# Patient Record
Sex: FEMALE | Race: WHITE | Employment: OTHER | RURAL
[De-identification: names, ages, dates, MRNs, and addresses within clinical notes are randomized per-mention and may not be internally consistent; named-entity substitution may affect disease eponyms.]

---

## 2017-01-24 RX ORDER — LEVOTHYROXINE SODIUM 175 UG/1
TABLET ORAL
Qty: 30 TABLET | Refills: 3 | Status: SHIPPED | OUTPATIENT
Start: 2017-01-24 | End: 2017-06-05 | Stop reason: SDUPTHER

## 2017-02-01 ENCOUNTER — OFFICE VISIT (OUTPATIENT)
Dept: PRIMARY CARE CLINIC | Age: 52
End: 2017-02-01
Payer: MEDICARE

## 2017-02-01 VITALS
OXYGEN SATURATION: 94 % | WEIGHT: 242 LBS | RESPIRATION RATE: 20 BRPM | DIASTOLIC BLOOD PRESSURE: 84 MMHG | BODY MASS INDEX: 40.27 KG/M2 | HEART RATE: 110 BPM | SYSTOLIC BLOOD PRESSURE: 124 MMHG

## 2017-02-01 DIAGNOSIS — E66.9 OBESITY, UNSPECIFIED OBESITY SEVERITY, UNSPECIFIED OBESITY TYPE: Primary | ICD-10-CM

## 2017-02-01 DIAGNOSIS — G25.81 RLS (RESTLESS LEGS SYNDROME): ICD-10-CM

## 2017-02-01 DIAGNOSIS — F41.9 ANXIETY: ICD-10-CM

## 2017-02-01 DIAGNOSIS — I10 ESSENTIAL HYPERTENSION: ICD-10-CM

## 2017-02-01 PROCEDURE — G8427 DOCREV CUR MEDS BY ELIG CLIN: HCPCS | Performed by: INTERNAL MEDICINE

## 2017-02-01 PROCEDURE — 3017F COLORECTAL CA SCREEN DOC REV: CPT | Performed by: INTERNAL MEDICINE

## 2017-02-01 PROCEDURE — 1036F TOBACCO NON-USER: CPT | Performed by: INTERNAL MEDICINE

## 2017-02-01 PROCEDURE — 3014F SCREEN MAMMO DOC REV: CPT | Performed by: INTERNAL MEDICINE

## 2017-02-01 PROCEDURE — G8484 FLU IMMUNIZE NO ADMIN: HCPCS | Performed by: INTERNAL MEDICINE

## 2017-02-01 PROCEDURE — 99213 OFFICE O/P EST LOW 20 MIN: CPT | Performed by: INTERNAL MEDICINE

## 2017-02-01 PROCEDURE — G8419 CALC BMI OUT NRM PARAM NOF/U: HCPCS | Performed by: INTERNAL MEDICINE

## 2017-02-01 RX ORDER — ROPINIROLE 0.5 MG/1
0.5 TABLET, FILM COATED ORAL NIGHTLY
Qty: 30 TABLET | Refills: 1 | Status: SHIPPED | OUTPATIENT
Start: 2017-02-01 | End: 2017-04-03 | Stop reason: SDUPTHER

## 2017-02-01 RX ORDER — IBUPROFEN 800 MG/1
TABLET ORAL
Refills: 0 | COMMUNITY
Start: 2016-12-30 | End: 2021-03-23

## 2017-02-01 ASSESSMENT — ENCOUNTER SYMPTOMS
NAUSEA: 0
WHEEZING: 1
EYE DISCHARGE: 0
SINUS PRESSURE: 0
ABDOMINAL PAIN: 0
VOMITING: 0
SORE THROAT: 0
BACK PAIN: 0
SHORTNESS OF BREATH: 1
COUGH: 1

## 2017-02-02 ENCOUNTER — NURSE ONLY (OUTPATIENT)
Dept: PRIMARY CARE CLINIC | Age: 52
End: 2017-02-02
Payer: MEDICARE

## 2017-02-02 ENCOUNTER — HOSPITAL ENCOUNTER (OUTPATIENT)
Dept: OTHER | Age: 52
Discharge: HOME | End: 2017-02-02
Attending: INTERNAL MEDICINE | Admitting: INTERNAL MEDICINE

## 2017-02-02 DIAGNOSIS — G25.81 RLS (RESTLESS LEGS SYNDROME): ICD-10-CM

## 2017-02-02 DIAGNOSIS — E78.5 HYPERLIPIDEMIA, UNSPECIFIED HYPERLIPIDEMIA TYPE: ICD-10-CM

## 2017-02-02 DIAGNOSIS — R30.0 DYSURIA: Primary | ICD-10-CM

## 2017-02-02 DIAGNOSIS — I10 ESSENTIAL HYPERTENSION: ICD-10-CM

## 2017-02-02 DIAGNOSIS — E03.9 HYPOTHYROIDISM, UNSPECIFIED TYPE: ICD-10-CM

## 2017-02-02 LAB
A/G RATIO: 1.8 (ref 0.8–2)
ALBUMIN SERPL-MCNC: 4.4 G/DL (ref 3.4–4.8)
ALP BLD-CCNC: 167 U/L (ref 25–100)
ALT SERPL-CCNC: 26 U/L (ref 4–36)
ANION GAP SERPL CALCULATED.3IONS-SCNC: 11 MMOL/L (ref 3–16)
AST SERPL-CCNC: 25 U/L (ref 8–33)
BILIRUB SERPL-MCNC: 0.4 MG/DL (ref 0.3–1.2)
BILIRUBIN, POC: NORMAL
BLOOD URINE, POC: NORMAL
BUN BLDV-MCNC: 13 MG/DL (ref 6–20)
CALCIUM SERPL-MCNC: 9.4 MG/DL (ref 8.5–10.5)
CHLORIDE BLD-SCNC: 102 MMOL/L (ref 98–107)
CHOLESTEROL, TOTAL: 214 MG/DL (ref 0–200)
CLARITY, POC: NORMAL
CO2: 29 MMOL/L (ref 20–30)
COLOR, POC: YELLOW
CREAT SERPL-MCNC: 0.7 MG/DL (ref 0.4–1.2)
GFR AFRICAN AMERICAN: >59
GFR NON-AFRICAN AMERICAN: >60
GLOBULIN: 2.5 G/DL
GLUCOSE BLD-MCNC: 95 MG/DL (ref 74–106)
GLUCOSE URINE, POC: NORMAL
HDLC SERPL-MCNC: 60 MG/DL (ref 40–60)
KETONES, POC: NORMAL
LDL CHOLESTEROL CALCULATED: 134 MG/DL
LEUKOCYTE EST, POC: NORMAL
MAGNESIUM: 2.4 MG/DL (ref 1.7–2.4)
NITRITE, POC: NORMAL
PH, POC: 8
POTASSIUM SERPL-SCNC: 4.3 MMOL/L (ref 3.4–5.1)
PROTEIN, POC: NORMAL
SODIUM BLD-SCNC: 142 MMOL/L (ref 136–145)
SPECIFIC GRAVITY, POC: 1.01
TOTAL PROTEIN: 6.9 G/DL (ref 6.4–8.3)
TRIGL SERPL-MCNC: 98 MG/DL (ref 0–249)
TSH SERPL DL<=0.05 MIU/L-ACNC: 2.81 UIU/ML (ref 0.35–5.5)
UROBILINOGEN, POC: 0.2
VLDLC SERPL CALC-MCNC: 20 MG/DL

## 2017-02-02 PROCEDURE — 81002 URINALYSIS NONAUTO W/O SCOPE: CPT | Performed by: INTERNAL MEDICINE

## 2017-02-22 RX ORDER — VENLAFAXINE HYDROCHLORIDE 75 MG/1
225 CAPSULE, EXTENDED RELEASE ORAL DAILY
Qty: 90 CAPSULE | Refills: 2 | Status: SHIPPED | OUTPATIENT
Start: 2017-02-22 | End: 2017-06-08 | Stop reason: SDUPTHER

## 2017-02-22 RX ORDER — VENLAFAXINE HYDROCHLORIDE 75 MG/1
225 CAPSULE, EXTENDED RELEASE ORAL DAILY
Qty: 90 CAPSULE | Refills: 2 | Status: CANCELLED | OUTPATIENT
Start: 2017-02-22

## 2017-02-25 ENCOUNTER — OFFICE VISIT (OUTPATIENT)
Dept: PRIMARY CARE CLINIC | Age: 52
End: 2017-02-25
Payer: MEDICARE

## 2017-02-25 VITALS
TEMPERATURE: 98.2 F | RESPIRATION RATE: 24 BRPM | BODY MASS INDEX: 40.32 KG/M2 | DIASTOLIC BLOOD PRESSURE: 84 MMHG | HEIGHT: 65 IN | HEART RATE: 105 BPM | OXYGEN SATURATION: 95 % | WEIGHT: 242 LBS | SYSTOLIC BLOOD PRESSURE: 132 MMHG

## 2017-02-25 DIAGNOSIS — J44.1 COPD WITH ACUTE EXACERBATION (HCC): Primary | ICD-10-CM

## 2017-02-25 DIAGNOSIS — J84.9 INTERSTITIAL LUNG DISEASE (HCC): ICD-10-CM

## 2017-02-25 PROCEDURE — 1036F TOBACCO NON-USER: CPT | Performed by: NURSE PRACTITIONER

## 2017-02-25 PROCEDURE — G8926 SPIRO NO PERF OR DOC: HCPCS | Performed by: NURSE PRACTITIONER

## 2017-02-25 PROCEDURE — 3023F SPIROM DOC REV: CPT | Performed by: NURSE PRACTITIONER

## 2017-02-25 PROCEDURE — 3014F SCREEN MAMMO DOC REV: CPT | Performed by: NURSE PRACTITIONER

## 2017-02-25 PROCEDURE — G8417 CALC BMI ABV UP PARAM F/U: HCPCS | Performed by: NURSE PRACTITIONER

## 2017-02-25 PROCEDURE — 94640 AIRWAY INHALATION TREATMENT: CPT | Performed by: NURSE PRACTITIONER

## 2017-02-25 PROCEDURE — 3017F COLORECTAL CA SCREEN DOC REV: CPT | Performed by: NURSE PRACTITIONER

## 2017-02-25 PROCEDURE — G8427 DOCREV CUR MEDS BY ELIG CLIN: HCPCS | Performed by: NURSE PRACTITIONER

## 2017-02-25 PROCEDURE — 99212 OFFICE O/P EST SF 10 MIN: CPT | Performed by: NURSE PRACTITIONER

## 2017-02-25 PROCEDURE — G8484 FLU IMMUNIZE NO ADMIN: HCPCS | Performed by: NURSE PRACTITIONER

## 2017-02-25 RX ORDER — IPRATROPIUM BROMIDE AND ALBUTEROL SULFATE 2.5; .5 MG/3ML; MG/3ML
1 SOLUTION RESPIRATORY (INHALATION) ONCE
Status: COMPLETED | OUTPATIENT
Start: 2017-02-25 | End: 2017-02-25

## 2017-02-25 RX ADMIN — IPRATROPIUM BROMIDE AND ALBUTEROL SULFATE 1 AMPULE: 2.5; .5 SOLUTION RESPIRATORY (INHALATION) at 12:49

## 2017-02-25 ASSESSMENT — ENCOUNTER SYMPTOMS
GASTROINTESTINAL NEGATIVE: 1
WHEEZING: 1
SHORTNESS OF BREATH: 1
ORTHOPNEA: 1
CHEST TIGHTNESS: 1
EYES NEGATIVE: 1

## 2017-03-03 ENCOUNTER — CARE COORDINATION (OUTPATIENT)
Dept: CARE COORDINATION | Age: 52
End: 2017-03-03

## 2017-03-06 RX ORDER — PANTOPRAZOLE SODIUM 40 MG/1
TABLET, DELAYED RELEASE ORAL
Qty: 30 TABLET | Refills: 3 | Status: SHIPPED | OUTPATIENT
Start: 2017-03-06 | End: 2017-07-01 | Stop reason: SDUPTHER

## 2017-03-08 ENCOUNTER — OFFICE VISIT (OUTPATIENT)
Dept: PRIMARY CARE CLINIC | Age: 52
End: 2017-03-08
Payer: MEDICARE

## 2017-03-08 VITALS
HEART RATE: 90 BPM | WEIGHT: 245 LBS | OXYGEN SATURATION: 98 % | BODY MASS INDEX: 40.77 KG/M2 | RESPIRATION RATE: 20 BRPM | DIASTOLIC BLOOD PRESSURE: 66 MMHG | SYSTOLIC BLOOD PRESSURE: 112 MMHG

## 2017-03-08 DIAGNOSIS — J44.9 CHRONIC OBSTRUCTIVE PULMONARY DISEASE, UNSPECIFIED COPD TYPE (HCC): Primary | ICD-10-CM

## 2017-03-08 DIAGNOSIS — I10 ESSENTIAL HYPERTENSION: ICD-10-CM

## 2017-03-08 DIAGNOSIS — E78.5 HYPERLIPIDEMIA, UNSPECIFIED HYPERLIPIDEMIA TYPE: ICD-10-CM

## 2017-03-08 PROCEDURE — 99495 TRANSJ CARE MGMT MOD F2F 14D: CPT | Performed by: INTERNAL MEDICINE

## 2017-03-08 RX ORDER — POTASSIUM CHLORIDE 750 MG/1
10 TABLET, FILM COATED, EXTENDED RELEASE ORAL 2 TIMES DAILY
Qty: 60 TABLET | Refills: 2 | Status: SHIPPED | OUTPATIENT
Start: 2017-03-08 | End: 2017-06-08 | Stop reason: SDUPTHER

## 2017-03-08 RX ORDER — PREDNISONE 10 MG/1
10 TABLET ORAL 3 TIMES DAILY
COMMUNITY
Start: 2017-02-28 | End: 2017-08-08 | Stop reason: ALTCHOICE

## 2017-03-08 ASSESSMENT — ENCOUNTER SYMPTOMS
BACK PAIN: 0
NAUSEA: 0
COUGH: 1
SORE THROAT: 0
VOMITING: 0
EYE DISCHARGE: 0
ABDOMINAL PAIN: 0
SINUS PRESSURE: 0
SHORTNESS OF BREATH: 1
WHEEZING: 1

## 2017-04-03 RX ORDER — ROPINIROLE 0.5 MG/1
0.5 TABLET, FILM COATED ORAL NIGHTLY
Qty: 30 TABLET | Refills: 1 | Status: SHIPPED | OUTPATIENT
Start: 2017-04-03 | End: 2017-05-31 | Stop reason: SDUPTHER

## 2017-05-05 DIAGNOSIS — I10 ESSENTIAL HYPERTENSION: ICD-10-CM

## 2017-05-05 RX ORDER — HYDROCHLOROTHIAZIDE 25 MG/1
TABLET ORAL
Qty: 30 TABLET | Refills: 3 | Status: SHIPPED | OUTPATIENT
Start: 2017-05-05 | End: 2017-08-28 | Stop reason: SDUPTHER

## 2017-05-31 RX ORDER — ROPINIROLE 0.5 MG/1
0.5 TABLET, FILM COATED ORAL NIGHTLY
Qty: 30 TABLET | Refills: 1 | Status: SHIPPED | OUTPATIENT
Start: 2017-05-31 | End: 2017-07-31 | Stop reason: SDUPTHER

## 2017-06-02 ENCOUNTER — HOSPITAL ENCOUNTER (OUTPATIENT)
Dept: OTHER | Age: 52
Discharge: HOME | End: 2017-06-02
Attending: INTERNAL MEDICINE | Admitting: INTERNAL MEDICINE

## 2017-06-02 DIAGNOSIS — E78.5 HYPERLIPIDEMIA, UNSPECIFIED HYPERLIPIDEMIA TYPE: ICD-10-CM

## 2017-06-02 DIAGNOSIS — I10 ESSENTIAL HYPERTENSION: ICD-10-CM

## 2017-06-02 DIAGNOSIS — J44.9 CHRONIC OBSTRUCTIVE PULMONARY DISEASE, UNSPECIFIED COPD TYPE (HCC): ICD-10-CM

## 2017-06-02 LAB
A/G RATIO: 1.5 (ref 0.8–2)
ALBUMIN SERPL-MCNC: 4.3 G/DL (ref 3.4–4.8)
ALP BLD-CCNC: 155 U/L (ref 25–100)
ALT SERPL-CCNC: 46 U/L (ref 4–36)
ANION GAP SERPL CALCULATED.3IONS-SCNC: 13 MMOL/L (ref 3–16)
AST SERPL-CCNC: 44 U/L (ref 8–33)
BASOPHILS ABSOLUTE: 0 K/UL (ref 0–0.1)
BASOPHILS RELATIVE PERCENT: 0.1 %
BILIRUB SERPL-MCNC: 0.3 MG/DL (ref 0.3–1.2)
BUN BLDV-MCNC: 18 MG/DL (ref 6–20)
CALCIUM SERPL-MCNC: 9.5 MG/DL (ref 8.5–10.5)
CHLORIDE BLD-SCNC: 99 MMOL/L (ref 98–107)
CHOLESTEROL, TOTAL: 205 MG/DL (ref 0–200)
CO2: 30 MMOL/L (ref 20–30)
CREAT SERPL-MCNC: 0.7 MG/DL (ref 0.4–1.2)
EOSINOPHILS ABSOLUTE: 0 K/UL (ref 0–0.4)
EOSINOPHILS RELATIVE PERCENT: 0 %
GFR AFRICAN AMERICAN: >59
GFR NON-AFRICAN AMERICAN: >60
GLOBULIN: 2.8 G/DL
GLUCOSE BLD-MCNC: 107 MG/DL (ref 74–106)
HCT VFR BLD CALC: 43.2 % (ref 37–47)
HDLC SERPL-MCNC: 53 MG/DL (ref 40–60)
HEMOGLOBIN: 13.9 G/DL (ref 11.5–16.5)
LDL CHOLESTEROL CALCULATED: 131 MG/DL
LYMPHOCYTES ABSOLUTE: 1.9 K/UL (ref 1.5–4)
LYMPHOCYTES RELATIVE PERCENT: 25 % (ref 20–40)
MCH RBC QN AUTO: 30.8 PG (ref 27–32)
MCHC RBC AUTO-ENTMCNC: 32.2 G/DL (ref 31–35)
MCV RBC AUTO: 95.8 FL (ref 80–100)
MONOCYTES ABSOLUTE: 0.6 K/UL (ref 0.2–0.8)
MONOCYTES RELATIVE PERCENT: 7.8 % (ref 3–10)
NEUTROPHILS ABSOLUTE: 5.1 K/UL (ref 2–7.5)
NEUTROPHILS RELATIVE PERCENT: 67.1 %
PDW BLD-RTO: 13.3 % (ref 11–16)
PLATELET # BLD: 308 K/UL (ref 150–400)
PMV BLD AUTO: 11.1 FL (ref 6–10)
POTASSIUM SERPL-SCNC: 4.1 MMOL/L (ref 3.4–5.1)
RBC # BLD: 4.51 M/UL (ref 3.8–5.8)
SODIUM BLD-SCNC: 142 MMOL/L (ref 136–145)
TOTAL PROTEIN: 7.1 G/DL (ref 6.4–8.3)
TRIGL SERPL-MCNC: 104 MG/DL (ref 0–249)
VLDLC SERPL CALC-MCNC: 21 MG/DL
WBC # BLD: 7.6 K/UL (ref 4–11)

## 2017-06-05 RX ORDER — LEVOTHYROXINE SODIUM 175 UG/1
TABLET ORAL
Qty: 30 TABLET | Refills: 3 | Status: ON HOLD | OUTPATIENT
Start: 2017-06-05 | End: 2017-11-03 | Stop reason: HOSPADM

## 2017-06-08 ENCOUNTER — OFFICE VISIT (OUTPATIENT)
Dept: PRIMARY CARE CLINIC | Age: 52
End: 2017-06-08
Payer: MEDICARE

## 2017-06-08 VITALS
HEART RATE: 104 BPM | OXYGEN SATURATION: 97 % | RESPIRATION RATE: 18 BRPM | BODY MASS INDEX: 41.07 KG/M2 | SYSTOLIC BLOOD PRESSURE: 112 MMHG | DIASTOLIC BLOOD PRESSURE: 68 MMHG | WEIGHT: 246.8 LBS

## 2017-06-08 DIAGNOSIS — E78.5 HYPERLIPIDEMIA, UNSPECIFIED HYPERLIPIDEMIA TYPE: ICD-10-CM

## 2017-06-08 DIAGNOSIS — J84.9 INTERSTITIAL LUNG DISEASE (HCC): Primary | ICD-10-CM

## 2017-06-08 DIAGNOSIS — E03.9 HYPOTHYROIDISM, UNSPECIFIED TYPE: ICD-10-CM

## 2017-06-08 DIAGNOSIS — R73.9 HYPERGLYCEMIA: ICD-10-CM

## 2017-06-08 LAB — HBA1C MFR BLD: 5.4 %

## 2017-06-08 PROCEDURE — 3017F COLORECTAL CA SCREEN DOC REV: CPT | Performed by: INTERNAL MEDICINE

## 2017-06-08 PROCEDURE — G8427 DOCREV CUR MEDS BY ELIG CLIN: HCPCS | Performed by: INTERNAL MEDICINE

## 2017-06-08 PROCEDURE — 99213 OFFICE O/P EST LOW 20 MIN: CPT | Performed by: INTERNAL MEDICINE

## 2017-06-08 PROCEDURE — 1036F TOBACCO NON-USER: CPT | Performed by: INTERNAL MEDICINE

## 2017-06-08 PROCEDURE — G8417 CALC BMI ABV UP PARAM F/U: HCPCS | Performed by: INTERNAL MEDICINE

## 2017-06-08 PROCEDURE — 83036 HEMOGLOBIN GLYCOSYLATED A1C: CPT | Performed by: INTERNAL MEDICINE

## 2017-06-08 PROCEDURE — 3014F SCREEN MAMMO DOC REV: CPT | Performed by: INTERNAL MEDICINE

## 2017-06-08 RX ORDER — VENLAFAXINE HYDROCHLORIDE 75 MG/1
225 CAPSULE, EXTENDED RELEASE ORAL DAILY
Qty: 90 CAPSULE | Refills: 2 | Status: SHIPPED | OUTPATIENT
Start: 2017-06-08 | End: 2017-09-14 | Stop reason: SDUPTHER

## 2017-06-08 RX ORDER — ACYCLOVIR 400 MG/1
400 TABLET ORAL DAILY
Qty: 30 TABLET | Refills: 2 | Status: SHIPPED | OUTPATIENT
Start: 2017-06-08 | End: 2017-09-25 | Stop reason: SDUPTHER

## 2017-06-08 RX ORDER — CETIRIZINE HYDROCHLORIDE 10 MG/1
10 TABLET ORAL DAILY
Qty: 30 TABLET | Refills: 3 | Status: SHIPPED | OUTPATIENT
Start: 2017-06-08 | End: 2020-07-22 | Stop reason: SDUPTHER

## 2017-06-08 RX ORDER — POTASSIUM CHLORIDE 750 MG/1
10 TABLET, FILM COATED, EXTENDED RELEASE ORAL 2 TIMES DAILY
Qty: 60 TABLET | Refills: 2 | Status: SHIPPED | OUTPATIENT
Start: 2017-06-08 | End: 2017-09-25 | Stop reason: SDUPTHER

## 2017-06-08 ASSESSMENT — PATIENT HEALTH QUESTIONNAIRE - PHQ9
1. LITTLE INTEREST OR PLEASURE IN DOING THINGS: 0
2. FEELING DOWN, DEPRESSED OR HOPELESS: 1
SUM OF ALL RESPONSES TO PHQ QUESTIONS 1-9: 1
SUM OF ALL RESPONSES TO PHQ9 QUESTIONS 1 & 2: 1

## 2017-06-08 ASSESSMENT — ENCOUNTER SYMPTOMS
COUGH: 1
SINUS PRESSURE: 0
SORE THROAT: 0
NAUSEA: 0
BACK PAIN: 0
WHEEZING: 1
EYE DISCHARGE: 0
ABDOMINAL PAIN: 0
SHORTNESS OF BREATH: 1
VOMITING: 0

## 2017-07-05 RX ORDER — PANTOPRAZOLE SODIUM 40 MG/1
TABLET, DELAYED RELEASE ORAL
Qty: 30 TABLET | Refills: 3 | Status: SHIPPED | OUTPATIENT
Start: 2017-07-05 | End: 2017-11-11 | Stop reason: SDUPTHER

## 2017-07-31 RX ORDER — ROPINIROLE 0.5 MG/1
TABLET, FILM COATED ORAL
Qty: 30 TABLET | Refills: 1 | Status: SHIPPED | OUTPATIENT
Start: 2017-07-31 | End: 2017-08-08 | Stop reason: ALTCHOICE

## 2017-08-08 ENCOUNTER — OFFICE VISIT (OUTPATIENT)
Dept: PRIMARY CARE CLINIC | Age: 52
End: 2017-08-08
Payer: MEDICARE

## 2017-08-08 VITALS
RESPIRATION RATE: 18 BRPM | BODY MASS INDEX: 39.71 KG/M2 | HEART RATE: 83 BPM | WEIGHT: 238.6 LBS | SYSTOLIC BLOOD PRESSURE: 116 MMHG | OXYGEN SATURATION: 98 % | DIASTOLIC BLOOD PRESSURE: 64 MMHG

## 2017-08-08 DIAGNOSIS — E03.9 HYPOTHYROIDISM, UNSPECIFIED TYPE: ICD-10-CM

## 2017-08-08 DIAGNOSIS — I10 ESSENTIAL HYPERTENSION: ICD-10-CM

## 2017-08-08 DIAGNOSIS — J84.9 INTERSTITIAL LUNG DISEASE (HCC): ICD-10-CM

## 2017-08-08 DIAGNOSIS — E66.9 OBESITY, UNSPECIFIED OBESITY SEVERITY, UNSPECIFIED OBESITY TYPE: Primary | ICD-10-CM

## 2017-08-08 DIAGNOSIS — R79.89 ELEVATED LFTS: ICD-10-CM

## 2017-08-08 PROCEDURE — 1036F TOBACCO NON-USER: CPT | Performed by: INTERNAL MEDICINE

## 2017-08-08 PROCEDURE — 99213 OFFICE O/P EST LOW 20 MIN: CPT | Performed by: INTERNAL MEDICINE

## 2017-08-08 PROCEDURE — G8427 DOCREV CUR MEDS BY ELIG CLIN: HCPCS | Performed by: INTERNAL MEDICINE

## 2017-08-08 PROCEDURE — G8417 CALC BMI ABV UP PARAM F/U: HCPCS | Performed by: INTERNAL MEDICINE

## 2017-08-08 PROCEDURE — 3014F SCREEN MAMMO DOC REV: CPT | Performed by: INTERNAL MEDICINE

## 2017-08-08 PROCEDURE — 3017F COLORECTAL CA SCREEN DOC REV: CPT | Performed by: INTERNAL MEDICINE

## 2017-08-08 RX ORDER — PHENTERMINE HYDROCHLORIDE 37.5 MG/1
37.5 TABLET ORAL
Qty: 30 TABLET | Refills: 0 | Status: SHIPPED | OUTPATIENT
Start: 2017-08-08 | End: 2017-09-19 | Stop reason: SDUPTHER

## 2017-08-08 ASSESSMENT — ENCOUNTER SYMPTOMS
NAUSEA: 0
WHEEZING: 1
EYE DISCHARGE: 0
BACK PAIN: 0
SORE THROAT: 0
VOMITING: 0
SINUS PRESSURE: 0
ABDOMINAL PAIN: 0
SHORTNESS OF BREATH: 1
COUGH: 1

## 2017-08-28 DIAGNOSIS — I10 ESSENTIAL HYPERTENSION: ICD-10-CM

## 2017-08-28 RX ORDER — HYDROCHLOROTHIAZIDE 25 MG/1
TABLET ORAL
Qty: 30 TABLET | Refills: 3 | Status: SHIPPED | OUTPATIENT
Start: 2017-08-28 | End: 2018-04-12 | Stop reason: SDUPTHER

## 2017-09-14 RX ORDER — VENLAFAXINE HYDROCHLORIDE 75 MG/1
CAPSULE, EXTENDED RELEASE ORAL
Qty: 90 CAPSULE | Refills: 2 | Status: SHIPPED | OUTPATIENT
Start: 2017-09-14 | End: 2018-01-17 | Stop reason: SDUPTHER

## 2017-09-19 ENCOUNTER — OFFICE VISIT (OUTPATIENT)
Dept: PRIMARY CARE CLINIC | Age: 52
End: 2017-09-19
Payer: MEDICARE

## 2017-09-19 VITALS
WEIGHT: 231 LBS | RESPIRATION RATE: 18 BRPM | BODY MASS INDEX: 38.44 KG/M2 | SYSTOLIC BLOOD PRESSURE: 132 MMHG | HEART RATE: 92 BPM | OXYGEN SATURATION: 96 % | DIASTOLIC BLOOD PRESSURE: 70 MMHG

## 2017-09-19 DIAGNOSIS — E66.9 OBESITY, UNSPECIFIED OBESITY SEVERITY, UNSPECIFIED OBESITY TYPE: Primary | ICD-10-CM

## 2017-09-19 DIAGNOSIS — I10 ESSENTIAL HYPERTENSION: ICD-10-CM

## 2017-09-19 PROCEDURE — G8427 DOCREV CUR MEDS BY ELIG CLIN: HCPCS | Performed by: INTERNAL MEDICINE

## 2017-09-19 PROCEDURE — 3014F SCREEN MAMMO DOC REV: CPT | Performed by: INTERNAL MEDICINE

## 2017-09-19 PROCEDURE — G8417 CALC BMI ABV UP PARAM F/U: HCPCS | Performed by: INTERNAL MEDICINE

## 2017-09-19 PROCEDURE — 3017F COLORECTAL CA SCREEN DOC REV: CPT | Performed by: INTERNAL MEDICINE

## 2017-09-19 PROCEDURE — 99213 OFFICE O/P EST LOW 20 MIN: CPT | Performed by: INTERNAL MEDICINE

## 2017-09-19 PROCEDURE — 1036F TOBACCO NON-USER: CPT | Performed by: INTERNAL MEDICINE

## 2017-09-19 RX ORDER — ROPINIROLE 0.5 MG/1
1 TABLET, FILM COATED ORAL NIGHTLY PRN
Refills: 0 | COMMUNITY
Start: 2017-09-13 | End: 2017-11-09 | Stop reason: ALTCHOICE

## 2017-09-19 RX ORDER — PHENTERMINE HYDROCHLORIDE 37.5 MG/1
37.5 TABLET ORAL
Qty: 30 TABLET | Refills: 0 | Status: SHIPPED | OUTPATIENT
Start: 2017-09-19 | End: 2017-12-07 | Stop reason: SDUPTHER

## 2017-09-19 ASSESSMENT — ENCOUNTER SYMPTOMS
ABDOMINAL PAIN: 0
WHEEZING: 1
NAUSEA: 0
SINUS PRESSURE: 0
COUGH: 1
SHORTNESS OF BREATH: 1
BACK PAIN: 0
EYE DISCHARGE: 0
VOMITING: 0
SORE THROAT: 0

## 2017-09-25 RX ORDER — POTASSIUM CHLORIDE 750 MG/1
TABLET, FILM COATED, EXTENDED RELEASE ORAL
Qty: 60 TABLET | Refills: 2 | Status: SHIPPED | OUTPATIENT
Start: 2017-09-25 | End: 2018-01-03 | Stop reason: SDUPTHER

## 2017-09-25 RX ORDER — ACYCLOVIR 400 MG/1
TABLET ORAL
Qty: 30 TABLET | Refills: 2 | Status: SHIPPED | OUTPATIENT
Start: 2017-09-25 | End: 2017-12-07 | Stop reason: ALTCHOICE

## 2017-10-09 RX ORDER — ROPINIROLE 0.5 MG/1
TABLET, FILM COATED ORAL
Qty: 30 TABLET | Refills: 1 | Status: SHIPPED | OUTPATIENT
Start: 2017-10-09 | End: 2017-10-20 | Stop reason: SDUPTHER

## 2017-10-20 ENCOUNTER — OFFICE VISIT (OUTPATIENT)
Dept: PRIMARY CARE CLINIC | Age: 52
End: 2017-10-20
Payer: MEDICARE

## 2017-10-20 VITALS
SYSTOLIC BLOOD PRESSURE: 128 MMHG | DIASTOLIC BLOOD PRESSURE: 86 MMHG | OXYGEN SATURATION: 96 % | WEIGHT: 227 LBS | TEMPERATURE: 97 F | HEIGHT: 65 IN | HEART RATE: 136 BPM | RESPIRATION RATE: 20 BRPM | BODY MASS INDEX: 37.82 KG/M2

## 2017-10-20 DIAGNOSIS — J44.1 COPD WITH ACUTE EXACERBATION (HCC): Primary | ICD-10-CM

## 2017-10-20 PROCEDURE — G8427 DOCREV CUR MEDS BY ELIG CLIN: HCPCS | Performed by: FAMILY MEDICINE

## 2017-10-20 PROCEDURE — 3014F SCREEN MAMMO DOC REV: CPT | Performed by: FAMILY MEDICINE

## 2017-10-20 PROCEDURE — G8926 SPIRO NO PERF OR DOC: HCPCS | Performed by: FAMILY MEDICINE

## 2017-10-20 PROCEDURE — 96372 THER/PROPH/DIAG INJ SC/IM: CPT | Performed by: FAMILY MEDICINE

## 2017-10-20 PROCEDURE — G8484 FLU IMMUNIZE NO ADMIN: HCPCS | Performed by: FAMILY MEDICINE

## 2017-10-20 PROCEDURE — 99214 OFFICE O/P EST MOD 30 MIN: CPT | Performed by: FAMILY MEDICINE

## 2017-10-20 PROCEDURE — 3017F COLORECTAL CA SCREEN DOC REV: CPT | Performed by: FAMILY MEDICINE

## 2017-10-20 PROCEDURE — G8417 CALC BMI ABV UP PARAM F/U: HCPCS | Performed by: FAMILY MEDICINE

## 2017-10-20 PROCEDURE — 1036F TOBACCO NON-USER: CPT | Performed by: FAMILY MEDICINE

## 2017-10-20 PROCEDURE — 3023F SPIROM DOC REV: CPT | Performed by: FAMILY MEDICINE

## 2017-10-20 RX ORDER — CEFDINIR 300 MG/1
300 CAPSULE ORAL 2 TIMES DAILY
Qty: 20 CAPSULE | Refills: 0 | Status: SHIPPED | OUTPATIENT
Start: 2017-10-20 | End: 2017-10-30

## 2017-10-20 RX ORDER — HYDROCODONE POLISTIREX AND CHLORPHENIRAMINE POLISTIREX 10; 8 MG/5ML; MG/5ML
5 SUSPENSION, EXTENDED RELEASE ORAL EVERY 12 HOURS PRN
Qty: 140 ML | Refills: 0 | Status: ON HOLD | OUTPATIENT
Start: 2017-10-20 | End: 2017-11-03 | Stop reason: HOSPADM

## 2017-10-20 RX ORDER — METHYLPREDNISOLONE SODIUM SUCCINATE 125 MG/2ML
125 INJECTION, POWDER, LYOPHILIZED, FOR SOLUTION INTRAMUSCULAR; INTRAVENOUS ONCE
Status: COMPLETED | OUTPATIENT
Start: 2017-10-20 | End: 2017-10-20

## 2017-10-20 RX ORDER — METHYLPREDNISOLONE 4 MG/1
TABLET ORAL
Qty: 1 KIT | Refills: 0 | Status: SHIPPED | OUTPATIENT
Start: 2017-10-20 | End: 2017-10-26

## 2017-10-20 RX ORDER — CEFTRIAXONE 500 MG/1
1000 INJECTION, POWDER, FOR SOLUTION INTRAMUSCULAR; INTRAVENOUS ONCE
Status: COMPLETED | OUTPATIENT
Start: 2017-10-20 | End: 2017-10-20

## 2017-10-20 RX ADMIN — METHYLPREDNISOLONE SODIUM SUCCINATE 125 MG: 125 INJECTION, POWDER, LYOPHILIZED, FOR SOLUTION INTRAMUSCULAR; INTRAVENOUS at 16:10

## 2017-10-20 RX ADMIN — CEFTRIAXONE 1000 MG: 500 INJECTION, POWDER, FOR SOLUTION INTRAMUSCULAR; INTRAVENOUS at 16:09

## 2017-10-20 NOTE — PROGRESS NOTES
After obtaining consent, and per orders of ,  injection of rocephin and solumedrol given in bilateral gluteal by Ruthy Homans. Patient instructed to remain in clinic for 20 minutes afterwards, and to report any adverse reaction to me immediately.

## 2017-10-28 PROBLEM — F41.9 ANXIETY: Status: ACTIVE | Noted: 2017-10-28

## 2017-10-28 PROBLEM — J96.21 ACUTE ON CHRONIC RESPIRATORY FAILURE WITH HYPOXIA (HCC): Status: ACTIVE | Noted: 2017-10-28

## 2017-10-28 PROBLEM — D80.3 IGG DEFICIENCY (HCC): Status: ACTIVE | Noted: 2017-10-28

## 2017-10-29 PROBLEM — K14.0 GLOSSITIS: Status: ACTIVE | Noted: 2017-10-29

## 2017-11-01 PROBLEM — R53.81 DECLINING FUNCTIONAL STATUS: Status: ACTIVE | Noted: 2017-11-01

## 2017-11-03 ENCOUNTER — TELEPHONE (OUTPATIENT)
Dept: PRIMARY CARE CLINIC | Age: 52
End: 2017-11-03

## 2017-11-03 PROBLEM — G47.00 INSOMNIA: Status: ACTIVE | Noted: 2017-11-03

## 2017-11-03 NOTE — TELEPHONE ENCOUNTER
Pt called stating she just got out of the hospital and has been on so many antibiotic she has thrush in her mouth and she said she thought you were going to send her some nystatin rinse but she did not get it.

## 2017-11-05 ASSESSMENT — ENCOUNTER SYMPTOMS
BACK PAIN: 0
SORE THROAT: 1
COUGH: 1
VOMITING: 0
NAUSEA: 0
SINUS PRESSURE: 0
SHORTNESS OF BREATH: 1
ABDOMINAL PAIN: 0
WHEEZING: 1

## 2017-11-07 ENCOUNTER — CARE COORDINATION (OUTPATIENT)
Dept: CARE COORDINATION | Age: 52
End: 2017-11-07

## 2017-11-09 ENCOUNTER — OFFICE VISIT (OUTPATIENT)
Dept: PRIMARY CARE CLINIC | Age: 52
End: 2017-11-09
Payer: MEDICARE

## 2017-11-09 VITALS
OXYGEN SATURATION: 99 % | HEART RATE: 69 BPM | SYSTOLIC BLOOD PRESSURE: 128 MMHG | WEIGHT: 235.8 LBS | BODY MASS INDEX: 39.24 KG/M2 | DIASTOLIC BLOOD PRESSURE: 70 MMHG | RESPIRATION RATE: 18 BRPM

## 2017-11-09 DIAGNOSIS — I10 ESSENTIAL HYPERTENSION: ICD-10-CM

## 2017-11-09 DIAGNOSIS — J84.9 INTERSTITIAL LUNG DISEASE (HCC): Primary | ICD-10-CM

## 2017-11-09 DIAGNOSIS — Z12.31 ENCOUNTER FOR SCREENING MAMMOGRAM FOR BREAST CANCER: ICD-10-CM

## 2017-11-09 PROCEDURE — 3014F SCREEN MAMMO DOC REV: CPT | Performed by: INTERNAL MEDICINE

## 2017-11-09 PROCEDURE — G8427 DOCREV CUR MEDS BY ELIG CLIN: HCPCS | Performed by: INTERNAL MEDICINE

## 2017-11-09 PROCEDURE — 1111F DSCHRG MED/CURRENT MED MERGE: CPT | Performed by: INTERNAL MEDICINE

## 2017-11-09 PROCEDURE — G8484 FLU IMMUNIZE NO ADMIN: HCPCS | Performed by: INTERNAL MEDICINE

## 2017-11-09 PROCEDURE — 1036F TOBACCO NON-USER: CPT | Performed by: INTERNAL MEDICINE

## 2017-11-09 PROCEDURE — 99213 OFFICE O/P EST LOW 20 MIN: CPT | Performed by: INTERNAL MEDICINE

## 2017-11-09 PROCEDURE — G8417 CALC BMI ABV UP PARAM F/U: HCPCS | Performed by: INTERNAL MEDICINE

## 2017-11-09 PROCEDURE — 3017F COLORECTAL CA SCREEN DOC REV: CPT | Performed by: INTERNAL MEDICINE

## 2017-11-09 RX ORDER — HYDROCODONE BITARTRATE AND ACETAMINOPHEN 5; 325 MG/1; MG/1
TABLET ORAL
Refills: 0 | COMMUNITY
Start: 2017-11-03 | End: 2018-02-28 | Stop reason: ALTCHOICE

## 2017-11-09 RX ORDER — ZOLPIDEM TARTRATE 5 MG/1
1 TABLET ORAL NIGHTLY
Refills: 0 | COMMUNITY
Start: 2017-11-03 | End: 2018-02-28 | Stop reason: ALTCHOICE

## 2017-11-09 RX ORDER — FLUCONAZOLE 100 MG/1
100 TABLET ORAL DAILY
Qty: 5 TABLET | Refills: 0 | Status: SHIPPED | OUTPATIENT
Start: 2017-11-09 | End: 2017-11-14

## 2017-11-09 ASSESSMENT — ENCOUNTER SYMPTOMS
VOMITING: 0
SINUS PRESSURE: 0
NAUSEA: 0
ABDOMINAL PAIN: 0
EYE DISCHARGE: 0
BACK PAIN: 0
SORE THROAT: 0

## 2017-11-09 NOTE — PROGRESS NOTES
numbness and headaches. Hematological: Negative. Psychiatric/Behavioral: Negative for agitation and sleep disturbance. The patient is not nervous/anxious. OBJECTIVE:   Wt Readings from Last 3 Encounters:   11/09/17 235 lb 12.8 oz (107 kg)   11/03/17 232 lb 6.4 oz (105.4 kg)   10/30/17 228 lb (103.4 kg)     BP Readings from Last 3 Encounters:   11/09/17 128/70   11/03/17 129/80   10/31/17 112/61       /70 (Site: Right Arm, Position: Sitting, Cuff Size: Large Adult)   Pulse 69   Resp 18   Wt 235 lb 12.8 oz (107 kg)   SpO2 99%   BMI 39.24 kg/m²      Physical Exam   Constitutional: She is oriented to person, place, and time. She appears well-developed and well-nourished. obese, on O2   HENT:   Head: Normocephalic and atraumatic. Right Ear: External ear normal.   Left Ear: External ear normal.   Nose: Nose normal.   Eyes: Conjunctivae and EOM are normal. Pupils are equal, round, and reactive to light. Neck: Neck supple. No JVD present. No thyromegaly present. Cardiovascular: Normal rate, regular rhythm and normal heart sounds. Pulmonary/Chest: Effort normal and breath sounds normal. She has no wheezes. She has no rales. Abdominal: Soft. Bowel sounds are normal. She exhibits no distension. There is no tenderness. Musculoskeletal: She exhibits no edema or tenderness. Neurological: She is alert and oriented to person, place, and time. Skin: No rash noted. No erythema. Psychiatric: She has a normal mood and affect. Judgment normal.   Nursing note and vitals reviewed.       Lab Results   Component Value Date     11/02/2017    K 4.5 11/02/2017    CL 99 11/02/2017    CO2 35 11/02/2017    GLUCOSE 133 11/02/2017    BUN 24 11/02/2017    CREATININE 0.6 11/02/2017    CALCIUM 9.3 11/02/2017    PROT 7.0 10/28/2017    LABALBU 3.9 10/28/2017    BILITOT <0.2 10/28/2017    ALT 31 10/28/2017    AST 14 10/28/2017       Hemoglobin A1C (%)   Date Value   06/08/2017 5.4     Microscopic Examination (no units)   Date Value   02/25/2017 Not Indicated     LDL Calculated (mg/dL)   Date Value   06/02/2017 131 (H)         Lab Results   Component Value Date    WBC 15.8 11/02/2017    NEUTROABS 13.8 11/02/2017    HGB 13.8 11/02/2017    HCT 42.1 11/02/2017    MCV 94.6 11/02/2017     11/02/2017       Lab Results   Component Value Date    TSH 0.24 (L) 10/28/2017         ASSESSMENT/PLAN:     1. Interstitial lung disease (HCC)  Continue current inhalers, neb treatment and oxygen. Back to baseline. Continue to follow-up with pulmonology. Long conversation with her regarding weight loss. Review record from recent hospital stay. 2. Essential hypertension  BP is stable. I have advised her on low-sodium diet, exercise and weight control. I am going to continue current medication. Will monitor her renal function every few months, have advised her to check blood pressure frequently and to keep a record of this. 3. Class 2 obesity with serious comorbidity and body mass index (BMI) of 39.0 to 39.9 in adult, unspecified obesity type  I had a long discussion with patient regarding the risk and complication from obesity. She is aware. I have a long discussion with her about the importance of following appropriate diet and exercise on a regular basis. I have discussed with her several diet option. Work up to rule out secondary causes of obesity was done. Risks of dieting were reviewed, including fatigue, temporary hair loss, gallstone formation, gout, and with very low calorie diets, electrolyte abnormalities, nutrient inadequacies, and loss of lean body mass. Proper food choices reviewed. Also, discuss with her several treatment options including surgery and other medications. Patient elected to continue on Phentermine. She is aware about the risk and possible side effect. She is aware that it will be for short term only. I will re-assess her response in the next few weeks.     4. Encounter for screening mammogram for breast cancer  Proceed with mammogram. Advised patient on monthly self breast exams. Orders Placed This Encounter   Medications    fluconazole (DIFLUCAN) 100 MG tablet     Sig: Take 1 tablet by mouth daily for 5 days     Dispense:  5 tablet     Refill:  0        Written by Janki Mariano CMA, acting as a scribe for Dr. Cristine Cavazos on 11/9/2017 at 2:06 PM.       I, Dr. Cici James, personally performed the services described in the documentation as scribed by Janki Mariano CMA, in my presence and it is both accurate and complete.

## 2017-11-13 RX ORDER — PANTOPRAZOLE SODIUM 40 MG/1
TABLET, DELAYED RELEASE ORAL
Qty: 30 TABLET | Refills: 3 | Status: SHIPPED | OUTPATIENT
Start: 2017-11-13 | End: 2018-03-14 | Stop reason: SDUPTHER

## 2017-11-26 ASSESSMENT — ENCOUNTER SYMPTOMS
COUGH: 1
SHORTNESS OF BREATH: 1
WHEEZING: 1

## 2017-11-27 ENCOUNTER — HOSPITAL ENCOUNTER (OUTPATIENT)
Dept: NEUROLOGY | Age: 52
Discharge: HOME | End: 2017-11-27
Attending: INTERNAL MEDICINE | Admitting: INTERNAL MEDICINE

## 2017-12-07 ENCOUNTER — OFFICE VISIT (OUTPATIENT)
Dept: PRIMARY CARE CLINIC | Age: 52
End: 2017-12-07
Payer: MEDICARE

## 2017-12-07 VITALS
WEIGHT: 236 LBS | OXYGEN SATURATION: 99 % | HEART RATE: 89 BPM | BODY MASS INDEX: 39.27 KG/M2 | DIASTOLIC BLOOD PRESSURE: 78 MMHG | SYSTOLIC BLOOD PRESSURE: 118 MMHG

## 2017-12-07 DIAGNOSIS — J84.9 INTERSTITIAL LUNG DISEASE (HCC): ICD-10-CM

## 2017-12-07 DIAGNOSIS — E78.5 HYPERLIPIDEMIA, UNSPECIFIED HYPERLIPIDEMIA TYPE: ICD-10-CM

## 2017-12-07 DIAGNOSIS — E03.9 HYPOTHYROIDISM, UNSPECIFIED TYPE: ICD-10-CM

## 2017-12-07 DIAGNOSIS — Z12.31 ENCOUNTER FOR SCREENING MAMMOGRAM FOR BREAST CANCER: ICD-10-CM

## 2017-12-07 DIAGNOSIS — I10 ESSENTIAL HYPERTENSION: Primary | ICD-10-CM

## 2017-12-07 PROCEDURE — 1036F TOBACCO NON-USER: CPT | Performed by: INTERNAL MEDICINE

## 2017-12-07 PROCEDURE — G8427 DOCREV CUR MEDS BY ELIG CLIN: HCPCS | Performed by: INTERNAL MEDICINE

## 2017-12-07 PROCEDURE — G8417 CALC BMI ABV UP PARAM F/U: HCPCS | Performed by: INTERNAL MEDICINE

## 2017-12-07 PROCEDURE — G8484 FLU IMMUNIZE NO ADMIN: HCPCS | Performed by: INTERNAL MEDICINE

## 2017-12-07 PROCEDURE — 3017F COLORECTAL CA SCREEN DOC REV: CPT | Performed by: INTERNAL MEDICINE

## 2017-12-07 PROCEDURE — 3014F SCREEN MAMMO DOC REV: CPT | Performed by: INTERNAL MEDICINE

## 2017-12-07 PROCEDURE — 99213 OFFICE O/P EST LOW 20 MIN: CPT | Performed by: INTERNAL MEDICINE

## 2017-12-07 RX ORDER — PHENTERMINE HYDROCHLORIDE 37.5 MG/1
37.5 TABLET ORAL
Qty: 30 TABLET | Refills: 0 | Status: SHIPPED | OUTPATIENT
Start: 2017-12-07 | End: 2018-01-06

## 2017-12-07 ASSESSMENT — ENCOUNTER SYMPTOMS
BACK PAIN: 0
ABDOMINAL PAIN: 0
SINUS PRESSURE: 0
SHORTNESS OF BREATH: 1
SORE THROAT: 0
COUGH: 1
WHEEZING: 1
EYE DISCHARGE: 0
NAUSEA: 0
VOMITING: 0

## 2017-12-07 NOTE — PROGRESS NOTES
SUBJECTIVE:    Patient ID: Corey Crook is a 47 y.o. female. Chief Complaint   Patient presents with    COPD    Obesity    Hypertension    Hyperlipidemia     HPI:  Patient has had hypertension for few years. She has been compliant with taking medications, without side effects from it. She has been following a low-sodium, is active and occasionally exercises. Weight is stable, compared to last visit. Her blood pressure is stable at this time. Patient has had COPD/interstitial lung disease for a long time. She has been using nebulizer inhalation treatments as ordered. She is on oxygen. She has some dyspnea with exertion. With on and off cough, SOB and wheezing that does respond to treatment. She has been using the Albuterol inhaler. Patient  has had hypothyroidism for the past few years. She has been compliant with taking her medication without side effect. There are no symptoms of hypo or hyper thyroidism. Patient has been overweight for some time now. History of eating disorders: none. There is a family history positive for obesity in the patient. Previous treatments for obesity include prescription appetite suppressants: Adipex and self-directed dieting. Obesity associated medical conditions: hyperlipidemia. Obesity associated medications: none. Cardiovascular risk factors besides obesity: hypertension and obesity (BMI >= 30 kg/m2). Patient's medications, allergies, past medical, surgical, social and family histories were reviewed and updated as appropriate. Outpatient Prescriptions Marked as Taking for the 12/7/17 encounter (Office Visit) with Usama Mccoy MD   Medication Sig Dispense Refill    pantoprazole (PROTONIX) 40 MG tablet take 1 tablet by mouth once daily 30 tablet 3    HYDROcodone-acetaminophen (NORCO) 5-325 MG per tablet take 1 tablet by mouth every 6 hours if needed for cough  0    zolpidem (AMBIEN) 5 MG tablet Take 1 tablet by mouth nightly .   0    continue current medication. Will monitor her renal function every few months, have advised her to check blood pressure frequently and to keep a record of this. - Comprehensive Metabolic Panel; Future  - Lipid Panel; Future  - CBC Auto Differential; Future    2. Interstitial lung disease ([address]  Continue current regimen. Patient at baseline. Follow up with pulmonology. Long conversation with her regarding weight loss. - CBC Auto Differential; Future    3. Hypothyroidism, unspecified type  Last TSH was borderline low. I am going to check the TSH every few months. I am going to continue the current dose of Levothyroxine. I have advised her on the importance of taking the medication on a daily basis. Lab Results   Component Value Date    TSH 0.24 (L) 10/28/2017     - TSH without Reflex; Future    4. Hyperlipidemia, unspecified hyperlipidemia type  I have advised her on low-fat diet, exercise and weight control. I will monitor her liver functions and lipid profile every few months. Last lipid profile was not at goal.  Lab Results   Component Value Date    LDLCALC 131 (H) 06/02/2017    LDLDIRECT 143 (H) 11/12/2014     - Comprehensive Metabolic Panel; Future  - Lipid Panel; Future    5. Encounter for screening mammogram for breast cancer  Proceed with mammogram. Further recommandation based on test results. Advised patient on monthly self breast exams.     - Mammography screening bilateral; Future        Orders Placed This Encounter   Medications    phentermine (ADIPEX-P) 37.5 MG tablet     Sig: Take 1 tablet by mouth every morning (before breakfast) . Dispense:  30 tablet     Refill:  0        Written by Augusto Tatum CMA, acting as a scribe for Dr. Mercedez Humphreys on 12/7/2017 at 9:33 AM.       I, Dr. Leroy Schofield, personally performed the services described in the documentation as scribed by Augusto Tatum CMA, in my presence and it is both accurate and complete.

## 2017-12-12 RX ORDER — ROPINIROLE 0.5 MG/1
TABLET, FILM COATED ORAL
Qty: 30 TABLET | Refills: 1 | Status: SHIPPED | OUTPATIENT
Start: 2017-12-12 | End: 2018-02-28 | Stop reason: ALTCHOICE

## 2017-12-13 ENCOUNTER — HOSPITAL ENCOUNTER (OUTPATIENT)
Dept: GENERAL RADIOLOGY | Age: 52
Discharge: HOME | End: 2017-12-13
Attending: INTERNAL MEDICINE | Admitting: INTERNAL MEDICINE

## 2017-12-13 DIAGNOSIS — Z12.31 ENCOUNTER FOR SCREENING MAMMOGRAM FOR BREAST CANCER: ICD-10-CM

## 2017-12-16 ENCOUNTER — OFFICE VISIT (OUTPATIENT)
Dept: PRIMARY CARE CLINIC | Age: 52
End: 2017-12-16
Payer: MEDICARE

## 2017-12-16 VITALS
RESPIRATION RATE: 26 BRPM | SYSTOLIC BLOOD PRESSURE: 138 MMHG | TEMPERATURE: 97.7 F | OXYGEN SATURATION: 83 % | HEIGHT: 65 IN | HEART RATE: 116 BPM | WEIGHT: 238.6 LBS | DIASTOLIC BLOOD PRESSURE: 72 MMHG | BODY MASS INDEX: 39.75 KG/M2

## 2017-12-16 DIAGNOSIS — J84.9 INTERSTITIAL LUNG DISEASE (HCC): ICD-10-CM

## 2017-12-16 DIAGNOSIS — J44.1 COPD WITH ACUTE EXACERBATION (HCC): Primary | ICD-10-CM

## 2017-12-16 PROCEDURE — 3023F SPIROM DOC REV: CPT | Performed by: NURSE PRACTITIONER

## 2017-12-16 PROCEDURE — 3017F COLORECTAL CA SCREEN DOC REV: CPT | Performed by: NURSE PRACTITIONER

## 2017-12-16 PROCEDURE — 1036F TOBACCO NON-USER: CPT | Performed by: NURSE PRACTITIONER

## 2017-12-16 PROCEDURE — G8926 SPIRO NO PERF OR DOC: HCPCS | Performed by: NURSE PRACTITIONER

## 2017-12-16 PROCEDURE — G8484 FLU IMMUNIZE NO ADMIN: HCPCS | Performed by: NURSE PRACTITIONER

## 2017-12-16 PROCEDURE — 3014F SCREEN MAMMO DOC REV: CPT | Performed by: NURSE PRACTITIONER

## 2017-12-16 PROCEDURE — 99213 OFFICE O/P EST LOW 20 MIN: CPT | Performed by: NURSE PRACTITIONER

## 2017-12-16 PROCEDURE — G8427 DOCREV CUR MEDS BY ELIG CLIN: HCPCS | Performed by: NURSE PRACTITIONER

## 2017-12-16 PROCEDURE — G8417 CALC BMI ABV UP PARAM F/U: HCPCS | Performed by: NURSE PRACTITIONER

## 2017-12-16 RX ORDER — FLUCONAZOLE 100 MG/1
TABLET ORAL
Refills: 0 | COMMUNITY
Start: 2017-11-09 | End: 2018-07-19 | Stop reason: ALTCHOICE

## 2017-12-16 RX ORDER — LEVOFLOXACIN 500 MG/1
500 TABLET, FILM COATED ORAL DAILY
Qty: 14 TABLET | Refills: 0 | Status: SHIPPED | OUTPATIENT
Start: 2017-12-16 | End: 2017-12-30

## 2017-12-16 RX ORDER — ACYCLOVIR 400 MG/1
TABLET ORAL
Refills: 0 | COMMUNITY
Start: 2017-09-25 | End: 2018-05-21

## 2017-12-16 NOTE — PROGRESS NOTES
SUBJECTIVE:  Tiffany Roa is a 47 y.o. y/o female that presents with Cough and Restrictive Lung Disease  . HPI:      Symptoms have been present for chronically  and uses bactrim, acyclovir daily and the levaquin is prn and her refills . She says she is coughing up green now and if she is not started on the levaquin, she will be admitted and she would like to avoid that. Symptoms are worse since they initially started. Fever? Yes  Runny nose or congestion? No   Cough? Yes  Sore throat? No  Headache, fatigue, joint pains, muscle aches? Yes  Shortness of breath/Wheezing? Yes  Nausea/Vomiting/Diarrhea? No  Double Sickening? No  Sick contacts? No    Patient has tried current treatments with no improvement. 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      Social History Main Topics    Smoking status: Former Smoker     Packs/day: 1.00     Years: 30.00     Quit date: 2007    Smokeless tobacco: Never Used    Alcohol use No    Drug use: No    Sexual activity: Not on file     Other Topics Concern    Not on file     Social History Narrative    No narrative on file       Family History   Problem Relation Age of Onset    Cancer Mother      colon    Cancer Father      bladder    High Blood Pressure Father     Stroke Father     High Blood Pressure Sister            OBJECTIVE:  /72 (Site: Right Arm, Position: Sitting, Cuff Size: Large Adult)   Pulse 116   Temp 97.7 °F (36.5 °C)   Resp 26   Ht 5' 5\" (1.651 m)   Wt 238 lb 9.6 oz (108.2 kg)   SpO2 (!) 83% Comment: 3 Liters  BMI 39.71 kg/m²   General appearance: alert, well appearing, and in no distress, oriented to person, place, and time, overweight, anxious, ill-appearing and chronically ill appearing. ENT exam reveals - ENT exam normal, no neck nodes or sinus tenderness, bilateral TM normal without fluid or infection, neck without nodes, throat normal without erythema or exudate, sinuses nontender, post nasal drip noted and nasal mucosa pale and congested. CVS exam: normal rate, regular rhythm, normal S1, S2, no murmurs, rubs, clicks or gallops. Chest:no tachypnea, retractions or cyanosis, wheezing noted bilateral, decreased air entry noted bilateral.   Abdominal exam: soft, nontender, nondistended, no masses or organomegaly. Extremities:  No clubbing, cyanosis or edema  Skin exam - normal coloration and turgor, no rashes, no suspicious skin lesions noted. Psych -  Affect appropriate. Thought process is normal without evidence of depression or psychosis. Good insight and appropriae interaction. Cognition and memory appear to be intact. ASSESSMENT & PLAN  Hamlet Moser was seen today for cough and restrictive lung disease. Diagnoses and all orders for this visit:    COPD with acute exacerbation ([address]    Interstitial lung disease ([facility] [address]    Other orders  -     levofloxacin (LEVAQUIN) 500 MG tablet; Take 1 tablet by mouth daily for 14 days        No Follow-up on file.     -Patient advised to call immediately or go to ER if any worsening of symptoms  -Patient counseled on conservative care including fluids, rest and OTC meds    Hamlet Moser received counseling on the following healthy behaviors: medication adherence  Reviewed prior labs and health maintenance. Continue current medications, diet and exercise. Discussed use, benefit, and side effects of prescribed medications. Barriers to medication compliance addressed. Patient given educational materials - see patient instructions. All patient questions answered. Patient voiced understanding. I have reviewed this patient's history, habits, and medication list and have updated the chart where appropriate.

## 2017-12-16 NOTE — PATIENT INSTRUCTIONS
cannot be treated with a safer antibiotic. Levofloxacin may also be used for purposes not listed in this medication guide. What should I discuss with my healthcare provider before taking levofloxacin? You should not use this medicine if you are allergic to levofloxacin or other fluoroquinolones (ciprofloxacin, gemifloxacin, moxifloxacin, ofloxacin, norfloxacin, and others). To make sure levofloxacin is safe for you, tell your doctor if you have:  · tendon problems, bone problems, arthritis or other joint problems (especially in children);  · slow heartbeats or other heart rhythm disorder (especially if you take medication to treat it);  · a personal or family history of long QT syndrome;  · liver or kidney disease;  · a history of epilepsy or other seizure disorder;  · a nerve disorder;  · diabetes (especially if you use insulin or take oral diabetes medication);  · low levels of potassium in your blood (hypokalemia); or  · if you use a blood thinner (warfarin, Coumadin, Hermila Brittle) and have \"INR\" or prothrombin time tests. Levofloxacin may cause swelling or tearing of a tendon (the fiber that connects bones to muscles in the body), especially in the Achilles' tendon of the heel. This can happen during treatment or up to several months after you stop taking levofloxacin. Tendon problems may be more likely to occur if you are over 60, if you take steroid medication, or if you have had a kidney, heart, or lung transplant. Do not give this medicine to a child without medical advice. Tendon and joint problems may be more likely in a child taking levofloxacin. It is not known whether this medicine will harm an unborn baby. Tell your doctor if you are pregnant or plan to become pregnant. Levofloxacin can pass into breast milk and may harm a nursing baby. You should not breast-feed while using this medicine. How should I take levofloxacin? Follow all directions on your prescription label.  Do not take this medicine levofloxacin. Remember, keep this and all other medicines out of the reach of children, never share your medicines with others, and use this medication only for the indication prescribed. Every effort has been made to ensure that the information provided by [address] is accurate, up-to-date, and complete, but no guarantee is made to that effect. Drug information contained herein may be time sensitive. [facility] information has been compiled for use by healthcare practitioners and consumers in the [place] and therefore [facility] does not warrant that uses outside of the [place] are appropriate, unless specifically indicated otherwise. [facility]'s drug information does not endorse drugs, diagnose patients or recommend therapy. [facility]'s drug information is an informational resource designed to assist licensed healthcare practitioners in caring for their patients and/or to serve consumers viewing this service as a supplement to, and not a substitute for, the expertise, skill, knowledge and judgment of healthcare practitioners. The absence of a warning for a given drug or drug combination in no way should be construed to indicate that the drug or drug combination is safe, effective or appropriate for any given patient. [facility] does not assume any responsibility for any aspect of healthcare administered with the aid of information [facility] provides. The information contained herein is not intended to cover all possible uses, directions, precautions, warnings, drug interactions, allergic reactions, or adverse effects. If you have questions about the drugs you are taking, check with your doctor, nurse or pharmacist.  Copyright 1081-3821 [address] [place] [address] Dr DIAS. Version: 11.02. Revision date: 2/13/2017. Care instructions adapted under license by [facility] ([facility]).  If you have questions about a medical condition or this instruction, always ask your healthcare professional. [address] any warranty or liability for your use of this information.

## 2018-01-03 RX ORDER — POTASSIUM CHLORIDE 750 MG/1
TABLET, FILM COATED, EXTENDED RELEASE ORAL
Qty: 60 TABLET | Refills: 5 | Status: SHIPPED | OUTPATIENT
Start: 2018-01-03 | End: 2018-07-03 | Stop reason: SDUPTHER

## 2018-01-17 RX ORDER — SULFAMETHOXAZOLE AND TRIMETHOPRIM 800; 160 MG/1; MG/1
1 TABLET ORAL
Refills: 0 | Status: CANCELLED | OUTPATIENT
Start: 2018-01-17

## 2018-01-18 RX ORDER — VENLAFAXINE HYDROCHLORIDE 75 MG/1
CAPSULE, EXTENDED RELEASE ORAL
Qty: 90 CAPSULE | Refills: 5 | Status: SHIPPED | OUTPATIENT
Start: 2018-01-18 | End: 2018-08-08 | Stop reason: SDUPTHER

## 2018-02-28 ENCOUNTER — OFFICE VISIT (OUTPATIENT)
Dept: PRIMARY CARE CLINIC | Age: 53
End: 2018-02-28
Payer: MEDICARE

## 2018-02-28 VITALS
TEMPERATURE: 97.9 F | SYSTOLIC BLOOD PRESSURE: 126 MMHG | OXYGEN SATURATION: 96 % | WEIGHT: 236 LBS | RESPIRATION RATE: 18 BRPM | DIASTOLIC BLOOD PRESSURE: 70 MMHG | BODY MASS INDEX: 39.27 KG/M2 | HEART RATE: 97 BPM

## 2018-02-28 DIAGNOSIS — H60.311 ACUTE DIFFUSE OTITIS EXTERNA OF RIGHT EAR: Primary | ICD-10-CM

## 2018-02-28 PROCEDURE — 3017F COLORECTAL CA SCREEN DOC REV: CPT | Performed by: INTERNAL MEDICINE

## 2018-02-28 PROCEDURE — G8417 CALC BMI ABV UP PARAM F/U: HCPCS | Performed by: INTERNAL MEDICINE

## 2018-02-28 PROCEDURE — G8482 FLU IMMUNIZE ORDER/ADMIN: HCPCS | Performed by: INTERNAL MEDICINE

## 2018-02-28 PROCEDURE — 1036F TOBACCO NON-USER: CPT | Performed by: INTERNAL MEDICINE

## 2018-02-28 PROCEDURE — 4130F TOPICAL PREP RX AOE: CPT | Performed by: INTERNAL MEDICINE

## 2018-02-28 PROCEDURE — G8427 DOCREV CUR MEDS BY ELIG CLIN: HCPCS | Performed by: INTERNAL MEDICINE

## 2018-02-28 PROCEDURE — 99213 OFFICE O/P EST LOW 20 MIN: CPT | Performed by: INTERNAL MEDICINE

## 2018-02-28 PROCEDURE — 3014F SCREEN MAMMO DOC REV: CPT | Performed by: INTERNAL MEDICINE

## 2018-02-28 RX ORDER — HYDROCODONE BITARTRATE AND ACETAMINOPHEN 5; 325 MG/1; MG/1
1 TABLET ORAL EVERY 6 HOURS PRN
Qty: 12 TABLET | Refills: 0 | Status: SHIPPED | OUTPATIENT
Start: 2018-02-28 | End: 2018-03-03

## 2018-02-28 RX ORDER — AMOXICILLIN AND CLAVULANATE POTASSIUM 875; 125 MG/1; MG/1
1 TABLET, FILM COATED ORAL 2 TIMES DAILY
Qty: 20 TABLET | Refills: 0 | Status: SHIPPED | OUTPATIENT
Start: 2018-02-28 | End: 2018-02-28 | Stop reason: SDUPTHER

## 2018-02-28 RX ORDER — PRAMIPEXOLE DIHYDROCHLORIDE 0.5 MG/1
1 TABLET ORAL NIGHTLY
Refills: 0 | COMMUNITY
Start: 2018-02-05 | End: 2018-07-19 | Stop reason: SDUPTHER

## 2018-02-28 RX ORDER — HYDROCODONE BITARTRATE AND ACETAMINOPHEN 5; 325 MG/1; MG/1
1 TABLET ORAL EVERY 6 HOURS PRN
Qty: 12 TABLET | Refills: 0 | Status: SHIPPED | OUTPATIENT
Start: 2018-02-28 | End: 2018-02-28 | Stop reason: SDUPTHER

## 2018-02-28 RX ORDER — AMOXICILLIN AND CLAVULANATE POTASSIUM 875; 125 MG/1; MG/1
1 TABLET, FILM COATED ORAL 2 TIMES DAILY
Qty: 20 TABLET | Refills: 0 | Status: SHIPPED | OUTPATIENT
Start: 2018-02-28 | End: 2018-03-07 | Stop reason: ALTCHOICE

## 2018-02-28 ASSESSMENT — ENCOUNTER SYMPTOMS
SHORTNESS OF BREATH: 1
BACK PAIN: 0
COUGH: 1
NAUSEA: 0
WHEEZING: 1
VOMITING: 0
SORE THROAT: 0
SINUS PRESSURE: 0
ABDOMINAL PAIN: 0
EYE DISCHARGE: 0

## 2018-02-28 NOTE — PROGRESS NOTES
Have you seen any other physician or provider since your last visit no    Have you had any other diagnostic tests since your last visit? no    Have you changed or stopped any medications since your last visit? Yes updated started mirapex. Pt is here co of otalgia in right ear x about 2 days. Pt has tried ibuprofen with no relief.
noted. No erythema. Psychiatric: She has a normal mood and affect. Judgment normal.   Nursing note and vitals reviewed. Lab Results   Component Value Date     11/02/2017    K 4.5 11/02/2017    CL 99 11/02/2017    CO2 35 11/02/2017    GLUCOSE 133 11/02/2017    BUN 24 11/02/2017    CREATININE 0.6 11/02/2017    CALCIUM 9.3 11/02/2017    PROT 7.0 10/28/2017    LABALBU 3.9 10/28/2017    BILITOT <0.2 10/28/2017    ALT 31 10/28/2017    AST 14 10/28/2017       Hemoglobin A1C (%)   Date Value   06/08/2017 5.4     Microscopic Examination (no units)   Date Value   02/25/2017 Not Indicated     LDL Calculated (mg/dL)   Date Value   06/02/2017 131 (H)         Lab Results   Component Value Date    WBC 15.8 11/02/2017    NEUTROABS 13.8 11/02/2017    HGB 13.8 11/02/2017    HCT 42.1 11/02/2017    MCV 94.6 11/02/2017     11/02/2017       Lab Results   Component Value Date    TSH 0.24 (L) 10/28/2017       ASSESSMENT/PLAN:     1. Acute diffuse otitis externa of right ear  Will treat with Augmentin and Cortisporin. Few pain pills as needed. Patient to return to office or go to ER if her sx getting worse, recur or not getting any better. Orders Placed This Encounter   Medications    neomycin-polymyxin-hydrocortisone (CORTISPORIN) 3.5-18229-1 otic solution     Sig: Place 3 drops into the right ear 3 times daily for 10 days     Dispense:  1 each     Refill:  0    amoxicillin-clavulanate (AUGMENTIN) 875-125 MG per tablet     Sig: Take 1 tablet by mouth 2 times daily for 10 days     Dispense:  20 tablet     Refill:  0    HYDROcodone-acetaminophen (NORCO) 5-325 MG per tablet     Sig: Take 1 tablet by mouth every 6 hours as needed for Pain for up to 3 days . Take lowest dose possible to manage pain.      Dispense:  12 tablet     Refill:  0        Written by Aniket Evans CMA, acting as a scribe for Dr. Abida Hunt on 2/28/2018 at 2:24 PM.       I, Dr. Frankie Evans, personally performed the services described in the

## 2018-03-02 ENCOUNTER — OFFICE VISIT (OUTPATIENT)
Dept: PRIMARY CARE CLINIC | Age: 53
End: 2018-03-02
Payer: MEDICARE

## 2018-03-02 ENCOUNTER — HOSPITAL ENCOUNTER (OUTPATIENT)
Dept: OTHER | Age: 53
Discharge: HOME | End: 2018-03-02
Attending: INTERNAL MEDICINE | Admitting: INTERNAL MEDICINE

## 2018-03-02 VITALS
HEIGHT: 65 IN | RESPIRATION RATE: 20 BRPM | SYSTOLIC BLOOD PRESSURE: 118 MMHG | DIASTOLIC BLOOD PRESSURE: 78 MMHG | HEART RATE: 108 BPM | WEIGHT: 236 LBS | OXYGEN SATURATION: 97 % | BODY MASS INDEX: 39.32 KG/M2

## 2018-03-02 DIAGNOSIS — I10 ESSENTIAL HYPERTENSION: ICD-10-CM

## 2018-03-02 DIAGNOSIS — H60.501 ACUTE OTITIS EXTERNA OF RIGHT EAR, UNSPECIFIED TYPE: Primary | ICD-10-CM

## 2018-03-02 DIAGNOSIS — J84.9 INTERSTITIAL LUNG DISEASE (HCC): ICD-10-CM

## 2018-03-02 DIAGNOSIS — E03.9 HYPOTHYROIDISM, UNSPECIFIED TYPE: ICD-10-CM

## 2018-03-02 DIAGNOSIS — E78.5 HYPERLIPIDEMIA, UNSPECIFIED HYPERLIPIDEMIA TYPE: ICD-10-CM

## 2018-03-02 LAB
A/G RATIO: 1.7 (ref 0.8–2)
ALBUMIN SERPL-MCNC: 4.5 G/DL (ref 3.4–4.8)
ALP BLD-CCNC: 167 U/L (ref 25–100)
ALT SERPL-CCNC: 23 U/L (ref 4–36)
ANION GAP SERPL CALCULATED.3IONS-SCNC: 12 MMOL/L (ref 3–16)
AST SERPL-CCNC: 22 U/L (ref 8–33)
BASOPHILS ABSOLUTE: 0 K/UL (ref 0–0.1)
BASOPHILS RELATIVE PERCENT: 0.2 %
BILIRUB SERPL-MCNC: 0.3 MG/DL (ref 0.3–1.2)
BUN BLDV-MCNC: 13 MG/DL (ref 6–20)
CALCIUM SERPL-MCNC: 9.1 MG/DL (ref 8.5–10.5)
CHLORIDE BLD-SCNC: 98 MMOL/L (ref 98–107)
CHOLESTEROL, TOTAL: 235 MG/DL (ref 0–200)
CO2: 30 MMOL/L (ref 20–30)
CREAT SERPL-MCNC: 0.8 MG/DL (ref 0.4–1.2)
EOSINOPHILS ABSOLUTE: 0 K/UL (ref 0–0.4)
EOSINOPHILS RELATIVE PERCENT: 0 %
GFR AFRICAN AMERICAN: >59
GFR NON-AFRICAN AMERICAN: >60
GLOBULIN: 2.6 G/DL
GLUCOSE BLD-MCNC: 95 MG/DL (ref 74–106)
HCT VFR BLD CALC: 46.1 % (ref 37–47)
HDLC SERPL-MCNC: 67 MG/DL (ref 40–60)
HEMOGLOBIN: 14.7 G/DL (ref 11.5–16.5)
IMMATURE GRANULOCYTES #: 0 K/UL
IMMATURE GRANULOCYTES %: 0.2 % (ref 0–5)
LDL CHOLESTEROL CALCULATED: 146 MG/DL
LYMPHOCYTES ABSOLUTE: 2.1 K/UL (ref 1.5–4)
LYMPHOCYTES RELATIVE PERCENT: 24.9 %
MCH RBC QN AUTO: 30.8 PG (ref 27–32)
MCHC RBC AUTO-ENTMCNC: 31.9 G/DL (ref 31–35)
MCV RBC AUTO: 96.4 FL (ref 80–100)
MONOCYTES ABSOLUTE: 0.4 K/UL (ref 0.2–0.8)
MONOCYTES RELATIVE PERCENT: 5.2 %
NEUTROPHILS ABSOLUTE: 5.9 K/UL (ref 2–7.5)
NEUTROPHILS RELATIVE PERCENT: 69.5 %
PDW BLD-RTO: 13.2 % (ref 11–16)
PLATELET # BLD: 311 K/UL (ref 150–400)
PMV BLD AUTO: 11.1 FL (ref 6–10)
POTASSIUM SERPL-SCNC: 4.3 MMOL/L (ref 3.4–5.1)
RBC # BLD: 4.78 M/UL (ref 3.8–5.8)
SODIUM BLD-SCNC: 140 MMOL/L (ref 136–145)
TOTAL PROTEIN: 7.1 G/DL (ref 6.4–8.3)
TRIGL SERPL-MCNC: 108 MG/DL (ref 0–249)
TSH SERPL DL<=0.05 MIU/L-ACNC: >100 UIU/ML (ref 0.35–5.5)
VLDLC SERPL CALC-MCNC: 22 MG/DL
WBC # BLD: 8.5 K/UL (ref 4–11)

## 2018-03-02 PROCEDURE — 3017F COLORECTAL CA SCREEN DOC REV: CPT | Performed by: FAMILY MEDICINE

## 2018-03-02 PROCEDURE — G8417 CALC BMI ABV UP PARAM F/U: HCPCS | Performed by: FAMILY MEDICINE

## 2018-03-02 PROCEDURE — 99213 OFFICE O/P EST LOW 20 MIN: CPT | Performed by: FAMILY MEDICINE

## 2018-03-02 PROCEDURE — G8484 FLU IMMUNIZE NO ADMIN: HCPCS | Performed by: FAMILY MEDICINE

## 2018-03-02 PROCEDURE — 3014F SCREEN MAMMO DOC REV: CPT | Performed by: FAMILY MEDICINE

## 2018-03-02 PROCEDURE — 1036F TOBACCO NON-USER: CPT | Performed by: FAMILY MEDICINE

## 2018-03-02 PROCEDURE — 4130F TOPICAL PREP RX AOE: CPT | Performed by: FAMILY MEDICINE

## 2018-03-02 PROCEDURE — G8427 DOCREV CUR MEDS BY ELIG CLIN: HCPCS | Performed by: FAMILY MEDICINE

## 2018-03-02 RX ORDER — METHYLPREDNISOLONE 4 MG/1
TABLET ORAL
Qty: 1 KIT | Refills: 0 | Status: SHIPPED | OUTPATIENT
Start: 2018-03-02 | End: 2018-03-08

## 2018-03-02 RX ORDER — CIPROFLOXACIN 500 MG/1
500 TABLET, FILM COATED ORAL 2 TIMES DAILY
Qty: 20 TABLET | Refills: 0 | Status: SHIPPED | OUTPATIENT
Start: 2018-03-02 | End: 2018-03-12

## 2018-03-02 NOTE — PROGRESS NOTES
02/25/2017 Not Indicated     LDL Calculated (mg/dL)   Date Value   03/02/2018 146 (H)         Lab Results   Component Value Date    WBC 8.5 03/02/2018    NEUTROABS 5.9 03/02/2018    HGB 14.7 03/02/2018    HCT 46.1 03/02/2018    MCV 96.4 03/02/2018     03/02/2018       Lab Results   Component Value Date    TSH >100.00 03/02/2018         ASSESSMENT/PLAN:   Problem List Items Addressed This Visit     Acute otitis externa of right ear - Primary     Discussed with the patient that her ear canal is patent and I believe her drops are entering the ear canal appropriately. She is to continue the cortisporin drops. I have changed her oral antibiotic to cipro. I have also added an oral steroid. If no improvement over the next day or two or symptoms worse, she is to proceed to the ER. Discussed if her condition worsens she will likely need IV antibiotics. Return if symptoms worsen or fail to improve.

## 2018-03-04 PROBLEM — H60.501 ACUTE OTITIS EXTERNA OF RIGHT EAR: Status: ACTIVE | Noted: 2018-03-04

## 2018-03-04 ASSESSMENT — ENCOUNTER SYMPTOMS
SHORTNESS OF BREATH: 1
VOMITING: 0
NAUSEA: 0
BACK PAIN: 0
SINUS PRESSURE: 0
COUGH: 1
SORE THROAT: 0
WHEEZING: 1
ABDOMINAL PAIN: 0

## 2018-03-07 ENCOUNTER — OFFICE VISIT (OUTPATIENT)
Dept: PRIMARY CARE CLINIC | Age: 53
End: 2018-03-07
Payer: MEDICARE

## 2018-03-07 VITALS
DIASTOLIC BLOOD PRESSURE: 82 MMHG | WEIGHT: 240.4 LBS | BODY MASS INDEX: 40 KG/M2 | SYSTOLIC BLOOD PRESSURE: 130 MMHG | RESPIRATION RATE: 18 BRPM | OXYGEN SATURATION: 98 % | HEART RATE: 96 BPM

## 2018-03-07 DIAGNOSIS — E78.5 HYPERLIPIDEMIA, UNSPECIFIED HYPERLIPIDEMIA TYPE: ICD-10-CM

## 2018-03-07 DIAGNOSIS — I10 ESSENTIAL HYPERTENSION: ICD-10-CM

## 2018-03-07 DIAGNOSIS — Z23 NEED FOR DIPHTHERIA-TETANUS-PERTUSSIS (TDAP) VACCINE: ICD-10-CM

## 2018-03-07 DIAGNOSIS — E03.9 HYPOTHYROIDISM, UNSPECIFIED TYPE: Primary | ICD-10-CM

## 2018-03-07 DIAGNOSIS — H92.01 EARACHE ON RIGHT: ICD-10-CM

## 2018-03-07 PROCEDURE — G8427 DOCREV CUR MEDS BY ELIG CLIN: HCPCS | Performed by: INTERNAL MEDICINE

## 2018-03-07 PROCEDURE — 99214 OFFICE O/P EST MOD 30 MIN: CPT | Performed by: INTERNAL MEDICINE

## 2018-03-07 PROCEDURE — 3014F SCREEN MAMMO DOC REV: CPT | Performed by: INTERNAL MEDICINE

## 2018-03-07 PROCEDURE — G8417 CALC BMI ABV UP PARAM F/U: HCPCS | Performed by: INTERNAL MEDICINE

## 2018-03-07 PROCEDURE — 3017F COLORECTAL CA SCREEN DOC REV: CPT | Performed by: INTERNAL MEDICINE

## 2018-03-07 PROCEDURE — 1036F TOBACCO NON-USER: CPT | Performed by: INTERNAL MEDICINE

## 2018-03-07 PROCEDURE — G8484 FLU IMMUNIZE NO ADMIN: HCPCS | Performed by: INTERNAL MEDICINE

## 2018-03-07 RX ORDER — LEVOTHYROXINE SODIUM 175 UG/1
175 TABLET ORAL DAILY
Qty: 30 TABLET | Refills: 3 | Status: SHIPPED | OUTPATIENT
Start: 2018-03-07 | End: 2018-06-05 | Stop reason: SDUPTHER

## 2018-03-07 ASSESSMENT — ENCOUNTER SYMPTOMS
WHEEZING: 1
SINUS PRESSURE: 0
VOMITING: 0
SHORTNESS OF BREATH: 1
BACK PAIN: 0
EYE DISCHARGE: 0
COUGH: 1
NAUSEA: 0
ABDOMINAL PAIN: 0
SORE THROAT: 0

## 2018-03-07 NOTE — PROGRESS NOTES
Have you seen any other physician or provider since your last visit yes - sunni for continuing ear pain    Have you had any other diagnostic tests since your last visit? yes - labs    Have you changed or stopped any medications since your last visit? no - Pt states that she had extra of 150mcg of the levothyroxine at home is why she hadn't gotten refills yet but she has been taking. Pt is here for a follow up on htn, hypothyroidism, copd and obesity.

## 2018-03-07 NOTE — PROGRESS NOTES
SUBJECTIVE:    Patient ID: Chris Clark is a 47 y.o. female. Chief Complaint   Patient presents with    Hypertension    Hypothyroidism    COPD    Obesity       HPI:  Patient has had hypertension for few years. She has been compliant with taking medications, without side effects from it. She has been following a low-sodium, is  active and rarely exercises. Weight is about the same, compared to last visit. Her blood pressure is stable at this time. Patient has had COPD for a long time. She has been using nebulizer inhalation treatments as ordered. She is on oxygen. She has some dyspnea with exertion. With on and off cough, SOB and wheezing that does respond to treatment. She has been using the Albuterol inhaler. Patient  has had hypothyroidism for the past few years. She has been compliant with taking her medication without side effect. Has been taking 150mcg daily (old pills). There are no symptoms of hypo or hyper thyroidism except for slight decreased in her energy which is chronic. She has had blood work and is here today for follow up. Patient has had hyperlipidemia for few years. She has  been compliant with low fat diet. She has been compliant with taking the medications, without side effects. Her weight is up compared to last visit. She is lightly active, and occasionally exercises. He rate is much better compared to before. Still have pain. No fever or chills. Minimal drainage. Still on antibiotics. Patient's medications, allergies, past medical, surgical, social and family histories were reviewed and updated as appropriate. Outpatient Prescriptions Marked as Taking for the 3/7/18 encounter (Office Visit) with Susan Holloway MD   Medication Sig Dispense Refill    ciprofloxacin (CIPRO) 500 MG tablet Take 1 tablet by mouth 2 times daily for 10 days 20 tablet 0    methylPREDNISolone (MEDROL DOSEPACK) 4 MG tablet Take by mouth.  1 kit 0    pramipexole (MIRAPEX) 0.5 MG and sleep disturbance. The patient is not nervous/anxious. OBJECTIVE:   Wt Readings from Last 3 Encounters:   03/07/18 240 lb 6.4 oz (109 kg)   03/02/18 236 lb (107 kg)   02/28/18 236 lb (107 kg)     BP Readings from Last 3 Encounters:   03/07/18 130/82   03/02/18 118/78   02/28/18 126/70       /82 (Site: Right Arm, Position: Sitting, Cuff Size: Large Adult)   Pulse 96   Resp 18   Wt 240 lb 6.4 oz (109 kg)   SpO2 98% Comment: 3 liters of 02  BMI 40.00 kg/m²      Physical Exam   Constitutional: She is oriented to person, place, and time. She appears well-developed and well-nourished. obese, on O2   HENT:   Head: Normocephalic and atraumatic. Right Ear: External ear normal.   Left Ear: External ear normal.   Nose: Nose normal.   Eyes: Conjunctivae and EOM are normal. Pupils are equal, round, and reactive to light. Neck: Neck supple. No JVD present. No thyromegaly present. Cardiovascular: Normal rate, regular rhythm and normal heart sounds. Pulmonary/Chest: Effort normal and breath sounds normal. She has no wheezes. She has no rales. Abdominal: Soft. Bowel sounds are normal. She exhibits no distension. There is no tenderness. Musculoskeletal: She exhibits no edema or tenderness. Neurological: She is alert and oriented to person, place, and time. Skin: No rash noted. No erythema. Psychiatric: She has a normal mood and affect. Judgment normal.   Nursing note and vitals reviewed.       Lab Results   Component Value Date     03/02/2018    K 4.3 03/02/2018    CL 98 03/02/2018    CO2 30 03/02/2018    GLUCOSE 95 03/02/2018    BUN 13 03/02/2018    CREATININE 0.8 03/02/2018    CALCIUM 9.1 03/02/2018    PROT 7.1 03/02/2018    LABALBU 4.5 03/02/2018    BILITOT 0.3 03/02/2018    ALT 23 03/02/2018    AST 22 03/02/2018       Hemoglobin A1C (%)   Date Value   06/08/2017 5.4     Microscopic Examination (no units)   Date Value   02/25/2017 Not Indicated     LDL Calculated (mg/dL)   Date Value

## 2018-03-14 RX ORDER — PANTOPRAZOLE SODIUM 40 MG/1
TABLET, DELAYED RELEASE ORAL
Qty: 90 TABLET | Refills: 0 | Status: SHIPPED | OUTPATIENT
Start: 2018-03-14 | End: 2018-07-19 | Stop reason: SDUPTHER

## 2018-03-14 RX ORDER — ACYCLOVIR 400 MG/1
TABLET ORAL
Qty: 30 TABLET | Refills: 2 | Status: SHIPPED | OUTPATIENT
Start: 2018-03-14 | End: 2018-04-19 | Stop reason: SDUPTHER

## 2018-04-09 ENCOUNTER — OFFICE VISIT (OUTPATIENT)
Dept: PRIMARY CARE CLINIC | Age: 53
End: 2018-04-09
Payer: MEDICARE

## 2018-04-09 VITALS
WEIGHT: 236 LBS | HEART RATE: 96 BPM | HEIGHT: 66 IN | SYSTOLIC BLOOD PRESSURE: 130 MMHG | BODY MASS INDEX: 37.93 KG/M2 | TEMPERATURE: 96.5 F | OXYGEN SATURATION: 97 % | DIASTOLIC BLOOD PRESSURE: 80 MMHG | RESPIRATION RATE: 22 BRPM

## 2018-04-09 DIAGNOSIS — J44.1 COPD WITH ACUTE EXACERBATION (HCC): Primary | ICD-10-CM

## 2018-04-09 PROBLEM — J96.21 ACUTE ON CHRONIC RESPIRATORY FAILURE WITH HYPOXIA (HCC): Status: RESOLVED | Noted: 2017-10-28 | Resolved: 2018-04-09

## 2018-04-09 PROBLEM — H60.501 ACUTE OTITIS EXTERNA OF RIGHT EAR: Status: RESOLVED | Noted: 2018-03-04 | Resolved: 2018-04-09

## 2018-04-09 PROCEDURE — 96372 THER/PROPH/DIAG INJ SC/IM: CPT | Performed by: FAMILY MEDICINE

## 2018-04-09 PROCEDURE — 3017F COLORECTAL CA SCREEN DOC REV: CPT | Performed by: FAMILY MEDICINE

## 2018-04-09 PROCEDURE — 3014F SCREEN MAMMO DOC REV: CPT | Performed by: FAMILY MEDICINE

## 2018-04-09 PROCEDURE — G8427 DOCREV CUR MEDS BY ELIG CLIN: HCPCS | Performed by: FAMILY MEDICINE

## 2018-04-09 PROCEDURE — 3023F SPIROM DOC REV: CPT | Performed by: FAMILY MEDICINE

## 2018-04-09 PROCEDURE — G8417 CALC BMI ABV UP PARAM F/U: HCPCS | Performed by: FAMILY MEDICINE

## 2018-04-09 PROCEDURE — 99214 OFFICE O/P EST MOD 30 MIN: CPT | Performed by: FAMILY MEDICINE

## 2018-04-09 PROCEDURE — G8926 SPIRO NO PERF OR DOC: HCPCS | Performed by: FAMILY MEDICINE

## 2018-04-09 PROCEDURE — 1036F TOBACCO NON-USER: CPT | Performed by: FAMILY MEDICINE

## 2018-04-09 RX ORDER — LEVOFLOXACIN 500 MG/1
500 TABLET, FILM COATED ORAL DAILY
Qty: 10 TABLET | Refills: 0 | Status: SHIPPED | OUTPATIENT
Start: 2018-04-09 | End: 2018-04-19

## 2018-04-09 RX ORDER — METHYLPREDNISOLONE SODIUM SUCCINATE 125 MG/2ML
125 INJECTION, POWDER, LYOPHILIZED, FOR SOLUTION INTRAMUSCULAR; INTRAVENOUS ONCE
Status: COMPLETED | OUTPATIENT
Start: 2018-04-09 | End: 2018-04-09

## 2018-04-09 RX ORDER — METHYLPREDNISOLONE 4 MG/1
TABLET ORAL
Qty: 1 KIT | Refills: 0 | Status: SHIPPED | OUTPATIENT
Start: 2018-04-09 | End: 2018-04-15

## 2018-04-09 RX ORDER — CEFTRIAXONE 500 MG/1
1000 INJECTION, POWDER, FOR SOLUTION INTRAMUSCULAR; INTRAVENOUS ONCE
Status: COMPLETED | OUTPATIENT
Start: 2018-04-09 | End: 2018-04-09

## 2018-04-09 RX ADMIN — CEFTRIAXONE 1000 MG: 500 INJECTION, POWDER, FOR SOLUTION INTRAMUSCULAR; INTRAVENOUS at 12:05

## 2018-04-09 RX ADMIN — METHYLPREDNISOLONE SODIUM SUCCINATE 125 MG: 125 INJECTION, POWDER, LYOPHILIZED, FOR SOLUTION INTRAMUSCULAR; INTRAVENOUS at 12:06

## 2018-04-09 ASSESSMENT — ENCOUNTER SYMPTOMS
WHEEZING: 1
DIARRHEA: 0
VOMITING: 0
EYE DISCHARGE: 0
ABDOMINAL PAIN: 0
CONSTIPATION: 0
SHORTNESS OF BREATH: 1
EYE REDNESS: 0
COUGH: 1
EYE ITCHING: 0
RHINORRHEA: 0
NAUSEA: 0
SORE THROAT: 1

## 2018-04-12 DIAGNOSIS — I10 ESSENTIAL HYPERTENSION: ICD-10-CM

## 2018-04-12 RX ORDER — HYDROCHLOROTHIAZIDE 25 MG/1
TABLET ORAL
Qty: 30 TABLET | Refills: 5 | Status: SHIPPED | OUTPATIENT
Start: 2018-04-12 | End: 2019-04-04 | Stop reason: SDUPTHER

## 2018-04-17 ENCOUNTER — HOSPITAL ENCOUNTER (OUTPATIENT)
Dept: OTHER | Age: 53
Discharge: HOME | End: 2018-04-17
Attending: INTERNAL MEDICINE | Admitting: INTERNAL MEDICINE

## 2018-04-17 DIAGNOSIS — I10 ESSENTIAL HYPERTENSION: ICD-10-CM

## 2018-04-17 DIAGNOSIS — E03.9 HYPOTHYROIDISM, UNSPECIFIED TYPE: ICD-10-CM

## 2018-04-17 DIAGNOSIS — E78.5 HYPERLIPIDEMIA, UNSPECIFIED HYPERLIPIDEMIA TYPE: ICD-10-CM

## 2018-04-17 LAB
A/G RATIO: 1.6 (ref 0.8–2)
ALBUMIN SERPL-MCNC: 4.6 G/DL (ref 3.4–4.8)
ALP BLD-CCNC: 143 U/L (ref 25–100)
ALT SERPL-CCNC: 33 U/L (ref 4–36)
ANION GAP SERPL CALCULATED.3IONS-SCNC: 11 MMOL/L (ref 3–16)
AST SERPL-CCNC: 25 U/L (ref 8–33)
BILIRUB SERPL-MCNC: <0.2 MG/DL (ref 0.3–1.2)
BUN BLDV-MCNC: 11 MG/DL (ref 6–20)
CALCIUM SERPL-MCNC: 9.9 MG/DL (ref 8.5–10.5)
CHLORIDE BLD-SCNC: 101 MMOL/L (ref 98–107)
CO2: 31 MMOL/L (ref 20–30)
CREAT SERPL-MCNC: 0.8 MG/DL (ref 0.4–1.2)
GFR AFRICAN AMERICAN: >59
GFR NON-AFRICAN AMERICAN: >60
GLOBULIN: 2.8 G/DL
GLUCOSE BLD-MCNC: 107 MG/DL (ref 74–106)
POTASSIUM SERPL-SCNC: 4.3 MMOL/L (ref 3.4–5.1)
SODIUM BLD-SCNC: 143 MMOL/L (ref 136–145)
TOTAL PROTEIN: 7.4 G/DL (ref 6.4–8.3)
TSH SERPL DL<=0.05 MIU/L-ACNC: 5.37 UIU/ML (ref 0.35–5.5)

## 2018-04-19 ENCOUNTER — OFFICE VISIT (OUTPATIENT)
Dept: PRIMARY CARE CLINIC | Age: 53
End: 2018-04-19
Payer: MEDICARE

## 2018-04-19 VITALS
SYSTOLIC BLOOD PRESSURE: 130 MMHG | DIASTOLIC BLOOD PRESSURE: 62 MMHG | BODY MASS INDEX: 38.8 KG/M2 | RESPIRATION RATE: 18 BRPM | WEIGHT: 240.4 LBS | HEART RATE: 105 BPM | OXYGEN SATURATION: 96 %

## 2018-04-19 DIAGNOSIS — E78.5 HYPERLIPIDEMIA, UNSPECIFIED HYPERLIPIDEMIA TYPE: ICD-10-CM

## 2018-04-19 DIAGNOSIS — E03.9 HYPOTHYROIDISM, UNSPECIFIED TYPE: Primary | ICD-10-CM

## 2018-04-19 DIAGNOSIS — I10 ESSENTIAL HYPERTENSION: ICD-10-CM

## 2018-04-19 PROCEDURE — G8417 CALC BMI ABV UP PARAM F/U: HCPCS | Performed by: INTERNAL MEDICINE

## 2018-04-19 PROCEDURE — G8427 DOCREV CUR MEDS BY ELIG CLIN: HCPCS | Performed by: INTERNAL MEDICINE

## 2018-04-19 PROCEDURE — 1036F TOBACCO NON-USER: CPT | Performed by: INTERNAL MEDICINE

## 2018-04-19 PROCEDURE — 3017F COLORECTAL CA SCREEN DOC REV: CPT | Performed by: INTERNAL MEDICINE

## 2018-04-19 PROCEDURE — 99213 OFFICE O/P EST LOW 20 MIN: CPT | Performed by: INTERNAL MEDICINE

## 2018-04-19 RX ORDER — FUROSEMIDE 20 MG/1
20 TABLET ORAL DAILY PRN
Qty: 60 TABLET | Refills: 3 | Status: SHIPPED | OUTPATIENT
Start: 2018-04-19 | End: 2018-12-17 | Stop reason: SDUPTHER

## 2018-04-19 ASSESSMENT — ENCOUNTER SYMPTOMS
SHORTNESS OF BREATH: 1
VOMITING: 0
SORE THROAT: 0
WHEEZING: 1
ABDOMINAL PAIN: 0
BACK PAIN: 0
SINUS PRESSURE: 0
EYE DISCHARGE: 0
COUGH: 1
NAUSEA: 0

## 2018-05-21 PROBLEM — J44.1 COPD EXACERBATION (HCC): Status: ACTIVE | Noted: 2018-05-21

## 2018-05-23 PROBLEM — R00.0 SINUS TACHYCARDIA: Status: ACTIVE | Noted: 2018-05-23

## 2018-05-25 PROBLEM — B37.0 THRUSH, ORAL: Status: ACTIVE | Noted: 2018-05-25

## 2018-05-31 ENCOUNTER — CARE COORDINATION (OUTPATIENT)
Dept: CARE COORDINATION | Age: 53
End: 2018-05-31

## 2018-06-05 ENCOUNTER — OFFICE VISIT (OUTPATIENT)
Dept: PRIMARY CARE CLINIC | Age: 53
End: 2018-06-05
Payer: MEDICARE

## 2018-06-05 VITALS
DIASTOLIC BLOOD PRESSURE: 80 MMHG | WEIGHT: 244 LBS | SYSTOLIC BLOOD PRESSURE: 128 MMHG | HEART RATE: 115 BPM | BODY MASS INDEX: 40.6 KG/M2 | RESPIRATION RATE: 18 BRPM | OXYGEN SATURATION: 97 %

## 2018-06-05 DIAGNOSIS — J44.9 CHRONIC OBSTRUCTIVE PULMONARY DISEASE, UNSPECIFIED COPD TYPE (HCC): Primary | ICD-10-CM

## 2018-06-05 DIAGNOSIS — I10 ESSENTIAL HYPERTENSION: ICD-10-CM

## 2018-06-05 DIAGNOSIS — E03.9 HYPOTHYROIDISM, UNSPECIFIED TYPE: ICD-10-CM

## 2018-06-05 PROCEDURE — G8417 CALC BMI ABV UP PARAM F/U: HCPCS | Performed by: INTERNAL MEDICINE

## 2018-06-05 PROCEDURE — 1036F TOBACCO NON-USER: CPT | Performed by: INTERNAL MEDICINE

## 2018-06-05 PROCEDURE — 3017F COLORECTAL CA SCREEN DOC REV: CPT | Performed by: INTERNAL MEDICINE

## 2018-06-05 PROCEDURE — G8427 DOCREV CUR MEDS BY ELIG CLIN: HCPCS | Performed by: INTERNAL MEDICINE

## 2018-06-05 PROCEDURE — G8926 SPIRO NO PERF OR DOC: HCPCS | Performed by: INTERNAL MEDICINE

## 2018-06-05 PROCEDURE — 3023F SPIROM DOC REV: CPT | Performed by: INTERNAL MEDICINE

## 2018-06-05 PROCEDURE — 1111F DSCHRG MED/CURRENT MED MERGE: CPT | Performed by: INTERNAL MEDICINE

## 2018-06-05 PROCEDURE — 99213 OFFICE O/P EST LOW 20 MIN: CPT | Performed by: INTERNAL MEDICINE

## 2018-06-05 RX ORDER — LEVOTHYROXINE SODIUM 175 UG/1
175 TABLET ORAL DAILY
Qty: 30 TABLET | Refills: 5 | Status: SHIPPED | OUTPATIENT
Start: 2018-06-05 | End: 2019-05-01 | Stop reason: SDUPTHER

## 2018-06-05 ASSESSMENT — ENCOUNTER SYMPTOMS
ABDOMINAL PAIN: 0
VOMITING: 0
SHORTNESS OF BREATH: 1
EYE DISCHARGE: 0
SORE THROAT: 0
WHEEZING: 1
BACK PAIN: 0
NAUSEA: 0
SINUS PRESSURE: 0
COUGH: 1

## 2018-06-08 ENCOUNTER — TELEPHONE (OUTPATIENT)
Dept: PRIMARY CARE CLINIC | Age: 53
End: 2018-06-08

## 2018-07-05 RX ORDER — POTASSIUM CHLORIDE 750 MG/1
TABLET, FILM COATED, EXTENDED RELEASE ORAL
Qty: 180 TABLET | Refills: 1 | Status: SHIPPED | OUTPATIENT
Start: 2018-07-05 | End: 2019-09-23 | Stop reason: ALTCHOICE

## 2018-07-17 DIAGNOSIS — R06.2 WHEEZING: Primary | ICD-10-CM

## 2018-07-17 RX ORDER — PREDNISONE 10 MG/1
TABLET ORAL
Qty: 18 TABLET | Refills: 0 | Status: SHIPPED | OUTPATIENT
Start: 2018-07-17 | End: 2018-08-08 | Stop reason: ALTCHOICE

## 2018-07-19 ENCOUNTER — OFFICE VISIT (OUTPATIENT)
Dept: PRIMARY CARE CLINIC | Age: 53
End: 2018-07-19
Payer: MEDICARE

## 2018-07-19 VITALS
BODY MASS INDEX: 40.98 KG/M2 | HEART RATE: 98 BPM | DIASTOLIC BLOOD PRESSURE: 80 MMHG | RESPIRATION RATE: 20 BRPM | SYSTOLIC BLOOD PRESSURE: 110 MMHG | HEIGHT: 65 IN | WEIGHT: 246 LBS | OXYGEN SATURATION: 91 %

## 2018-07-19 DIAGNOSIS — J84.9 INTERSTITIAL LUNG DISEASE (HCC): ICD-10-CM

## 2018-07-19 DIAGNOSIS — I10 ESSENTIAL HYPERTENSION: Primary | ICD-10-CM

## 2018-07-19 DIAGNOSIS — D80.3 IGG DEFICIENCY (HCC): ICD-10-CM

## 2018-07-19 DIAGNOSIS — F32.A DEPRESSION, UNSPECIFIED DEPRESSION TYPE: ICD-10-CM

## 2018-07-19 PROBLEM — J44.1 COPD EXACERBATION (HCC): Status: RESOLVED | Noted: 2018-05-21 | Resolved: 2018-07-19

## 2018-07-19 PROBLEM — K14.0 GLOSSITIS: Status: RESOLVED | Noted: 2017-10-29 | Resolved: 2018-07-19

## 2018-07-19 PROBLEM — B37.0 THRUSH, ORAL: Status: RESOLVED | Noted: 2018-05-25 | Resolved: 2018-07-19

## 2018-07-19 PROCEDURE — 99213 OFFICE O/P EST LOW 20 MIN: CPT | Performed by: INTERNAL MEDICINE

## 2018-07-19 PROCEDURE — 1036F TOBACCO NON-USER: CPT | Performed by: INTERNAL MEDICINE

## 2018-07-19 PROCEDURE — G8427 DOCREV CUR MEDS BY ELIG CLIN: HCPCS | Performed by: INTERNAL MEDICINE

## 2018-07-19 PROCEDURE — 3017F COLORECTAL CA SCREEN DOC REV: CPT | Performed by: INTERNAL MEDICINE

## 2018-07-19 PROCEDURE — G8417 CALC BMI ABV UP PARAM F/U: HCPCS | Performed by: INTERNAL MEDICINE

## 2018-07-19 RX ORDER — TRAZODONE HYDROCHLORIDE 100 MG/1
100 TABLET ORAL NIGHTLY
Qty: 30 TABLET | Refills: 5 | Status: SHIPPED | OUTPATIENT
Start: 2018-07-19 | End: 2019-05-15 | Stop reason: ALTCHOICE

## 2018-07-19 RX ORDER — VENLAFAXINE HYDROCHLORIDE 75 MG/1
150 CAPSULE, EXTENDED RELEASE ORAL DAILY
Qty: 90 CAPSULE | Refills: 5 | Status: CANCELLED | OUTPATIENT
Start: 2018-07-19

## 2018-07-19 RX ORDER — PRAMIPEXOLE DIHYDROCHLORIDE 0.5 MG/1
0.5 TABLET ORAL NIGHTLY
Qty: 90 TABLET | Refills: 0 | Status: SHIPPED | OUTPATIENT
Start: 2018-07-19 | End: 2019-05-15 | Stop reason: ALTCHOICE

## 2018-07-19 RX ORDER — ACYCLOVIR 400 MG/1
400 TABLET ORAL DAILY
Qty: 30 TABLET | Refills: 5 | Status: SHIPPED | OUTPATIENT
Start: 2018-07-19 | End: 2018-11-07

## 2018-07-19 RX ORDER — PANTOPRAZOLE SODIUM 40 MG/1
TABLET, DELAYED RELEASE ORAL
Qty: 90 TABLET | Refills: 3 | Status: SHIPPED | OUTPATIENT
Start: 2018-07-19 | End: 2019-07-10 | Stop reason: SDUPTHER

## 2018-07-19 RX ORDER — DULOXETIN HYDROCHLORIDE 60 MG/1
60 CAPSULE, DELAYED RELEASE ORAL DAILY
Qty: 30 CAPSULE | Refills: 3 | Status: SHIPPED | OUTPATIENT
Start: 2018-07-19 | End: 2018-08-08

## 2018-07-19 ASSESSMENT — PATIENT HEALTH QUESTIONNAIRE - PHQ9
2. FEELING DOWN, DEPRESSED OR HOPELESS: 0
SUM OF ALL RESPONSES TO PHQ9 QUESTIONS 1 & 2: 0
SUM OF ALL RESPONSES TO PHQ QUESTIONS 1-9: 0
1. LITTLE INTEREST OR PLEASURE IN DOING THINGS: 0

## 2018-08-08 ENCOUNTER — OFFICE VISIT (OUTPATIENT)
Dept: PRIMARY CARE CLINIC | Age: 53
End: 2018-08-08
Payer: MEDICARE

## 2018-08-08 VITALS
OXYGEN SATURATION: 94 % | HEIGHT: 65 IN | RESPIRATION RATE: 20 BRPM | DIASTOLIC BLOOD PRESSURE: 70 MMHG | BODY MASS INDEX: 39.49 KG/M2 | SYSTOLIC BLOOD PRESSURE: 120 MMHG | HEART RATE: 107 BPM | WEIGHT: 237 LBS

## 2018-08-08 DIAGNOSIS — F41.8 DEPRESSION WITH ANXIETY: Primary | ICD-10-CM

## 2018-08-08 DIAGNOSIS — G47.00 INSOMNIA, UNSPECIFIED TYPE: ICD-10-CM

## 2018-08-08 PROCEDURE — 99213 OFFICE O/P EST LOW 20 MIN: CPT | Performed by: PEDIATRICS

## 2018-08-08 PROCEDURE — 1036F TOBACCO NON-USER: CPT | Performed by: PEDIATRICS

## 2018-08-08 PROCEDURE — G8417 CALC BMI ABV UP PARAM F/U: HCPCS | Performed by: PEDIATRICS

## 2018-08-08 PROCEDURE — 3017F COLORECTAL CA SCREEN DOC REV: CPT | Performed by: PEDIATRICS

## 2018-08-08 PROCEDURE — G8427 DOCREV CUR MEDS BY ELIG CLIN: HCPCS | Performed by: PEDIATRICS

## 2018-08-08 RX ORDER — VENLAFAXINE HYDROCHLORIDE 75 MG/1
225 CAPSULE, EXTENDED RELEASE ORAL DAILY
Qty: 270 CAPSULE | Refills: 3 | Status: SHIPPED | OUTPATIENT
Start: 2018-08-08 | End: 2019-07-25 | Stop reason: SDUPTHER

## 2018-08-08 RX ORDER — ALPRAZOLAM 0.5 MG/1
0.5 TABLET ORAL NIGHTLY PRN
Qty: 20 TABLET | Refills: 0 | Status: SHIPPED | OUTPATIENT
Start: 2018-08-08 | End: 2018-09-07

## 2018-08-08 ASSESSMENT — ENCOUNTER SYMPTOMS
VOMITING: 0
COUGH: 0
WHEEZING: 0
ABDOMINAL PAIN: 0
SORE THROAT: 0
NAUSEA: 0
EYE DISCHARGE: 0
SINUS PRESSURE: 0
BACK PAIN: 0
SHORTNESS OF BREATH: 0

## 2018-08-08 NOTE — PROGRESS NOTES
 Interstitial lung disease ([facility] [address]     Lung nodules     bilateral lungs       Past Surgical History:   Procedure Laterality Date     SECTION      GALLBLADDER SURGERY      HYSTERECTOMY         Family History   Problem Relation Age of Onset    Cancer Mother         colon    Cancer Father         bladder    High Blood Pressure Father     Stroke Father     High Blood Pressure Sister        Social History     Social History    Marital status:      Spouse name: N/A    Number of children: N/A    Years of education: N/A     Social History Main Topics    Smoking status: Former Smoker     Packs/day: 1.00     Years: 30.00     Quit date: 2007    Smokeless tobacco: Never Used    Alcohol use No    Drug use: No    Sexual activity: Not Asked     Other Topics Concern    None     Social History Narrative    None       OBJECTIVE:    Vitals:    18 1337   BP: 120/70   Site: Right Arm   Position: Sitting   Pulse: 107   Resp: 20   SpO2: 94%   Weight: 237 lb (107.5 kg)   Height: 5' 5\" (1.651 m)     Physical Exam   Constitutional: She is oriented to person, place, and time. She appears well-developed and well-nourished. No distress. HENT:   Head: Normocephalic and atraumatic. Nose: Nose normal.   Mouth/Throat: Oropharynx is clear and moist.   Eyes: Conjunctivae and EOM are normal. Pupils are equal, round, and reactive to light. Right eye exhibits no discharge. Left eye exhibits no discharge. No scleral icterus. Neck: Normal range of motion. Neck supple. No JVD present. No tracheal deviation present. No thyromegaly present. Cardiovascular: Normal rate, regular rhythm and normal heart sounds. No murmur heard. Pulmonary/Chest: Effort normal and breath sounds normal. No stridor. No respiratory distress. She has no wheezes. She has no rales. She exhibits no tenderness. Abdominal: Soft. Bowel sounds are normal. She exhibits no distension and no mass. There is no tenderness.  There is no solution Inhale 3 mLs into the lungs every 6 hours as needed for Shortness of Breath 360 mL 0    furosemide (LASIX) 20 MG tablet Take 1 tablet by mouth daily as needed (swelling) 60 tablet 3    hydrochlorothiazide (HYDRODIURIL) 25 MG tablet take 1 tablet by mouth once daily 30 tablet 5    cetirizine (ZYRTEC) 10 MG tablet Take 1 tablet by mouth daily 30 tablet 3    ibuprofen (ADVIL;MOTRIN) 800 MG tablet take 1 tablet by mouth every 8 hours if needed for pain  0     No current facility-administered medications for this visit. During this visit the following were done:  Labs reviewed []    Labs ordered []    Radiology reports Reviewed []    Radiology ordered []    EKG, echo, and/or stress test reviewed []    EEG results reviewed  []    EEG reviewed and interpreted per myself   []    Previous provider/old records requested  []    Previous provider Records Reviewed []    ER records Reviewed []    Hospital records Reviewed []    History obtained From Family []    Radiological images view and Interpreted per myself []      ASSESSMENT/PLAN:    Deepa Hall was seen today for medication reaction. Diagnoses and all orders for this visit:    Depression with anxiety  -     venlafaxine (EFFEXOR XR) 75 MG extended release capsule; Take 3 capsules by mouth daily    Insomnia, unspecified type  -     ALPRAZolam (XANAX) 0.5 MG tablet; Take 1 tablet by mouth nightly as needed for Sleep or Anxiety for up to 30 days. .       Additional plan related to above diagnosis:    Return in about 4 weeks (around 9/5/2018) for medication change .     Controlled Substances Monitoring:

## 2018-09-02 ASSESSMENT — ENCOUNTER SYMPTOMS
SHORTNESS OF BREATH: 1
NAUSEA: 0
WHEEZING: 1
SINUS PRESSURE: 0
EYE DISCHARGE: 0
ABDOMINAL PAIN: 0
VOMITING: 0
SORE THROAT: 0
COUGH: 1
BACK PAIN: 0

## 2018-09-02 NOTE — PROGRESS NOTES
Have you seen any other physician or provider since your last visit no    Have you had any other diagnostic tests since your last visit? no    Have you changed or stopped any medications since your last visit? no
release tablet take 1 tablet by mouth twice a day 180 tablet 1    levothyroxine (SYNTHROID) 175 MCG tablet Take 1 tablet by mouth Daily 30 tablet 5    albuterol sulfate HFA (PROAIR HFA) 108 (90 Base) MCG/ACT inhaler Inhale 2 puffs into the lungs every 6 hours as needed for Wheezing 1 Inhaler 3    furosemide (LASIX) 20 MG tablet Take 1 tablet by mouth daily as needed (swelling) 60 tablet 3    hydrochlorothiazide (HYDRODIURIL) 25 MG tablet take 1 tablet by mouth once daily 30 tablet 5    [DISCONTINUED] venlafaxine (EFFEXOR XR) 75 MG extended release capsule take 3 capsules by mouth once daily 90 capsule 5    cetirizine (ZYRTEC) 10 MG tablet Take 1 tablet by mouth daily 30 tablet 3    ibuprofen (ADVIL;MOTRIN) 800 MG tablet take 1 tablet by mouth every 8 hours if needed for pain  0        Review of Systems   Constitutional: Positive for fatigue. Negative for chills and fever. HENT: Negative for congestion, sinus pressure and sore throat. Eyes: Negative for discharge and visual disturbance. Respiratory: Positive for cough, shortness of breath and wheezing. Cardiovascular: Negative for chest pain and palpitations. AYALA   Gastrointestinal: Negative for abdominal pain, nausea and vomiting. Endocrine: Negative for cold intolerance and heat intolerance. Genitourinary: Negative for dysuria, frequency and urgency. Musculoskeletal: Positive for arthralgias. Negative for back pain. Skin: Negative for rash and wound. Neurological: Negative for syncope, numbness and headaches. Hematological: Negative. Psychiatric/Behavioral: Negative for agitation and sleep disturbance. The patient is not nervous/anxious.         OBJECTIVE:   Wt Readings from Last 3 Encounters:   08/08/18 237 lb (107.5 kg)   07/19/18 246 lb (111.6 kg)   06/05/18 244 lb (110.7 kg)     BP Readings from Last 3 Encounters:   08/08/18 120/70   07/19/18 110/80   06/05/18 128/80       /80 (Site: Right Arm, Position: Sitting, Cuff

## 2018-09-06 ENCOUNTER — OFFICE VISIT (OUTPATIENT)
Dept: PRIMARY CARE CLINIC | Age: 53
End: 2018-09-06
Payer: MEDICARE

## 2018-09-06 VITALS
RESPIRATION RATE: 18 BRPM | DIASTOLIC BLOOD PRESSURE: 70 MMHG | BODY MASS INDEX: 39.21 KG/M2 | OXYGEN SATURATION: 98 % | WEIGHT: 235.6 LBS | HEART RATE: 100 BPM | SYSTOLIC BLOOD PRESSURE: 132 MMHG

## 2018-09-06 DIAGNOSIS — E03.9 HYPOTHYROIDISM, UNSPECIFIED TYPE: ICD-10-CM

## 2018-09-06 DIAGNOSIS — F32.A DEPRESSION, UNSPECIFIED DEPRESSION TYPE: Chronic | ICD-10-CM

## 2018-09-06 DIAGNOSIS — J84.9 INTERSTITIAL LUNG DISEASE (HCC): Primary | ICD-10-CM

## 2018-09-06 DIAGNOSIS — I10 ESSENTIAL HYPERTENSION: ICD-10-CM

## 2018-09-06 PROCEDURE — 99213 OFFICE O/P EST LOW 20 MIN: CPT | Performed by: INTERNAL MEDICINE

## 2018-09-06 PROCEDURE — 3017F COLORECTAL CA SCREEN DOC REV: CPT | Performed by: INTERNAL MEDICINE

## 2018-09-06 PROCEDURE — G8417 CALC BMI ABV UP PARAM F/U: HCPCS | Performed by: INTERNAL MEDICINE

## 2018-09-06 PROCEDURE — G8427 DOCREV CUR MEDS BY ELIG CLIN: HCPCS | Performed by: INTERNAL MEDICINE

## 2018-09-06 PROCEDURE — 1036F TOBACCO NON-USER: CPT | Performed by: INTERNAL MEDICINE

## 2018-09-06 RX ORDER — HYDROXYZINE HYDROCHLORIDE 25 MG/1
25 TABLET, FILM COATED ORAL 2 TIMES DAILY PRN
Qty: 40 TABLET | Refills: 1 | Status: SHIPPED | OUTPATIENT
Start: 2018-09-06 | End: 2018-10-06

## 2018-09-06 ASSESSMENT — ENCOUNTER SYMPTOMS
VOMITING: 0
ABDOMINAL PAIN: 0
SHORTNESS OF BREATH: 1
BACK PAIN: 0
SINUS PRESSURE: 0
SORE THROAT: 0
COUGH: 1
EYE DISCHARGE: 0
NAUSEA: 0
WHEEZING: 1

## 2018-10-29 ENCOUNTER — HOSPITAL ENCOUNTER (OUTPATIENT)
Facility: HOSPITAL | Age: 53
Discharge: HOME | End: 2018-10-29
Payer: MEDICARE

## 2018-10-29 LAB
ALBUMIN SERPL-MCNC: 4.4 G/DL (ref 3.4–4.8)
ALP BLD-CCNC: 167 U/L (ref 25–100)
ALT SERPL-CCNC: 33 U/L (ref 4–36)
AST SERPL-CCNC: 30 U/L (ref 8–33)
BILIRUB SERPL-MCNC: 0.3 MG/DL (ref 0.3–1.2)
BILIRUBIN DIRECT: <0.2 MG/DL (ref 0–0.2)
BILIRUBIN, INDIRECT: ABNORMAL MG/DL (ref 0.2–0.8)
TOTAL PROTEIN: 7.5 G/DL (ref 6.4–8.3)

## 2018-10-29 PROCEDURE — 36415 COLL VENOUS BLD VENIPUNCTURE: CPT

## 2018-10-29 PROCEDURE — 80076 HEPATIC FUNCTION PANEL: CPT

## 2018-11-07 ENCOUNTER — OFFICE VISIT (OUTPATIENT)
Dept: PRIMARY CARE CLINIC | Age: 53
End: 2018-11-07
Payer: MEDICARE

## 2018-11-07 VITALS
WEIGHT: 231.8 LBS | TEMPERATURE: 98.3 F | DIASTOLIC BLOOD PRESSURE: 66 MMHG | HEART RATE: 96 BPM | SYSTOLIC BLOOD PRESSURE: 118 MMHG | BODY MASS INDEX: 38.57 KG/M2 | RESPIRATION RATE: 18 BRPM | OXYGEN SATURATION: 98 %

## 2018-11-07 DIAGNOSIS — E03.9 HYPOTHYROIDISM, UNSPECIFIED TYPE: ICD-10-CM

## 2018-11-07 DIAGNOSIS — E78.5 HYPERLIPIDEMIA, UNSPECIFIED HYPERLIPIDEMIA TYPE: ICD-10-CM

## 2018-11-07 DIAGNOSIS — R05.8 PRODUCTIVE COUGH: ICD-10-CM

## 2018-11-07 DIAGNOSIS — I10 ESSENTIAL HYPERTENSION: Primary | ICD-10-CM

## 2018-11-07 PROCEDURE — 3017F COLORECTAL CA SCREEN DOC REV: CPT | Performed by: INTERNAL MEDICINE

## 2018-11-07 PROCEDURE — G8484 FLU IMMUNIZE NO ADMIN: HCPCS | Performed by: INTERNAL MEDICINE

## 2018-11-07 PROCEDURE — G8417 CALC BMI ABV UP PARAM F/U: HCPCS | Performed by: INTERNAL MEDICINE

## 2018-11-07 PROCEDURE — 1036F TOBACCO NON-USER: CPT | Performed by: INTERNAL MEDICINE

## 2018-11-07 PROCEDURE — G8427 DOCREV CUR MEDS BY ELIG CLIN: HCPCS | Performed by: INTERNAL MEDICINE

## 2018-11-07 PROCEDURE — 99213 OFFICE O/P EST LOW 20 MIN: CPT | Performed by: INTERNAL MEDICINE

## 2018-11-07 PROCEDURE — 96372 THER/PROPH/DIAG INJ SC/IM: CPT | Performed by: INTERNAL MEDICINE

## 2018-11-07 RX ORDER — CEFTRIAXONE 1 G/1
0.5 INJECTION, POWDER, FOR SOLUTION INTRAMUSCULAR; INTRAVENOUS ONCE
Status: COMPLETED | OUTPATIENT
Start: 2018-11-07 | End: 2018-11-07

## 2018-11-07 RX ORDER — CEFTRIAXONE 500 MG/1
500 INJECTION, POWDER, FOR SOLUTION INTRAMUSCULAR; INTRAVENOUS ONCE
Status: DISCONTINUED | OUTPATIENT
Start: 2018-11-07 | End: 2019-05-15

## 2018-11-07 RX ORDER — CEFDINIR 300 MG/1
300 CAPSULE ORAL 2 TIMES DAILY
Qty: 20 CAPSULE | Refills: 0 | Status: SHIPPED | OUTPATIENT
Start: 2018-11-07 | End: 2018-11-17

## 2018-11-07 RX ADMIN — CEFTRIAXONE 0.5 G: 1 INJECTION, POWDER, FOR SOLUTION INTRAMUSCULAR; INTRAVENOUS at 10:51

## 2018-11-07 NOTE — PROGRESS NOTES
SUBJECTIVE:    Patient ID: Wilmer Kemp is a 46 y. o.female. Chief Complaint   Patient presents with    COPD    Anxiety    Hypothyroidism    Cough     productive green sputum         HPI:  Patient has had hypertension for several years. She has been compliant with taking medications, without side effects from it. She has been following a low-sodium, is not very active and never exercises. Weight is decreasing steadily, compared to last visit. Her blood pressure is stable at this time. Patient  has had hypothyroidism for the past few years. She has been compliant with taking her medication without side effect. There are no symptoms of hypo or hyper thyroidism. She has had blood work and is here today for follow up. Patient has had hyperlipidemia for several years. She has been compliant with low fat diet. She has been compliant with taking the medications, without side effects. Her weight is down compared to last visit. She is not very active, and never exercises. Patient with history of interstitial lung disease/COPD. She is on oxygen. Patient reported that she's been having some productive cough for the past few days worse than usual. Slight increased shortness of breath. No fever or chills. Decreased wheezing. She has been using her inhalers and neb treatment with minimal response. Patient's medications, allergies, past medical, surgical, social and family histories were reviewed and updated as appropriate in electronic medical record.         Outpatient Prescriptions Marked as Taking for the 11/7/18 encounter (Office Visit) with Jhonatan Fleming MD   Medication Sig Dispense Refill    Nintedanib Esylate 150 MG CAPS Take by mouth 2 times daily      cefdinir (OMNICEF) 300 MG capsule Take 1 capsule by mouth 2 times daily for 10 days 20 capsule 0    venlafaxine (EFFEXOR XR) 75 MG extended release capsule Take 3 capsules by mouth daily 270 capsule 3    pantoprazole (PROTONIX) 40 MG tablet take 1 tablet fluid intake. RTC if sx no better. I had long conversation with the patient regarding the importance of working on her weight especially with questionably now getting workup for possible lung transplant.     Orders Placed This Encounter   Medications    cefTRIAXone (ROCEPHIN) injection 500 mg    cefdinir (OMNICEF) 300 MG capsule     Sig: Take 1 capsule by mouth 2 times daily for 10 days     Dispense:  20 capsule     Refill:  0

## 2018-11-07 NOTE — PROGRESS NOTES
Have you seen any other physician or provider since your last visit yes -pulmonary    Have you had any other diagnostic tests since your last visit? yes - labs    Have you changed or stopped any medications since your last visit? yes - pulmonologist started her on ofev nintedanib for IPF. Pt is here for fu on copd, anxiety and hypothyroidism. Pulmonologist diagnosed with IPF.   Pt co of productive cough, sinus congestion,sore throat x about 2 weeks she had one round of augmentin with no relief

## 2018-11-25 ASSESSMENT — ENCOUNTER SYMPTOMS
COUGH: 1
SHORTNESS OF BREATH: 1
NAUSEA: 0
BACK PAIN: 0
WHEEZING: 1
SORE THROAT: 0
VOMITING: 0
EYE DISCHARGE: 0
SINUS PRESSURE: 0
ABDOMINAL PAIN: 0

## 2018-12-17 RX ORDER — FUROSEMIDE 20 MG/1
TABLET ORAL
Qty: 60 TABLET | Refills: 3 | Status: ON HOLD | OUTPATIENT
Start: 2018-12-17 | End: 2020-11-08 | Stop reason: SDUPTHER

## 2018-12-31 RX ORDER — IPRATROPIUM BROMIDE AND ALBUTEROL SULFATE 2.5; .5 MG/3ML; MG/3ML
1 SOLUTION RESPIRATORY (INHALATION) EVERY 6 HOURS PRN
Qty: 360 ML | Refills: 0 | Status: ON HOLD | OUTPATIENT
Start: 2018-12-31 | End: 2021-05-26

## 2019-01-30 ENCOUNTER — HOSPITAL ENCOUNTER (OUTPATIENT)
Facility: HOSPITAL | Age: 54
Discharge: HOME | End: 2019-01-30
Payer: MEDICARE

## 2019-01-30 LAB
ALBUMIN SERPL-MCNC: 4.2 G/DL (ref 3.4–4.8)
ANION GAP SERPL CALCULATED.3IONS-SCNC: 10 MMOL/L (ref 3–16)
BUN BLDV-MCNC: 18 MG/DL (ref 6–20)
CALCIUM SERPL-MCNC: 9.7 MG/DL (ref 8.5–10.5)
CHLORIDE BLD-SCNC: 101 MMOL/L (ref 98–107)
CO2: 32 MMOL/L (ref 20–30)
CREAT SERPL-MCNC: 0.8 MG/DL (ref 0.4–1.2)
GFR AFRICAN AMERICAN: >59
GFR NON-AFRICAN AMERICAN: >60
GLUCOSE BLD-MCNC: 108 MG/DL (ref 74–106)
PHOSPHORUS: 3.1 MG/DL (ref 2.5–4.5)
POTASSIUM SERPL-SCNC: 3.9 MMOL/L (ref 3.4–5.1)
SODIUM BLD-SCNC: 143 MMOL/L (ref 136–145)

## 2019-01-30 PROCEDURE — 80069 RENAL FUNCTION PANEL: CPT

## 2019-04-04 DIAGNOSIS — I10 ESSENTIAL HYPERTENSION: ICD-10-CM

## 2019-04-05 RX ORDER — HYDROCHLOROTHIAZIDE 25 MG/1
TABLET ORAL
Qty: 90 TABLET | Refills: 5 | Status: SHIPPED | OUTPATIENT
Start: 2019-04-05 | End: 2020-05-15 | Stop reason: SDUPTHER

## 2019-05-01 RX ORDER — LEVOTHYROXINE SODIUM 175 UG/1
175 TABLET ORAL DAILY
Qty: 30 TABLET | Refills: 5 | Status: SHIPPED | OUTPATIENT
Start: 2019-05-01 | End: 2019-05-23

## 2019-05-15 ENCOUNTER — OFFICE VISIT (OUTPATIENT)
Dept: PRIMARY CARE CLINIC | Age: 54
End: 2019-05-15
Payer: MEDICARE

## 2019-05-15 VITALS
BODY MASS INDEX: 36.15 KG/M2 | HEART RATE: 107 BPM | SYSTOLIC BLOOD PRESSURE: 110 MMHG | OXYGEN SATURATION: 96 % | WEIGHT: 217 LBS | HEIGHT: 65 IN | DIASTOLIC BLOOD PRESSURE: 70 MMHG

## 2019-05-15 DIAGNOSIS — J84.9 INTERSTITIAL LUNG DISEASE (HCC): ICD-10-CM

## 2019-05-15 DIAGNOSIS — Z12.39 SCREENING FOR BREAST CANCER: ICD-10-CM

## 2019-05-15 DIAGNOSIS — E03.9 HYPOTHYROIDISM, UNSPECIFIED TYPE: ICD-10-CM

## 2019-05-15 DIAGNOSIS — E78.5 HYPERLIPIDEMIA, UNSPECIFIED HYPERLIPIDEMIA TYPE: ICD-10-CM

## 2019-05-15 DIAGNOSIS — I10 ESSENTIAL HYPERTENSION: Primary | ICD-10-CM

## 2019-05-15 PROCEDURE — 1036F TOBACCO NON-USER: CPT | Performed by: INTERNAL MEDICINE

## 2019-05-15 PROCEDURE — 99213 OFFICE O/P EST LOW 20 MIN: CPT | Performed by: INTERNAL MEDICINE

## 2019-05-15 PROCEDURE — G8427 DOCREV CUR MEDS BY ELIG CLIN: HCPCS | Performed by: INTERNAL MEDICINE

## 2019-05-15 PROCEDURE — 3017F COLORECTAL CA SCREEN DOC REV: CPT | Performed by: INTERNAL MEDICINE

## 2019-05-15 PROCEDURE — G8417 CALC BMI ABV UP PARAM F/U: HCPCS | Performed by: INTERNAL MEDICINE

## 2019-05-15 ASSESSMENT — ENCOUNTER SYMPTOMS
COUGH: 1
SHORTNESS OF BREATH: 1
ABDOMINAL PAIN: 0
WHEEZING: 1
SINUS PRESSURE: 0
EYE DISCHARGE: 0
VOMITING: 0
NAUSEA: 0
SORE THROAT: 0

## 2019-05-15 NOTE — PROGRESS NOTES
SUBJECTIVE:    Patient ID: Kelvin Nur is a 46 y. o.female. Chief Complaint   Patient presents with    Hypertension    Depression    Anxiety    Hyperthyroidism         HPI:   Patient has had hypertension for several years. She has been compliant with taking medications, without side effects from it. She has been following a low-sodium, is not very active and never exercises. Weight is decreasing steadily, compared to last visit. Her blood pressure is stable elevated at this time. Patient  has had hypothyroidism for the past few years. She has been compliant with taking her medication without side effect. There are no symptoms of hypo or hyper thyroidism. She has had blood work and is here today for follow up. Patient has had hyperlipidemia for several years. She has been compliant with low fat diet. She has been compliant with taking the medications, without side effects. Her weight is down compared to last visit. She is not very active, and never exercises. Patient's medications, allergies, past medical, surgical, social and family histories were reviewed and updated as appropriate in the electronic medical record/chart.         Outpatient Medications Marked as Taking for the 5/15/19 encounter (Office Visit) with Cheyenne Pires MD   Medication Sig Dispense Refill    levothyroxine (SYNTHROID) 175 MCG tablet Take 1 tablet by mouth Daily 30 tablet 5    hydrochlorothiazide (HYDRODIURIL) 25 MG tablet take 1 tablet by mouth once daily 90 tablet 5    furosemide (LASIX) 20 MG tablet take 1 tablet by mouth once daily if needed for SWELLING 60 tablet 3    venlafaxine (EFFEXOR XR) 75 MG extended release capsule Take 3 capsules by mouth daily 270 capsule 3    pantoprazole (PROTONIX) 40 MG tablet take 1 tablet by mouth once daily 90 tablet 3    albuterol sulfate HFA (PROAIR HFA) 108 (90 Base) MCG/ACT inhaler Inhale 2 puffs into the lungs every 6 hours as needed for Wheezing 1 Inhaler 3    cetirizine (ZYRTEC) 10 MG tablet Take 1 tablet by mouth daily 30 tablet 3    ibuprofen (ADVIL;MOTRIN) 800 MG tablet take 1 tablet by mouth every 8 hours if needed for pain  0        Review of Systems   Constitutional: Negative for chills and fever. HENT: Negative for congestion, sinus pressure and sore throat. Eyes: Negative for discharge and visual disturbance. Respiratory: Positive for cough, shortness of breath and wheezing. At baseline    Cardiovascular: Negative for chest pain and palpitations. AYALA   Gastrointestinal: Negative for abdominal pain, nausea and vomiting. Endocrine: Negative for cold intolerance and heat intolerance. Genitourinary: Negative for dysuria, frequency and urgency. Musculoskeletal: Positive for arthralgias and back pain. Skin: Negative for rash and wound. Neurological: Negative for syncope, numbness and headaches. Hematological: Negative. Psychiatric/Behavioral: Negative for agitation and sleep disturbance. The patient is not nervous/anxious.         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     Hyperlipidemia     Hypertension     Hypothyroidism     Interstitial lung disease ([address]     Lung nodules     bilateral lungs     Past Surgical History:   Procedure Laterality Date     SECTION      GALLBLADDER SURGERY      HYSTERECTOMY        Family History   Problem Relation Age of Onset   [employer] Cancer Mother         colon    Cancer Father         bladder    High Blood Pressure Father     Stroke Father     High Blood Pressure Sister       Social History     Tobacco Use   Smoking Status Former Smoker    Packs/day: 1.00    Years: 30.00    Pack years: 30.00    Last attempt to quit: 2007    Years since quittin.3   Smokeless Tobacco Never Used OBJECTIVE:   Wt Readings from Last 3 Encounters:   05/15/19 217 lb (98.4 kg)   11/07/18 231 lb 12.8 oz (105.1 kg)   09/06/18 235 lb 9.6 oz (106.9 kg)     BP Readings from Last 3 Encounters:   05/15/19 110/70   11/07/18 118/66   09/06/18 132/70       /70   Pulse 107   Ht 5' 5\" (1.651 m)   Wt 217 lb (98.4 kg)   SpO2 96% Comment: on 3Liters of o2  BMI 36.11 kg/m²      Physical Exam   Constitutional: She is oriented to person, place, and time. She appears well-developed and well-nourished. Obese    HENT:   Head: Normocephalic and atraumatic. Right Ear: External ear normal.   Left Ear: External ear normal.   Nose: Nose normal.   Eyes: Pupils are equal, round, and reactive to light. Conjunctivae and EOM are normal.   Neck: Neck supple. No JVD present. No thyromegaly present. Cardiovascular: Normal rate, regular rhythm and normal heart sounds. Pulmonary/Chest: Effort normal and breath sounds normal. She has no wheezes. She has no rales. Distant breath sounds   Abdominal: Soft. Bowel sounds are normal. She exhibits no distension. There is no tenderness. Musculoskeletal: She exhibits no edema or tenderness. Clubbing to fingernails. Neurological: She is alert and oriented to person, place, and time. Skin: No rash noted. No erythema. Psychiatric: She has a normal mood and affect. Judgment normal.   Nursing note and vitals reviewed.       Lab Results   Component Value Date     01/30/2019    K 3.9 01/30/2019    K 4.3 05/25/2018     01/30/2019    CO2 32 01/30/2019    GLUCOSE 108 01/30/2019    BUN 18 01/30/2019    CREATININE 0.8 01/30/2019    CALCIUM 9.7 01/30/2019    PROT 7.5 10/29/2018    LABALBU 4.2 01/30/2019    BILITOT 0.3 10/29/2018    ALT 33 10/29/2018    AST 30 10/29/2018       Hemoglobin A1C (%)   Date Value   06/08/2017 5.4     Microscopic Examination (no units)   Date Value   02/25/2017 Not Indicated     LDL Calculated (mg/dL)   Date Value   03/02/2018 146 (H)         Lab Results   Component Value Date    WBC 13.9 05/28/2018    NEUTROABS 11.5 05/25/2018    HGB 14.1 05/28/2018    HCT 44.6 05/28/2018    MCV 96.7 05/28/2018     05/28/2018       Lab Results   Component Value Date    TSH 5.37 04/17/2018       ASSESSMENT/PLAN:     1. Essential hypertension  BP is stable. I have advised her on low-sodium diet, exercise and weight control. I am going to continue current medication . Will monitor her renal function every few months, have advised her to check blood pressure frequently and to keep a record of this. - Comprehensive Metabolic Panel; Future  - CBC Auto Differential; Future    2. Hypothyroidism, unspecified type  Stable . I am going to check the TSH every few months. I am going to continue the current dose of Levothyroxine. I have advised her on the importance of taking the medication on a daily basis. Lab Results   Component Value Date    TSH 5.37 04/17/2018     - TSH without Reflex; Future    3. Hyperlipidemia, unspecified hyperlipidemia type  I have advised her on low-fat diet, exercise and weight loss. I have also advised her on the possible side effects from the medication. I am going to continue the current medication. I will monitor his liver functions and lipid profile every few months. Last LDL was elevated. Lab Results   Component Value Date    LDLCALC 146 (H) 03/02/2018    LDLDIRECT 143 (H) 11/12/2014       - Comprehensive Metabolic Panel; Future  - CBC Auto Differential; Future    4. Screening for breast cancer  Proceed with mammogram.  - [facility] DIGITAL DIAGNOSTIC W OR WO CAD BILATERAL; Future    5. Interstitial lung disease ([address]  Seems to be stable. Continue current regimen. Continue oxygen. Follow-up with pulmonology on a regular basis. Might be in the process to be on the list for possible lung transplant. Encourage weight loss so she can qualify.       Written by Frank Kelley, acting as a scribe for Dr. Jacky Cavazos on 5/15/2019 at 11:17 AM.     I, Dr. Feli Wellington, personally performed the services described in the documentation as scribed by Rossy Kam CMA, in my presence and it is both accurate and complete.

## 2019-05-15 NOTE — PROGRESS NOTES
Have you seen any other physician or provider since your last visit yes - weight management and pulmonology. Patient also see transplant team.     Have you had any other diagnostic tests since your last visit? yes - blood work and an xray. Have you changed or stopped any medications since your last visit? yes - Patient stopped taking sleeping medication due to it not helping her.

## 2019-05-18 ASSESSMENT — ENCOUNTER SYMPTOMS: BACK PAIN: 1

## 2019-05-22 ENCOUNTER — HOSPITAL ENCOUNTER (OUTPATIENT)
Facility: HOSPITAL | Age: 54
Discharge: HOME | End: 2019-05-22
Payer: MEDICARE

## 2019-05-22 ENCOUNTER — HOSPITAL ENCOUNTER (OUTPATIENT)
Dept: MAMMOGRAPHY | Facility: HOSPITAL | Age: 54
Discharge: HOME | End: 2019-05-22
Payer: MEDICARE

## 2019-05-22 DIAGNOSIS — I10 ESSENTIAL HYPERTENSION: ICD-10-CM

## 2019-05-22 DIAGNOSIS — Z12.39 SCREENING FOR BREAST CANCER: ICD-10-CM

## 2019-05-22 DIAGNOSIS — E03.9 HYPOTHYROIDISM, UNSPECIFIED TYPE: ICD-10-CM

## 2019-05-22 DIAGNOSIS — E78.5 HYPERLIPIDEMIA, UNSPECIFIED HYPERLIPIDEMIA TYPE: ICD-10-CM

## 2019-05-22 LAB
A/G RATIO: 1.4 (ref 0.8–2)
ALBUMIN SERPL-MCNC: 4.5 G/DL (ref 3.4–4.8)
ALP BLD-CCNC: 157 U/L (ref 25–100)
ALT SERPL-CCNC: 32 U/L (ref 4–36)
ANION GAP SERPL CALCULATED.3IONS-SCNC: 16 MMOL/L (ref 3–16)
AST SERPL-CCNC: 26 U/L (ref 8–33)
BASOPHILS ABSOLUTE: 0 K/UL (ref 0–0.1)
BASOPHILS RELATIVE PERCENT: 0.3 %
BILIRUB SERPL-MCNC: 0.3 MG/DL (ref 0.3–1.2)
BUN BLDV-MCNC: 12 MG/DL (ref 6–20)
CALCIUM SERPL-MCNC: 9.9 MG/DL (ref 8.5–10.5)
CHLORIDE BLD-SCNC: 95 MMOL/L (ref 98–107)
CHOLESTEROL, TOTAL: 214 MG/DL (ref 0–200)
CO2: 30 MMOL/L (ref 20–30)
CREAT SERPL-MCNC: 0.7 MG/DL (ref 0.4–1.2)
EOSINOPHILS ABSOLUTE: 0 K/UL (ref 0–0.4)
EOSINOPHILS RELATIVE PERCENT: 0.2 %
GFR AFRICAN AMERICAN: >59
GFR NON-AFRICAN AMERICAN: >60
GLOBULIN: 3.2 G/DL
GLUCOSE BLD-MCNC: 96 MG/DL (ref 74–106)
HCT VFR BLD CALC: 45.7 % (ref 37–47)
HDLC SERPL-MCNC: 59 MG/DL (ref 40–60)
HEMOGLOBIN: 15.1 G/DL (ref 11.5–16.5)
IMMATURE GRANULOCYTES #: 0 K/UL
IMMATURE GRANULOCYTES %: 0.4 % (ref 0–5)
LDL CHOLESTEROL CALCULATED: 139 MG/DL
LYMPHOCYTES ABSOLUTE: 2.5 K/UL (ref 1.5–4)
LYMPHOCYTES RELATIVE PERCENT: 23.4 %
MCH RBC QN AUTO: 31.6 PG (ref 27–32)
MCHC RBC AUTO-ENTMCNC: 33 G/DL (ref 31–35)
MCV RBC AUTO: 95.6 FL (ref 80–100)
MONOCYTES ABSOLUTE: 0.7 K/UL (ref 0.2–0.8)
MONOCYTES RELATIVE PERCENT: 6.4 %
NEUTROPHILS ABSOLUTE: 7.3 K/UL (ref 2–7.5)
NEUTROPHILS RELATIVE PERCENT: 69.3 %
PDW BLD-RTO: 13.3 % (ref 11–16)
PLATELET # BLD: 349 K/UL (ref 150–400)
PMV BLD AUTO: 10.5 FL (ref 6–10)
POTASSIUM SERPL-SCNC: 3.9 MMOL/L (ref 3.4–5.1)
RBC # BLD: 4.78 M/UL (ref 3.8–5.8)
SODIUM BLD-SCNC: 141 MMOL/L (ref 136–145)
TOTAL PROTEIN: 7.7 G/DL (ref 6.4–8.3)
TRIGL SERPL-MCNC: 82 MG/DL (ref 0–249)
TSH SERPL DL<=0.05 MIU/L-ACNC: 0.08 UIU/ML (ref 0.35–5.5)
VLDLC SERPL CALC-MCNC: 16 MG/DL
WBC # BLD: 10.5 K/UL (ref 4–11)

## 2019-05-22 PROCEDURE — 36415 COLL VENOUS BLD VENIPUNCTURE: CPT

## 2019-05-22 PROCEDURE — 77067 SCR MAMMO BI INCL CAD: CPT

## 2019-05-22 PROCEDURE — 85025 COMPLETE CBC W/AUTO DIFF WBC: CPT

## 2019-05-22 PROCEDURE — 80061 LIPID PANEL: CPT

## 2019-05-22 PROCEDURE — 80053 COMPREHEN METABOLIC PANEL: CPT

## 2019-05-22 PROCEDURE — 77063 BREAST TOMOSYNTHESIS BI: CPT

## 2019-05-22 PROCEDURE — 84443 ASSAY THYROID STIM HORMONE: CPT

## 2019-05-23 DIAGNOSIS — E78.5 HYPERLIPIDEMIA, UNSPECIFIED HYPERLIPIDEMIA TYPE: ICD-10-CM

## 2019-05-23 DIAGNOSIS — E03.9 HYPOTHYROIDISM, UNSPECIFIED TYPE: ICD-10-CM

## 2019-05-23 DIAGNOSIS — I10 ESSENTIAL HYPERTENSION: Primary | ICD-10-CM

## 2019-05-23 RX ORDER — LEVOTHYROXINE SODIUM 175 UG/1
175 TABLET ORAL DAILY
Qty: 30 TABLET | Refills: 5 | Status: SHIPPED | OUTPATIENT
Start: 2019-05-23 | End: 2019-05-24 | Stop reason: DRUGHIGH

## 2019-05-23 RX ORDER — ROSUVASTATIN CALCIUM 20 MG/1
20 TABLET, COATED ORAL NIGHTLY
Qty: 30 TABLET | Refills: 3 | Status: SHIPPED | OUTPATIENT
Start: 2019-05-23 | End: 2019-09-20 | Stop reason: SDUPTHER

## 2019-05-24 RX ORDER — LEVOTHYROXINE SODIUM 0.15 MG/1
150 TABLET ORAL DAILY
Qty: 30 TABLET | Refills: 3 | Status: SHIPPED | OUTPATIENT
Start: 2019-05-24 | End: 2019-10-14

## 2019-07-10 RX ORDER — PANTOPRAZOLE SODIUM 40 MG/1
TABLET, DELAYED RELEASE ORAL
Qty: 90 TABLET | Refills: 3 | Status: SHIPPED | OUTPATIENT
Start: 2019-07-10 | End: 2020-07-22 | Stop reason: SDUPTHER

## 2019-07-25 DIAGNOSIS — F41.8 DEPRESSION WITH ANXIETY: ICD-10-CM

## 2019-07-25 RX ORDER — VENLAFAXINE HYDROCHLORIDE 75 MG/1
CAPSULE, EXTENDED RELEASE ORAL
Qty: 270 CAPSULE | Refills: 3 | Status: SHIPPED | OUTPATIENT
Start: 2019-07-25 | End: 2020-09-11

## 2019-09-20 RX ORDER — ROSUVASTATIN CALCIUM 20 MG/1
20 TABLET, COATED ORAL NIGHTLY
Qty: 120 TABLET | Refills: 0 | Status: SHIPPED | OUTPATIENT
Start: 2019-09-20 | End: 2019-09-23 | Stop reason: ALTCHOICE

## 2019-09-23 ENCOUNTER — OFFICE VISIT (OUTPATIENT)
Dept: PRIMARY CARE CLINIC | Age: 54
End: 2019-09-23
Payer: MEDICARE

## 2019-09-23 VITALS
HEART RATE: 108 BPM | BODY MASS INDEX: 35.11 KG/M2 | RESPIRATION RATE: 18 BRPM | SYSTOLIC BLOOD PRESSURE: 120 MMHG | DIASTOLIC BLOOD PRESSURE: 62 MMHG | WEIGHT: 211 LBS | OXYGEN SATURATION: 98 %

## 2019-09-23 DIAGNOSIS — E03.9 HYPOTHYROIDISM, UNSPECIFIED TYPE: ICD-10-CM

## 2019-09-23 DIAGNOSIS — I10 ESSENTIAL HYPERTENSION: Primary | ICD-10-CM

## 2019-09-23 DIAGNOSIS — D80.3 IGG DEFICIENCY (HCC): ICD-10-CM

## 2019-09-23 DIAGNOSIS — F32.A DEPRESSION, UNSPECIFIED DEPRESSION TYPE: Chronic | ICD-10-CM

## 2019-09-23 PROBLEM — R53.81 DECLINING FUNCTIONAL STATUS: Status: RESOLVED | Noted: 2017-11-01 | Resolved: 2019-09-23

## 2019-09-23 PROCEDURE — G8427 DOCREV CUR MEDS BY ELIG CLIN: HCPCS | Performed by: INTERNAL MEDICINE

## 2019-09-23 PROCEDURE — 1036F TOBACCO NON-USER: CPT | Performed by: INTERNAL MEDICINE

## 2019-09-23 PROCEDURE — 99213 OFFICE O/P EST LOW 20 MIN: CPT | Performed by: INTERNAL MEDICINE

## 2019-09-23 PROCEDURE — G8417 CALC BMI ABV UP PARAM F/U: HCPCS | Performed by: INTERNAL MEDICINE

## 2019-09-23 PROCEDURE — 3017F COLORECTAL CA SCREEN DOC REV: CPT | Performed by: INTERNAL MEDICINE

## 2019-09-23 RX ORDER — HYDROXYZINE HYDROCHLORIDE 10 MG/1
10 TABLET, FILM COATED ORAL 2 TIMES DAILY PRN
Qty: 45 TABLET | Refills: 1 | Status: SHIPPED | OUTPATIENT
Start: 2019-09-23 | End: 2019-11-26 | Stop reason: SDUPTHER

## 2019-09-23 ASSESSMENT — ENCOUNTER SYMPTOMS
EYE DISCHARGE: 0
BACK PAIN: 1
NAUSEA: 0
SHORTNESS OF BREATH: 1
SORE THROAT: 0
VOMITING: 0
ABDOMINAL PAIN: 0
WHEEZING: 1
COUGH: 1
SINUS PRESSURE: 0

## 2019-09-23 NOTE — PROGRESS NOTES
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     Hyperlipidemia     Hypertension     Hypothyroidism     Interstitial lung disease ([address]     Lung nodules     bilateral lungs     Past Surgical History:   Procedure Laterality Date     SECTION      GALLBLADDER SURGERY      HYSTERECTOMY        Family History   Problem Relation Age of Onset    Cancer Mother         colon    Cancer Father         bladder    High Blood Pressure Father     Stroke Father     High Blood Pressure Sister       Social History     Tobacco Use   Smoking Status Former Smoker    Packs/day: 1.00    Years: 30.00    Pack years: 30.00    Last attempt to quit: 2007    Years since quittin.7   Smokeless Tobacco Never Used       OBJECTIVE:   Wt Readings from Last 3 Encounters:   19 211 lb (95.7 kg)   05/15/19 217 lb (98.4 kg)   18 231 lb 12.8 oz (105.1 kg)     BP Readings from Last 3 Encounters:   19 120/62   05/15/19 110/70   18 118/66       /62 (Site: Right Upper Arm, Position: Sitting, Cuff Size: Large Adult)   Pulse 108   Resp 18   Wt 211 lb (95.7 kg)   SpO2 98% Comment: 3 liters 02  BMI 35.11 kg/m²      Physical Exam   Constitutional: She is oriented to person, place, and time. She appears well-developed and well-nourished. Obese    HENT:   Head: Normocephalic and atraumatic. Right Ear: External ear normal.   Left Ear: External ear normal.   Nose: Nose normal.   Eyes: Pupils are equal, round, and reactive to light. Conjunctivae and EOM are normal.   Neck: Neck supple. No JVD present. No thyromegaly present. Cardiovascular: Normal rate, regular rhythm and normal heart sounds. Pulmonary/Chest: Effort normal and breath sounds normal. She has no wheezes. She has no rales. On o2  Few fine rales in right base   Abdominal: Soft. Bowel sounds are normal. She exhibits no distension.

## 2019-10-14 ENCOUNTER — TELEPHONE (OUTPATIENT)
Dept: PRIMARY CARE CLINIC | Age: 54
End: 2019-10-14

## 2019-10-14 ENCOUNTER — HOSPITAL ENCOUNTER (OUTPATIENT)
Facility: HOSPITAL | Age: 54
Discharge: HOME | End: 2019-10-14
Payer: MEDICARE

## 2019-10-14 DIAGNOSIS — I10 ESSENTIAL HYPERTENSION: ICD-10-CM

## 2019-10-14 DIAGNOSIS — E03.9 HYPOTHYROIDISM, UNSPECIFIED TYPE: ICD-10-CM

## 2019-10-14 DIAGNOSIS — E78.5 HYPERLIPIDEMIA, UNSPECIFIED HYPERLIPIDEMIA TYPE: ICD-10-CM

## 2019-10-14 LAB
A/G RATIO: 1.4 (ref 0.8–2)
ALBUMIN SERPL-MCNC: 4.2 G/DL (ref 3.4–4.8)
ALP BLD-CCNC: 147 U/L (ref 25–100)
ALT SERPL-CCNC: 21 U/L (ref 4–36)
ANION GAP SERPL CALCULATED.3IONS-SCNC: 11 MMOL/L (ref 3–16)
AST SERPL-CCNC: 21 U/L (ref 8–33)
BILIRUB SERPL-MCNC: 0.3 MG/DL (ref 0.3–1.2)
BUN BLDV-MCNC: 12 MG/DL (ref 6–20)
CALCIUM SERPL-MCNC: 9.5 MG/DL (ref 8.5–10.5)
CHLORIDE BLD-SCNC: 103 MMOL/L (ref 98–107)
CHOLESTEROL, TOTAL: 218 MG/DL (ref 0–200)
CO2: 31 MMOL/L (ref 20–30)
CREAT SERPL-MCNC: 0.7 MG/DL (ref 0.4–1.2)
GFR AFRICAN AMERICAN: >59
GFR NON-AFRICAN AMERICAN: >60
GLOBULIN: 3.1 G/DL
GLUCOSE BLD-MCNC: 101 MG/DL (ref 74–106)
HDLC SERPL-MCNC: 66 MG/DL (ref 40–60)
LDL CHOLESTEROL CALCULATED: 134 MG/DL
POTASSIUM SERPL-SCNC: 4 MMOL/L (ref 3.4–5.1)
SODIUM BLD-SCNC: 145 MMOL/L (ref 136–145)
TOTAL PROTEIN: 7.3 G/DL (ref 6.4–8.3)
TRIGL SERPL-MCNC: 89 MG/DL (ref 0–249)
TSH SERPL DL<=0.05 MIU/L-ACNC: 0.15 UIU/ML (ref 0.35–5.5)
VLDLC SERPL CALC-MCNC: 18 MG/DL

## 2019-10-14 PROCEDURE — 80061 LIPID PANEL: CPT

## 2019-10-14 PROCEDURE — 84443 ASSAY THYROID STIM HORMONE: CPT

## 2019-10-14 PROCEDURE — 80053 COMPREHEN METABOLIC PANEL: CPT

## 2019-10-14 RX ORDER — LEVOTHYROXINE SODIUM 137 UG/1
137 TABLET ORAL DAILY
Qty: 30 TABLET | Refills: 3 | Status: SHIPPED | OUTPATIENT
Start: 2019-10-14 | End: 2019-12-16

## 2019-10-15 ENCOUNTER — TELEPHONE (OUTPATIENT)
Dept: PRIMARY CARE CLINIC | Age: 54
End: 2019-10-15

## 2019-10-20 RX ORDER — LEVOTHYROXINE SODIUM 0.15 MG/1
TABLET ORAL
Qty: 30 TABLET | Refills: 3 | Status: SHIPPED | OUTPATIENT
Start: 2019-10-20 | End: 2019-12-16 | Stop reason: SDUPTHER

## 2019-11-17 ENCOUNTER — OFFICE VISIT (OUTPATIENT)
Dept: PRIMARY CARE CLINIC | Age: 54
End: 2019-11-17
Payer: MEDICARE

## 2019-11-17 VITALS
SYSTOLIC BLOOD PRESSURE: 131 MMHG | OXYGEN SATURATION: 97 % | WEIGHT: 214 LBS | HEART RATE: 115 BPM | DIASTOLIC BLOOD PRESSURE: 83 MMHG | RESPIRATION RATE: 20 BRPM | BODY MASS INDEX: 35.61 KG/M2

## 2019-11-17 DIAGNOSIS — J40 BRONCHITIS: ICD-10-CM

## 2019-11-17 DIAGNOSIS — J06.9 URI, ACUTE: Primary | ICD-10-CM

## 2019-11-17 DIAGNOSIS — T14.8XXA SKIN ABRASION: ICD-10-CM

## 2019-11-17 PROCEDURE — 3017F COLORECTAL CA SCREEN DOC REV: CPT | Performed by: NURSE PRACTITIONER

## 2019-11-17 PROCEDURE — 96372 THER/PROPH/DIAG INJ SC/IM: CPT | Performed by: NURSE PRACTITIONER

## 2019-11-17 PROCEDURE — G8417 CALC BMI ABV UP PARAM F/U: HCPCS | Performed by: NURSE PRACTITIONER

## 2019-11-17 PROCEDURE — G8484 FLU IMMUNIZE NO ADMIN: HCPCS | Performed by: NURSE PRACTITIONER

## 2019-11-17 PROCEDURE — 1036F TOBACCO NON-USER: CPT | Performed by: NURSE PRACTITIONER

## 2019-11-17 PROCEDURE — 99213 OFFICE O/P EST LOW 20 MIN: CPT | Performed by: NURSE PRACTITIONER

## 2019-11-17 PROCEDURE — G8427 DOCREV CUR MEDS BY ELIG CLIN: HCPCS | Performed by: NURSE PRACTITIONER

## 2019-11-17 RX ORDER — METHYLPREDNISOLONE SODIUM SUCCINATE 40 MG/ML
40 INJECTION, POWDER, LYOPHILIZED, FOR SOLUTION INTRAMUSCULAR; INTRAVENOUS ONCE
Status: COMPLETED | OUTPATIENT
Start: 2019-11-17 | End: 2019-11-17

## 2019-11-17 RX ORDER — CEFTRIAXONE 1 G/1
1 INJECTION, POWDER, FOR SOLUTION INTRAMUSCULAR; INTRAVENOUS ONCE
Status: COMPLETED | OUTPATIENT
Start: 2019-11-17 | End: 2019-11-17

## 2019-11-17 RX ORDER — AZITHROMYCIN 250 MG/1
250 TABLET, FILM COATED ORAL SEE ADMIN INSTRUCTIONS
Qty: 6 TABLET | Refills: 0 | Status: SHIPPED | OUTPATIENT
Start: 2019-11-17 | End: 2019-11-22

## 2019-11-17 RX ORDER — METHYLPREDNISOLONE 4 MG/1
4 TABLET ORAL SEE ADMIN INSTRUCTIONS
Qty: 1 KIT | Refills: 0 | Status: SHIPPED | OUTPATIENT
Start: 2019-11-17 | End: 2019-11-23

## 2019-11-17 RX ADMIN — CEFTRIAXONE 1 G: 1 INJECTION, POWDER, FOR SOLUTION INTRAMUSCULAR; INTRAVENOUS at 15:16

## 2019-11-17 RX ADMIN — METHYLPREDNISOLONE SODIUM SUCCINATE 40 MG: 40 INJECTION, POWDER, LYOPHILIZED, FOR SOLUTION INTRAMUSCULAR; INTRAVENOUS at 15:17

## 2019-11-17 ASSESSMENT — ENCOUNTER SYMPTOMS
GASTROINTESTINAL NEGATIVE: 1
SORE THROAT: 1
SHORTNESS OF BREATH: 1
COUGH: 1

## 2019-11-26 RX ORDER — HYDROXYZINE HYDROCHLORIDE 10 MG/1
10 TABLET, FILM COATED ORAL 2 TIMES DAILY PRN
Qty: 45 TABLET | Refills: 1 | Status: SHIPPED | OUTPATIENT
Start: 2019-11-26 | End: 2020-01-21 | Stop reason: SDUPTHER

## 2019-12-16 ENCOUNTER — OFFICE VISIT (OUTPATIENT)
Dept: PRIMARY CARE CLINIC | Age: 54
End: 2019-12-16
Payer: MEDICARE

## 2019-12-16 ENCOUNTER — HOSPITAL ENCOUNTER (OUTPATIENT)
Facility: HOSPITAL | Age: 54
Discharge: HOME | End: 2019-12-16
Payer: MEDICARE

## 2019-12-16 VITALS
WEIGHT: 219.2 LBS | OXYGEN SATURATION: 97 % | BODY MASS INDEX: 36.52 KG/M2 | DIASTOLIC BLOOD PRESSURE: 78 MMHG | RESPIRATION RATE: 16 BRPM | HEIGHT: 65 IN | HEART RATE: 97 BPM | SYSTOLIC BLOOD PRESSURE: 116 MMHG

## 2019-12-16 DIAGNOSIS — I10 ESSENTIAL HYPERTENSION: ICD-10-CM

## 2019-12-16 DIAGNOSIS — E03.9 HYPOTHYROIDISM, UNSPECIFIED TYPE: ICD-10-CM

## 2019-12-16 LAB
A/G RATIO: 1.3 (ref 0.8–2)
ALBUMIN SERPL-MCNC: 4.2 G/DL (ref 3.4–4.8)
ALP BLD-CCNC: 145 U/L (ref 25–100)
ALT SERPL-CCNC: 26 U/L (ref 4–36)
ANION GAP SERPL CALCULATED.3IONS-SCNC: 12 MMOL/L (ref 3–16)
AST SERPL-CCNC: 25 U/L (ref 8–33)
BILIRUB SERPL-MCNC: 0.3 MG/DL (ref 0.3–1.2)
BUN BLDV-MCNC: 16 MG/DL (ref 6–20)
CALCIUM SERPL-MCNC: 9.3 MG/DL (ref 8.5–10.5)
CHLORIDE BLD-SCNC: 98 MMOL/L (ref 98–107)
CO2: 31 MMOL/L (ref 20–30)
CREAT SERPL-MCNC: 0.7 MG/DL (ref 0.4–1.2)
GFR AFRICAN AMERICAN: >59
GFR NON-AFRICAN AMERICAN: >60
GLOBULIN: 3.2 G/DL
GLUCOSE BLD-MCNC: 89 MG/DL (ref 74–106)
POTASSIUM SERPL-SCNC: 3.6 MMOL/L (ref 3.4–5.1)
SODIUM BLD-SCNC: 141 MMOL/L (ref 136–145)
TOTAL PROTEIN: 7.4 G/DL (ref 6.4–8.3)
TSH SERPL DL<=0.05 MIU/L-ACNC: 0.07 UIU/ML (ref 0.35–5.5)

## 2019-12-16 PROCEDURE — G8484 FLU IMMUNIZE NO ADMIN: HCPCS | Performed by: INTERNAL MEDICINE

## 2019-12-16 PROCEDURE — 1036F TOBACCO NON-USER: CPT | Performed by: INTERNAL MEDICINE

## 2019-12-16 PROCEDURE — 3017F COLORECTAL CA SCREEN DOC REV: CPT | Performed by: INTERNAL MEDICINE

## 2019-12-16 PROCEDURE — G8427 DOCREV CUR MEDS BY ELIG CLIN: HCPCS | Performed by: INTERNAL MEDICINE

## 2019-12-16 PROCEDURE — 80053 COMPREHEN METABOLIC PANEL: CPT

## 2019-12-16 PROCEDURE — 84443 ASSAY THYROID STIM HORMONE: CPT

## 2019-12-16 PROCEDURE — 99213 OFFICE O/P EST LOW 20 MIN: CPT | Performed by: INTERNAL MEDICINE

## 2019-12-16 PROCEDURE — G8417 CALC BMI ABV UP PARAM F/U: HCPCS | Performed by: INTERNAL MEDICINE

## 2019-12-16 RX ORDER — LEVOTHYROXINE SODIUM 0.15 MG/1
150 TABLET ORAL DAILY
Qty: 30 TABLET | Refills: 3 | Status: SHIPPED | OUTPATIENT
Start: 2019-12-16 | End: 2020-04-27

## 2019-12-16 ASSESSMENT — ENCOUNTER SYMPTOMS
NAUSEA: 0
WHEEZING: 1
BACK PAIN: 1
SINUS PRESSURE: 0
EYE DISCHARGE: 0
COUGH: 1
SORE THROAT: 0
SHORTNESS OF BREATH: 1
VOMITING: 0
ABDOMINAL PAIN: 0

## 2019-12-16 NOTE — PROGRESS NOTES
Chief Complaint   Patient presents with    Hypertension    Anxiety    Depression    Hypothyroidism     Pt here for 3 month follow up    Have you seen any other physician or provider since your last visit yes - Pulmonology     Have you had any other diagnostic tests since your last visit? no    Have you changed or stopped any medications since your last visit? no

## 2019-12-16 NOTE — PROGRESS NOTES
SUBJECTIVE:    Patient ID: Lance Corona is a 53 y. o.female. Chief Complaint   Patient presents with    Hypertension    Anxiety    Depression    Hypothyroidism     HPI:  Patient has had hypertension for several years. She has been compliant with taking medications, without side effects from it. She has been following a low-sodium, is lightly active and rarely exercises. Weight is up 5 pounds, compared to last visit. Her blood pressure is stable at this time. She has had a nerve and depression problem for long time. Her sx have been slightly worsening due to the holidays. She occasionally has some difficulties falling and maintaining sleep. She denies any suicidal ideation. She has been compliant with taking medication without side effects. She has supportive family. Patient  has had hypothyroidism for the past few years. She has been compliant with taking her medication without side effects. There are no symptoms of hypo or hyper thyroidism. She has had blood work and is here today for follow up. Patient's medications, allergies, past medical, surgical, social and family histories were reviewed and updated as appropriate in the electronic medical record/chart.         Outpatient Medications Marked as Taking for the 12/16/19 encounter (Office Visit) with Rosana Sandoval MD   Medication Sig Dispense Refill    hydrOXYzine (ATARAX) 10 MG tablet Take 1 tablet by mouth 2 times daily as needed for Anxiety 45 tablet 1    levothyroxine (SYNTHROID) 150 MCG tablet take 1 tablet by mouth once daily 30 tablet 3    levothyroxine (SYNTHROID) 137 MCG tablet Take 1 tablet by mouth daily 30 tablet 3    venlafaxine (EFFEXOR XR) 75 MG extended release capsule take 3 capsules by mouth once daily 270 capsule 3    pantoprazole (PROTONIX) 40 MG tablet take 1 tablet by mouth once daily 90 tablet 3    hydrochlorothiazide (HYDRODIURIL) 25 MG tablet take 1 tablet by mouth once daily 90 tablet 5    furosemide (LASIX) 20

## 2019-12-16 NOTE — PATIENT INSTRUCTIONS
when an how to quit. Here are some techniques:   Switch Brands   Switch to a brand you find distasteful. Change to a brand that is low in tar and nicotine a couple of weeks before your target quit date. This will help change your smoking behavior. However, do not smoke more cigarettes, inhale them more often or more deeply, or place your fingertips over the holes in the filters. All of these actions will increase your nicotine intake, and the idea is to get your body used to functioning without nicotine. Cut Down the Number of Cigarettes You Smoke   Smoke only half of each cigarette. Each day, postpone the lighting of your first cigarette by one hour. Decide you'll only smoke during odd or even hours of the day. Decide beforehand how many cigarettes you'll smoke during the day. For each additional cigarette, give a dollar to your favorite olegario. Change your eating habits to help you cut down. For example, drink milk, which many people consider incompatible with smoking. End meals or snacks with something that won't lead to a cigarette. Reach for a glass of juice instead of a cigarette for a \"pick-me-up. \"   Remember: Cutting down can help you quit, but it's not a substitute for quitting. If you're down to about seven cigarettes a day, it's time to set your target quit date, and get ready to stick to it. Don't Smoke \"Automatically\"   Smoke only those cigarettes you really want. Catch yourself before you light up a cigarette out of pure habit. Don't empty your ashtrays. This will remind you of how many cigarettes you've smoked each day, and the sight and the smell of stale cigarettes butts will be very unpleasant. Make yourself aware of each cigarette by using the opposite hand or putting cigarettes in an unfamiliar location or a different pocket to break the automatic reach.    If you light up many times during the day without even thinking about it, try to look in a mirror each time you put a match to your cigarette. You may decide you don't need it. Make Smoking Inconvenient   Stop buying cigarettes by the carton. Wait until one pack is empty before you buy another. Stop carrying cigarettes with you at home or at work. Make them difficult to get to. Make Smoking Unpleasant   Smoke only under circumstances that aren't especially pleasurable for you. If you like to smoke with others, smoke alone. Turn your chair to an empty corner and focus only on the cigarette you are smoking and all its many negative effects. Collect all your cigarette butts in one large glass container as a visual reminder of the filth made by smoking. Just Before Quitting   Practice going without cigarettes. Don't think of never smoking again. Think of quitting in terms of one day at a time . Tell yourself you won't smoke today, and then don't. Clean your clothes to rid them of the cigarette smell, which can linger a long time. On the Day You Quit   Throw away all your cigarettes and matches. Hide your lighters and ashtrays. Visit the dentist and have your teeth cleaned to get rid of tobacco stains. Notice how nice they look and resolve to keep them that way. Make a list of things you'd like to buy for yourself or someone else. Estimate the cost in terms of packs of cigarettes, and put the money aside to buy these presents. Keep very busy on the big day. Go to the movies, exercise, take long walks, or go bike riding. Remind your family and friends that this is your quit date, and ask them to help you over the rough spots of the first couple of days and weeks. Buy yourself a treat or do something special to celebrate. Telephone and Internet Support   Telephone, web-, and computer-based programs can offer you the support that you need to quit and to stay smoke-free. You can find many programs online, like the American [employer] Association's [place] from Smoking .    Immediately After Quitting   Develop a clean,

## 2019-12-17 ENCOUNTER — TELEPHONE (OUTPATIENT)
Dept: PRIMARY CARE CLINIC | Age: 54
End: 2019-12-17

## 2020-01-21 RX ORDER — HYDROXYZINE HYDROCHLORIDE 10 MG/1
10 TABLET, FILM COATED ORAL 2 TIMES DAILY PRN
Qty: 45 TABLET | Refills: 1 | Status: SHIPPED | OUTPATIENT
Start: 2020-01-21 | End: 2020-04-02 | Stop reason: SDUPTHER

## 2020-03-19 ENCOUNTER — TELEPHONE (OUTPATIENT)
Dept: PRIMARY CARE CLINIC | Age: 55
End: 2020-03-19

## 2020-03-19 RX ORDER — NYSTATIN 100000 U/G
CREAM TOPICAL
Qty: 45 G | Refills: 1 | Status: ON HOLD | OUTPATIENT
Start: 2020-03-19 | End: 2020-11-02 | Stop reason: ALTCHOICE

## 2020-03-19 NOTE — TELEPHONE ENCOUNTER
Pt called stating she doesn't want to get out she is needing something for yeast infection. She is unable to do e-visit.

## 2020-04-02 RX ORDER — HYDROXYZINE HYDROCHLORIDE 10 MG/1
10 TABLET, FILM COATED ORAL 2 TIMES DAILY PRN
Qty: 45 TABLET | Refills: 1 | Status: SHIPPED | OUTPATIENT
Start: 2020-04-02 | End: 2020-05-26

## 2020-04-27 RX ORDER — LEVOTHYROXINE SODIUM 0.15 MG/1
TABLET ORAL
Qty: 120 TABLET | Refills: 0 | Status: SHIPPED | OUTPATIENT
Start: 2020-04-27 | End: 2020-07-14 | Stop reason: SDUPTHER

## 2020-05-15 RX ORDER — HYDROCHLOROTHIAZIDE 25 MG/1
TABLET ORAL
Qty: 90 TABLET | Refills: 5 | Status: SHIPPED | OUTPATIENT
Start: 2020-05-15 | End: 2020-11-16 | Stop reason: SDUPTHER

## 2020-07-14 RX ORDER — LEVOTHYROXINE SODIUM 0.15 MG/1
TABLET ORAL
Qty: 90 TABLET | Refills: 2 | Status: SHIPPED | OUTPATIENT
Start: 2020-07-14 | End: 2020-10-05

## 2020-07-14 RX ORDER — HYDROXYZINE HYDROCHLORIDE 10 MG/1
TABLET, FILM COATED ORAL
Qty: 45 TABLET | Refills: 2 | Status: ON HOLD | OUTPATIENT
Start: 2020-07-14 | End: 2020-11-04

## 2020-07-20 ENCOUNTER — HOSPITAL ENCOUNTER (OUTPATIENT)
Facility: HOSPITAL | Age: 55
Discharge: HOME | End: 2020-07-20
Payer: MEDICARE

## 2020-07-20 LAB
ALBUMIN SERPL-MCNC: 4.6 G/DL (ref 3.4–4.8)
ALP BLD-CCNC: 155 U/L (ref 25–100)
ALT SERPL-CCNC: 39 U/L (ref 4–36)
AST SERPL-CCNC: 33 U/L (ref 8–33)
BILIRUB SERPL-MCNC: 0.3 MG/DL (ref 0.3–1.2)
BILIRUBIN DIRECT: <0.2 MG/DL (ref 0–0.2)
BILIRUBIN, INDIRECT: ABNORMAL MG/DL (ref 0.2–0.8)
TOTAL PROTEIN: 7.9 G/DL (ref 6.4–8.3)

## 2020-07-20 PROCEDURE — 36415 COLL VENOUS BLD VENIPUNCTURE: CPT

## 2020-07-20 PROCEDURE — 80076 HEPATIC FUNCTION PANEL: CPT

## 2020-07-22 ENCOUNTER — VIRTUAL VISIT (OUTPATIENT)
Dept: PRIMARY CARE CLINIC | Age: 55
End: 2020-07-22
Payer: MEDICARE

## 2020-07-22 PROCEDURE — G2025 DIS SITE TELE SVCS RHC/FQHC: HCPCS | Performed by: INTERNAL MEDICINE

## 2020-07-22 RX ORDER — CETIRIZINE HYDROCHLORIDE 10 MG/1
10 TABLET ORAL DAILY
Qty: 30 TABLET | Refills: 3 | Status: SHIPPED | OUTPATIENT
Start: 2020-07-22 | End: 2021-12-07 | Stop reason: ALTCHOICE

## 2020-07-22 RX ORDER — PANTOPRAZOLE SODIUM 40 MG/1
TABLET, DELAYED RELEASE ORAL
Qty: 90 TABLET | Refills: 3 | Status: SHIPPED | OUTPATIENT
Start: 2020-07-22 | End: 2020-11-16 | Stop reason: SDUPTHER

## 2020-07-22 ASSESSMENT — ENCOUNTER SYMPTOMS
SINUS PRESSURE: 0
SHORTNESS OF BREATH: 1
EYE DISCHARGE: 0
ABDOMINAL PAIN: 0
SORE THROAT: 0
BACK PAIN: 1
VOMITING: 0
NAUSEA: 0
WHEEZING: 1
COUGH: 1

## 2020-07-22 NOTE — PROGRESS NOTES
Chief Complaint   Patient presents with    Hypertension    Anxiety    Depression       Have you seen any other physician or provider since your last visit no    Have you had any other diagnostic tests since your last visit? no    Have you changed or stopped any medications since your last visit?  No     Pt had pneumo shot at lung

## 2020-09-30 ENCOUNTER — NURSE ONLY (OUTPATIENT)
Dept: PRIMARY CARE CLINIC | Age: 55
End: 2020-09-30

## 2020-09-30 PROCEDURE — 90688 IIV4 VACCINE SPLT 0.5 ML IM: CPT | Performed by: INTERNAL MEDICINE

## 2020-09-30 PROCEDURE — G0008 ADMIN INFLUENZA VIRUS VAC: HCPCS | Performed by: INTERNAL MEDICINE

## 2020-09-30 NOTE — PROGRESS NOTES
Vaccine Information Sheet, \"Influenza - Inactivated\"  given to Óscar Urbina, or parent/legal guardian of  Óscar Urbina and verbalized understanding. Patient responses:    Have you ever had a reaction to a flu vaccine? No  Do you have any current illness? No  Have you ever had [place] Syndrome? No  Do you have a serious allergy to any of the follow: Neomycin, Polymyxin, Thimerosal, eggs or egg products? No    Flu vaccine given per order. Please see immunization tab. Risks and benefits explained. Current VIS given.       Immunizations Administered     Name Date Dose Route    Influenza, Stanley Metcalf, 6-35 Months, IM (Fluzone,Afluria) 9/30/2020 0.25 mL Intramuscular    Site: Vastus Lateralis- Left    Lot: [id number]    NDC: 28332-613-56

## 2020-10-05 ENCOUNTER — HOSPITAL ENCOUNTER (OUTPATIENT)
Dept: MAMMOGRAPHY | Facility: HOSPITAL | Age: 55
Discharge: HOME | End: 2020-10-05
Payer: MEDICARE

## 2020-10-05 ENCOUNTER — HOSPITAL ENCOUNTER (OUTPATIENT)
Facility: HOSPITAL | Age: 55
Discharge: HOME | End: 2020-10-05
Payer: MEDICARE

## 2020-10-05 LAB
A/G RATIO: 1.4 (ref 0.8–2)
ALBUMIN SERPL-MCNC: 4.2 G/DL (ref 3.4–4.8)
ALP BLD-CCNC: 165 U/L (ref 25–100)
ALT SERPL-CCNC: 25 U/L (ref 4–36)
ANION GAP SERPL CALCULATED.3IONS-SCNC: 9 MMOL/L (ref 3–16)
AST SERPL-CCNC: 25 U/L (ref 8–33)
BASOPHILS ABSOLUTE: 0 K/UL (ref 0–0.1)
BASOPHILS RELATIVE PERCENT: 0.1 %
BILIRUB SERPL-MCNC: 0.3 MG/DL (ref 0.3–1.2)
BUN BLDV-MCNC: 15 MG/DL (ref 6–20)
CALCIUM SERPL-MCNC: 9.6 MG/DL (ref 8.5–10.5)
CHLORIDE BLD-SCNC: 99 MMOL/L (ref 98–107)
CO2: 31 MMOL/L (ref 20–30)
CREAT SERPL-MCNC: 0.7 MG/DL (ref 0.4–1.2)
EOSINOPHILS ABSOLUTE: 0 K/UL (ref 0–0.4)
EOSINOPHILS RELATIVE PERCENT: 0.1 %
GFR AFRICAN AMERICAN: >59
GFR NON-AFRICAN AMERICAN: >60
GLOBULIN: 3.1 G/DL
GLUCOSE BLD-MCNC: 100 MG/DL (ref 74–106)
HCT VFR BLD CALC: 44.4 % (ref 37–47)
HEMOGLOBIN: 14.2 G/DL (ref 11.5–16.5)
IMMATURE GRANULOCYTES #: 0.1 K/UL
IMMATURE GRANULOCYTES %: 0.7 % (ref 0–5)
LYMPHOCYTES ABSOLUTE: 1.5 K/UL (ref 1.5–4)
LYMPHOCYTES RELATIVE PERCENT: 19.6 %
MCH RBC QN AUTO: 31.1 PG (ref 27–32)
MCHC RBC AUTO-ENTMCNC: 32 G/DL (ref 31–35)
MCV RBC AUTO: 97.4 FL (ref 80–100)
MONOCYTES ABSOLUTE: 0.5 K/UL (ref 0.2–0.8)
MONOCYTES RELATIVE PERCENT: 5.9 %
NEUTROPHILS ABSOLUTE: 5.6 K/UL (ref 2–7.5)
NEUTROPHILS RELATIVE PERCENT: 73.6 %
PDW BLD-RTO: 12.9 % (ref 11–16)
PLATELET # BLD: 290 K/UL (ref 150–400)
PMV BLD AUTO: 9.9 FL (ref 6–10)
POTASSIUM SERPL-SCNC: 3.8 MMOL/L (ref 3.4–5.1)
RBC # BLD: 4.56 M/UL (ref 3.8–5.8)
SODIUM BLD-SCNC: 139 MMOL/L (ref 136–145)
TOTAL PROTEIN: 7.3 G/DL (ref 6.4–8.3)
TSH SERPL DL<=0.05 MIU/L-ACNC: 0.14 UIU/ML (ref 0.35–5.5)
WBC # BLD: 7.6 K/UL (ref 4–11)

## 2020-10-05 PROCEDURE — 77067 SCR MAMMO BI INCL CAD: CPT

## 2020-10-05 PROCEDURE — 84443 ASSAY THYROID STIM HORMONE: CPT

## 2020-10-05 PROCEDURE — 80053 COMPREHEN METABOLIC PANEL: CPT

## 2020-10-05 PROCEDURE — 36415 COLL VENOUS BLD VENIPUNCTURE: CPT

## 2020-10-05 PROCEDURE — 85025 COMPLETE CBC W/AUTO DIFF WBC: CPT

## 2020-10-05 RX ORDER — LEVOTHYROXINE SODIUM 0.12 MG/1
125 TABLET ORAL DAILY
Qty: 90 TABLET | Refills: 2 | Status: SHIPPED | OUTPATIENT
Start: 2020-10-05 | End: 2021-03-24

## 2020-10-20 ENCOUNTER — OFFICE VISIT (OUTPATIENT)
Dept: PRIMARY CARE CLINIC | Age: 55
End: 2020-10-20
Payer: MEDICARE

## 2020-10-20 VITALS
BODY MASS INDEX: 36.11 KG/M2 | OXYGEN SATURATION: 99 % | HEART RATE: 103 BPM | TEMPERATURE: 96 F | RESPIRATION RATE: 18 BRPM | DIASTOLIC BLOOD PRESSURE: 66 MMHG | WEIGHT: 217 LBS | SYSTOLIC BLOOD PRESSURE: 120 MMHG

## 2020-10-20 PROCEDURE — G8417 CALC BMI ABV UP PARAM F/U: HCPCS | Performed by: INTERNAL MEDICINE

## 2020-10-20 PROCEDURE — 3023F SPIROM DOC REV: CPT | Performed by: INTERNAL MEDICINE

## 2020-10-20 PROCEDURE — 1036F TOBACCO NON-USER: CPT | Performed by: INTERNAL MEDICINE

## 2020-10-20 PROCEDURE — 3017F COLORECTAL CA SCREEN DOC REV: CPT | Performed by: INTERNAL MEDICINE

## 2020-10-20 PROCEDURE — G8427 DOCREV CUR MEDS BY ELIG CLIN: HCPCS | Performed by: INTERNAL MEDICINE

## 2020-10-20 PROCEDURE — 99213 OFFICE O/P EST LOW 20 MIN: CPT | Performed by: INTERNAL MEDICINE

## 2020-10-20 PROCEDURE — G8482 FLU IMMUNIZE ORDER/ADMIN: HCPCS | Performed by: INTERNAL MEDICINE

## 2020-10-20 PROCEDURE — G8926 SPIRO NO PERF OR DOC: HCPCS | Performed by: INTERNAL MEDICINE

## 2020-10-20 PROCEDURE — G0296 VISIT TO DETERM LDCT ELIG: HCPCS | Performed by: INTERNAL MEDICINE

## 2020-10-20 ASSESSMENT — ENCOUNTER SYMPTOMS
VOMITING: 0
NAUSEA: 0
SINUS PRESSURE: 0
ABDOMINAL PAIN: 0
COUGH: 1
WHEEZING: 1
BACK PAIN: 1
SORE THROAT: 0
EYE DISCHARGE: 0
SHORTNESS OF BREATH: 1

## 2020-10-20 NOTE — PROGRESS NOTES
SUBJECTIVE:    Patient ID: Sasha Ace is a 63 y. o.female. Chief Complaint   Patient presents with    Hypertension    Anxiety    Depression     HPI:  Patient has had hypertension for several years. She has been compliant with taking medications, without side effects from it. She has been following a low-sodium, is  active and rarely exercises. Weight is stable, compared to last visit. Her blood pressure is stable at this time. Patient has had COPD for a long time. She has been using nebulizer inhalation treatments as ordered. She is on oxygen. She has some dyspnea with exertion. With on and off cough, SOB and wheezing that does respond to treatment. She has been using the Albuterol inhaler. Last Pneumovax was 2015 . She quit smoking in 2007. She states her breathing is not great recently. Patient  has had hypothyroidism for the past few years. She has been compliant with taking her medication without side effects. There are no symptoms of hypo or hyper thyroidism. Patient's medications, allergies, past medical, surgical, social and family histories were reviewed and updated as appropriate in the electronic medical record/chart.         Outpatient Medications Marked as Taking for the 10/20/20 encounter (Office Visit) with Nell Ivy MD   Medication Sig Dispense Refill    levothyroxine (SYNTHROID) 125 MCG tablet Take 1 tablet by mouth Daily TAKE 1 TABLET BY MOUTH ONCE DAILY 90 tablet 2    venlafaxine (EFFEXOR XR) 75 MG extended release capsule TAKE 3 CAPSULES BY MOUTH DAILY 270 capsule 3    pantoprazole (PROTONIX) 40 MG tablet Take 1 tablet by mouth once daily 90 tablet 3    cetirizine (ZYRTEC) 10 MG tablet Take 1 tablet by mouth daily 30 tablet 3    hydrOXYzine (ATARAX) 10 MG tablet TAKE 1 TABLET BY MOUTH TWICE DAILY AS NEEDED FOR ANXIETY 45 tablet 2    hydroCHLOROthiazide (HYDRODIURIL) 25 MG tablet take 1 tablet by mouth once daily 90 tablet 5    nystatin (MYCOSTATIN) 440224 UNIT/GM cream Apply topically 2 times daily. 45 g 1    ipratropium-albuterol (DUONEB) 0.5-2.5 (3) MG/3ML SOLN nebulizer solution Inhale 3 mLs into the lungs every 6 hours as needed for Shortness of Breath 360 mL 0    furosemide (LASIX) 20 MG tablet take 1 tablet by mouth once daily if needed for SWELLING 60 tablet 3    Nintedanib Esylate 150 MG CAPS Take by mouth 2 times daily      albuterol sulfate HFA (PROAIR HFA) 108 (90 Base) MCG/ACT inhaler Inhale 2 puffs into the lungs every 6 hours as needed for Wheezing 1 Inhaler 3    ibuprofen (ADVIL;MOTRIN) 800 MG tablet take 1 tablet by mouth every 8 hours if needed for pain  0        Review of Systems   Constitutional: Negative for chills and fever. HENT: Negative for congestion, sinus pressure and sore throat. Eyes: Negative for discharge and visual disturbance. Respiratory: Positive for cough, shortness of breath and wheezing. At baseline    Cardiovascular: Negative for chest pain and palpitations. AYALA   Gastrointestinal: Negative for abdominal pain, nausea and vomiting. Endocrine: Negative for cold intolerance and heat intolerance. Genitourinary: Negative for dysuria, frequency and urgency. Musculoskeletal: Positive for arthralgias and back pain. Skin: Negative for rash and wound. Neurological: Negative for syncope, numbness and headaches. Hematological: Negative. Psychiatric/Behavioral: Negative for agitation, self-injury, sleep disturbance and suicidal ideas. The patient is nervous/anxious (occasional ).         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00    Pack years: 30.00    Last attempt to quit: 2007    Years since quittin.8   Smokeless Tobacco Never Used       OBJECTIVE:   Wt Readings from Last 3 Encounters:   10/20/20 217 lb (98.4 kg)   19 219 lb 3.2 oz (99.4 kg)   19 214 lb (97.1 kg)     BP Readings from Last 3 Encounters:   10/20/20 120/66   19 116/78   19 131/83       /66   Pulse 103   Temp 96 °F (35.6 °C) (Temporal)   Resp 18   SpO2 99% Comment: 4 liters of 02     Physical Exam  Vitals signs and nursing note reviewed. Constitutional:       Appearance: Normal appearance. She is well-developed. Comments: Obese    HENT:      Head: Normocephalic and atraumatic. Right Ear: External ear normal.      Left Ear: External ear normal.      Nose: Nose normal.   Eyes:      Conjunctiva/sclera: Conjunctivae normal.      Pupils: Pupils are equal, round, and reactive to light. Neck:      Musculoskeletal: Neck supple. No neck rigidity or muscular tenderness. Thyroid: No thyromegaly. Vascular: No JVD. Cardiovascular:      Rate and Rhythm: Normal rate and regular rhythm. Heart sounds: Normal heart sounds. Pulmonary:      Effort: Pulmonary effort is normal.      Breath sounds: Rales (few fine right base) present. No wheezing. Abdominal:      General: Bowel sounds are normal. There is no distension. Palpations: Abdomen is soft. Tenderness: There is no abdominal tenderness. Musculoskeletal:         General: No tenderness. Right lower leg: No edema. Left lower leg: No edema.       Comments: Clubbing to fingernails. Skin:     Findings: No erythema or rash. Neurological:      Mental Status: She is alert and oriented to person, place, and time. Psychiatric:         Mood and Affect: Mood normal.         Behavior: Behavior normal.         Thought Content: Thought content normal.         Judgment: Judgment normal.         Lab Results   Component Value Date     10/05/2020    K 3.8 10/05/2020    K 4.3 05/25/2018    CL 99 10/05/2020    CO2 31 10/05/2020    GLUCOSE 100 10/05/2020    BUN 15 10/05/2020    CREATININE 0.7 10/05/2020    CALCIUM 9.6 10/05/2020    PROT 7.3 10/05/2020    LABALBU 4.2 10/05/2020    BILITOT 0.3 10/05/2020    ALT 25 10/05/2020    AST 25 10/05/2020       Hemoglobin A1C (%)   Date Value   06/08/2017 5.4     Microscopic Examination (no units)   Date Value   02/25/2017 Not Indicated     LDL Calculated (mg/dL)   Date Value   10/14/2019 134 (H)         Lab Results   Component Value Date    WBC 7.6 10/05/2020    NEUTROABS 5.6 10/05/2020    HGB 14.2 10/05/2020    HCT 44.4 10/05/2020    MCV 97.4 10/05/2020     10/05/2020       Lab Results   Component Value Date    TSH 0.14 (L) 10/05/2020       ASSESSMENT/PLAN:     1. Essential hypertension  BP is stable. I have advised her on low-sodium diet, exercise and weight control. I am going to continue current medication. Will monitor her renal function every few months, have advised her to check blood pressure frequently and to keep a record of this. 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)  Patient is no longer following up with with specialist for this. She has not been taking long term prophylactic antibiotics for a while. I advised her to make contact with them for follow up at least once per year. - TSH without Reflex; Future  - Folate; Future  - Vitamin B12; Future    3.  Chronic obstructive pulmonary disease, unspecified COPD type ([facility] [address]  I have advised her on the nature of the disease and the importance of staying off smoking. I have instructed her to continue inhalers, nebulizer treatments and oxygen as directed. Pneumovax is needed. Immunization History   Administered Date(s) Administered    Hepatitis A Adult (Havrix, Vaqta) 08/10/2018    Influenza Virus Vaccine 11/05/2019    Influenza, MDCK Quadv, with preserv IM (Flucelvax 4 yrs and older) 11/03/2017    Influenza, Arabellaander Beaver, 6-35 Months, IM (Fluzone,Afluria) 09/30/2020    Influenza, Quadv, IM, PF (6 mo and older Fluzone, Flulaval, Fluarix, and 3 yrs and older Afluria) 10/11/2016    PPD Test 08/13/2015, 10/31/2017    Pneumococcal Conjugate 13-valent ([id number]) 08/13/2015    Pneumococcal Polysaccharide ([id number]) 08/01/2011    Tdap (Boostrix, Adacel) 03/07/2018     Patient has not had CT lung screen in the past 4 years. I am going to order one today. Further recommendation based on test results. 4. Hypothyroidism, unspecified type  Last TSH was slightly low. I am going to check the TSH every few months. I am going to continue the current dose of Levothyroxine. I have advised her on the importance of taking the medication on a daily basis. Check TSH in the near future. Lab Results   Component Value Date    TSH 0.14 (L) 10/05/2020     - TSH without Reflex; Future  - Folate; Future  - Vitamin B12; Future    5. Personal history of tobacco use  Patient no longer smoking. Long discussion with her regarding need to not start smoking again. Spent 3 minutes    CT lung screen ordered today. Discussed the risk/benefit/complication from low-dose CT lung screen. Prior history of smoking. Also prior history of interstitial lung disease. All questions and concerns were answered. Patient is in agreement. Discussed the protocol for CT lung screen with the patient. - OK VISIT TO DISCUSS LUNG CA SCREEN W LDCT  - CT Lung Screen (Annual);  Future       Written by Danni Kelley, acting as a scribe for  Veronica on 10/20/2020 at 10:53 AM.     I, Dr. Delia Winchester, personally performed the services described in the documentation as scribed by Blas Dominique CMA, in my presence and it is both accurate and complete.

## 2020-10-20 NOTE — PROGRESS NOTES
Chief Complaint   Patient presents with    Hypertension    Anxiety    Depression       Have you seen any other physician or provider since your last visit no    Have you had any other diagnostic tests since your last visit? yes - labs    Have you changed or stopped any medications since your last visit? no     I have recommended that this patient have a immunization for pneumonia but she declines at this time. I have discussed the risks and benefits of this examination with her. The patient verbalizes understanding.   She had pneumo at another office she thinks

## 2020-10-27 ENCOUNTER — HOSPITAL ENCOUNTER (OUTPATIENT)
Dept: CT IMAGING | Facility: HOSPITAL | Age: 55
Discharge: HOME | End: 2020-10-27
Payer: MEDICARE

## 2020-10-27 PROCEDURE — G0297 LDCT FOR LUNG CA SCREEN: HCPCS

## 2020-11-02 ENCOUNTER — APPOINTMENT (OUTPATIENT)
Dept: GENERAL RADIOLOGY | Facility: HOSPITAL | Age: 55
DRG: 191 | End: 2020-11-02
Attending: INTERNAL MEDICINE
Payer: MEDICARE

## 2020-11-02 ENCOUNTER — HOSPITAL ENCOUNTER (INPATIENT)
Facility: HOSPITAL | Age: 55
LOS: 3 days | Discharge: HOME | DRG: 191 | End: 2020-11-05
Attending: INTERNAL MEDICINE | Admitting: INTERNAL MEDICINE
Payer: MEDICARE

## 2020-11-02 PROBLEM — F41.8 MIXED ANXIETY DEPRESSIVE DISORDER: Status: ACTIVE | Noted: 2017-10-28

## 2020-11-02 PROBLEM — J44.1 COPD EXACERBATION (HCC): Status: ACTIVE | Noted: 2020-11-02

## 2020-11-02 LAB
A/G RATIO: 1.2 (ref 0.8–2)
ALBUMIN SERPL-MCNC: 4.1 G/DL (ref 3.4–4.8)
ALP BLD-CCNC: 154 U/L (ref 25–100)
ALT SERPL-CCNC: 26 U/L (ref 4–36)
ANION GAP SERPL CALCULATED.3IONS-SCNC: 9 MMOL/L (ref 3–16)
AST SERPL-CCNC: 25 U/L (ref 8–33)
BASOPHILS ABSOLUTE: 0 K/UL (ref 0–0.1)
BASOPHILS RELATIVE PERCENT: 0.1 %
BILIRUB SERPL-MCNC: 0.3 MG/DL (ref 0.3–1.2)
BUN BLDV-MCNC: 12 MG/DL (ref 6–20)
CALCIUM SERPL-MCNC: 9.5 MG/DL (ref 8.5–10.5)
CHLORIDE BLD-SCNC: 102 MMOL/L (ref 98–107)
CO2: 29 MMOL/L (ref 20–30)
CREAT SERPL-MCNC: 0.8 MG/DL (ref 0.4–1.2)
EOSINOPHILS ABSOLUTE: 0 K/UL (ref 0–0.4)
EOSINOPHILS RELATIVE PERCENT: 0.1 %
GFR AFRICAN AMERICAN: >59
GFR NON-AFRICAN AMERICAN: >60
GLOBULIN: 3.4 G/DL
GLUCOSE BLD-MCNC: 121 MG/DL (ref 74–106)
HCT VFR BLD CALC: 45.1 % (ref 37–47)
HEMOGLOBIN: 14.1 G/DL (ref 11.5–16.5)
IMMATURE GRANULOCYTES #: 0 K/UL
IMMATURE GRANULOCYTES %: 0.2 % (ref 0–5)
LYMPHOCYTES ABSOLUTE: 2.2 K/UL (ref 1.5–4)
LYMPHOCYTES RELATIVE PERCENT: 23.7 %
MCH RBC QN AUTO: 30.7 PG (ref 27–32)
MCHC RBC AUTO-ENTMCNC: 31.3 G/DL (ref 31–35)
MCV RBC AUTO: 98.3 FL (ref 80–100)
MONOCYTES ABSOLUTE: 0.5 K/UL (ref 0.2–0.8)
MONOCYTES RELATIVE PERCENT: 5.1 %
NEUTROPHILS ABSOLUTE: 6.6 K/UL (ref 2–7.5)
NEUTROPHILS RELATIVE PERCENT: 70.8 %
PDW BLD-RTO: 13.1 % (ref 11–16)
PLATELET # BLD: 274 K/UL (ref 150–400)
PMV BLD AUTO: 9.9 FL (ref 6–10)
POTASSIUM SERPL-SCNC: 3.9 MMOL/L (ref 3.4–5.1)
RBC # BLD: 4.59 M/UL (ref 3.8–5.8)
SARS-COV-2, NAAT: NOT DETECTED
SODIUM BLD-SCNC: 140 MMOL/L (ref 136–145)
TOTAL PROTEIN: 7.5 G/DL (ref 6.4–8.3)
TSH SERPL DL<=0.05 MIU/L-ACNC: 0.74 UIU/ML (ref 0.35–5.5)
WBC # BLD: 9.3 K/UL (ref 4–11)

## 2020-11-02 PROCEDURE — 85025 COMPLETE CBC W/AUTO DIFF WBC: CPT

## 2020-11-02 PROCEDURE — 99222 1ST HOSP IP/OBS MODERATE 55: CPT | Performed by: INTERNAL MEDICINE

## 2020-11-02 PROCEDURE — 6360000002 HC RX W HCPCS: Performed by: NURSE PRACTITIONER

## 2020-11-02 PROCEDURE — 94640 AIRWAY INHALATION TREATMENT: CPT

## 2020-11-02 PROCEDURE — 94761 N-INVAS EAR/PLS OXIMETRY MLT: CPT

## 2020-11-02 PROCEDURE — 2580000003 HC RX 258: Performed by: INTERNAL MEDICINE

## 2020-11-02 PROCEDURE — 2580000003 HC RX 258: Performed by: NURSE PRACTITIONER

## 2020-11-02 PROCEDURE — 6370000000 HC RX 637 (ALT 250 FOR IP): Performed by: NURSE PRACTITIONER

## 2020-11-02 PROCEDURE — 97165 OT EVAL LOW COMPLEX 30 MIN: CPT

## 2020-11-02 PROCEDURE — 71046 X-RAY EXAM CHEST 2 VIEWS: CPT

## 2020-11-02 PROCEDURE — 6360000002 HC RX W HCPCS: Performed by: INTERNAL MEDICINE

## 2020-11-02 PROCEDURE — 80053 COMPREHEN METABOLIC PANEL: CPT

## 2020-11-02 PROCEDURE — U0002 COVID-19 LAB TEST NON-CDC: HCPCS

## 2020-11-02 PROCEDURE — 36415 COLL VENOUS BLD VENIPUNCTURE: CPT

## 2020-11-02 PROCEDURE — 1200000000 HC SEMI PRIVATE

## 2020-11-02 PROCEDURE — 84443 ASSAY THYROID STIM HORMONE: CPT

## 2020-11-02 RX ORDER — ACETAMINOPHEN 650 MG/1
650 SUPPOSITORY RECTAL EVERY 6 HOURS PRN
Status: DISCONTINUED | OUTPATIENT
Start: 2020-11-02 | End: 2020-11-05 | Stop reason: HOSPADM

## 2020-11-02 RX ORDER — SODIUM CHLORIDE 0.9 % (FLUSH) 0.9 %
10 SYRINGE (ML) INJECTION PRN
Status: DISCONTINUED | OUTPATIENT
Start: 2020-11-02 | End: 2020-11-05 | Stop reason: HOSPADM

## 2020-11-02 RX ORDER — ONDANSETRON 2 MG/ML
4 INJECTION INTRAMUSCULAR; INTRAVENOUS EVERY 6 HOURS PRN
Status: DISCONTINUED | OUTPATIENT
Start: 2020-11-02 | End: 2020-11-05 | Stop reason: HOSPADM

## 2020-11-02 RX ORDER — FUROSEMIDE 20 MG/1
20 TABLET ORAL DAILY
Status: DISCONTINUED | OUTPATIENT
Start: 2020-11-03 | End: 2020-11-05 | Stop reason: HOSPADM

## 2020-11-02 RX ORDER — LEVALBUTEROL INHALATION SOLUTION 1.25 MG/3ML
1.25 SOLUTION RESPIRATORY (INHALATION) EVERY 8 HOURS PRN
Status: DISCONTINUED | OUTPATIENT
Start: 2020-11-02 | End: 2020-11-02

## 2020-11-02 RX ORDER — LEVOTHYROXINE SODIUM 0.12 MG/1
125 TABLET ORAL DAILY
Status: DISCONTINUED | OUTPATIENT
Start: 2020-11-03 | End: 2020-11-05 | Stop reason: HOSPADM

## 2020-11-02 RX ORDER — ACETAMINOPHEN 325 MG/1
650 TABLET ORAL EVERY 6 HOURS PRN
Status: DISCONTINUED | OUTPATIENT
Start: 2020-11-02 | End: 2020-11-05 | Stop reason: HOSPADM

## 2020-11-02 RX ORDER — SODIUM CHLORIDE 0.9 % (FLUSH) 0.9 %
10 SYRINGE (ML) INJECTION EVERY 12 HOURS SCHEDULED
Status: DISCONTINUED | OUTPATIENT
Start: 2020-11-02 | End: 2020-11-05 | Stop reason: HOSPADM

## 2020-11-02 RX ORDER — IPRATROPIUM BROMIDE AND ALBUTEROL SULFATE 2.5; .5 MG/3ML; MG/3ML
1 SOLUTION RESPIRATORY (INHALATION)
Status: DISCONTINUED | OUTPATIENT
Start: 2020-11-02 | End: 2020-11-02

## 2020-11-02 RX ORDER — FAMOTIDINE 20 MG/1
20 TABLET, FILM COATED ORAL 2 TIMES DAILY
Status: DISCONTINUED | OUTPATIENT
Start: 2020-11-02 | End: 2020-11-05 | Stop reason: HOSPADM

## 2020-11-02 RX ORDER — HYDROXYZINE HYDROCHLORIDE 25 MG/1
25 TABLET, FILM COATED ORAL 3 TIMES DAILY PRN
Status: DISCONTINUED | OUTPATIENT
Start: 2020-11-02 | End: 2020-11-05 | Stop reason: HOSPADM

## 2020-11-02 RX ORDER — HYDROCHLOROTHIAZIDE 25 MG/1
25 TABLET ORAL DAILY
Status: DISCONTINUED | OUTPATIENT
Start: 2020-11-03 | End: 2020-11-05 | Stop reason: HOSPADM

## 2020-11-02 RX ORDER — LEVALBUTEROL INHALATION SOLUTION 1.25 MG/3ML
1.25 SOLUTION RESPIRATORY (INHALATION) EVERY 4 HOURS PRN
Status: DISCONTINUED | OUTPATIENT
Start: 2020-11-02 | End: 2020-11-05 | Stop reason: HOSPADM

## 2020-11-02 RX ORDER — LEVALBUTEROL INHALATION SOLUTION 1.25 MG/3ML
1.25 SOLUTION RESPIRATORY (INHALATION) 3 TIMES DAILY
Status: DISCONTINUED | OUTPATIENT
Start: 2020-11-02 | End: 2020-11-03

## 2020-11-02 RX ORDER — METHYLPREDNISOLONE SODIUM SUCCINATE 125 MG/2ML
60 INJECTION, POWDER, LYOPHILIZED, FOR SOLUTION INTRAMUSCULAR; INTRAVENOUS DAILY
Status: DISCONTINUED | OUTPATIENT
Start: 2020-11-03 | End: 2020-11-03

## 2020-11-02 RX ORDER — METHYLPREDNISOLONE SODIUM SUCCINATE 125 MG/2ML
125 INJECTION, POWDER, LYOPHILIZED, FOR SOLUTION INTRAMUSCULAR; INTRAVENOUS ONCE
Status: COMPLETED | OUTPATIENT
Start: 2020-11-02 | End: 2020-11-02

## 2020-11-02 RX ORDER — MONTELUKAST SODIUM 10 MG/1
10 TABLET ORAL NIGHTLY
Status: DISCONTINUED | OUTPATIENT
Start: 2020-11-02 | End: 2020-11-05 | Stop reason: HOSPADM

## 2020-11-02 RX ORDER — VENLAFAXINE HYDROCHLORIDE 37.5 MG/1
75 CAPSULE, EXTENDED RELEASE ORAL
Status: DISCONTINUED | OUTPATIENT
Start: 2020-11-03 | End: 2020-11-05 | Stop reason: HOSPADM

## 2020-11-02 RX ORDER — IPRATROPIUM BROMIDE AND ALBUTEROL SULFATE 2.5; .5 MG/3ML; MG/3ML
3 SOLUTION RESPIRATORY (INHALATION) EVERY 6 HOURS PRN
Status: DISCONTINUED | OUTPATIENT
Start: 2020-11-02 | End: 2020-11-02

## 2020-11-02 RX ORDER — HYDROXYZINE HYDROCHLORIDE 25 MG/1
25 TABLET, FILM COATED ORAL 2 TIMES DAILY PRN
Status: DISCONTINUED | OUTPATIENT
Start: 2020-11-02 | End: 2020-11-02

## 2020-11-02 RX ORDER — IBUPROFEN 800 MG/1
800 TABLET ORAL 2 TIMES DAILY PRN
Status: DISCONTINUED | OUTPATIENT
Start: 2020-11-02 | End: 2020-11-05 | Stop reason: HOSPADM

## 2020-11-02 RX ORDER — POLYETHYLENE GLYCOL 3350 17 G/17G
17 POWDER, FOR SOLUTION ORAL DAILY PRN
Status: DISCONTINUED | OUTPATIENT
Start: 2020-11-02 | End: 2020-11-05 | Stop reason: HOSPADM

## 2020-11-02 RX ORDER — GUAIFENESIN 600 MG/1
600 TABLET, EXTENDED RELEASE ORAL 2 TIMES DAILY
Status: DISCONTINUED | OUTPATIENT
Start: 2020-11-02 | End: 2020-11-05 | Stop reason: HOSPADM

## 2020-11-02 RX ORDER — CETIRIZINE HYDROCHLORIDE 10 MG/1
10 TABLET ORAL DAILY
Status: DISCONTINUED | OUTPATIENT
Start: 2020-11-03 | End: 2020-11-05 | Stop reason: HOSPADM

## 2020-11-02 RX ADMIN — ONDANSETRON 4 MG: 2 INJECTION INTRAMUSCULAR; INTRAVENOUS at 14:57

## 2020-11-02 RX ADMIN — METHYLPREDNISOLONE SODIUM SUCCINATE 125 MG: 125 INJECTION, POWDER, FOR SOLUTION INTRAMUSCULAR; INTRAVENOUS at 14:58

## 2020-11-02 RX ADMIN — CEFTRIAXONE 1 G: 1 INJECTION, POWDER, FOR SOLUTION INTRAMUSCULAR; INTRAVENOUS at 23:41

## 2020-11-02 RX ADMIN — LEVALBUTEROL 1.25 MG: 1.25 SOLUTION RESPIRATORY (INHALATION) at 20:49

## 2020-11-02 RX ADMIN — FAMOTIDINE 20 MG: 20 TABLET ORAL at 15:08

## 2020-11-02 RX ADMIN — AZITHROMYCIN MONOHYDRATE 500 MG: 500 INJECTION, POWDER, LYOPHILIZED, FOR SOLUTION INTRAVENOUS at 15:07

## 2020-11-02 RX ADMIN — MONTELUKAST SODIUM 10 MG: 10 TABLET, COATED ORAL at 20:38

## 2020-11-02 RX ADMIN — HYDROXYZINE HYDROCHLORIDE 25 MG: 25 TABLET, FILM COATED ORAL at 20:38

## 2020-11-02 RX ADMIN — Medication 10 ML: at 20:39

## 2020-11-02 RX ADMIN — ENOXAPARIN SODIUM 40 MG: 40 INJECTION SUBCUTANEOUS at 15:08

## 2020-11-02 RX ADMIN — FAMOTIDINE 20 MG: 20 TABLET ORAL at 20:38

## 2020-11-02 RX ADMIN — LEVALBUTEROL 1.25 MG: 1.25 SOLUTION RESPIRATORY (INHALATION) at 16:35

## 2020-11-02 RX ADMIN — GUAIFENESIN 600 MG: 600 TABLET, EXTENDED RELEASE ORAL at 15:08

## 2020-11-02 NOTE — PLAN OF CARE
Problem: Respiratory  Goal: No pulmonary complications  Outcome: Ongoing  Goal: O2 Sat > 90%  Outcome: Ongoing  Goal: Supplemental O2 requirements decreased  Outcome: Ongoing  Goal: Agreement to quit smoking  Outcome: Ongoing  Goal: Pneumonia vaccine at discharge as needed  Outcome: Ongoing

## 2020-11-02 NOTE — H&P
History and Physical    Patient:  Cullen Lackey    CHIEF COMPLAINT:    SOB, wheezing    HISTORY OF PRESENT ILLNESS:   The patient is a 63 y.o. female with PMH COPD on 3L home oxygen, interstitial lung disease, HTN, HLD, anxiety, lung nodules, hypothyroidism who is direct admit by PCP for worsening SOB and wheezing several days. Associated symptoms include fatigue, wheezing, congestion, occasional productive cough thick sputum. Using inhalers at home but not nebs. Reports body aches 3-4 weeks ago following flu shot but has resolved. Denies fevers, abdominal pain, CP, palpitations, recent illness. Usually wears 3L NC but has had to increase to 4L NC at home. Followed by [facility] pulmonology. Admitted for further evaluation and treatment. 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  Authorizing Provider   levothyroxine (SYNTHROID) 125 MCG tablet Take 1 tablet by mouth Daily TAKE 1 TABLET BY MOUTH ONCE DAILY 10/5/20  Yes Aysha Harper MD   venlafaxine (EFFEXOR XR) 75 MG extended release capsule TAKE 3 CAPSULES BY MOUTH DAILY 20  Yes Aysha Harper MD   pantoprazole (PROTONIX) 40 MG tablet Take 1 tablet by mouth once daily 20  Yes Aysha Harper MD   cetirizine (ZYRTEC) 10 MG tablet Take 1 tablet by mouth daily 20  Yes Aysha Harper MD   hydrOXYzine (ATARAX) 10 MG tablet TAKE 1 TABLET BY MOUTH TWICE DAILY AS NEEDED FOR ANXIETY 20  Yes Nell Ivy MD   hydroCHLOROthiazide (HYDRODIURIL) 25 MG tablet take 1 tablet by mouth once daily 5/15/20  Yes Nell Ivy MD   ipratropium-albuterol (DUONEB) 0.5-2.5 (3) MG/3ML SOLN nebulizer solution Inhale 3 mLs into the lungs every 6 hours as needed for Shortness of Breath 12/31/18 11/2/20 Yes Nell Ivy MD   furosemide (LASIX) 20 MG tablet take 1 tablet by mouth once daily if needed for SWELLING 12/17/18  Yes Nell Ivy MD   Nintedanib Esylate 150 MG CAPS Take by mouth 2 times daily   Yes Historical Provider, MD   albuterol sulfate HFA (PROAIR HFA) 108 (90 Base) MCG/ACT inhaler Inhale 2 puffs into the lungs every 6 hours as needed for Wheezing 5/29/18  Yes OPAL Herron   ibuprofen (ADVIL;MOTRIN) 800 MG tablet take 1 tablet by mouth every 8 hours if needed for pain 12/30/16  Yes Historical Provider, MD       Allergies:  Patient has no known allergies. Social History:   TOBACCO:   reports that she quit smoking about 13 years ago. She has a 30.00 pack-year smoking history. She has never used smokeless tobacco.  ETOH:   reports no history of alcohol use. OCCUPATION:  None     Family History:       Problem Relation Age of Onset   Patel Cancer Mother         colon    Cancer Father         bladder    High Blood Pressure Father     Stroke Father     High Blood Pressure Sister        Review of system   Constitutional:  Denies fever or chills +fatigue  Eyes:  Denies eye pain or redness  HENT:  Denies nasal congestion or sore throat   Respiratory:  +cough, wheezing, shortness of breath   Cardiovascular:  Denies chest pain or edema   GI:  Denies abdominal pain, nausea, vomiting, bloody stools or diarrhea.  +decreased appetite  :  Denies dysuria or frequency  Musculoskeletal:  Denies acute neck pain or body aches  Integument:  Denies rash or itching  Neurologic:  Denies headache, dizziness, numbness, tingling or unilateral weakness  Psychiatric:  Denies acute depression or acute anxiety    Vital Signs  Temp: 98.4 °F (36.9 °C)  Pulse: 102  Resp: 22  BP: 125/73  SpO2: 96 %  O2 Device: Nasal cannula  O2 Flow Rate (L/min): 3 L/min    vital signs reviewed in electronic chart. Physical exam  Constitutional:  Well developed, well nourished, no acute distress  Eyes:  PERRL, no scleral icterus, conjunctiva normal   HENT:  Atraumatic, external ears normal, nose normal, oropharynx moist, no pharyngeal exudates. Neck- supple, no JVD, no lymphadenopathy  Respiratory: 3L NC. No respiratory distress. Bilateral lungs with scattered expiratory/inspiratory wheezing. No rales. Cardiovascular:  Normal rate, normal rhythm, no murmurs, no gallops, no rubs, no edema   GI:  Soft, nondistended, normal bowel sounds, nontender, no voluntary guarding  Musculoskeletal:  No cyanosis or obvious acute deformity. Moving all extremities   Integument:  Warm and dry. Neurologic:  Alert & oriented x 3, no apparent focal deficits noted   Psychiatric:  Speech and behavior appropriate      Lab Results   Component Value Date     2020    K 3.9 2020     2020    CO2 29 2020    BUN 12 2020    CREATININE 0.8 2020    GLUCOSE 121 (H) 2020    CALCIUM 9.5 2020    PROT 7.5 2020    LABALBU 4.1 2020    BILITOT 0.3 2020    ALKPHOS 154 (H) 2020    AST 25 2020    ALT 26 2020    LABGLOM >60 2020    GFRAA >59 2020    AGRATIO 1.2 2020    GLOB 3.4 2020     Lab Results   Component Value Date    WBC 9.3 2020    HGB 14.1 2020    HCT 45.1 2020    MCV 98.3 2020     2020     XR CHEST (2 VW)   Final Result      1. Diffuse bilateral interstitial airspace densities, progressed when compared to the prior study. This is nonspecific and may reflect a combination of acute and/or chronic lung disease. Correlate clinically.       2.  COPD        CT lung screening (annual) 10/27/20:  Nodules, right lun mm nodule of the base of the right lower lobe, image 152 of series 3.         Nodules, left lun mm nodule of the left upper lobe posteriorly abutting the fissure, image 102 of series 3. . 5 mm nodule of the left upper lobe medially, image 73 of series 3. Assessment and Plan     Active Hospital Problems    Diagnosis Date Noted    COPD exacerbation ([facility] [J44.1]  CXR:   XR CHEST (2 VW)   Final Result      1. Diffuse bilateral interstitial airspace densities, progressed when compared to the prior study. This is nonspecific and may reflect a combination of acute and/or chronic lung disease. Correlate clinically. 2.  COPD      -IV azithromycin, IV steroids, breathing devices, oxygen, nebs, mucinex, added singulair nightly, inhalers, continue home meds. Labs in AM.   GI/DVT prophylaxis. PT/OT. Reports home nebs make her jittery-changed to levalbuterol inpatient. 2020    Mixed anxiety depressive disorder [F41.8]  Continue home meds. No concerns. 10/28/2017    Essential hypertension [I10]  Stable. Continue home regimen. On lasix 20mg daily, hctz 25mg daily. Adjust if needed.  Gastroesophageal reflux disease [K21.9]  PPI. Avoid aggravating factors. 2015    Inter[facility] lung disease ([facility] [J84.9]  See COPD.    01/15/2015    Hypothyroidism [E03.9]  TSH low side. PCP recently decreased synthroid from 150 to 125. Repeat TSH about 2-3 weeks. Cont current regimen. 04/15/2011     Patient was seen and examined by Dr. Molina Mejia and plan of care reviewed.        Kacy Cortes certifies per CMS regulation for 42 .15(a), that the patient may reasonably be expected to be discharged or transferred to a hospital within 96 hours after admission to [address] [place] [address]    Electronically signed by DAVIAN Anaya CNP on 2020 at 4:14 PM

## 2020-11-02 NOTE — PROGRESS NOTES
Received this 63 year white female direct admit from home to Room 12. Patient awake alert and oriented X 4. LUIS and SOA noted. Dry spontaneous cough noted. Patient arrived with portable oxygen. Patient noted to have difficulty talking related to shortness of breath. Audible wheezes noted. Notified Noemi Zhao that the patient is here in room 12.

## 2020-11-03 LAB
A/G RATIO: 1.1 (ref 0.8–2)
ALBUMIN SERPL-MCNC: 4.2 G/DL (ref 3.4–4.8)
ALP BLD-CCNC: 152 U/L (ref 25–100)
ALT SERPL-CCNC: 24 U/L (ref 4–36)
ANION GAP SERPL CALCULATED.3IONS-SCNC: 10 MMOL/L (ref 3–16)
AST SERPL-CCNC: 18 U/L (ref 8–33)
BASOPHILS ABSOLUTE: 0 K/UL (ref 0–0.1)
BASOPHILS RELATIVE PERCENT: 0.2 %
BILIRUB SERPL-MCNC: <0.2 MG/DL (ref 0.3–1.2)
BUN BLDV-MCNC: 13 MG/DL (ref 6–20)
CALCIUM SERPL-MCNC: 9.3 MG/DL (ref 8.5–10.5)
CHLORIDE BLD-SCNC: 102 MMOL/L (ref 98–107)
CO2: 27 MMOL/L (ref 20–30)
CREAT SERPL-MCNC: 0.7 MG/DL (ref 0.4–1.2)
EOSINOPHILS ABSOLUTE: 0 K/UL (ref 0–0.4)
EOSINOPHILS RELATIVE PERCENT: 0 %
GFR AFRICAN AMERICAN: >59
GFR NON-AFRICAN AMERICAN: >60
GLOBULIN: 3.8 G/DL
GLUCOSE BLD-MCNC: 177 MG/DL (ref 74–106)
HCT VFR BLD CALC: 45.6 % (ref 37–47)
HEMOGLOBIN: 14.1 G/DL (ref 11.5–16.5)
IMMATURE GRANULOCYTES #: 0.1 K/UL
IMMATURE GRANULOCYTES %: 0.5 % (ref 0–5)
LYMPHOCYTES ABSOLUTE: 1 K/UL (ref 1.5–4)
LYMPHOCYTES RELATIVE PERCENT: 8.6 %
MCH RBC QN AUTO: 30.3 PG (ref 27–32)
MCHC RBC AUTO-ENTMCNC: 30.9 G/DL (ref 31–35)
MCV RBC AUTO: 97.9 FL (ref 80–100)
MONOCYTES ABSOLUTE: 0.4 K/UL (ref 0.2–0.8)
MONOCYTES RELATIVE PERCENT: 3.9 %
NEUTROPHILS ABSOLUTE: 9.6 K/UL (ref 2–7.5)
NEUTROPHILS RELATIVE PERCENT: 86.8 %
PDW BLD-RTO: 13 % (ref 11–16)
PLATELET # BLD: 272 K/UL (ref 150–400)
PMV BLD AUTO: 10 FL (ref 6–10)
POTASSIUM SERPL-SCNC: 4.3 MMOL/L (ref 3.4–5.1)
RBC # BLD: 4.66 M/UL (ref 3.8–5.8)
SODIUM BLD-SCNC: 139 MMOL/L (ref 136–145)
TOTAL PROTEIN: 8 G/DL (ref 6.4–8.3)
WBC # BLD: 11.1 K/UL (ref 4–11)

## 2020-11-03 PROCEDURE — 94669 MECHANICAL CHEST WALL OSCILL: CPT

## 2020-11-03 PROCEDURE — 6360000002 HC RX W HCPCS: Performed by: NURSE PRACTITIONER

## 2020-11-03 PROCEDURE — 2580000003 HC RX 258: Performed by: INTERNAL MEDICINE

## 2020-11-03 PROCEDURE — 6360000002 HC RX W HCPCS: Performed by: PHYSICIAN ASSISTANT

## 2020-11-03 PROCEDURE — 6360000002 HC RX W HCPCS: Performed by: INTERNAL MEDICINE

## 2020-11-03 PROCEDURE — 97110 THERAPEUTIC EXERCISES: CPT

## 2020-11-03 PROCEDURE — 1200000000 HC SEMI PRIVATE

## 2020-11-03 PROCEDURE — 85025 COMPLETE CBC W/AUTO DIFF WBC: CPT

## 2020-11-03 PROCEDURE — 36415 COLL VENOUS BLD VENIPUNCTURE: CPT

## 2020-11-03 PROCEDURE — 94640 AIRWAY INHALATION TREATMENT: CPT

## 2020-11-03 PROCEDURE — 80053 COMPREHEN METABOLIC PANEL: CPT

## 2020-11-03 PROCEDURE — 6370000000 HC RX 637 (ALT 250 FOR IP): Performed by: NURSE PRACTITIONER

## 2020-11-03 PROCEDURE — 99231 SBSQ HOSP IP/OBS SF/LOW 25: CPT | Performed by: INTERNAL MEDICINE

## 2020-11-03 PROCEDURE — 2580000003 HC RX 258: Performed by: NURSE PRACTITIONER

## 2020-11-03 RX ORDER — METHYLPREDNISOLONE SODIUM SUCCINATE 40 MG/ML
40 INJECTION, POWDER, LYOPHILIZED, FOR SOLUTION INTRAMUSCULAR; INTRAVENOUS 2 TIMES DAILY
Status: DISCONTINUED | OUTPATIENT
Start: 2020-11-03 | End: 2020-11-05 | Stop reason: HOSPADM

## 2020-11-03 RX ORDER — BUDESONIDE 0.5 MG/2ML
0.5 INHALANT ORAL 2 TIMES DAILY
Status: DISCONTINUED | OUTPATIENT
Start: 2020-11-04 | End: 2020-11-05 | Stop reason: HOSPADM

## 2020-11-03 RX ORDER — LEVALBUTEROL INHALATION SOLUTION 1.25 MG/3ML
1.25 SOLUTION RESPIRATORY (INHALATION) 3 TIMES DAILY
Status: DISCONTINUED | OUTPATIENT
Start: 2020-11-03 | End: 2020-11-05 | Stop reason: HOSPADM

## 2020-11-03 RX ORDER — BUDESONIDE 0.5 MG/2ML
0.5 INHALANT ORAL 2 TIMES DAILY
Status: DISCONTINUED | OUTPATIENT
Start: 2020-11-03 | End: 2020-11-03

## 2020-11-03 RX ADMIN — LEVALBUTEROL 1.25 MG: 1.25 SOLUTION RESPIRATORY (INHALATION) at 07:10

## 2020-11-03 RX ADMIN — GUAIFENESIN 600 MG: 600 TABLET, EXTENDED RELEASE ORAL at 09:09

## 2020-11-03 RX ADMIN — LEVOTHYROXINE SODIUM 125 MCG: 0.12 TABLET ORAL at 05:04

## 2020-11-03 RX ADMIN — FAMOTIDINE 20 MG: 20 TABLET ORAL at 20:40

## 2020-11-03 RX ADMIN — METHYLPREDNISOLONE SODIUM SUCCINATE 60 MG: 125 INJECTION, POWDER, FOR SOLUTION INTRAMUSCULAR; INTRAVENOUS at 09:11

## 2020-11-03 RX ADMIN — MONTELUKAST SODIUM 10 MG: 10 TABLET, COATED ORAL at 20:40

## 2020-11-03 RX ADMIN — Medication 10 ML: at 09:12

## 2020-11-03 RX ADMIN — FAMOTIDINE 20 MG: 20 TABLET ORAL at 09:09

## 2020-11-03 RX ADMIN — LEVALBUTEROL 1.25 MG: 1.25 SOLUTION RESPIRATORY (INHALATION) at 13:34

## 2020-11-03 RX ADMIN — METHYLPREDNISOLONE SODIUM SUCCINATE 40 MG: 40 INJECTION, POWDER, FOR SOLUTION INTRAMUSCULAR; INTRAVENOUS at 15:13

## 2020-11-03 RX ADMIN — FUROSEMIDE 20 MG: 20 TABLET ORAL at 09:10

## 2020-11-03 RX ADMIN — GUAIFENESIN 600 MG: 600 TABLET, EXTENDED RELEASE ORAL at 20:39

## 2020-11-03 RX ADMIN — VENLAFAXINE HYDROCHLORIDE 75 MG: 37.5 CAPSULE, EXTENDED RELEASE ORAL at 09:09

## 2020-11-03 RX ADMIN — BUDESONIDE 500 MCG: 0.5 SUSPENSION RESPIRATORY (INHALATION) at 13:33

## 2020-11-03 RX ADMIN — Medication 10 ML: at 20:44

## 2020-11-03 RX ADMIN — AZITHROMYCIN MONOHYDRATE 500 MG: 500 INJECTION, POWDER, LYOPHILIZED, FOR SOLUTION INTRAVENOUS at 15:07

## 2020-11-03 RX ADMIN — CETIRIZINE HYDROCHLORIDE 10 MG: 10 TABLET, FILM COATED ORAL at 09:10

## 2020-11-03 RX ADMIN — METHYLPREDNISOLONE SODIUM SUCCINATE 40 MG: 40 INJECTION, POWDER, FOR SOLUTION INTRAMUSCULAR; INTRAVENOUS at 20:40

## 2020-11-03 RX ADMIN — HYDROXYZINE HYDROCHLORIDE 25 MG: 25 TABLET, FILM COATED ORAL at 20:39

## 2020-11-03 RX ADMIN — HYDROCHLOROTHIAZIDE 25 MG: 25 TABLET ORAL at 09:10

## 2020-11-03 RX ADMIN — ENOXAPARIN SODIUM 40 MG: 40 INJECTION SUBCUTANEOUS at 09:09

## 2020-11-03 RX ADMIN — CEFTRIAXONE 1 G: 1 INJECTION, POWDER, FOR SOLUTION INTRAMUSCULAR; INTRAVENOUS at 20:39

## 2020-11-03 NOTE — CARE COORDINATION
Attempted PT evaluation this afternoon. Pt is very pleasant and agreeable, but states she is too fatigued to attempt due to shortness of air and other activity earlier in the day. Pt reports she would prefer to do her PT evaluation in the am if possible, as she tends to do better with breathing and fatigue in the mornings.       Electronically signed by Marguerite Claudio PT on 70/6/2661 at 4:55 PM

## 2020-11-03 NOTE — PLAN OF CARE
Problem: Respiratory  Goal: Supplemental O2 requirements decreased  Outcome: Met This Shift     Problem: Respiratory  Goal: No pulmonary complications  17/3/2693 1130 by Tristan Woods RN  Outcome: Ongoing  11/2/2020 2146 by Marko Garcia LPN  Outcome: Ongoing  Goal: O2 Sat > 90%  11/3/2020 1130 by Tristan Woods RN  Outcome: Ongoing  11/2/2020 2146 by Marko Garcia LPN  Outcome: Ongoing     Problem: Pain:  Goal: Pain level will decrease  Description: Pain level will decrease  Outcome: Ongoing

## 2020-11-03 NOTE — PROGRESS NOTES
Occupational Therapy  Facility/Department: [facility] MED [facility]  Daily Treatment Note  NAME: Sheri Rey  : 1965  MRN: [id number]    Date of Service: 11/3/2020    Discharge Recommendations:          Assessment   Assessment: Pt agreeable to OT services. Pt completed BUE ther ex x10 reps with frequent rest breaks and cues for PLB pt unable to complete full set of exercises secondary to fatigue. Pt left lying in bed with call light in reach. Prognosis: Good  Decision Making: Low Complexity  REQUIRES OT FOLLOW UP: Yes         Patient Diagnosis(es): There were no encounter diagnoses. 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 has a past surgical history that includes  section; Gallbladder surgery; and Hysterectomy. Restrictions     Subjective   General  Chart Reviewed: Yes  Patient assessed for rehabilitation services?: Yes  Family / Caregiver Present: No  Diagnosis: COPD  Subjective  Subjective: Pt presents SOB; reports pain 6/10 with deep breaths      Orientation     Objective          Type of ROM/Therapeutic Exercise  Type of ROM/Therapeutic Exercise: Resistive Bands  Comment: Orange  Exercises  Scapular Retraction: x10  Horizontal ABduction: x10  Elbow Flexion: x10  Elbow Extension: x10                    Plan   Plan  Times per week: 3-5  Times per day: Daily  Plan weeks: 1  Current Treatment Recommendations: Strengthening, Balance Training, Self-Care / ADL, Functional Mobility Training, Endurance Training       Therapy Time   Individual Concurrent Group Co-treatment   Time In 0348         Time Out 9542         Minutes 24              This note serves as a DC summary in the event of pt discharge.      Ariana Giraldo OTR/L

## 2020-11-03 NOTE — PROGRESS NOTES
Occupational Therapy   Occupational Therapy Initial Assessment  Date: 2020   Patient Name: Garo Chapman  MRN: [id number]     : 1965    Date of Service: 2020    Discharge Recommendations:  Home with assist prn from family; OP OT     Assessment   Pt seen for skilled OT evaluation; pt reports pain 6/10 under lung with deep breaths. Pt movements & participation limited due to SOB. Pt presents with 5L O2. Pt reports Mod I with ADL tasks typically & prn assist for homemaking tasks. Pt plans to return home where she cares for son. Pt typically ambulates without device; unable to assess transfers due to SOB. Pt is a good candidate to benefit from skilled OT interventions while IP to regain functional independence, increase endurance, & increase activity tolerance. Upon exit, pt supine in bed, call bell in reach. Activity Tolerance: Patient limited by fatigue;Patient limited by pain     Patient Diagnosis(es): There were no encounter diagnoses. 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 has a past surgical history that includes  section; Gallbladder surgery; and Hysterectomy.     Subjective   Chart Reviewed: Yes  Patient assessed for rehabilitation services?: Yes  Family / Caregiver Present: No  Diagnosis: COPD  Subjective: Pt presents SOB; reports pain 6/10 with deep breaths    Vital Signs  Temp: 98.2 °F (36.8 °C)  Temp Source: Temporal  Pulse: 99  Resp: 18  BP: 125/79  MAP (mmHg): 94  Oxygen Therapy  SpO2: 97 %  Social/Functional History  Social/Functional History  Lives With: Family  Type of Home: House  Home Layout: One level  Home Access: Stairs to enter with rails  Entrance Stairs - Number of Steps: 1- No difficulty accessing  Bathroom Shower/Tub: Tub/Shower unit  Bathroom Toilet: Standard  Bathroom Equipment: Shower chair  Home Equipment: Oxygen  ADL Assistance: Independent  Homemaking Assistance: Needs assistance  Homemaking Responsibilities: No  Active : Yes  Mode of Transportation: Car(Some difficulty accessing)  Leisure & Hobbies: Color, craft, sew     Objective   Vision: Impaired  Vision Exceptions: Wears glasses at all times  Hearing: Within functional limits    Orientation  Overall Orientation Status: Within Functional Limits  Observation/Palpation  Posture: Fair  Observation: Pt supine in bed; NAD; pleasant & cooperative; 5L O2    Functional Mobility  Functional - Mobility Device: No device typically- unable to assess      Bed mobility  Bridging: Independent  Rolling to Left: Independent  Rolling to Right: Independent  Supine to Sit: Independent  Sit to Supine: Independent  Scooting: Independent    LUE AROM : WFL  RUE AROM : WFL  UE Strength: 3/5     Plan   Plan  Times per week: 3-5  Times per day: Daily  Plan weeks: 1  Current Treatment Recommendations: Strengthening, Balance Training, Self-Care / ADL, Functional Mobility Training, Endurance Training    Goals  Time Frame for Short term goals: 1 week  Short term goal 1: Pt to complete toileting with MOD I  Short term goal 2: Pt to complete dressing with MOD I  Short term goal 3: Pt to complete bathing with MOD I demo good safety awareness  Short term goal 4: Pt to tolerate x15 minutes of activity to increase functional activity tolerance     Therapy Time   Individual Concurrent Group Co-treatment   Time In 0446         Time Out 0504         Minutes Chrissie Medley 33, OTR/L  This note serves as D/C summary if patient is discharged prior to next visit.

## 2020-11-03 NOTE — FLOWSHEET NOTE
11/03/20 1121   Assessment   Charting Type Shift assessment   Neurological   Neuro (WDL) WDL   [place] Coma Scale   Eye Opening 4   Best Verbal Response 5   Best Motor Response 6   Jose Coma Scale Score 15   HEENT   HEENT (WDL) X   Teeth Dentures upper   Respiratory   Respiratory (WDL) X  (O2 on at 4L/nc)   Respiratory Pattern Tachypneic   Respiratory Depth Deep   Respiratory Quality/Effort Labored;Dyspnea with exertion;Dyspnea at rest   Chest Assessment Chest expansion symmetrical   Breath Sounds   Right Upper Lobe Fine Crackles   Right Middle Lobe Fine Crackles   Right Lower Lobe Coarse Crackles   Left Upper Lobe Fine Crackles   Left Lower Lobe Coarse Crackles   Cough/Sputum   Cough Moist;Productive   Frequency Frequent   Sputum Amount Moderate   Sputum Color Green   Tenacity Thick   Cardiac   Cardiac (WDL) WDL   Gastrointestinal   Abdominal (WDL) WDL   Peripheral Vascular   Peripheral Vascular (WDL) WDL   Edema None   Skin Color/Condition   Skin Color/Condition (WDL) WDL   Skin Integrity   Skin Integrity (WDL) WDL   Musculoskeletal   Musculoskeletal (WDL) WDL   Psychosocial   Psychosocial (WDL) WDL   Pt up in her room this am. O2 on at 4L/nc. Moderate AYALA noted relieved with rest. Pt has prod cough. Thick green sputum noted. Pt states that she feels better. Adventitious lungs sounds throughout. Pt has own medication that is non formulary at facility. Pt took and RN witnessed this am. Pharmacy notified and labeled medication. Pt alert and oriented. Denies complaints at this time.

## 2020-11-03 NOTE — PROGRESS NOTES
Device: Nasal cannula  O2 Flow Rate (L/min): 5 L/min    Vital signs reviewed in electronic charts. Physical exam  Constitutional:  Well developed, well nourished, no acute distress  Eyes:  PERRL, no scleral icterus, conjunctiva normal   HENT:  Atraumatic, external ears normal, nose normal, oropharynx moist, no pharyngeal exudates. Neck- supple, no JVD, no lymphadenopathy  Respiratory: 3L NC.  No respiratory distress. Crackles appreciated throughout bilateral lungs. No wheezing or rhonchi. Cardiovascular:  Normal rate, normal rhythm, no murmurs, no gallops, no rubs, no edema   GI:  Soft, nondistended, normal bowel sounds, nontender, no voluntary guarding  Musculoskeletal:  No cyanosis or obvious acute deformity. Moving all extremities   Integument:  Warm and dry. Neurologic:  Alert & oriented x 3, no apparent focal deficits noted   Psychiatric:  Speech and behavior appropriate        Results:     Lab Results   Component Value Date    WBC 11.1 (H) 11/03/2020    HGB 14.1 11/03/2020    HCT 45.6 11/03/2020    MCV 97.9 11/03/2020     11/03/2020       Lab Results   Component Value Date     11/03/2020    K 4.3 11/03/2020    K 4.3 05/25/2018     11/03/2020    CO2 27 11/03/2020    BUN 13 11/03/2020    CREATININE 0.7 11/03/2020    GLUCOSE 177 11/03/2020    CALCIUM 9.3 11/03/2020        Assessment and Plan:      Active Hospital Problems    Diagnosis Date Noted    COPD exacerbation ([facility] [J44.1]  - in setting of ILD   - Continue IV antibiotics, steroid, supplemental, and nebulizer treatments   - add pulmicort nebs and incentive spirometer  - on 4L O2 and will wean as tolerated    - clinically improving, continue to monitor 11/02/2020    Mixed anxiety depressive disorder [F41.8]  - continue home regimen  10/28/2017    Essential hypertension [I10]  - BP stable at this time      Gastroesophageal reflux disease [K21.9]  - Continue current regimen  05/14/2015    Interstitial lung disease ([facility] [J84.9]  - see above under COPD  01/15/2015    Hypothyroidism [E03.9]  - Continue current regimen  04/15/2011     Patient was seen and examined by Dr. Nando Son and plan of care reviewed.     Electronically signed by Shreya OPAL Quintanilla on 11/3/2020 at 4:41 PM

## 2020-11-03 NOTE — FLOWSHEET NOTE
11/02/20 2030   Assessment   Charting Type Shift assessment   Neurological   Neuro (WDL) WDL   [place] Coma Scale   Eye Opening 4   Best Verbal Response 5   Best Motor Response 6   Jose Coma Scale Score 15   HEENT   HEENT (WDL) X   Teeth Dentures upper   Respiratory   Respiratory (WDL) X   Respiratory Pattern Tachypneic   Respiratory Depth Deep   Respiratory Quality/Effort Labored; Accessory muscle use; Dyspnea with exertion;Dyspnea at rest   Chest Assessment Chest expansion symmetrical   L Breath Sounds Diminished; Expiratory Wheezes   R Breath Sounds Fine Crackles;Diminished; Expiratory Wheezes   Breath Sounds   Right Upper Lobe Diminished   Right Middle Lobe Diminished; Expiratory Wheezes   Right Lower Lobe Fine Crackles   Left Upper Lobe Diminished   Left Lower Lobe Diminished; Expiratory Wheezes   Cardiac   Cardiac (WDL) WDL   Gastrointestinal   Abdominal (WDL) WDL   Peripheral Vascular   Peripheral Vascular (WDL) WDL   Edema None   Skin Color/Condition   Skin Color/Condition (WDL) WDL   Skin Integrity   Skin Integrity (WDL) WDL   Musculoskeletal   Musculoskeletal (WDL) WDL   Psychosocial   Psychosocial (WDL) WDL

## 2020-11-04 PROCEDURE — 1200000000 HC SEMI PRIVATE

## 2020-11-04 PROCEDURE — 2580000003 HC RX 258: Performed by: INTERNAL MEDICINE

## 2020-11-04 PROCEDURE — 6370000000 HC RX 637 (ALT 250 FOR IP): Performed by: PHYSICIAN ASSISTANT

## 2020-11-04 PROCEDURE — 97161 PT EVAL LOW COMPLEX 20 MIN: CPT

## 2020-11-04 PROCEDURE — 6360000002 HC RX W HCPCS: Performed by: NURSE PRACTITIONER

## 2020-11-04 PROCEDURE — 2700000000 HC OXYGEN THERAPY PER DAY

## 2020-11-04 PROCEDURE — 99231 SBSQ HOSP IP/OBS SF/LOW 25: CPT | Performed by: INTERNAL MEDICINE

## 2020-11-04 PROCEDURE — 97110 THERAPEUTIC EXERCISES: CPT

## 2020-11-04 PROCEDURE — 6360000002 HC RX W HCPCS: Performed by: INTERNAL MEDICINE

## 2020-11-04 PROCEDURE — 97535 SELF CARE MNGMENT TRAINING: CPT

## 2020-11-04 PROCEDURE — 6360000002 HC RX W HCPCS: Performed by: PHYSICIAN ASSISTANT

## 2020-11-04 PROCEDURE — 94640 AIRWAY INHALATION TREATMENT: CPT

## 2020-11-04 PROCEDURE — 6370000000 HC RX 637 (ALT 250 FOR IP): Performed by: NURSE PRACTITIONER

## 2020-11-04 PROCEDURE — 94761 N-INVAS EAR/PLS OXIMETRY MLT: CPT

## 2020-11-04 PROCEDURE — 2580000003 HC RX 258: Performed by: NURSE PRACTITIONER

## 2020-11-04 RX ORDER — HYDROXYZINE HYDROCHLORIDE 10 MG/1
TABLET, FILM COATED ORAL
Qty: 45 TABLET | Refills: 2 | Status: SHIPPED | OUTPATIENT
Start: 2020-11-04 | End: 2021-02-16

## 2020-11-04 RX ADMIN — GUAIFENESIN 600 MG: 600 TABLET, EXTENDED RELEASE ORAL at 21:08

## 2020-11-04 RX ADMIN — FAMOTIDINE 20 MG: 20 TABLET ORAL at 08:45

## 2020-11-04 RX ADMIN — ROFLUMILAST 500 MCG: 500 TABLET ORAL at 14:03

## 2020-11-04 RX ADMIN — BUDESONIDE 500 MCG: 0.5 SUSPENSION RESPIRATORY (INHALATION) at 17:39

## 2020-11-04 RX ADMIN — FAMOTIDINE 20 MG: 20 TABLET ORAL at 21:08

## 2020-11-04 RX ADMIN — METHYLPREDNISOLONE SODIUM SUCCINATE 40 MG: 40 INJECTION, POWDER, FOR SOLUTION INTRAMUSCULAR; INTRAVENOUS at 21:08

## 2020-11-04 RX ADMIN — LEVALBUTEROL 1.25 MG: 1.25 SOLUTION RESPIRATORY (INHALATION) at 21:59

## 2020-11-04 RX ADMIN — METHYLPREDNISOLONE SODIUM SUCCINATE 40 MG: 40 INJECTION, POWDER, FOR SOLUTION INTRAMUSCULAR; INTRAVENOUS at 08:45

## 2020-11-04 RX ADMIN — LEVOTHYROXINE SODIUM 125 MCG: 0.12 TABLET ORAL at 05:11

## 2020-11-04 RX ADMIN — CEFTRIAXONE 1 G: 1 INJECTION, POWDER, FOR SOLUTION INTRAMUSCULAR; INTRAVENOUS at 21:08

## 2020-11-04 RX ADMIN — LEVALBUTEROL 1.25 MG: 1.25 SOLUTION RESPIRATORY (INHALATION) at 05:24

## 2020-11-04 RX ADMIN — Medication 10 ML: at 21:09

## 2020-11-04 RX ADMIN — Medication 10 ML: at 08:47

## 2020-11-04 RX ADMIN — VENLAFAXINE HYDROCHLORIDE 75 MG: 37.5 CAPSULE, EXTENDED RELEASE ORAL at 08:45

## 2020-11-04 RX ADMIN — LEVALBUTEROL 1.25 MG: 1.25 SOLUTION RESPIRATORY (INHALATION) at 17:39

## 2020-11-04 RX ADMIN — AZITHROMYCIN MONOHYDRATE 500 MG: 500 INJECTION, POWDER, LYOPHILIZED, FOR SOLUTION INTRAVENOUS at 14:03

## 2020-11-04 RX ADMIN — FUROSEMIDE 20 MG: 20 TABLET ORAL at 08:45

## 2020-11-04 RX ADMIN — HYDROXYZINE HYDROCHLORIDE 25 MG: 25 TABLET, FILM COATED ORAL at 21:16

## 2020-11-04 RX ADMIN — MONTELUKAST SODIUM 10 MG: 10 TABLET, COATED ORAL at 21:08

## 2020-11-04 RX ADMIN — GUAIFENESIN 600 MG: 600 TABLET, EXTENDED RELEASE ORAL at 08:45

## 2020-11-04 RX ADMIN — CETIRIZINE HYDROCHLORIDE 10 MG: 10 TABLET, FILM COATED ORAL at 08:45

## 2020-11-04 RX ADMIN — HYDROCHLOROTHIAZIDE 25 MG: 25 TABLET ORAL at 08:45

## 2020-11-04 RX ADMIN — BUDESONIDE 500 MCG: 0.5 SUSPENSION RESPIRATORY (INHALATION) at 05:24

## 2020-11-04 NOTE — PROGRESS NOTES
(L/min): 5 L/min    Vital signs reviewed in electronic charts. Physical exam  Constitutional:  Well developed, well nourished, no acute distress  Eyes:  PERRL, no scleral icterus, conjunctiva normal   HENT:  Atraumatic, external ears normal, nose normal, oropharynx moist, no pharyngeal exudates. Neck- supple, no JVD, no lymphadenopathy  Respiratory: 5L NC.  No respiratory distress. Crackles appreciated throughout bilateral lungs worse in bases. No wheezing or rhonchi. Cardiovascular:  Normal rate, normal rhythm, no murmurs, no gallops, no rubs, no edema   GI:  obese. Soft, nondistended, normal bowel sounds, nontender, no voluntary guarding  Musculoskeletal:  No cyanosis or obvious acute deformity. Moving all extremities   Integument:  Warm and dry.    Neurologic:  Alert & oriented x 3, no apparent focal deficits noted   Psychiatric:  Speech and behavior appropriate         Results:     Lab Results   Component Value Date    WBC 11.1 (H) 11/03/2020    HGB 14.1 11/03/2020    HCT 45.6 11/03/2020    MCV 97.9 11/03/2020     11/03/2020       Lab Results   Component Value Date     11/03/2020    K 4.3 11/03/2020    K 4.3 05/25/2018     11/03/2020    CO2 27 11/03/2020    BUN 13 11/03/2020    CREATININE 0.7 11/03/2020    GLUCOSE 177 11/03/2020    CALCIUM 9.3 11/03/2020        Assessment and Plan:      COPD exacerbation ([facility] [J44.1]  - in setting of ILD   - Continue IV antibiotics, steroid, supplemental, and nebulizer treatments   -  incentive spirometer and acapella ordered  - on 4-5L O2 and will wean as tolerated  to baseline 3-4L  - clinically improving on current regimen, continue to monitor  - obtain sputum culture  11/02/2020    Mixed anxiety depressive disorder [F41.8]  - continue home regimen  10/28/2017    Essential hypertension [I10]  - BP stable at this time       Gastroesophageal reflux disease [K21.9]  - Continue current regimen  05/14/2015    Inter[facility] disease ([facility] [J84.9]  -

## 2020-11-04 NOTE — PROGRESS NOTES
Occupational Therapy  Facility/Department: [facility]  Daily Treatment Note  NAME: Penelope Palm  : 1965  MRN: [id number]    Date of Service: 2020    Discharge Recommendations:      Home with HHS    Assessment   Assessment: Pt agreeable to OT services. Pt ambulated to bathroom on 6L 02 and transferred to shower with SUP with LOB requiring CGA to correct. Pt used grab bars for transfer. Pt transferred to seated position on shower stool and completed shower with BAILEY. Pt donned clothing with independence. Pt required extra timing tocomplete task. Pt 02 sats <90% at 84% upon completing shower and dressing. Pt educated to use PLB strategies to regain 02 sats >90%. Pt returned to 5LPM upon exiting room. Patient Diagnosis(es): There were no encounter diagnoses. 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 has a past surgical history that includes  section; Gallbladder surgery; and Hysterectomy.     Restrictions  Restrictions/Precautions  Restrictions/Precautions: General Precautions  Required Braces or Orthoses?: No  Subjective   General  Chart Reviewed: Yes  Patient assessed for rehabilitation services?: Yes  Family / Caregiver Present: No  Diagnosis: COPD  Subjective  Subjective: Pt presents SOB; reports pain 6/10 with deep breaths      Orientation     Objective    ADL  Grooming: Independent  UE Bathing: Setup  LE Bathing: Setup  UE Dressing: Independent  LE Dressing: Independent           Bed mobility  Rolling to Right: Independent  Supine to Sit: Independent        Plan   Plan  Times per week: 3-5  Times per day: Daily  Plan weeks: 1  Current Treatment Recommendations: Strengthening, Balance Training, Self-Care / ADL, Functional Mobility Training, Endurance Training    Therapy Time   Individual Concurrent Group Co-treatment   Time In 0158         Time Out 0231         Minutes 33              This note serves as a DC summary in the event of pt discharge.      Ariana Giraldo, OTR/L

## 2020-11-04 NOTE — FLOWSHEET NOTE
11/04/20 1125   Assessment   Charting Type Shift assessment   Neurological   Neuro (WDL) WDL   [place] Coma Scale   Eye Opening 4   Best Verbal Response 5   Best Motor Response 6   Jose Coma Scale Score 15   HEENT   HEENT (WDL) X   Right Eye Impaired vision   Left Eye Impaired vision   Teeth Dentures upper   Respiratory   Respiratory (WDL) X   Respiratory Pattern Regular   Respiratory Depth Normal   Respiratory Quality/Effort Unlabored   Chest Assessment Chest expansion symmetrical   L Breath Sounds Diminished; Expiratory Wheezes   R Breath Sounds Diminished   Breath Sounds   Right Upper Lobe Diminished   Right Middle Lobe Fine Crackles   Right Lower Lobe Diminished   Left Upper Lobe Fine Crackles   Left Lower Lobe Diminished   Cardiac   Cardiac (WDL) WDL   Cardiac Monitor   Telemetry Monitor On No   Gastrointestinal   Abdominal (WDL) WDL   Peripheral Vascular   Peripheral Vascular (WDL) WDL   Edema None   Skin Color/Condition   Skin Color/Condition (WDL) WDL   Skin Integrity   Skin Integrity (WDL) WDL   Musculoskeletal   Musculoskeletal (WDL) WDL   Urine Assessment   Incontinence No   Psychosocial   Psychosocial (WDL) WDL

## 2020-11-04 NOTE — PROGRESS NOTES
Physical Therapy    Facility/Department: [facility] MED SURG  Initial Assessment    NAME: Garo Chapman  : 1965  MRN: [id number]    Date of Service: 2020    Discharge Recommendations:  Continue to assess pending progress        Assessment   Body structures, Functions, Activity limitations: Decreased high-level IADLs;Decreased endurance  Assessment: COD exacerbation; functional decline; will benefit from PT to help improve tolerance to physical activity and return to independence. Treatment Diagnosis: COD exacerbation; functional decline  Prognosis: Excellent  Decision Making: Low Complexity  REQUIRES PT FOLLOW UP: Yes  Activity Tolerance  Activity Tolerance: Patient Tolerated treatment well;Patient limited by fatigue;Patient limited by endurance       Patient Diagnosis(es): There were no encounter diagnoses. 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 has a past surgical history that includes  section; Gallbladder surgery; and Hysterectomy. Restrictions  Restrictions/Precautions  Restrictions/Precautions: General Precautions  Required Braces or Orthoses?: No  Vision/Hearing  Vision: Impaired  Vision Exceptions: Wears glasses at all times  Hearing: Within functional limits     Subjective  General  Chart Reviewed: Yes  Patient assessed for rehabilitation services?: Yes  Response To Previous Treatment: Not applicable  Family / Caregiver Present: No  Referring Practitioner: Hardeep Henry MD  Diagnosis: COPD exacerbatoin  Follows Commands: Within Functional Limits  Subjective  Subjective: Patient agreeable to consult; normally is independent with mobility; primary c/o SOA, fatigue with minimal exertion; on 5 liters O2.   Pain Screening  Patient Currently in Pain: No  Vital Signs  Patient Currently in Pain: No       Orientation  Orientation  Overall Orientation Status: Within Normal Limits  Social/Functional History  Social/Functional History  Lives With: Family  Type of Home: House  Home Layout: One level  Home Access: Stairs to enter with rails  Entrance Stairs - Number of Steps: 1- No difficulty accessing  Bathroom Shower/Tub: Tub/Shower unit  Bathroom Toilet: Standard  Bathroom Equipment: Shower chair  Home Equipment: Oxygen  ADL Assistance: Independent  Homemaking Assistance: Needs assistance  Homemaking Responsibilities: No  Ambulation Assistance: Independent  Transfer Assistance: Independent  Active : Yes  Mode of Transportation: Car  Leisure & Hobbies: Color, craft, sew  Cognition        Objective     Observation/Palpation  Posture: Fair  Observation: Pt supine in bed; NAD; pleasant & cooperative; 5L O2    AROM RLE (degrees)  RLE AROM: WFL  AROM LLE (degrees)  LLE AROM : WFL  AROM RUE (degrees)  RUE AROM : WFL  AROM LUE (degrees)  LUE AROM : WFL  Strength RLE  Strength RLE: WFL  Strength LLE  Strength LLE: WFL  Strength RUE  Strength RUE: WFL  Strength LUE  Strength LUE: WFL  Tone RLE  RLE Tone: Normotonic  Tone LLE  LLE Tone: Normotonic  Motor Control  Gross Motor?: WFL  Sensation  Overall Sensation Status: WFL  Bed mobility  Rolling to Right: Independent  Supine to Sit: Independent  Sit to Supine: Independent  Scooting: Independent  Transfers  Sit to Stand: Independent  Stand to sit:  Independent  Bed to Chair: Independent  Ambulation  Ambulation?: Yes  Ambulation 1  Surface: level tile  Device: No Device  Assistance: Supervision  Quality of Gait: steady  Gait Deviations: Slow Dede;Decreased step length;Decreased step height  Distance: 25 feet  Comments: became SOA with mild wheezing; recovered < 1 minute upon sitting     Balance  Posture: Good  Sitting - Static: Good  Sitting - Dynamic: Good  Standing - Static:

## 2020-11-04 NOTE — PROGRESS NOTES
Physical Therapy  Facility/Department: [facility] MED SURG  Daily Treatment Note  NAME: Hortensia Hendrix  : 1965  MRN: [id number]    Date of Service: 2020    Discharge Recommendations:  Continue to assess pending progress      Assessment   Body structures, Functions, Activity limitations: Decreased high-level IADLs;Decreased endurance  Assessment: Patient tired from showering but agreeable to PT. Completed bed mobility independently. Sat EOB for B LE therex. Patient requires frequent rest breaks due to fatigue and SOA. O2 sats remained above 90% throughout. Treatment Diagnosis: COPD exacerbation; functional decline  Prognosis: Excellent  Activity Tolerance  Activity Tolerance: Patient Tolerated treatment well;Patient limited by fatigue;Patient limited by endurance     Patient Diagnosis(es): There were no encounter diagnoses. 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 has a past surgical history that includes  section; Gallbladder surgery; and Hysterectomy. Restrictions  Restrictions/Precautions  Restrictions/Precautions: General Precautions  Required Braces or Orthoses?: No  Subjective   General  Chart Reviewed: Yes  Response To Previous Treatment: Patient with no complaints from previous session. Family / Caregiver Present: No  Referring Practitioner: Xavier López MD  Subjective  Subjective: Patient states she's a little tired from her shower but will do some exercise.   Pain Screening  Patient Currently in Pain: No  Vital Signs  Patient Currently in Pain: No       Orientation  Orientation  Overall Orientation Status: Within Normal Limits  Cognition      Objective   Bed mobility  Rolling to Right: Independent  Supine to Sit:

## 2020-11-04 NOTE — CARE COORDINATION
11/02/20 1614   Discharge [facility] Spouse/Significant Other;Family Members; Children   Current Services Prior To Admission Durable Medical Equipment; Oxygen Therapy   DME Oxygen Therapy (Comment); Shower Chair   Potential Assistance Needed N/A   Potential Assistance Purchasing Medications No   Type of Home Care Services None   Patient expects to be discharged to: home   Expected Discharge Date 11/06/20   Follow Up Appointment: Best Day/Time    (any)

## 2020-11-04 NOTE — FLOWSHEET NOTE
11/03/20 2045   Assessment   Charting Type Shift assessment   Neurological   Neuro (WDL) WDL   [place] Coma Scale   Eye Opening 4   Best Verbal Response 5   Best Motor Response 6   [place] Coma Scale Score 15   HEENT   HEENT (WDL) X   Teeth Dentures upper   Respiratory   Respiratory (WDL) X   Respiratory Pattern Tachypneic   Respiratory Depth Deep   Respiratory Quality/Effort Labored;Dyspnea with exertion;Dyspnea at rest   Chest Assessment Chest expansion symmetrical   Breath Sounds   Right Upper Lobe Fine Crackles   Right Middle Lobe Fine Crackles   Right Lower Lobe Coarse Crackles   Left Upper Lobe Fine Crackles   Left Lower Lobe Coarse Crackles   Cough/Sputum   Cough Moist;Productive   Sputum Amount Moderate   Sputum Color Green   Tenacity Thick   Cardiac   Cardiac (WDL) WDL   Gastrointestinal   Abdominal (WDL) WDL   Peripheral Vascular   Peripheral Vascular (WDL) WDL   Edema None   Skin Color/Condition   Skin Color/Condition (WDL) WDL   Skin Integrity   Skin Integrity (WDL) WDL   Musculoskeletal   Musculoskeletal (WDL) WDL   Psychosocial   Psychosocial (WDL) WDL

## 2020-11-05 ENCOUNTER — HOSPITAL ENCOUNTER (INPATIENT)
Facility: HOSPITAL | Age: 55
LOS: 3 days | Discharge: HOME | DRG: 192 | End: 2020-11-08
Attending: INTERNAL MEDICINE | Admitting: INTERNAL MEDICINE
Payer: MEDICARE

## 2020-11-05 VITALS
TEMPERATURE: 97.8 F | OXYGEN SATURATION: 99 % | BODY MASS INDEX: 36.57 KG/M2 | RESPIRATION RATE: 20 BRPM | SYSTOLIC BLOOD PRESSURE: 127 MMHG | HEIGHT: 65 IN | DIASTOLIC BLOOD PRESSURE: 77 MMHG | WEIGHT: 219.5 LBS | HEART RATE: 83 BPM

## 2020-11-05 PROBLEM — R53.1 GENERALIZED WEAKNESS: Status: ACTIVE | Noted: 2020-11-05

## 2020-11-05 PROCEDURE — 2580000003 HC RX 258: Performed by: NURSE PRACTITIONER

## 2020-11-05 PROCEDURE — 94761 N-INVAS EAR/PLS OXIMETRY MLT: CPT

## 2020-11-05 PROCEDURE — 6370000000 HC RX 637 (ALT 250 FOR IP): Performed by: PHYSICIAN ASSISTANT

## 2020-11-05 PROCEDURE — 2700000000 HC OXYGEN THERAPY PER DAY

## 2020-11-05 PROCEDURE — 6360000002 HC RX W HCPCS: Performed by: NURSE PRACTITIONER

## 2020-11-05 PROCEDURE — 97110 THERAPEUTIC EXERCISES: CPT

## 2020-11-05 PROCEDURE — 6360000002 HC RX W HCPCS: Performed by: PHYSICIAN ASSISTANT

## 2020-11-05 PROCEDURE — 99238 HOSP IP/OBS DSCHRG MGMT 30/<: CPT | Performed by: INTERNAL MEDICINE

## 2020-11-05 PROCEDURE — 6370000000 HC RX 637 (ALT 250 FOR IP): Performed by: NURSE PRACTITIONER

## 2020-11-05 PROCEDURE — 6360000002 HC RX W HCPCS: Performed by: INTERNAL MEDICINE

## 2020-11-05 PROCEDURE — 87070 CULTURE OTHR SPECIMN AEROBIC: CPT

## 2020-11-05 PROCEDURE — 97530 THERAPEUTIC ACTIVITIES: CPT

## 2020-11-05 PROCEDURE — 2580000003 HC RX 258: Performed by: PHYSICIAN ASSISTANT

## 2020-11-05 PROCEDURE — 1200000002 HC SEMI PRIVATE SWING BED

## 2020-11-05 PROCEDURE — 94640 AIRWAY INHALATION TREATMENT: CPT

## 2020-11-05 PROCEDURE — 87205 SMEAR GRAM STAIN: CPT

## 2020-11-05 RX ORDER — POLYETHYLENE GLYCOL 3350 17 G/17G
17 POWDER, FOR SOLUTION ORAL DAILY PRN
Status: CANCELLED | OUTPATIENT
Start: 2020-11-05

## 2020-11-05 RX ORDER — ACETAMINOPHEN 325 MG/1
650 TABLET ORAL EVERY 6 HOURS PRN
Status: CANCELLED | OUTPATIENT
Start: 2020-11-05

## 2020-11-05 RX ORDER — VENLAFAXINE HYDROCHLORIDE 37.5 MG/1
75 CAPSULE, EXTENDED RELEASE ORAL
Status: DISCONTINUED | OUTPATIENT
Start: 2020-11-06 | End: 2020-11-06

## 2020-11-05 RX ORDER — HYDROCHLOROTHIAZIDE 25 MG/1
25 TABLET ORAL DAILY
Status: CANCELLED | OUTPATIENT
Start: 2020-11-06

## 2020-11-05 RX ORDER — SODIUM CHLORIDE 0.9 % (FLUSH) 0.9 %
10 SYRINGE (ML) INJECTION EVERY 12 HOURS SCHEDULED
Status: DISCONTINUED | OUTPATIENT
Start: 2020-11-05 | End: 2020-11-08 | Stop reason: HOSPADM

## 2020-11-05 RX ORDER — METHYLPREDNISOLONE SODIUM SUCCINATE 40 MG/ML
40 INJECTION, POWDER, LYOPHILIZED, FOR SOLUTION INTRAMUSCULAR; INTRAVENOUS 2 TIMES DAILY
Status: CANCELLED | OUTPATIENT
Start: 2020-11-05

## 2020-11-05 RX ORDER — BUDESONIDE 0.5 MG/2ML
0.5 INHALANT ORAL 2 TIMES DAILY
Status: CANCELLED | OUTPATIENT
Start: 2020-11-05

## 2020-11-05 RX ORDER — BUDESONIDE 0.5 MG/2ML
0.5 INHALANT ORAL 2 TIMES DAILY
Status: DISCONTINUED | OUTPATIENT
Start: 2020-11-05 | End: 2020-11-08 | Stop reason: HOSPADM

## 2020-11-05 RX ORDER — SODIUM CHLORIDE 0.9 % (FLUSH) 0.9 %
10 SYRINGE (ML) INJECTION EVERY 12 HOURS SCHEDULED
Status: CANCELLED | OUTPATIENT
Start: 2020-11-05

## 2020-11-05 RX ORDER — MONTELUKAST SODIUM 10 MG/1
10 TABLET ORAL NIGHTLY
Status: DISCONTINUED | OUTPATIENT
Start: 2020-11-05 | End: 2020-11-08 | Stop reason: HOSPADM

## 2020-11-05 RX ORDER — FUROSEMIDE 20 MG/1
20 TABLET ORAL DAILY
Status: DISCONTINUED | OUTPATIENT
Start: 2020-11-06 | End: 2020-11-08 | Stop reason: HOSPADM

## 2020-11-05 RX ORDER — POLYETHYLENE GLYCOL 3350 17 G/17G
17 POWDER, FOR SOLUTION ORAL DAILY PRN
Status: DISCONTINUED | OUTPATIENT
Start: 2020-11-05 | End: 2020-11-08 | Stop reason: HOSPADM

## 2020-11-05 RX ORDER — VENLAFAXINE HYDROCHLORIDE 37.5 MG/1
75 CAPSULE, EXTENDED RELEASE ORAL
Status: CANCELLED | OUTPATIENT
Start: 2020-11-06

## 2020-11-05 RX ORDER — SODIUM CHLORIDE 0.9 % (FLUSH) 0.9 %
10 SYRINGE (ML) INJECTION PRN
Status: CANCELLED | OUTPATIENT
Start: 2020-11-05

## 2020-11-05 RX ORDER — IBUPROFEN 800 MG/1
800 TABLET ORAL 2 TIMES DAILY PRN
Status: CANCELLED | OUTPATIENT
Start: 2020-11-05

## 2020-11-05 RX ORDER — ACETAMINOPHEN 650 MG/1
650 SUPPOSITORY RECTAL EVERY 6 HOURS PRN
Status: CANCELLED | OUTPATIENT
Start: 2020-11-05

## 2020-11-05 RX ORDER — LEVOTHYROXINE SODIUM 0.12 MG/1
125 TABLET ORAL DAILY
Status: CANCELLED | OUTPATIENT
Start: 2020-11-06

## 2020-11-05 RX ORDER — ONDANSETRON 2 MG/ML
4 INJECTION INTRAMUSCULAR; INTRAVENOUS EVERY 6 HOURS PRN
Status: CANCELLED | OUTPATIENT
Start: 2020-11-05

## 2020-11-05 RX ORDER — HYDROXYZINE HYDROCHLORIDE 25 MG/1
25 TABLET, FILM COATED ORAL 3 TIMES DAILY PRN
Status: CANCELLED | OUTPATIENT
Start: 2020-11-05

## 2020-11-05 RX ORDER — GUAIFENESIN 600 MG/1
600 TABLET, EXTENDED RELEASE ORAL 2 TIMES DAILY
Status: CANCELLED | OUTPATIENT
Start: 2020-11-05 | End: 2020-11-12

## 2020-11-05 RX ORDER — LEVALBUTEROL INHALATION SOLUTION 1.25 MG/3ML
1.25 SOLUTION RESPIRATORY (INHALATION) 3 TIMES DAILY
Status: CANCELLED | OUTPATIENT
Start: 2020-11-05

## 2020-11-05 RX ORDER — IBUPROFEN 800 MG/1
800 TABLET ORAL 2 TIMES DAILY PRN
Status: DISCONTINUED | OUTPATIENT
Start: 2020-11-05 | End: 2020-11-08 | Stop reason: HOSPADM

## 2020-11-05 RX ORDER — HYDROXYZINE HYDROCHLORIDE 25 MG/1
25 TABLET, FILM COATED ORAL 3 TIMES DAILY PRN
Status: DISCONTINUED | OUTPATIENT
Start: 2020-11-05 | End: 2020-11-08 | Stop reason: HOSPADM

## 2020-11-05 RX ORDER — ACETAMINOPHEN 325 MG/1
650 TABLET ORAL EVERY 4 HOURS PRN
Status: DISCONTINUED | OUTPATIENT
Start: 2020-11-05 | End: 2020-11-05 | Stop reason: SDUPTHER

## 2020-11-05 RX ORDER — CETIRIZINE HYDROCHLORIDE 10 MG/1
10 TABLET ORAL DAILY
Status: CANCELLED | OUTPATIENT
Start: 2020-11-06

## 2020-11-05 RX ORDER — ONDANSETRON 2 MG/ML
4 INJECTION INTRAMUSCULAR; INTRAVENOUS EVERY 6 HOURS PRN
Status: DISCONTINUED | OUTPATIENT
Start: 2020-11-05 | End: 2020-11-08 | Stop reason: HOSPADM

## 2020-11-05 RX ORDER — MONTELUKAST SODIUM 10 MG/1
10 TABLET ORAL NIGHTLY
Status: CANCELLED | OUTPATIENT
Start: 2020-11-05

## 2020-11-05 RX ORDER — ACETAMINOPHEN 325 MG/1
650 TABLET ORAL EVERY 6 HOURS PRN
Status: DISCONTINUED | OUTPATIENT
Start: 2020-11-05 | End: 2020-11-08 | Stop reason: HOSPADM

## 2020-11-05 RX ORDER — LEVALBUTEROL INHALATION SOLUTION 1.25 MG/3ML
1.25 SOLUTION RESPIRATORY (INHALATION) EVERY 4 HOURS PRN
Status: DISCONTINUED | OUTPATIENT
Start: 2020-11-05 | End: 2020-11-08 | Stop reason: HOSPADM

## 2020-11-05 RX ORDER — GUAIFENESIN 600 MG/1
600 TABLET, EXTENDED RELEASE ORAL 2 TIMES DAILY
Status: DISCONTINUED | OUTPATIENT
Start: 2020-11-05 | End: 2020-11-08 | Stop reason: HOSPADM

## 2020-11-05 RX ORDER — FAMOTIDINE 20 MG/1
20 TABLET, FILM COATED ORAL 2 TIMES DAILY
Status: CANCELLED | OUTPATIENT
Start: 2020-11-05

## 2020-11-05 RX ORDER — LEVALBUTEROL INHALATION SOLUTION 1.25 MG/3ML
1.25 SOLUTION RESPIRATORY (INHALATION) EVERY 4 HOURS PRN
Status: CANCELLED | OUTPATIENT
Start: 2020-11-05

## 2020-11-05 RX ORDER — ACETAMINOPHEN 650 MG/1
650 SUPPOSITORY RECTAL EVERY 6 HOURS PRN
Status: DISCONTINUED | OUTPATIENT
Start: 2020-11-05 | End: 2020-11-08 | Stop reason: HOSPADM

## 2020-11-05 RX ORDER — ACETAMINOPHEN 325 MG/1
650 TABLET ORAL EVERY 4 HOURS PRN
Status: CANCELLED | OUTPATIENT
Start: 2020-11-05

## 2020-11-05 RX ORDER — LEVOTHYROXINE SODIUM 0.12 MG/1
125 TABLET ORAL DAILY
Status: DISCONTINUED | OUTPATIENT
Start: 2020-11-06 | End: 2020-11-08 | Stop reason: HOSPADM

## 2020-11-05 RX ORDER — CETIRIZINE HYDROCHLORIDE 10 MG/1
10 TABLET ORAL DAILY
Status: DISCONTINUED | OUTPATIENT
Start: 2020-11-06 | End: 2020-11-08 | Stop reason: HOSPADM

## 2020-11-05 RX ORDER — LEVALBUTEROL INHALATION SOLUTION 1.25 MG/3ML
1.25 SOLUTION RESPIRATORY (INHALATION) 3 TIMES DAILY
Status: DISCONTINUED | OUTPATIENT
Start: 2020-11-05 | End: 2020-11-08 | Stop reason: HOSPADM

## 2020-11-05 RX ORDER — HYDROCHLOROTHIAZIDE 25 MG/1
25 TABLET ORAL DAILY
Status: DISCONTINUED | OUTPATIENT
Start: 2020-11-06 | End: 2020-11-08 | Stop reason: HOSPADM

## 2020-11-05 RX ORDER — FAMOTIDINE 20 MG/1
20 TABLET, FILM COATED ORAL 2 TIMES DAILY
Status: DISCONTINUED | OUTPATIENT
Start: 2020-11-05 | End: 2020-11-08 | Stop reason: HOSPADM

## 2020-11-05 RX ORDER — METHYLPREDNISOLONE SODIUM SUCCINATE 40 MG/ML
40 INJECTION, POWDER, LYOPHILIZED, FOR SOLUTION INTRAMUSCULAR; INTRAVENOUS 2 TIMES DAILY
Status: DISCONTINUED | OUTPATIENT
Start: 2020-11-05 | End: 2020-11-07

## 2020-11-05 RX ORDER — SENNA PLUS 8.6 MG/1
1 TABLET ORAL DAILY PRN
Status: CANCELLED | OUTPATIENT
Start: 2020-11-05

## 2020-11-05 RX ORDER — SODIUM CHLORIDE 0.9 % (FLUSH) 0.9 %
10 SYRINGE (ML) INJECTION PRN
Status: DISCONTINUED | OUTPATIENT
Start: 2020-11-05 | End: 2020-11-08 | Stop reason: HOSPADM

## 2020-11-05 RX ORDER — SENNA PLUS 8.6 MG/1
1 TABLET ORAL DAILY PRN
Status: DISCONTINUED | OUTPATIENT
Start: 2020-11-05 | End: 2020-11-08 | Stop reason: HOSPADM

## 2020-11-05 RX ORDER — FUROSEMIDE 20 MG/1
20 TABLET ORAL DAILY
Status: CANCELLED | OUTPATIENT
Start: 2020-11-06

## 2020-11-05 RX ADMIN — BUDESONIDE 500 MCG: 0.5 SUSPENSION RESPIRATORY (INHALATION) at 06:12

## 2020-11-05 RX ADMIN — Medication 10 ML: at 08:46

## 2020-11-05 RX ADMIN — GUAIFENESIN 600 MG: 600 TABLET, EXTENDED RELEASE ORAL at 21:33

## 2020-11-05 RX ADMIN — CETIRIZINE HYDROCHLORIDE 10 MG: 10 TABLET, FILM COATED ORAL at 08:45

## 2020-11-05 RX ADMIN — GUAIFENESIN 600 MG: 600 TABLET, EXTENDED RELEASE ORAL at 08:45

## 2020-11-05 RX ADMIN — LEVOTHYROXINE SODIUM 125 MCG: 0.12 TABLET ORAL at 06:21

## 2020-11-05 RX ADMIN — FAMOTIDINE 20 MG: 20 TABLET ORAL at 21:33

## 2020-11-05 RX ADMIN — AZITHROMYCIN MONOHYDRATE 500 MG: 500 INJECTION, POWDER, LYOPHILIZED, FOR SOLUTION INTRAVENOUS at 15:40

## 2020-11-05 RX ADMIN — LEVALBUTEROL 1.25 MG: 1.25 SOLUTION RESPIRATORY (INHALATION) at 17:25

## 2020-11-05 RX ADMIN — HYDROCHLOROTHIAZIDE 25 MG: 25 TABLET ORAL at 08:44

## 2020-11-05 RX ADMIN — FUROSEMIDE 20 MG: 20 TABLET ORAL at 08:44

## 2020-11-05 RX ADMIN — FAMOTIDINE 20 MG: 20 TABLET ORAL at 08:44

## 2020-11-05 RX ADMIN — VENLAFAXINE HYDROCHLORIDE 75 MG: 37.5 CAPSULE, EXTENDED RELEASE ORAL at 08:43

## 2020-11-05 RX ADMIN — ROFLUMILAST 500 MCG: 500 TABLET ORAL at 08:44

## 2020-11-05 RX ADMIN — SODIUM CHLORIDE, PRESERVATIVE FREE 10 ML: 5 INJECTION INTRAVENOUS at 21:38

## 2020-11-05 RX ADMIN — ENOXAPARIN SODIUM 40 MG: 40 INJECTION SUBCUTANEOUS at 08:45

## 2020-11-05 RX ADMIN — CEFTRIAXONE 1 G: 1 INJECTION, POWDER, FOR SOLUTION INTRAMUSCULAR; INTRAVENOUS at 21:24

## 2020-11-05 RX ADMIN — METHYLPREDNISOLONE SODIUM SUCCINATE 40 MG: 40 INJECTION, POWDER, FOR SOLUTION INTRAMUSCULAR; INTRAVENOUS at 21:37

## 2020-11-05 RX ADMIN — LEVALBUTEROL 1.25 MG: 1.25 SOLUTION RESPIRATORY (INHALATION) at 06:12

## 2020-11-05 RX ADMIN — METHYLPREDNISOLONE SODIUM SUCCINATE 40 MG: 40 INJECTION, POWDER, FOR SOLUTION INTRAMUSCULAR; INTRAVENOUS at 08:45

## 2020-11-05 RX ADMIN — MONTELUKAST SODIUM 10 MG: 10 TABLET, COATED ORAL at 21:33

## 2020-11-05 ASSESSMENT — PAIN SCALES - GENERAL: PAINLEVEL_OUTOF10: 0

## 2020-11-05 NOTE — PROGRESS NOTES
Occupational Therapy  Facility/Department: [facility] MED SURG  Daily Treatment Note  NAME: Kylah Franco  : 1965  MRN: [id number]    Date of Service: 2020    Discharge Recommendations:          Assessment   Assessment: Pt agreeable to OT services. Pt tolerated BUE ther ex with MOD  rest breaks. Pt able to maintain 02 sats >90% with PLB cues. Activity Tolerance  Activity Tolerance: Patient limited by fatigue         Patient Diagnosis(es): There were no encounter diagnoses. 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 has a past surgical history that includes  section; Gallbladder surgery; and Hysterectomy.     Restrictions  Restrictions/Precautions  Restrictions/Precautions: General Precautions  Required Braces or Orthoses?: No  Subjective   General  Chart Reviewed: Yes  Patient assessed for rehabilitation services?: Yes  Family / Caregiver Present: No  Diagnosis: COPD  Subjective  Subjective: Pt presents SOB; reports pain 6/10 with deep breaths      Orientation     Objective    ADL  Toileting: Independent            Type of ROM/Therapeutic Exercise  Type of ROM/Therapeutic Exercise: Resistive Bands  Comment: Orange  Exercises  Scapular Retraction: 2x10  Shoulder Flexion: 2x10  Shoulder Extension: 2x10  Horizontal ABduction: 2x10  Elbow Flexion: 2x10  Elbow Extension: 2x10     Plan   Plan  Times per week: 3-5  Times per day: Daily  Plan weeks: 1  Current Treatment Recommendations: Strengthening, Balance Training, Self-Care / ADL, Functional Mobility Training, Endurance Training       Therapy Time   Individual Concurrent Group Co-treatment   Time In 0133         Time Out 0217         Minutes 44              This note serves as a DC summary in the event of pt discharge.      Ariana Giraldo, OTR/L

## 2020-11-05 NOTE — DISCHARGE SUMMARY
Discharge Summary      Patient ID: Alton Hung      Patient's PCP: Giorgio Vincent MD    Admit Date: 11/2/2020     Discharge Date:  11/5/2020    Admitting Provider: Giorgio Vincent MD    Discharging Provider: OPAL Wilkes     Reason for this admission:   COPD exacerbation     Discharge Diagnoses: Active Hospital Problems    Diagnosis Date Noted    Generalized weakness [R53.1] 11/05/2020    COPD exacerbation ([facility] [J44.1] 11/02/2020    Mixed anxiety depressive disorder [F41.8] 10/28/2017    Essential hypertension [I10]     Gastroesophageal reflux disease [K21.9] 05/14/2015    Interstitial lung disease ([facility] [J84.9] 01/15/2015    Hypothyroidism [E03.9] 04/15/2011       Procedures:  XR CHEST (2 VW)   Final Result      1. Diffuse bilateral interstitial airspace densities, progressed when compared to the prior study. This is nonspecific and may reflect a combination of acute and/or chronic lung disease. Correlate clinically. 2.  COPD            Consults:   IP CONSULT TO CASE MANAGEMENT  IP CONSULT TO CASE MANAGEMENT  IP CONSULT TO CASE MANAGEMENT  PT/OT    Briefly:   Ms. Filemon Foley is a 62 yo female with PMH of interstitial lung disease, anxiety, HTN, GERD, hypothyroidism who presents due to generalized weakness, shortness of breath, and cough. Hospital Course:       Active Hospital Problems    Diagnosis Date Noted    Generalized weakness [R53.1]  - patient with worsening weakness and fatigue last several weeks she attributes to exacerbation (see below)  - PT/OT consulted and following which will hopefully help with her endurance and breathing strategies  - transition to swing bed for ongoing treatment for copd exacerbation and subacute rehab 11/05/2020    COPD exacerbation ([facility] [J44.1]  - in setting of chronic ILD   - Continue IV antibiotics, steroid, supplemental, and nebulizer treatments   -  incentive spirometer and acapella ordered  - on 4-5L O2 and will wean as tolerated  to baseline 3-4L  - clinically improving on current regimen, continue to monitor  -  sputum culture pending and will follow  - transition to swing bed for ongoing management 11/02/2020    Mixed anxiety depressive disorder [F41.8]  - continue home regimen  10/28/2017    Essential hypertension [I10]  - BP stable at this time. No changes.  Gastroesophageal reflux disease [K21.9]  - Continue current regimen  05/14/2015    Interstitial lung disease ([address] [J84.9]  - see above under COPD  01/15/2015    Hypothyroidism [E03.9]  - Continue current regimen  04/15/2011       Disposition: swing bed    Discharged Condition: Stable    Vital Signs  Temp: 97.8 °F (36.6 °C)  Pulse: 83  Resp: 20  BP: 127/77  SpO2: 99 %  O2 Device: Nasal cannula  O2 Flow Rate (L/min): 5 L/min    Vital signs reviewed in electronic chart. Physical exam  Constitutional:  Well developed, well nourished, no acute distress  Eyes:  PERRL, no scleral icterus, conjunctiva normal   HENT:  Atraumatic, external ears normal, nose normal, oropharynx moist, no pharyngeal exudates. Neck- supple, no JVD, no lymphadenopathy  Respiratory: 5L NC.  No respiratory distress. Crackles appreciated bilateral lower lobes. Air movement improved from previous. No wheezing or rhonchi. Cardiovascular:  Normal rate, normal rhythm, no murmurs, no gallops, no rubs, no edema   GI:  obese. Soft, nondistended, normal bowel sounds, nontender, no voluntary guarding  Musculoskeletal:  No cyanosis or obvious acute deformity. Moving all extremities   Integument:  Warm and dry. Neurologic:  Alert & oriented x 3, no apparent focal deficits noted   Psychiatric:  Speech and behavior appropriate     Labs:  For convenience and continuity at follow-up the following most recent labs are provided:    CBC:   Lab Results   Component Value Date    WBC 11.1 11/03/2020    HGB 14.1 11/03/2020    HCT 45.6 11/03/2020     11/03/2020       RENAL:   Lab Results   Component Value Date     11/03/2020    K 4.3 11/03/2020    K 4.3 05/25/2018     11/03/2020    CO2 27 11/03/2020    BUN 13 11/03/2020    CREATININE 0.7 11/03/2020            Current Discharge Medication List           Details   hydrOXYzine (ATARAX) 10 MG tablet TAKE 1 TABLET BY MOUTH TWICE DAILY AS NEEDED FOR ANXIETY  Qty: 45 tablet, Refills: 2              Details   levothyroxine (SYNTHROID) 125 MCG tablet Take 1 tablet by mouth Daily TAKE 1 TABLET BY MOUTH ONCE DAILY  Qty: 90 tablet, Refills: 2      venlafaxine (EFFEXOR XR) 75 MG extended release capsule TAKE 3 CAPSULES BY MOUTH DAILY  Qty: 270 capsule, Refills: 3    Associated Diagnoses: Depression with anxiety      pantoprazole (PROTONIX) 40 MG tablet Take 1 tablet by mouth once daily  Qty: 90 tablet, Refills: 3      cetirizine (ZYRTEC) 10 MG tablet Take 1 tablet by mouth daily  Qty: 30 tablet, Refills: 3      hydroCHLOROthiazide (HYDRODIURIL) 25 MG tablet take 1 tablet by mouth once daily  Qty: 90 tablet, Refills: 5    Associated Diagnoses: Essential hypertension      ipratropium-albuterol (DUONEB) 0.5-2.5 (3) MG/3ML SOLN nebulizer solution Inhale 3 mLs into the lungs every 6 hours as needed for Shortness of Breath  Qty: 360 mL, Refills: 0      furosemide (LASIX) 20 MG tablet take 1 tablet by mouth once daily if needed for SWELLING  Qty: 60 tablet, Refills: 3      Nintedanib Esylate 150 MG CAPS Take by mouth 2 times daily      albuterol sulfate HFA (PROAIR HFA) 108 (90 Base) MCG/ACT inhaler Inhale 2 puffs into the lungs every 6 hours as needed for Wheezing  Qty: 1 Inhaler, Refills: 3      ibuprofen (ADVIL;MOTRIN) 800 MG tablet take 1 tablet by mouth every 8 hours if needed for pain  Refills: 0            Patient was seen and examined by Dr. Lia Ramos and plan of care reviewed. Signed:  Electronically signed by OPAL Real on 11/5/2020 at 11:37 AM       Thank you Pam Decker MD for the opportunity to be involved in this patient's care.  If you have any questions or concerns please feel free to contact me at [phone].

## 2020-11-05 NOTE — PROGRESS NOTES
Independent  Sit to Supine: Independent  Scooting: Independent  Transfers  Sit to Stand: Independent  Stand to sit: Independent  Bed to Chair: Independent  Ambulation  Ambulation?: Yes  Ambulation 1  Surface: level tile  Device: No Device  Assistance: Supervision  Gait Deviations: Slow Dede;Decreased step length;Decreased step height  Distance: 7 minutes slow pace with 2 standing rests     Balance  Posture: Good  Sitting - Static: Good  Sitting - Dynamic: Good  Standing - Static: Good  Standing - Dynamic: Good;-  Exercises  Hip Flexion: 2x10  Hip Abduction: 2x10  Knee Long Arc Quad: 2x10  Ankle Pumps: 2x10      Goals  Long term goals  Time Frame for Long term goals : 3-4 days  Long term goal 1: Achieve safe, independent ambulation on level surfaces. Long term goal 2: Ambulate 100 feet x 2 with no more than minimal c/o SOA, fatigue. Long term goal 3: Perform up to 20 minutes of PT with minimal rest breaks with O2 > 90    Plan    Plan  Times per week: 3-4 days  Plan weeks: 3-4 days  Current Treatment Recommendations: Strengthening, Balance Training, Neuromuscular Re-education, Endurance Training, Safety Education & Training  Safety Devices  Type of devices: Left in bed, Call light within reach     Therapy Time   Individual Concurrent Group Co-treatment   Time In 1023         Time Out 1052         Minutes 29              This note serves as D/C summary if patient is discharged prior to next visit.   Wendi Martin, PTA

## 2020-11-05 NOTE — PROGRESS NOTES
53/0/2063    I certify that the skilled nursing and/or rehabilitation services are required to be given on a daily basis, which as a practical matter can only be provided in a skilled nursing unit on an inpatient basis.     Electronically signed by Rebecca Sales RN on 11/5/2020 at 3:40 PM

## 2020-11-06 ENCOUNTER — APPOINTMENT (OUTPATIENT)
Dept: GENERAL RADIOLOGY | Facility: HOSPITAL | Age: 55
DRG: 192 | End: 2020-11-06
Attending: INTERNAL MEDICINE
Payer: MEDICARE

## 2020-11-06 PROBLEM — B37.9 YEAST INFECTION: Status: ACTIVE | Noted: 2020-11-06

## 2020-11-06 PROCEDURE — 94640 AIRWAY INHALATION TREATMENT: CPT

## 2020-11-06 PROCEDURE — 99305 1ST NF CARE MODERATE MDM 35: CPT | Performed by: INTERNAL MEDICINE

## 2020-11-06 PROCEDURE — 97535 SELF CARE MNGMENT TRAINING: CPT

## 2020-11-06 PROCEDURE — 1200000002 HC SEMI PRIVATE SWING BED

## 2020-11-06 PROCEDURE — 94761 N-INVAS EAR/PLS OXIMETRY MLT: CPT

## 2020-11-06 PROCEDURE — 6370000000 HC RX 637 (ALT 250 FOR IP): Performed by: PHYSICIAN ASSISTANT

## 2020-11-06 PROCEDURE — 97110 THERAPEUTIC EXERCISES: CPT

## 2020-11-06 PROCEDURE — 97530 THERAPEUTIC ACTIVITIES: CPT

## 2020-11-06 PROCEDURE — 6360000002 HC RX W HCPCS: Performed by: PHYSICIAN ASSISTANT

## 2020-11-06 PROCEDURE — 71046 X-RAY EXAM CHEST 2 VIEWS: CPT

## 2020-11-06 PROCEDURE — 2700000000 HC OXYGEN THERAPY PER DAY

## 2020-11-06 PROCEDURE — 2580000003 HC RX 258: Performed by: PHYSICIAN ASSISTANT

## 2020-11-06 PROCEDURE — 97165 OT EVAL LOW COMPLEX 30 MIN: CPT

## 2020-11-06 PROCEDURE — 97161 PT EVAL LOW COMPLEX 20 MIN: CPT

## 2020-11-06 RX ORDER — VENLAFAXINE HYDROCHLORIDE 150 MG/1
150 CAPSULE, EXTENDED RELEASE ORAL ONCE
Status: COMPLETED | OUTPATIENT
Start: 2020-11-06 | End: 2020-11-06

## 2020-11-06 RX ORDER — FLUCONAZOLE 150 MG/1
150 TABLET ORAL ONCE
Status: COMPLETED | OUTPATIENT
Start: 2020-11-06 | End: 2020-11-06

## 2020-11-06 RX ORDER — FUROSEMIDE 10 MG/ML
20 INJECTION INTRAMUSCULAR; INTRAVENOUS ONCE
Status: COMPLETED | OUTPATIENT
Start: 2020-11-06 | End: 2020-11-06

## 2020-11-06 RX ORDER — AZITHROMYCIN 250 MG/1
500 TABLET, FILM COATED ORAL ONCE
Status: COMPLETED | OUTPATIENT
Start: 2020-11-06 | End: 2020-11-06

## 2020-11-06 RX ADMIN — BUDESONIDE 500 MCG: 0.5 SUSPENSION RESPIRATORY (INHALATION) at 06:08

## 2020-11-06 RX ADMIN — ENOXAPARIN SODIUM 40 MG: 40 INJECTION SUBCUTANEOUS at 20:26

## 2020-11-06 RX ADMIN — FUROSEMIDE 20 MG: 10 INJECTION, SOLUTION INTRAMUSCULAR; INTRAVENOUS at 11:21

## 2020-11-06 RX ADMIN — FAMOTIDINE 20 MG: 20 TABLET ORAL at 09:22

## 2020-11-06 RX ADMIN — LEVALBUTEROL 1.25 MG: 1.25 SOLUTION RESPIRATORY (INHALATION) at 06:08

## 2020-11-06 RX ADMIN — METHYLPREDNISOLONE SODIUM SUCCINATE 40 MG: 40 INJECTION, POWDER, FOR SOLUTION INTRAMUSCULAR; INTRAVENOUS at 20:26

## 2020-11-06 RX ADMIN — LEVALBUTEROL 1.25 MG: 1.25 SOLUTION RESPIRATORY (INHALATION) at 14:05

## 2020-11-06 RX ADMIN — METHYLPREDNISOLONE SODIUM SUCCINATE 40 MG: 40 INJECTION, POWDER, FOR SOLUTION INTRAMUSCULAR; INTRAVENOUS at 09:22

## 2020-11-06 RX ADMIN — GUAIFENESIN 600 MG: 600 TABLET, EXTENDED RELEASE ORAL at 20:26

## 2020-11-06 RX ADMIN — HYDROCHLOROTHIAZIDE 25 MG: 25 TABLET ORAL at 09:22

## 2020-11-06 RX ADMIN — CEFTRIAXONE 1 G: 1 INJECTION, POWDER, FOR SOLUTION INTRAMUSCULAR; INTRAVENOUS at 20:25

## 2020-11-06 RX ADMIN — HYDROXYZINE HYDROCHLORIDE 25 MG: 25 TABLET, FILM COATED ORAL at 20:26

## 2020-11-06 RX ADMIN — FLUCONAZOLE 150 MG: 150 TABLET ORAL at 11:20

## 2020-11-06 RX ADMIN — LEVOTHYROXINE SODIUM 125 MCG: 0.12 TABLET ORAL at 06:04

## 2020-11-06 RX ADMIN — GUAIFENESIN 600 MG: 600 TABLET, EXTENDED RELEASE ORAL at 09:21

## 2020-11-06 RX ADMIN — ROFLUMILAST 500 MCG: 500 TABLET ORAL at 09:22

## 2020-11-06 RX ADMIN — SODIUM CHLORIDE, PRESERVATIVE FREE 10 ML: 5 INJECTION INTRAVENOUS at 20:28

## 2020-11-06 RX ADMIN — VENLAFAXINE HYDROCHLORIDE 75 MG: 37.5 CAPSULE, EXTENDED RELEASE ORAL at 09:22

## 2020-11-06 RX ADMIN — LEVALBUTEROL 1.25 MG: 1.25 SOLUTION RESPIRATORY (INHALATION) at 21:06

## 2020-11-06 RX ADMIN — AZITHROMYCIN MONOHYDRATE 500 MG: 250 TABLET ORAL at 11:21

## 2020-11-06 RX ADMIN — FAMOTIDINE 20 MG: 20 TABLET ORAL at 20:26

## 2020-11-06 RX ADMIN — FUROSEMIDE 20 MG: 20 TABLET ORAL at 09:21

## 2020-11-06 RX ADMIN — VENLAFAXINE HYDROCHLORIDE 150 MG: 150 CAPSULE, EXTENDED RELEASE ORAL at 11:21

## 2020-11-06 RX ADMIN — CETIRIZINE HYDROCHLORIDE 10 MG: 10 TABLET, FILM COATED ORAL at 09:22

## 2020-11-06 RX ADMIN — MONTELUKAST SODIUM 10 MG: 10 TABLET, COATED ORAL at 20:26

## 2020-11-06 RX ADMIN — BUDESONIDE 500 MCG: 0.5 SUSPENSION RESPIRATORY (INHALATION) at 21:06

## 2020-11-06 ASSESSMENT — PAIN SCALES - GENERAL: PAINLEVEL_OUTOF10: 0

## 2020-11-06 NOTE — PROGRESS NOTES
Occupational Therapy   Occupational Therapy Initial Assessment  Date: 2020   Patient Name: Luis Rice  MRN: [id number]     : 1965    Date of Service: 2020    Discharge Recommendations:  Home with assist PRN       Assessment   Performance deficits / Impairments: Decreased high-level IADLs;Decreased endurance  Assessment: Pt agreeable to OT services. Pt educated and used EC techniques during shower remaining seated and PLB techniques. Pt tolerated with SOA noted on 6Lpm. Pt required min rest breaks during dressing. Pt donned clothing without difficulty. Pt will benefit from skilled OT services while IP. Prognosis: Good  Decision Making: Low Complexity  REQUIRES OT FOLLOW UP: Yes  Activity Tolerance  Activity Tolerance: SOA           Patient Diagnosis(es): There were no encounter diagnoses. 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 has a past surgical history that includes  section; Gallbladder surgery; and Hysterectomy. Restrictions  Restrictions/Precautions  Restrictions/Precautions: General Precautions  Required Braces or Orthoses?: No    Subjective   General  Chart Reviewed: Yes  Patient assessed for rehabilitation services?: Yes  Family / Caregiver Present: No  Diagnosis: COPD  Subjective  Subjective: Pt agreeable to OT evaluation and tx.   Patient Currently in Pain: Denies  Vital Signs  Patient Currently in Pain: Denies  Social/Functional History  Social/Functional History  Lives With: Family  Type of Home: House  Home Layout: One level  Home Access: Stairs to enter with rails  Entrance Stairs - Number of Steps: 1- No difficulty accessing  Bathroom Shower/Tub: Tub/Shower unit  Bathroom Toilet: Standard  Bathroom Equipment: Shower chair  Home Equipment: Oxygen  ADL Assistance: Independent  Homemaking Assistance: Needs assistance  Homemaking Responsibilities: No  Ambulation Assistance: Independent  Transfer Assistance: Independent  Active : Yes  Mode of Transportation: Car  Leisure & Hobbies: Color, craft, sew       Objective   Vision: Impaired  Vision Exceptions: Wears glasses at all times  Hearing: Within functional limits    Orientation  Overall Orientation Status: Within Functional Limits  Observation/Palpation  Posture: Good  Observation: Patient presents supine in bed on 5 LO2, NAD  Balance  Sitting Balance: Stand by assistance  Standing Balance: Stand by assistance  Functional Mobility  Functional - Mobility Device: No device  Assist Level: Stand by assistance  Shower Transfers  Shower - Transfer Type: To and From  Shower - Transfer To:  Shower seat without back  Shower - Technique: Ambulating  Shower Transfers: Supervision  ADL  Grooming: Independent  UE Bathing: Setup  LE Bathing: Setup  UE Dressing: Independent  LE Dressing: Independent  Toileting: Independent        Bed mobility  Supine to Sit: Independent  Sit to Supine: Independent  Scooting: Independent  Transfers  Stand Step Transfers: Supervision  Stand Pivot Transfers: Supervision  Sit to stand: Supervision     Cognition  Overall Cognitive Status: WFL        Sensation  Overall Sensation Status: WFL        LUE AROM (degrees)  LUE AROM : WFL  RUE AROM (degrees)  RUE AROM : WFL  LUE Strength  Gross LUE Strength: WFL  RUE Strength  Gross RUE Strength: WFL                   Plan   Plan  Times per week: 3-5  Times per day: Daily  Plan weeks: 1  Current Treatment Recommendations: Strengthening, Balance Training, Self-Care / ADL, Functional Mobility Training, Endurance Training            Goals  Short term goals  Time Frame for Short term goals: 1 week  Short term goal 1: Pt to complete shower with MOD I maintaining PLB and using EC techniques. Short term goal 2: Pt to tolerate x15 minutes of activity to increase functional activity tolerance maintaining 02 sats >90%. Short term goal 3: pt to complete dynamic standign balance activities to improve safety with housekeeping tasks at DC. Short term goal 4: Pt to demonstrate independence with HEP       Therapy Time   Individual Concurrent Group Co-treatment   Time In [facility] 26         Time Out 0954         Minutes 33           This note serves as a DC summary in the event of pt discharge.         Ariana Giraldo, OTR/L

## 2020-11-06 NOTE — PROGRESS NOTES
Physical Therapy    Facility/Department: [facility] MED SURG  Initial Assessment    NAME: Alverto Ochoa  : 1965  MRN: [id number]    Date of Service: 2020    Discharge Recommendations:  Continue to assess pending progress        Assessment   Assessment: COPD exacerbation; functional decline; will benefit from PT to help improve endurance, functional mobility and independence. Treatment Diagnosis: COPD exacerbation; functional decline  Prognosis: Excellent  Decision Making: Low Complexity  REQUIRES PT FOLLOW UP: Yes  Activity Tolerance  Activity Tolerance: Patient Tolerated treatment well       Patient Diagnosis(es): There were no encounter diagnoses. 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 has a past surgical history that includes  section; Gallbladder surgery; and Hysterectomy. Restrictions  Restrictions/Precautions  Restrictions/Precautions: General Precautions  Required Braces or Orthoses?: No  Vision/Hearing  Vision: Impaired  Vision Exceptions: Wears glasses at all times  Hearing: Within functional limits     Subjective  General  Chart Reviewed: Yes  Patient assessed for rehabilitation services?: Yes  Family / Caregiver Present: No  Referring Practitioner: Mariusz Gomez MD  Diagnosis: COPD exacerbatoin  Follows Commands: Within Functional Limits  Subjective  Subjective: Patient admitted to Swing Bed following brief acute care stay due to COPD excarbation; is slowly improving and highly motivated to improve; agreeable to consult.   Pain Screening  Patient Currently in Pain: Denies  Vital Signs  Patient Currently in Pain: Denies       Orientation  Orientation  Overall Orientation Status: Within Normal Limits  Social/Functional History  Social/Functional History  Lives With: Family  Type of Home: House  Home Layout: One level  Home Access: Stairs to enter with rails  Entrance Stairs - Number of Steps: 1- No difficulty accessing  Bathroom Shower/Tub: Tub/Shower unit  Bathroom Toilet: Standard  Bathroom Equipment: Shower chair  Home Equipment: Oxygen  ADL Assistance: Independent  Homemaking Assistance: Needs assistance  Homemaking Responsibilities: No  Ambulation Assistance: Independent  Transfer Assistance: Independent  Active : Yes  Mode of Transportation: Car  Leisure & Hobbies: Color, craft, sew  Cognition        Objective     Observation/Palpation  Posture: Good  Observation: Patient presents supine in bed on 5 LO2, NAD    AROM RLE (degrees)  RLE AROM: WFL  AROM LLE (degrees)  LLE AROM : WFL  AROM RUE (degrees)  RUE AROM : WFL  AROM LUE (degrees)  LUE AROM : WFL  Strength RLE  Strength RLE: WFL  Strength LLE  Strength LLE: WFL  Strength RUE  Strength RUE: WFL  Strength LUE  Strength LUE: WFL  Tone RLE  RLE Tone: Normotonic  Tone LLE  LLE Tone: Normotonic  Motor Control  Gross Motor?: WFL  Sensation  Overall Sensation Status: WFL  Bed mobility  Rolling to Right: Independent  Supine to Sit: Independent  Sit to Supine: Independent  Scooting: Independent  Transfers  Sit to Stand: Independent  Stand to sit:  Independent  Bed to Chair: Independent  Ambulation  Ambulation?: Yes  Ambulation 1  Surface: level tile  Device: No Device  Assistance: Supervision  Quality of Gait: steady  Gait Deviations: Slow Dede;Decreased step length;Decreased step height     Balance  Posture: Good  Sitting - Static: Good  Sitting - Dynamic: Good  Standing - Static: Good  Standing - Dynamic: Good;-        Plan   Plan  Times per week: 3-4 days  Plan weeks: 3-4 days  Current Treatment Recommendations: Strengthening, Balance Training, Neuromuscular Re-education, Endurance Training, Safety Education & Training               Goals  Long term goals  Time Frame for Long term goals : 3-4 days  Long term goal 1: Achieve safe, independent ambulation on level surfaces. Long term goal 2: Ambulate 100 feet x 2 with no more than minimal c/o SOA, fatigue. Long term goal 3: Perform up to 20 minutes of PT with minimal rest breaks with O2 > 90       Therapy Time   Individual Concurrent Group Co-treatment   Time In           Time Out           Minutes                   Brian Flusimoney, PT       Certification of Medical Necessity: It will be understood that this treatment plan is certified medically necessary by the documenting therapist and referring physician mentioned in this report. Unless the physician indicated otherwise through written correspondence with our office, all further referrals will act as certification of medical necessity on this treatment plan. Thank you for this referral.  If you have questions regarding this plan of care, please call [phone].           Revisions to this plan (optional):                     Please sign and return this plan to:   FAX: [phone]      Signature:                                 Date:

## 2020-11-06 NOTE — FLOWSHEET NOTE
11/06/20 0800   Assessment   Charting Type Shift assessment   Neurological   Neuro (WDL) WDL   [place] Coma Scale   Eye Opening 4   Best Verbal Response 5   Best Motor Response 6   Jose Coma Scale Score 15   HEENT   HEENT (WDL) X   Right Eye Impaired vision   Left Eye Impaired vision   Teeth Dentures upper   Respiratory   Respiratory (WDL) X   Respiratory Pattern Regular   Respiratory Depth Normal   Respiratory Quality/Effort Unlabored   Chest Assessment Chest expansion symmetrical   L Breath Sounds Diminished; Expiratory Wheezes   R Breath Sounds Diminished   Breath Sounds   Right Upper Lobe Clear   Right Middle Lobe Fine Crackles   Right Lower Lobe Diminished   Left Upper Lobe Clear   Left Lower Lobe Diminished   Cough/Sputum   Cough Productive   Frequency Occasional   Sputum Amount Small   Sputum Color Green   Tenacity Thick   Cardiac   Cardiac (WDL) WDL   Cardiac Monitor   Telemetry Monitor On No   Gastrointestinal   Abdominal (WDL) WDL   Peripheral Vascular   Peripheral Vascular (WDL) WDL   Edema None   Skin Color/Condition   Skin Color/Condition (WDL) WDL   Skin Integrity   Skin Integrity (WDL) WDL   Musculoskeletal   Musculoskeletal (WDL) WDL   Genitourinary   Genitourinary (WDL) WDL   Urine Assessment   Incontinence No   Anus/Rectum   Anus/Rectum (WDL) WDL   Psychosocial   Psychosocial (WDL) WDL

## 2020-11-06 NOTE — H&P
History and Physical    Patient:  Daquan Fu    CHIEF COMPLAINT:    Shortness of breath     HISTORY OF PRESENT ILLNESS:   The patient is a 63 y.o. female with past medical history of interstitial lung disease/COPD, hypothyroidism, anxiety/depression who was admitted to acute care due to cough and shortness of breath as well as generalized weakness. Transitioned to swing bed for ongoing management of COPD exacerbation and generalized weakness. At time of exam today she is sitting up in bed. On 5L O2 which is higher than her baseline requirement. Continues to have dyspnea on exertion she feels is worse than normal. Cough is slightly productive. She complains of burning with IV zithromax and it was changed to oral dose for final dose today. Also complains fo itching, burning, and vaginal discharge consistent with yeast infection and requests diflucan. 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  Authorizing Provider   hydrOXYzine (ATARAX) 10 MG tablet TAKE 1 TABLET BY MOUTH TWICE DAILY AS NEEDED FOR ANXIETY 20   Pam Decker MD   levothyroxine (SYNTHROID) 125 MCG tablet Take 1 tablet by mouth Daily TAKE 1 TABLET BY MOUTH ONCE DAILY 10/5/20   Pam Decker MD   venlafaxine (EFFEXOR XR) 75 MG extended release capsule TAKE 3 CAPSULES BY MOUTH DAILY 20   Pam Decker MD   pantoprazole (PROTONIX) 40 MG tablet itching  Neurologic:  Denies headache, dizziness, numbness, tingling or unilateral weakness  Psychiatric:  Denies acute depression or acute anxiety      Vitals:    11/06/20 0826   BP: 120/75   Pulse: 92   Resp: 20   Temp: 97.1 °F (36.2 °C)   SpO2: 100%       Physical exam  Constitutional:  Well developed, well nourished, no acute distress  Eyes:  PERRL, no scleral icterus, conjunctiva normal   HENT:  Atraumatic, external ears normal, nose normal, oropharynx moist, no pharyngeal exudates. Neck- supple, no JVD, no lymphadenopathy  Respiratory: 5L NC.  No respiratory distress. Crackles appreciated throughout bilateral lungs worse in bases. No wheezing or rhonchi. Cardiovascular:  Normal rate, normal rhythm, no murmurs, no gallops, no rubs, no edema   GI:  obese. Soft, nondistended, normal bowel sounds, nontender, no voluntary guarding  Musculoskeletal:  No cyanosis or obvious acute deformity. Moving all extremities   Integument:  Warm and dry. No rash. Neurologic:  Alert & oriented x 3, no apparent focal deficits noted   Psychiatric:  Speech and behavior appropriate         Lab Results   Component Value Date     11/03/2020    K 4.3 11/03/2020     11/03/2020    CO2 27 11/03/2020    BUN 13 11/03/2020    CREATININE 0.7 11/03/2020    GLUCOSE 177 (H) 11/03/2020    CALCIUM 9.3 11/03/2020    PROT 8.0 11/03/2020    LABALBU 4.2 11/03/2020    BILITOT <0.2 (L) 11/03/2020    ALKPHOS 152 (H) 11/03/2020    AST 18 11/03/2020    ALT 24 11/03/2020    LABGLOM >60 11/03/2020    GFRAA >59 11/03/2020    AGRATIO 1.1 11/03/2020    GLOB 3.8 11/03/2020           Lab Results   Component Value Date    WBC 11.1 (H) 11/03/2020    HGB 14.1 11/03/2020    HCT 45.6 11/03/2020    MCV 97.9 11/03/2020     11/03/2020     XR CHEST (2 VW)   Final Result   Impression: Diffuse interstitial infiltrates, unchanged.           Assessment and Plan      COPD exacerbation ([facility] [J44.1]  - in setting of ILD   - Continue IV antibiotics (day 5 of

## 2020-11-06 NOTE — PROGRESS NOTES
Physical Therapy  Facility/Department: [facility] MED SURG  Daily Treatment Note  NAME: Kylah Franco  : 1965  MRN: [id number]    Date of Service: 2020    Discharge Recommendations:  Continue to assess pending progress      Assessment   Body structures, Functions, Activity limitations: Decreased high-level IADLs;Decreased endurance  Assessment: Patient ambulated in the room 4.5 minutes with 2 standing rests breaks. O2 remained above 90%. Completed B LE therex in sitting with increased reps today. Treatment Diagnosis: COPD exacerbation; functional decline  Prognosis: Excellent  REQUIRES PT FOLLOW UP: Yes  Activity Tolerance  Activity Tolerance: Patient Tolerated treatment well     Patient Diagnosis(es): There were no encounter diagnoses. 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 has a past surgical history that includes  section; Gallbladder surgery; and Hysterectomy. Restrictions  Restrictions/Precautions  Restrictions/Precautions: General Precautions  Required Braces or Orthoses?: No  Subjective   General  Chart Reviewed: Yes  Response To Previous Treatment: Patient with no complaints from previous session. Family / Caregiver Present: No  Referring Practitioner: Darius Lambert MD  Subjective  Subjective: Patient states she's tired but feeling better today.   Pain Screening  Patient Currently in Pain: Denies  Vital Signs  Patient Currently in Pain: Denies       Orientation  Orientation  Overall Orientation Status: Within Normal Limits  Cognition      Objective   Bed mobility  Rolling to Right: Independent  Supine to Sit: Independent  Sit to Supine: Independent  Scooting: Independent  Transfers  Sit to Stand: Independent  Stand to sit: Independent  Bed to Chair: Independent  Ambulation  Ambulation?: Yes  Ambulation 1  Surface: level tile  Device: No Device  Other Apparatus: O2  Assistance: Supervision  Gait Deviations: Slow Dede;Decreased step length;Decreased step height  Distance: 4.5 minutes with 2 standing rests     Balance  Posture: Good  Sitting - Static: Good  Sitting - Dynamic: Good  Standing - Static: Good  Standing - Dynamic: Good;-  Exercises  Hip Flexion: 2x15  Hip Abduction: 2x15  Knee Long Arc Quad: 2x15  Ankle Pumps: 2x15      Goals  Long term goals  Time Frame for Long term goals : 3-4 days  Long term goal 1: Achieve safe, independent ambulation on level surfaces. Long term goal 2: Ambulate 100 feet x 2 with no more than minimal c/o SOA, fatigue. Long term goal 3: Perform up to 20 minutes of PT with minimal rest breaks with O2 > 90    Plan    Plan  Times per week: 3-4 days  Plan weeks: 3-4 days  Current Treatment Recommendations: Strengthening, Balance Training, Neuromuscular Re-education, Endurance Training, Safety Education & Training  Safety Devices  Type of devices: Left in bed, Call light within reach     Therapy Time   Individual Concurrent Group Co-treatment   Time In 9778         Time Out 8067         Minutes 28              This note serves as D/C summary if patient is discharged prior to next visit.   Luke Pruitt, PTA

## 2020-11-07 LAB
CULTURE, RESPIRATORY: NORMAL
GRAM STAIN RESULT: NORMAL

## 2020-11-07 PROCEDURE — 99308 SBSQ NF CARE LOW MDM 20: CPT | Performed by: PHYSICIAN ASSISTANT

## 2020-11-07 PROCEDURE — 6360000002 HC RX W HCPCS: Performed by: PHYSICIAN ASSISTANT

## 2020-11-07 PROCEDURE — 94640 AIRWAY INHALATION TREATMENT: CPT

## 2020-11-07 PROCEDURE — 94761 N-INVAS EAR/PLS OXIMETRY MLT: CPT

## 2020-11-07 PROCEDURE — 6370000000 HC RX 637 (ALT 250 FOR IP): Performed by: PHYSICIAN ASSISTANT

## 2020-11-07 PROCEDURE — 1200000002 HC SEMI PRIVATE SWING BED

## 2020-11-07 PROCEDURE — 2700000000 HC OXYGEN THERAPY PER DAY

## 2020-11-07 PROCEDURE — 2580000003 HC RX 258: Performed by: PHYSICIAN ASSISTANT

## 2020-11-07 RX ORDER — METHYLPREDNISOLONE SODIUM SUCCINATE 40 MG/ML
40 INJECTION, POWDER, LYOPHILIZED, FOR SOLUTION INTRAMUSCULAR; INTRAVENOUS DAILY
Status: DISCONTINUED | OUTPATIENT
Start: 2020-11-08 | End: 2020-11-08 | Stop reason: HOSPADM

## 2020-11-07 RX ADMIN — LEVALBUTEROL 1.25 MG: 1.25 SOLUTION RESPIRATORY (INHALATION) at 06:03

## 2020-11-07 RX ADMIN — ROFLUMILAST 500 MCG: 500 TABLET ORAL at 09:00

## 2020-11-07 RX ADMIN — MONTELUKAST SODIUM 10 MG: 10 TABLET, COATED ORAL at 20:03

## 2020-11-07 RX ADMIN — HYDROCHLOROTHIAZIDE 25 MG: 25 TABLET ORAL at 09:00

## 2020-11-07 RX ADMIN — LEVOTHYROXINE SODIUM 125 MCG: 0.12 TABLET ORAL at 06:44

## 2020-11-07 RX ADMIN — SODIUM CHLORIDE, PRESERVATIVE FREE 10 ML: 5 INJECTION INTRAVENOUS at 20:05

## 2020-11-07 RX ADMIN — METHYLPREDNISOLONE SODIUM SUCCINATE 40 MG: 40 INJECTION, POWDER, FOR SOLUTION INTRAMUSCULAR; INTRAVENOUS at 09:00

## 2020-11-07 RX ADMIN — FUROSEMIDE 20 MG: 20 TABLET ORAL at 08:59

## 2020-11-07 RX ADMIN — GUAIFENESIN 600 MG: 600 TABLET, EXTENDED RELEASE ORAL at 09:00

## 2020-11-07 RX ADMIN — FAMOTIDINE 20 MG: 20 TABLET ORAL at 09:00

## 2020-11-07 RX ADMIN — ENOXAPARIN SODIUM 40 MG: 40 INJECTION SUBCUTANEOUS at 20:03

## 2020-11-07 RX ADMIN — LEVALBUTEROL 1.25 MG: 1.25 SOLUTION RESPIRATORY (INHALATION) at 16:49

## 2020-11-07 RX ADMIN — BUDESONIDE 500 MCG: 0.5 SUSPENSION RESPIRATORY (INHALATION) at 06:03

## 2020-11-07 RX ADMIN — SODIUM CHLORIDE, PRESERVATIVE FREE 10 ML: 5 INJECTION INTRAVENOUS at 09:02

## 2020-11-07 RX ADMIN — LEVALBUTEROL 1.25 MG: 1.25 SOLUTION RESPIRATORY (INHALATION) at 21:35

## 2020-11-07 RX ADMIN — GUAIFENESIN 600 MG: 600 TABLET, EXTENDED RELEASE ORAL at 20:03

## 2020-11-07 RX ADMIN — FAMOTIDINE 20 MG: 20 TABLET ORAL at 20:03

## 2020-11-07 RX ADMIN — CEFTRIAXONE 1 G: 1 INJECTION, POWDER, FOR SOLUTION INTRAMUSCULAR; INTRAVENOUS at 20:03

## 2020-11-07 RX ADMIN — BUDESONIDE 500 MCG: 0.5 SUSPENSION RESPIRATORY (INHALATION) at 16:49

## 2020-11-07 RX ADMIN — VENLAFAXINE HYDROCHLORIDE 225 MG: 150 CAPSULE, EXTENDED RELEASE ORAL at 08:59

## 2020-11-07 RX ADMIN — CETIRIZINE HYDROCHLORIDE 10 MG: 10 TABLET, FILM COATED ORAL at 08:59

## 2020-11-07 ASSESSMENT — PAIN SCALES - GENERAL
PAINLEVEL_OUTOF10: 0
PAINLEVEL_OUTOF10: 0

## 2020-11-07 NOTE — PROGRESS NOTES
Progress Note      Subjective:   Chief complaint: shortness of breath , generalized weakness    Interval History:   Lying in bed today. No acute distress. o2 requirement improved currently on 2.5L. baseline 3L at home and titrates up to 4-5L with exertion. Plans to have lunch outside with her son today. She is feeling better and weakness improving but not quite ready to go home. Review of systems:    Constitutional:  Denies fever or chills . positive for generalized weakness and fatigue- improving  Eyes:  Denies eye pain or redness  HENT:  Denies nasal congestion or sore throat   Respiratory:  Admits to cough, shortness of breath - improving   Cardiovascular:  Denies chest pain or edema   GI:  Denies abdominal pain, nausea, vomiting, bloody stools or diarrhea   :  Denies dysuria or frequency  Musculoskeletal:  Denies acute neck pain or body aches  Integument:  Denies rash or itching  Neurologic:  Denies headache, dizziness, numbness, tingling or unilateral weakness  Psychiatric:  Denies acute depression or acute anxiety     Past medical history, surgical history, family history and social history reviewed and unchanged compared to H&P earlier this admission.     Medications:   Scheduled Meds:   venlafaxine  225 mg Oral Daily with breakfast    enoxaparin  40 mg Subcutaneous Nightly    famotidine  20 mg Oral BID    sodium chloride flush  10 mL Intravenous 2 times per day    budesonide  0.5 mg Nebulization BID    cefTRIAXone (ROCEPHIN) IV  1 g Intravenous Q24H    cetirizine  10 mg Oral Daily    furosemide  20 mg Oral Daily    guaiFENesin  600 mg Oral BID    hydroCHLOROthiazide  25 mg Oral Daily    levalbuterol  1.25 mg Nebulization TID    levothyroxine  125 mcg Oral Daily    methylPREDNISolone  40 mg Intravenous BID    montelukast  10 mg Oral Nightly    Nintedanib Esylate  150 mg Oral BID    Roflumilast  500 mcg Oral Daily     Continuous Infusions:    Objective:     Vital Signs  Temp: 97 °F (36.1 continue to monitor  - sputum culture pending - will follow  - decreased solumedrol to daily dosing  11/02/2020    Mixed anxiety depressive disorder [F41.8]  - continue home regimen  10/28/2017    Essential hypertension [I10]  - BP stable at this time       Gastroesophageal reflux disease [K21.9]  - Continue current regimen  05/14/2015    Interstitial lung disease ([address] [J84.9]  - see above under COPD  01/15/2015    Hypothyroidism [E03.9]  - Continue current regimen      · Yeast infection   - Received diflucan.        The case was discussed with Dr. Selestino Dancer by phone and plan of care reviewed    Electronically signed by OPAL Wilkes on 11/7/2020 at 11:04 AM

## 2020-11-08 VITALS
RESPIRATION RATE: 18 BRPM | DIASTOLIC BLOOD PRESSURE: 80 MMHG | OXYGEN SATURATION: 99 % | TEMPERATURE: 97.6 F | HEART RATE: 84 BPM | SYSTOLIC BLOOD PRESSURE: 127 MMHG

## 2020-11-08 PROCEDURE — 94761 N-INVAS EAR/PLS OXIMETRY MLT: CPT

## 2020-11-08 PROCEDURE — 2580000003 HC RX 258: Performed by: PHYSICIAN ASSISTANT

## 2020-11-08 PROCEDURE — 6360000002 HC RX W HCPCS: Performed by: PHYSICIAN ASSISTANT

## 2020-11-08 PROCEDURE — 6370000000 HC RX 637 (ALT 250 FOR IP): Performed by: PHYSICIAN ASSISTANT

## 2020-11-08 PROCEDURE — 99315 NF DSCHRG MGMT 30 MIN/LESS: CPT | Performed by: INTERNAL MEDICINE

## 2020-11-08 PROCEDURE — 94640 AIRWAY INHALATION TREATMENT: CPT

## 2020-11-08 RX ORDER — MONTELUKAST SODIUM 10 MG/1
10 TABLET ORAL NIGHTLY
Qty: 30 TABLET | Refills: 3 | Status: SHIPPED | OUTPATIENT
Start: 2020-11-08 | End: 2021-03-23 | Stop reason: ALTCHOICE

## 2020-11-08 RX ORDER — PREDNISONE 10 MG/1
TABLET ORAL
Qty: 18 TABLET | Refills: 0 | Status: SHIPPED | OUTPATIENT
Start: 2020-11-09 | End: 2020-12-08 | Stop reason: ALTCHOICE

## 2020-11-08 RX ORDER — FUROSEMIDE 20 MG/1
20 TABLET ORAL 2 TIMES DAILY PRN
Qty: 60 TABLET | Refills: 3 | Status: SHIPPED | OUTPATIENT
Start: 2020-11-08

## 2020-11-08 RX ORDER — GUAIFENESIN 600 MG/1
600 TABLET, EXTENDED RELEASE ORAL 2 TIMES DAILY
Qty: 10 TABLET | Refills: 0 | Status: SHIPPED | OUTPATIENT
Start: 2020-11-08 | End: 2020-11-13

## 2020-11-08 RX ADMIN — HYDROCHLOROTHIAZIDE 25 MG: 25 TABLET ORAL at 08:33

## 2020-11-08 RX ADMIN — FUROSEMIDE 20 MG: 20 TABLET ORAL at 08:33

## 2020-11-08 RX ADMIN — BUDESONIDE 500 MCG: 0.5 SUSPENSION RESPIRATORY (INHALATION) at 06:20

## 2020-11-08 RX ADMIN — GUAIFENESIN 600 MG: 600 TABLET, EXTENDED RELEASE ORAL at 08:48

## 2020-11-08 RX ADMIN — LEVALBUTEROL 1.25 MG: 1.25 SOLUTION RESPIRATORY (INHALATION) at 06:20

## 2020-11-08 RX ADMIN — SODIUM CHLORIDE, PRESERVATIVE FREE 10 ML: 5 INJECTION INTRAVENOUS at 08:48

## 2020-11-08 RX ADMIN — METHYLPREDNISOLONE SODIUM SUCCINATE 40 MG: 40 INJECTION, POWDER, FOR SOLUTION INTRAMUSCULAR; INTRAVENOUS at 08:34

## 2020-11-08 RX ADMIN — LEVOTHYROXINE SODIUM 125 MCG: 0.12 TABLET ORAL at 05:54

## 2020-11-08 RX ADMIN — FAMOTIDINE 20 MG: 20 TABLET ORAL at 08:32

## 2020-11-08 RX ADMIN — CETIRIZINE HYDROCHLORIDE 10 MG: 10 TABLET, FILM COATED ORAL at 08:33

## 2020-11-08 RX ADMIN — ROFLUMILAST 500 MCG: 500 TABLET ORAL at 08:33

## 2020-11-08 RX ADMIN — VENLAFAXINE HYDROCHLORIDE 225 MG: 150 CAPSULE, EXTENDED RELEASE ORAL at 08:33

## 2020-11-08 RX ADMIN — CEFTRIAXONE 1 G: 1 INJECTION, POWDER, FOR SOLUTION INTRAMUSCULAR; INTRAVENOUS at 10:17

## 2020-11-08 ASSESSMENT — PAIN SCALES - GENERAL
PAINLEVEL_OUTOF10: 0

## 2020-11-08 NOTE — FLOWSHEET NOTE
11/07/20 1953   Vital Signs   Temp 97.4 °F (36.3 °C)   Temp Source Oral   Pulse 91   Heart Rate Source Monitor   Resp 20   /82   BP Location Left Arm   BP Upper/Lower Upper   MAP (mmHg) 101   Oxygen Therapy   SpO2 97 %   O2 Device Nasal cannula   O2 Flow Rate (L/min) 3 L/min

## 2020-11-08 NOTE — FLOWSHEET NOTE
Pt resting quietly in bed. Am assessment complete, respirations equal and unlabored, pt on O2 at 3 L via NC. Pt took am medications without difficulty. Pt instructed to call out with any needs or concerns, none at this time. Reviewed plan of care with pt, verbalized understanding. Call bell within pt reach. 11/08/20 0810   Assessment   Charting Type Shift assessment   Neurological   Neuro (WDL) WDL   [place] Coma Scale   Eye Opening 4   Best Verbal Response 5   Best Motor Response 6   [place] Coma Scale Score 15   HEENT   HEENT (WDL) X   Right Eye Impaired vision   Left Eye Impaired vision   Teeth Dentures upper   Respiratory   Respiratory (WDL) X   Respiratory Pattern Regular   Respiratory Depth Normal   Respiratory Quality/Effort Unlabored   Chest Assessment Chest expansion symmetrical   L Breath Sounds Expiratory Wheezes; Diminished   R Breath Sounds Expiratory Wheezes; Diminished   Breath Sounds   Right Upper Lobe Clear   Right Middle Lobe Expiratory Wheezes   Right Lower Lobe Diminished   Left Upper Lobe Expiratory Wheezes   Left Lower Lobe Diminished   Cough/Sputum   Cough Productive   Sputum Amount Small   Sputum Color Green   Tenacity Thick   Cardiac   Cardiac (WDL) WDL   Cardiac Monitor   Telemetry Monitor On No   Gastrointestinal   Abdominal (WDL) WDL   Peripheral Vascular   Peripheral Vascular (WDL) WDL   Edema None   Skin Color/Condition   Skin Color/Condition (WDL) WDL   Skin Integrity   Skin Integrity (WDL) WDL   Musculoskeletal   Musculoskeletal (WDL) WDL   Genitourinary   Genitourinary (WDL) WDL   Urine Assessment   Incontinence No   Psychosocial   Psychosocial (WDL) WDL

## 2020-11-08 NOTE — DISCHARGE SUMMARY
Discharge Summary      Patient ID: Hortensia Hendrix      Patient's PCP: Eli Salamanca MD    Admit Date: 11/5/2020     Discharge Date:  11/8/2020    Admitting Provider: Eli Salamanca MD    Discharging Provider: OPAL Herrera     Reason for this admission:   COPD exacerbation     Discharge Diagnoses: Active Hospital Problems    Diagnosis Date Noted    Yeast infection [B37.9] 11/06/2020    Generalized weakness [R53.1] 11/05/2020    COPD exacerbation ([facility] [J44.1] 11/02/2020    Mixed anxiety depressive disorder [F41.8] 10/28/2017    Essential hypertension [I10]     Interstitial lung disease ([facility] [J84.9] 01/15/2015    Hypothyroidism [E03.9] 04/15/2011       Procedures:  XR CHEST (2 VW)   Final Result   Impression: Diffuse interstitial infiltrates, unchanged. Consults:   IP CONSULT TO CASE MANAGEMENT  IP CONSULT TO CASE MANAGEMENT  PT/OT    Briefly:   Ms. Tammy Vasquez is a 62 yo female with PMH of interstitial lung disease, COPD, hypothyroidism, HTN who was admitted to [facility] bed for ongoing treatment for COPD exacerbation and generalized weakness. Hospital Course:       COPD exacerbation ([facility] [J44.1]  - in setting of chronic ILD/COPD  - Completed course of Zithromax and Rocephin. Treated with IV solumedrol, pulmicort nebs, duonebs. O2 weaned as tolerated. - added daliresp on 11/5  -  incentive spirometer and acapella ordered. Encouraged pulmonary toileting.   - Supplemental O2 weaned from around 5-6L to her baseline 3L O2   - responded well to diuresis with ongoing lasix 20 mg daily and additional dose of iv lasix 20 mg 11/6  - sputum culture normal north   - encourage low sodium diet and watching fluid balance. Continue diuretic as ordered.   - complete steroid taper as outpatient and resume home meds  11/02/2020    Mixed anxiety depressive disorder [F41.8]  - continue home regimen  10/28/2017    Essential hypertension [I10]  - BP stable at this time       Gastroesophageal reflux disease  11/03/2020       RENAL:   Lab Results   Component Value Date     11/03/2020    K 4.3 11/03/2020    K 4.3 05/25/2018     11/03/2020    CO2 27 11/03/2020    BUN 13 11/03/2020    CREATININE 0.7 11/03/2020         Discharge Medications:      Current Discharge Medication List           Details   guaiFENesin (MUCINEX) 600 MG extended release tablet Take 1 tablet by mouth 2 times daily for 5 days  Qty: 10 tablet, Refills: 0      montelukast (SINGULAIR) 10 MG tablet Take 1 tablet by mouth nightly  Qty: 30 tablet, Refills: 3      Roflumilast (DALIRESP) 500 MCG tablet Take 1 tablet by mouth daily  Qty: 30 tablet, Refills: 0    Comments: Eligible for [address] [place] [address], [address] [place] [address], [address]. Pharmacy Help Desk:  [phone].       predniSONE (DELTASONE) 10 MG tablet Take 3 tablets PO daily x 3 days, 2 tablets PO daily x 3 days, 1 tablet PO daily x 3 days, then stop  Qty: 18 tablet, Refills: 0              Details   furosemide (LASIX) 20 MG tablet Take 1 tablet by mouth 2 times daily as needed (worsening swelling, weight up by 3lbs or more)  Qty: 60 tablet, Refills: 3              Details   hydrOXYzine (ATARAX) 10 MG tablet TAKE 1 TABLET BY MOUTH TWICE DAILY AS NEEDED FOR ANXIETY  Qty: 45 tablet, Refills: 2      levothyroxine (SYNTHROID) 125 MCG tablet Take 1 tablet by mouth Daily TAKE 1 TABLET BY MOUTH ONCE DAILY  Qty: 90 tablet, Refills: 2      venlafaxine (EFFEXOR XR) 75 MG extended release capsule TAKE 3 CAPSULES BY MOUTH DAILY  Qty: 270 capsule, Refills: 3    Associated Diagnoses: Depression with anxiety      pantoprazole (PROTONIX) 40 MG tablet Take 1 tablet by mouth once daily  Qty: 90 tablet, Refills: 3      cetirizine (ZYRTEC) 10 MG tablet Take 1 tablet by mouth daily  Qty: 30 tablet, Refills: 3      hydroCHLOROthiazide (HYDRODIURIL) 25 MG tablet take 1 tablet by mouth once daily  Qty: 90 tablet, Refills: 5    Associated Diagnoses: Essential hypertension ipratropium-albuterol (DUONEB) 0.5-2.5 (3) MG/3ML SOLN nebulizer solution Inhale 3 mLs into the lungs every 6 hours as needed for Shortness of Breath  Qty: 360 mL, Refills: 0      Nintedanib Esylate 150 MG CAPS Take by mouth 2 times daily      albuterol sulfate HFA (PROAIR HFA) 108 (90 Base) MCG/ACT inhaler Inhale 2 puffs into the lungs every 6 hours as needed for Wheezing  Qty: 1 Inhaler, Refills: 3      ibuprofen (ADVIL;MOTRIN) 800 MG tablet take 1 tablet by mouth every 8 hours if needed for pain  Refills: 0              Patient was seen and examined by Dr. José Haro and plan of care reviewed. Signed:  Electronically signed by OPAL Torres on 11/8/2020 at 10:18 AM       Thank you Trinidad Maldonado MD for the opportunity to be involved in this patient's care. If you have any questions or concerns please feel free to contact me at [phone].

## 2020-11-10 ENCOUNTER — CARE COORDINATION (OUTPATIENT)
Dept: CARE COORDINATION | Age: 55
End: 2020-11-10

## 2020-11-11 NOTE — CARE COORDINATION
[facility] Transitions Initial Follow Up Call    Call within 2 business days of discharge: Yes     Patient: Shamir Kimball Patient : 1965 MRN: <[id number]>    Last Discharge [facility]       Complaint Diagnosis Description Type Department Provider    20   Admission (Discharged) [address] [place] Lon Cordero MD          RARS: Readmission Risk Score: 7       Spoke with: Attempting HFU call, unsuccessful. Message left with contact information.      Discharge department/facility: [facility]    Non-face-to-face services provided:  Scheduled appointment with PCP-Veronica/ Diana Smith and reviewed discharge summary and/or continuity of care documents    Follow Up  Future Appointments   Date Time Provider Anastacio Fisher   2020 10:30 AM DAVIAN Silva CNP [facility]   2021  9:15 AM Lon Cordero, [address] [place] [address] [facility]       Reid Sanchez RN

## 2020-11-11 NOTE — CARE COORDINATION
[facility] Transitions Initial Follow Up Call    Call within 2 business days of discharge: Yes     Patient: Makenzie Rubi Patient : 1965 MRN: <[id number]>    Last Discharge [facility]       Complaint Diagnosis Description Type Department Provider    20   Admission (Discharged) [address] [place] Adonis Galindo MD          RARS: Readmission Risk Score: 7       Spoke with: Attempting HFU call, unsuccessful. Message left with contact information.      Discharge department/facility: [facility]    Non-face-to-face services provided:  Scheduled appointment with PCP-Veronica/ Colby Herrera and reviewed discharge summary and/or continuity of care documents    Follow Up  Future Appointments   Date Time Provider Anastacio Fisher   2020 10:30 AM DAVIAN Casillas CNP [facility]   2021  9:15 AM Adonis Galindo, [address] [place] [address] [facility]       Deepa Eubanks RN

## 2020-11-12 NOTE — CARE COORDINATION
[facility] Transitions Initial Follow Up Call    Call within 2 business days of discharge: Yes     Patient: Kylah Franco Patient : 1965 MRN: <[id number]>    Last Discharge [facility]       Complaint Diagnosis Description Type Department Provider    20   Admission (Discharged) [address] [place] Siobhan Burgos MD          RARS: Readmission Risk Score: 7       Spoke with: Jhonny Landin states that she is doing very well and \"feels so much better\". Her weight is staying stable. We discussed discharge meds and her follow up appointment.      Discharge department/facility: [facility]    Non-face-to-face services provided:  Scheduled appointment with PCP-Felix Martin and reviewed discharge summary and/or continuity of care documents    Follow Up  Future Appointments   Date Time Provider Anastacio Fisher   2020 10:30 AM DAVIAN Huffman CNP [facility]   2021  9:15 AM Siobhan Burgos, [address] [place], [place] [address]       Gloria Douglass RN

## 2020-11-16 ENCOUNTER — OFFICE VISIT (OUTPATIENT)
Dept: PRIMARY CARE CLINIC | Age: 55
End: 2020-11-16
Payer: MEDICARE

## 2020-11-16 ENCOUNTER — HOSPITAL ENCOUNTER (OUTPATIENT)
Facility: HOSPITAL | Age: 55
Discharge: HOME | End: 2020-11-16
Payer: MEDICARE

## 2020-11-16 VITALS
HEART RATE: 110 BPM | DIASTOLIC BLOOD PRESSURE: 85 MMHG | OXYGEN SATURATION: 92 % | SYSTOLIC BLOOD PRESSURE: 135 MMHG | TEMPERATURE: 97.7 F

## 2020-11-16 LAB
A/G RATIO: 1.7 (ref 0.8–2)
ALBUMIN SERPL-MCNC: 3.8 G/DL (ref 3.4–4.8)
ALP BLD-CCNC: 124 U/L (ref 25–100)
ALT SERPL-CCNC: 33 U/L (ref 4–36)
ANION GAP SERPL CALCULATED.3IONS-SCNC: 11 MMOL/L (ref 3–16)
AST SERPL-CCNC: 19 U/L (ref 8–33)
BILIRUB SERPL-MCNC: <0.2 MG/DL (ref 0.3–1.2)
BUN BLDV-MCNC: 25 MG/DL (ref 6–20)
CALCIUM SERPL-MCNC: 9.2 MG/DL (ref 8.5–10.5)
CHLORIDE BLD-SCNC: 100 MMOL/L (ref 98–107)
CO2: 29 MMOL/L (ref 20–30)
CREAT SERPL-MCNC: 0.8 MG/DL (ref 0.4–1.2)
FOLATE: 6.08 NG/ML
GFR AFRICAN AMERICAN: >59
GFR NON-AFRICAN AMERICAN: >60
GLOBULIN: 2.3 G/DL
GLUCOSE BLD-MCNC: 189 MG/DL (ref 74–106)
HCT VFR BLD CALC: 44.9 % (ref 37–47)
HEMOGLOBIN: 13.7 G/DL (ref 11.5–16.5)
IRON SATURATION: 28 % (ref 15–50)
IRON: 77 UG/DL (ref 37–145)
MCH RBC QN AUTO: 30.1 PG (ref 27–32)
MCHC RBC AUTO-ENTMCNC: 30.5 G/DL (ref 31–35)
MCV RBC AUTO: 98.7 FL (ref 80–100)
PDW BLD-RTO: 12.9 % (ref 11–16)
PLATELET # BLD: 293 K/UL (ref 150–400)
PMV BLD AUTO: 10.4 FL (ref 6–10)
POTASSIUM SERPL-SCNC: 3.7 MMOL/L (ref 3.4–5.1)
RBC # BLD: 4.55 M/UL (ref 3.8–5.8)
SODIUM BLD-SCNC: 140 MMOL/L (ref 136–145)
TOTAL IRON BINDING CAPACITY: 279 UG/DL (ref 250–450)
TOTAL PROTEIN: 6.1 G/DL (ref 6.4–8.3)
TSH SERPL DL<=0.05 MIU/L-ACNC: 2.68 UIU/ML (ref 0.27–4.2)
VITAMIN B-12: 430 PG/ML (ref 211–911)
VITAMIN D 25-HYDROXY: 16.5 (ref 32–100)
WBC # BLD: 13.2 K/UL (ref 4–11)

## 2020-11-16 PROCEDURE — 36415 COLL VENOUS BLD VENIPUNCTURE: CPT

## 2020-11-16 PROCEDURE — 83550 IRON BINDING TEST: CPT

## 2020-11-16 PROCEDURE — 99214 OFFICE O/P EST MOD 30 MIN: CPT | Performed by: NURSE PRACTITIONER

## 2020-11-16 PROCEDURE — 80053 COMPREHEN METABOLIC PANEL: CPT

## 2020-11-16 PROCEDURE — 82746 ASSAY OF FOLIC ACID SERUM: CPT

## 2020-11-16 PROCEDURE — 82306 VITAMIN D 25 HYDROXY: CPT

## 2020-11-16 PROCEDURE — 1111F DSCHRG MED/CURRENT MED MERGE: CPT | Performed by: NURSE PRACTITIONER

## 2020-11-16 PROCEDURE — 83540 ASSAY OF IRON: CPT

## 2020-11-16 PROCEDURE — 82607 VITAMIN B-12: CPT

## 2020-11-16 PROCEDURE — 85027 COMPLETE CBC AUTOMATED: CPT

## 2020-11-16 PROCEDURE — 84443 ASSAY THYROID STIM HORMONE: CPT

## 2020-11-16 RX ORDER — PANTOPRAZOLE SODIUM 40 MG/1
TABLET, DELAYED RELEASE ORAL
Qty: 90 TABLET | Refills: 3 | Status: SHIPPED | OUTPATIENT
Start: 2020-11-16 | End: 2021-10-18

## 2020-11-16 RX ORDER — HYDROCHLOROTHIAZIDE 25 MG/1
TABLET ORAL
Qty: 90 TABLET | Refills: 5 | Status: SHIPPED | OUTPATIENT
Start: 2020-11-16 | End: 2022-01-17

## 2020-11-16 RX ORDER — VENLAFAXINE HYDROCHLORIDE 75 MG/1
CAPSULE, EXTENDED RELEASE ORAL
Qty: 270 CAPSULE | Refills: 3 | Status: SHIPPED | OUTPATIENT
Start: 2020-11-16 | End: 2021-10-18

## 2020-11-16 NOTE — PROGRESS NOTES
Post-Discharge Transitional Care Management Services or Hospital Follow Up      Makenzie Rubi   YOB: 1965    Date of Office Visit:  11/16/2020  Date of Hospital Admission: 11/2/20  Date of Hospital Discharge: 11/8/20 (swing bed 11/5-11/8)  Risk of hospital readmission (high >=14%.  Medium >=10%) :Readmission Risk Score: 7      Care management risk score Rising risk (score 2-5) and Complex Care (Scores >=6): 5     Non face to face  following discharge, date last encounter closed (first attempt may have been earlier): 11/12/2020 10:33 AM    Call initiated 2 business days of discharge: Yes    Patient Active Problem List   Diagnosis    Hypothyroidism    Hyperlipidemia    [facility] disease [facility]    Gastroesophageal reflux disease    COPD with acute exacerbation ([facility]    Essential hypertension    Acute on chronic respiratory failure with hypoxia (HCC)    IgG deficiency ([facility]    Mixed anxiety depressive disorder    Insomnia    Sinus tachycardia    COPD exacerbation (HCC)    Generalized weakness    Yeast infection       No Known Allergies    Medications listed as ordered at the time of discharge from [facility]   [address] Medication Instructions DEVIN:    Printed on:11/16/20 1047   Medication Information                      albuterol sulfate HFA (PROAIR HFA) 108 (90 Base) MCG/ACT inhaler  Inhale 2 puffs into the lungs every 6 hours as needed for Wheezing             cetirizine (ZYRTEC) 10 MG tablet  Take 1 tablet by mouth daily             furosemide (LASIX) 20 MG tablet  Take 1 tablet by mouth 2 times daily as needed (worsening swelling, weight up by 3lbs or more)             hydroCHLOROthiazide (HYDRODIURIL) 25 MG tablet  take 1 tablet by mouth once daily             hydrOXYzine (ATARAX) 10 MG tablet  TAKE 1 TABLET BY MOUTH TWICE DAILY AS NEEDED FOR ANXIETY             ibuprofen (ADVIL;MOTRIN) 800 MG tablet  take 1 tablet by mouth every 8 hours if needed for pain from Last 3 Encounters:   11/02/20 219 lb 8 oz (99.6 kg)   10/20/20 217 lb (98.4 kg)   12/16/19 219 lb 3.2 oz (99.4 kg)     BP Readings from Last 3 Encounters:   11/08/20 127/80   11/05/20 127/77   10/20/20 120/66         Physical Exam:  Constitutional:  Well developed, well nourished, no acute distress. Obese. Eyes:  PERRL, no scleral icterus, conjunctiva normal   HENT:  Atraumatic, external ears normal, nose normal, oropharynx moist, lips dry,  no pharyngeal exudates. Neck- supple, no JVD, no lymphadenopathy  Respiratory: 3L NC. Few scattered faint expiratory wheezing bilaterally and slightly diminished throughout. No respiratory distress, no rales. Cardiovascular:  Normal rate, normal rhythm, no murmurs. Trace BLE edema. GI:  Soft, nondistended, normal bowel sounds, nontender, no voluntary guarding  Musculoskeletal: Finger clubbing present. No cyanosis or obvious acute deformity. Moving all extremities   Integument:  Warm and dry. Neurologic:  Alert & oriented x 3, no apparent focal deficits noted   Psychiatric:  Speech and behavior appropriate      CXR 11/6/20:  Impression    Impression: Diffuse interstitial infiltrates, unchanged.                  Assessment/Plan:  1. Hospital discharge follow-up  For COPD exacerbation. Overall improved and breathing back to baseline. Repeat labs today. Cont home meds. F/u as scheduled. RTC sooner if needed. - COMPREHENSIVE METABOLIC PANEL; Future  - CBC; Future    2. Chronic obstructive pulmonary disease, unspecified COPD type ([address]  Stable. Back to baseline. Continue current regimen. F/u pulmonary as scheduled. 3. Depression with anxiety  Stable. Continue current regimen. - venlafaxine (EFFEXOR XR) 75 MG extended release capsule; TAKE 3 CAPSULES BY MOUTH DAILY  Dispense: 270 capsule; Refill: 3    4. Essential hypertension  Stable. Labs. Refill meds. Low sodium, cardiac diet. Cont home meds. F/u as scheduled.      - COMPREHENSIVE METABOLIC PANEL; Future  - CBC; Future  - hydroCHLOROthiazide (HYDRODIURIL) 25 MG tablet; take 1 tablet by mouth once daily  Dispense: 90 tablet; Refill: 5    5. Interstitial lung disease ([facility]  See above. 6. Fatigue, unspecified type  Labs today, Will follow. Healthy diet, daily multivitamin.     - VITAMIN B12 & FOLATE; Future  - Vitamin D 25 Hydroxy; Future  - IRON AND TIBC;  Future        Medical Decision Making: low complexity

## 2020-11-16 NOTE — PROGRESS NOTES
Chief Complaint   Patient presents with    Follow-Up from Hospital       Have you seen any other physician or provider since your last visit no    Have you had any other diagnostic tests since your last visit? no    Have you changed or stopped any medications since your last visit? no

## 2020-11-16 NOTE — PATIENT INSTRUCTIONS
· Keep a list of your medicines with you. List all of the prescription medicines, nonprescription medicines, supplements, natural remedies, and vitamins that you take. Tell your healthcare providers who treat you about all of the products you are taking. Your provider can provide you with a form to keep track of them. Just ask. · Follow the directions that come with your medicine, including information about food or alcohol. Make sure you know how and when to take your medicine. Do not take more or less than you are supposed to take. · Keep all medicines out of the reach of children. · Store medicines according to the directions on the label. · Monitor yourself. Learn to know how your body reacts to your new medicine and keep track of how it makes you feel before attempting (If your provider has allowed you to do so) to drive or go to work. · Seek emergency medical attention if you think you have used too much of this medicine. An overdose of any prescription medicine can be fatal. Overdose symptoms may include extreme drowsiness, muscle weakness, confusion, cold and clammy skin, pinpoint pupils, shallow breathing, slow heart rate, fainting, or coma. · Don't share prescription medicines with others, even when they seem to have the same symptoms. What may be good for you may be harmful to others. · If you are no longer taking a prescribed medication and you have pills left please take your pills out of their original containers. Mix crushed pills with an undesirable substance, such as cat litter or used coffee grounds. Put the mixture into a disposable container with a lid, such as an empty margarine tub, or into a sealable bag. Cover up or remove any of your personal information on the empty containers by covering it with black permanent marker or duct tape. Place the sealed container with the mixture, and the empty drug containers, in the trash.    · If you use a medication that is in the form of a patch, [place] to help you successfully quit smoking. For the best results, work with your doctor. Together, you can test your lung function and compare the results to those of a nonsmoking person. The results can be given to you as your lung age. Finding out your lung age right after having the test done may help you to stop smoking. Your doctor can also discuss with you all of your options and refer you to smoking-cessation support groups. You may wish to use nicotine replacement (gum, patches, inhaler) or one of the prescription medications that have been shown to increase quit rates and prolong abstinence from smoking. But whatever you and your doctor decide on these matters, it will still be you who decides when an how to quit. Here are some techniques:   Switch Brands   Switch to a brand you find distasteful. Change to a brand that is low in tar and nicotine a couple of weeks before your target quit date. This will help change your smoking behavior. However, do not smoke more cigarettes, inhale them more often or more deeply, or place your fingertips over the holes in the filters. All of these actions will increase your nicotine intake, and the idea is to get your body used to functioning without nicotine. Cut Down the Number of Cigarettes You Smoke   Smoke only half of each cigarette. Each day, postpone the lighting of your first cigarette by one hour. Decide you'll only smoke during odd or even hours of the day. Decide beforehand how many cigarettes you'll smoke during the day. For each additional cigarette, give a dollar to your favorite olegario. Change your eating habits to help you cut down. For example, drink milk, which many people consider incompatible with smoking. End meals or snacks with something that won't lead to a cigarette. Reach for a glass of juice instead of a cigarette for a \"pick-me-up. \"   Remember: Cutting down can help you quit, but it's not a substitute for quitting.  If you're down to about seven cigarettes a day, it's time to set your target quit date, and get ready to stick to it. Don't Smoke \"Automatically\"   Smoke only those cigarettes you really want. Catch yourself before you light up a cigarette out of pure habit. Don't empty your ashtrays. This will remind you of how many cigarettes you've smoked each day, and the sight and the smell of stale cigarettes butts will be very unpleasant. Make yourself aware of each cigarette by using the opposite hand or putting cigarettes in an unfamiliar location or a different pocket to break the automatic reach. If you light up many times during the day without even thinking about it, try to look in a mirror each time you put a match to your cigarette. You may decide you don't need it. Make Smoking Inconvenient   Stop buying cigarettes by the carton. Wait until one pack is empty before you buy another. Stop carrying cigarettes with you at home or at work. Make them difficult to get to. Make Smoking Unpleasant   Smoke only under circumstances that aren't especially pleasurable for you. If you like to smoke with others, smoke alone. Turn your chair to an empty corner and focus only on the cigarette you are smoking and all its many negative effects. Collect all your cigarette butts in one large glass container as a visual reminder of the filth made by smoking. Just Before Quitting   Practice going without cigarettes. Don't think of never smoking again. Think of quitting in terms of one day at a time . Tell yourself you won't smoke today, and then don't. Clean your clothes to rid them of the cigarette smell, which can linger a long time. On the Day You Quit   Throw away all your cigarettes and matches. Hide your lighters and ashtrays. Visit the dentist and have your teeth cleaned to get rid of tobacco stains. Notice how nice they look and resolve to keep them that way.    Make a list of things you'd like to buy for yourself or someone else. Estimate the cost in terms of packs of cigarettes, and put the money aside to buy these presents. Keep very busy on the big day. Go to the movies, exercise, take long walks, or go bike riding. Remind your family and friends that this is your quit date, and ask them to help you over the rough spots of the first couple of days and weeks. Buy yourself a treat or do something special to celebrate. Telephone and Internet Support   Telephone, web-, and computer-based programs can offer you the support that you need to quit and to stay smoke-free. You can find many programs online, like the [employer]'s [place] from Smoking . Immediately After Quitting   Develop a clean, fresh, nonsmoking environment around yourselfat work and at home. Buy yourself flowersyou may be surprised how much you can enjoy their scent now. The first few days after you quit, spend as much free time as possible in places where smoking isn't allowed, such as [address] [place] [address], museums, theaters, department stores, and churches. Drink large quantities of water and fruit juice (but avoid sodas that contain caffeine). Try to avoid alcohol, coffee, and other beverages that you associate with cigarette smoking. Strike up conversation instead of a match for a cigarette. If you miss the sensation of having a cigarette in your hand, play with something elsea pencil, a paper clip, a marble. If you miss having something in your mouth, try toothpicks or a fake cigarette.

## 2020-11-17 RX ORDER — ERGOCALCIFEROL 1.25 MG/1
50000 CAPSULE ORAL WEEKLY
Qty: 12 CAPSULE | Refills: 1 | Status: SHIPPED | OUTPATIENT
Start: 2020-11-17 | End: 2021-02-01

## 2020-11-17 RX ORDER — FOLIC ACID 1 MG/1
1 TABLET ORAL DAILY
Qty: 90 TABLET | Refills: 1 | Status: SHIPPED | OUTPATIENT
Start: 2020-11-17 | End: 2021-04-21

## 2020-12-08 ENCOUNTER — HOSPITAL ENCOUNTER (EMERGENCY)
Facility: HOSPITAL | Age: 55
Discharge: HOME | End: 2020-12-08
Attending: HOSPITALIST
Payer: MEDICARE

## 2020-12-08 ENCOUNTER — APPOINTMENT (OUTPATIENT)
Dept: GENERAL RADIOLOGY | Facility: HOSPITAL | Age: 55
End: 2020-12-08
Payer: MEDICARE

## 2020-12-08 VITALS
BODY MASS INDEX: 35.16 KG/M2 | WEIGHT: 211 LBS | HEIGHT: 65 IN | TEMPERATURE: 98.6 F | DIASTOLIC BLOOD PRESSURE: 84 MMHG | HEART RATE: 94 BPM | SYSTOLIC BLOOD PRESSURE: 129 MMHG | OXYGEN SATURATION: 96 % | RESPIRATION RATE: 14 BRPM

## 2020-12-08 LAB
A/G RATIO: 1.4 (ref 0.8–2)
ALBUMIN SERPL-MCNC: 4.2 G/DL (ref 3.4–4.8)
ALP BLD-CCNC: 171 U/L (ref 25–100)
ALT SERPL-CCNC: 25 U/L (ref 4–36)
ANION GAP SERPL CALCULATED.3IONS-SCNC: 9 MMOL/L (ref 3–16)
AST SERPL-CCNC: 22 U/L (ref 8–33)
BASOPHILS ABSOLUTE: 0 K/UL (ref 0–0.1)
BASOPHILS RELATIVE PERCENT: 0.2 %
BILIRUB SERPL-MCNC: <0.2 MG/DL (ref 0.3–1.2)
BUN BLDV-MCNC: 15 MG/DL (ref 6–20)
CALCIUM SERPL-MCNC: 9.5 MG/DL (ref 8.5–10.5)
CHLORIDE BLD-SCNC: 101 MMOL/L (ref 98–107)
CO2: 29 MMOL/L (ref 20–30)
CREAT SERPL-MCNC: 0.8 MG/DL (ref 0.4–1.2)
D DIMER: 0.46 UG/ML FEU (ref 0–0.6)
EOSINOPHILS ABSOLUTE: 0 K/UL (ref 0–0.4)
EOSINOPHILS RELATIVE PERCENT: 0.1 %
GFR AFRICAN AMERICAN: >59
GFR NON-AFRICAN AMERICAN: >60
GLOBULIN: 3.1 G/DL
GLUCOSE BLD-MCNC: 137 MG/DL (ref 74–106)
HCT VFR BLD CALC: 44.1 % (ref 37–47)
HEMOGLOBIN: 14.2 G/DL (ref 11.5–16.5)
IMMATURE GRANULOCYTES #: 0.1 K/UL
IMMATURE GRANULOCYTES %: 0.8 % (ref 0–5)
LACTIC ACID: 1.8 MMOL/L (ref 0.4–2)
LYMPHOCYTES ABSOLUTE: 1.9 K/UL (ref 1.5–4)
LYMPHOCYTES RELATIVE PERCENT: 20.2 %
MCH RBC QN AUTO: 30.9 PG (ref 27–32)
MCHC RBC AUTO-ENTMCNC: 32.2 G/DL (ref 31–35)
MCV RBC AUTO: 95.9 FL (ref 80–100)
MONOCYTES ABSOLUTE: 0.6 K/UL (ref 0.2–0.8)
MONOCYTES RELATIVE PERCENT: 6 %
NEUTROPHILS ABSOLUTE: 7 K/UL (ref 2–7.5)
NEUTROPHILS RELATIVE PERCENT: 72.7 %
PDW BLD-RTO: 14.8 % (ref 11–16)
PLATELET # BLD: 295 K/UL (ref 150–400)
PMV BLD AUTO: 10.1 FL (ref 6–10)
POTASSIUM REFLEX MAGNESIUM: 3.7 MMOL/L (ref 3.4–5.1)
PRO-BNP: 123 PG/ML (ref 0–1800)
RAPID INFLUENZA  B AGN: NEGATIVE
RAPID INFLUENZA A AGN: NEGATIVE
RBC # BLD: 4.6 M/UL (ref 3.8–5.8)
SARS-COV-2, NAAT: NOT DETECTED
SODIUM BLD-SCNC: 139 MMOL/L (ref 136–145)
TOTAL PROTEIN: 7.3 G/DL (ref 6.4–8.3)
TROPONIN: <0.3 NG/ML
WBC # BLD: 9.6 K/UL (ref 4–11)

## 2020-12-08 PROCEDURE — 80053 COMPREHEN METABOLIC PANEL: CPT

## 2020-12-08 PROCEDURE — 99283 EMERGENCY DEPT VISIT LOW MDM: CPT

## 2020-12-08 PROCEDURE — 84484 ASSAY OF TROPONIN QUANT: CPT

## 2020-12-08 PROCEDURE — 87040 BLOOD CULTURE FOR BACTERIA: CPT

## 2020-12-08 PROCEDURE — 71045 X-RAY EXAM CHEST 1 VIEW: CPT

## 2020-12-08 PROCEDURE — 85025 COMPLETE CBC W/AUTO DIFF WBC: CPT

## 2020-12-08 PROCEDURE — 87804 INFLUENZA ASSAY W/OPTIC: CPT

## 2020-12-08 PROCEDURE — 2580000003 HC RX 258: Performed by: HOSPITALIST

## 2020-12-08 PROCEDURE — 83880 ASSAY OF NATRIURETIC PEPTIDE: CPT

## 2020-12-08 PROCEDURE — 85379 FIBRIN DEGRADATION QUANT: CPT

## 2020-12-08 PROCEDURE — U0002 COVID-19 LAB TEST NON-CDC: HCPCS

## 2020-12-08 PROCEDURE — 36415 COLL VENOUS BLD VENIPUNCTURE: CPT

## 2020-12-08 PROCEDURE — 94640 AIRWAY INHALATION TREATMENT: CPT

## 2020-12-08 PROCEDURE — 96374 THER/PROPH/DIAG INJ IV PUSH: CPT

## 2020-12-08 PROCEDURE — 83605 ASSAY OF LACTIC ACID: CPT

## 2020-12-08 PROCEDURE — 6360000002 HC RX W HCPCS: Performed by: HOSPITALIST

## 2020-12-08 PROCEDURE — 6370000000 HC RX 637 (ALT 250 FOR IP): Performed by: HOSPITALIST

## 2020-12-08 RX ORDER — GUAIFENESIN 600 MG/1
1200 TABLET, EXTENDED RELEASE ORAL 2 TIMES DAILY
Qty: 28 TABLET | Refills: 0 | Status: SHIPPED | OUTPATIENT
Start: 2020-12-08 | End: 2020-12-15

## 2020-12-08 RX ORDER — PREDNISONE 10 MG/1
10 TABLET ORAL DAILY
Qty: 35 TABLET | Refills: 0 | Status: SHIPPED | OUTPATIENT
Start: 2020-12-08 | End: 2020-12-23

## 2020-12-08 RX ORDER — DOXYCYCLINE 100 MG/1
100 CAPSULE ORAL 2 TIMES DAILY
Qty: 20 CAPSULE | Refills: 0 | Status: SHIPPED | OUTPATIENT
Start: 2020-12-08 | End: 2020-12-18

## 2020-12-08 RX ORDER — METHYLPREDNISOLONE SODIUM SUCCINATE 125 MG/2ML
125 INJECTION, POWDER, LYOPHILIZED, FOR SOLUTION INTRAMUSCULAR; INTRAVENOUS ONCE
Status: COMPLETED | OUTPATIENT
Start: 2020-12-08 | End: 2020-12-08

## 2020-12-08 RX ORDER — 0.9 % SODIUM CHLORIDE 0.9 %
500 INTRAVENOUS SOLUTION INTRAVENOUS ONCE
Status: COMPLETED | OUTPATIENT
Start: 2020-12-08 | End: 2020-12-08

## 2020-12-08 RX ORDER — IPRATROPIUM BROMIDE AND ALBUTEROL SULFATE 2.5; .5 MG/3ML; MG/3ML
1 SOLUTION RESPIRATORY (INHALATION) ONCE
Status: COMPLETED | OUTPATIENT
Start: 2020-12-08 | End: 2020-12-08

## 2020-12-08 RX ADMIN — IPRATROPIUM BROMIDE AND ALBUTEROL SULFATE 1 AMPULE: .5; 3 SOLUTION RESPIRATORY (INHALATION) at 16:32

## 2020-12-08 RX ADMIN — METHYLPREDNISOLONE SODIUM SUCCINATE 125 MG: 125 INJECTION, POWDER, FOR SOLUTION INTRAMUSCULAR; INTRAVENOUS at 16:03

## 2020-12-08 RX ADMIN — SODIUM CHLORIDE 500 ML: 9 INJECTION, SOLUTION INTRAVENOUS at 16:03

## 2020-12-08 NOTE — ED TRIAGE NOTES
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 Has been in contact with a positive covid.

## 2020-12-08 NOTE — ED PROVIDER NOTES
[address] ENCOUNTER      Pt Name: Sasha Ace  MRN: [id number]  YOB: 1965  Date of evaluation: 12/8/2020  Provider: Leesa Leblanc, [address] [place] [address]       Chief Complaint   Patient presents with    Cough         HISTORY OF PRESENT ILLNESS  (Location/Symptom, Timing/Onset, Context/Setting, Quality, Duration, Modifying Factors, Severity.)   Sasha Ace is a 63 y.o. female who presents to the emergency department for cough. Patient states that she was admitted here back on November 5 and discharged on the eighth with a diagnosis of pneumonia. Patient states about a couple weeks after being discharged she started feeling \"bad again\". Patient has a history of pulmonary fibrosis and she does follow with pulmonology at the [facility] [address] Patient states that she was on antibiotics and a steroid taper after her discharge. Positive sick contacts at home. Patient states her grandchild has sinus infection but has had fevers with this also. Patient states that she unfortunately picks up about anything when someone is sick. Patient states that she has had increased shortness of breath. Especially she gets up with exertion. Patient is on 3 L nasal cannula at home continuously. Patient states that she has noticed that her oxygen saturation has dropping with exertion. Patient states she has not had a nebulizer since yesterday. Patient advised that she does feels very tight. She denies any chest pain. Denies any fevers or chills. Denies any sore throat. Denies any body aches. Denies any nausea vomiting or diarrhea. Denies any abdominal pain. Patient denies any numbness tingling or weakness of the ordinary. Denies any lightheadedness or dizziness. Denies any syncopal or presyncopal episodes. Denies any sensation of palpitations. Nursing notes were reviewed.     REVIEW OFSYSTEMS    (2-9 systems for level 4, 10 or more for level 5)   ROS:  General:  No fevers, no chills, no weakness  Cardiovascular:  No chest pain, no palpitations  Respiratory:  + shortness of breath, +cough, no wheezing  Gastrointestinal:  No pain, no nausea, no vomiting, no diarrhea  Musculoskeletal:  No muscle pain, no joint pain  Skin:  No rash, no easy bruising  Neurologic:  No speech problems, no headache, no extremity weakness  Psychiatric:  No anxiety  Genitourinary:  No dysuria, no hematuria    Except as noted above the remainder of the review of systems was reviewed and negative.        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     Hyperlipidemia     Hypertension     Hypothyroidism     Interstitial lung disease ([address]     Lung fibrosis [facility] [address]     Lung nodules     bilateral lungs         SURGICAL HISTORY       Past Surgical History:   Procedure Laterality Date     SECTION      GALLBLADDER SURGERY      HYSTERECTOMY           CURRENT MEDICATIONS       Previous Medications    ALBUTEROL SULFATE HFA (PROAIR HFA) 108 (90 BASE) MCG/ACT INHALER    Inhale 2 puffs into the lungs every 6 hours as needed for Wheezing    CETIRIZINE (ZYRTEC) 10 MG TABLET    Take 1 tablet by mouth daily    FOLIC ACID (FOLVITE) 1 MG TABLET    Take 1 tablet by mouth daily    FUROSEMIDE (LASIX) 20 MG TABLET    Take 1 tablet by mouth 2 times daily as needed (worsening swelling, weight up by 3lbs or more)    HYDROCHLOROTHIAZIDE (HYDRODIURIL) 25 MG TABLET    take 1 tablet by mouth once daily    HYDROXYZINE (ATARAX) 10 MG TABLET    TAKE 1 TABLET BY MOUTH TWICE DAILY AS NEEDED FOR ANXIETY    IBUPROFEN (ADVIL;MOTRIN) 800 MG TABLET    take 1 tablet by mouth every 8 hours if needed for pain    IPRATROPIUM-ALBUTEROL (DUONEB) 0.5-2.5 (3) MG/3ML SOLN NEBULIZER SOLUTION Inhale 3 mLs into the lungs every 6 hours as needed for Shortness of Breath    LEVOTHYROXINE (SYNTHROID) 125 MCG TABLET    Take 1 tablet by mouth Daily TAKE 1 TABLET BY MOUTH ONCE DAILY    MONTELUKAST (SINGULAIR) 10 MG TABLET    Take 1 tablet by mouth nightly    NINTEDANIB ESYLATE 150 MG CAPS    Take by mouth 2 times daily    PANTOPRAZOLE (PROTONIX) 40 MG TABLET    Take 1 tablet by mouth once daily    ROFLUMILAST (DALIRESP) 500 MCG TABLET    Take 1 tablet by mouth daily    VENLAFAXINE (EFFEXOR XR) 75 MG EXTENDED RELEASE CAPSULE    TAKE 3 CAPSULES BY MOUTH DAILY    VITAMIN D (ERGOCALCIFEROL) 1.25 MG (39352 UT) CAPS CAPSULE    Take 1 capsule by mouth once a week       ALLERGIES     Patient has no known allergies. FAMILY HISTORY       Family History   Problem Relation Age of Onset    Cancer Mother         colon    Cancer Father         bladder    High Blood Pressure Father     Stroke Father     High Blood Pressure Sister           SOCIAL HISTORY       Social History     Socioeconomic History    Marital status:       Spouse name: None    Number of children: None    Years of education: None    Highest education level: None   Occupational History    None   Social Needs    Financial resource strain: None    Food insecurity     Worry: None     Inability: None    Transportation needs     Medical: None     Non-medical: None   Tobacco Use    Smoking status: Former Smoker     Packs/day: 1.00     Years: 30.00     Pack years: 30.00     Last attempt to quit: 2007     Years since quittin.9    Smokeless tobacco: Never Used   Substance and Sexual Activity    Alcohol use: No    Drug use: No    Sexual activity: None   Lifestyle    Physical activity     Days per week: None     Minutes per session: None    Stress: None   Relationships    Social connections     Talks on phone: None     Gets together: None     Attends Jewish service: None     Active member of club or organization: None Attends meetings of clubs or organizations: None     Relationship status: None    Intimate partner violence     Fear of current or ex partner: None     Emotionally abused: None     Physically abused: None     Forced sexual activity: None   Other Topics Concern    None   Social History Narrative    None         PHYSICAL EXAM    (up to 7 for level 4, 8 or more for level 5)     ED Triage Vitals [12/08/20 1510]   BP Temp Temp src Pulse Resp SpO2 Height Weight   124/61 -- -- 117 24 97 % 5' 5\" (1.651 m) 211 lb (95.7 kg)       Physical Exam  General :Patient is awake, alert, oriented, in no acute distress, nontoxic appearing  HEENT: Pupils are equally round and reactive to light, EOMI, conjunctivae clear. Oral mucosa is moist, no exudate. Uvula is midline  Cardiac: Heart regular rate, rhythm, no murmurs, rubs, or gallops  Lungs: Creased air movement throughout all lung fields. She does have a end expiratory crackle noted consistent with her history of pulmonary fibrosis, no rhonchi or wheezing noted at this time. . There is no use of accessory muscles. Abdomen: Abdomen is soft, nontender, nondistended. There is no firm or pulsatile masses, no rebound rigidity or guarding. Musculoskeletal: 5 out of 5 strength in all 4 extremities. No focal muscle deficits are appreciated  Neuro: Motor intact, sensory intact, level of consciousness is normal  Dermatology: Skin is warm and dry  Psych: Mentation is grossly normal, cognition is grossly normal. Affect is appropriate. DIAGNOSTIC RESULTS     EKG: All EKG's are interpreted by the Emergency Department Physician who either signs or Co-signs this chart in the [address] a cardiologist.    The EKG interpreted by me shows sinus rhythm. Low voltage QRS in precordial leads. Ventricular rates 99 bpm, PA interval is 146 ms, QRS duration is 92 ms, QT/QTc is 334/429 ms. No acute T wave inversions concerning for acute myocardial ischemia.   No ST elevations concerning for acute myocardial infarction. RADIOLOGY:   Non-plain film images such as CT, Ultrasound and MRI are read by the radiologist. Plain radiographic images are visualized and preliminarily interpreted by the emergency physician with the below findings:      ? Radiologist's Report Reviewed:  XR CHEST PORTABLE   Final Result      1. Stable chest with mild use hazy density and interstitial prominence.                   ED BEDSIDE ULTRASOUND:   Performed by ED Physician - none    LABS:    I have reviewed and interpreted all of the currently available lab results from this visit (ifapplicable):  Results for orders placed or performed during the hospital encounter of 12/08/20   Rapid Influenza A/B Antigens    Specimen: Nasopharyngeal   Result Value Ref Range    Rapid Influenza A Ag Negative Negative    Rapid Influenza B Ag Negative Negative   CBC Auto Differential   Result Value Ref Range    WBC 9.6 4.0 - 11.0 K/uL    RBC 4.60 3.80 - 5.80 M/uL    Hemoglobin 14.2 11.5 - 16.5 g/dL    Hematocrit 44.1 37.0 - 47.0 %    MCV 95.9 80.0 - 100.0 fL    MCH 30.9 27.0 - 32.0 pg    MCHC 32.2 31.0 - 35.0 g/dL    RDW 14.8 11.0 - 16.0 %    Platelets 024 478 - 530 K/uL    MPV 10.1 (H) 6.0 - 10.0 fL    Neutrophils % 72.7 %    Immature Granulocytes % 0.8 0.0 - 5.0 %    Lymphocytes % 20.2 %    Monocytes % 6.0 %    Eosinophils % 0.1 %    Basophils % 0.2 %    Neutrophils Absolute 7.0 2.0 - 7.5 K/uL    Immature Granulocytes # 0.1 K/uL    Lymphocytes Absolute 1.9 1.5 - 4.0 K/uL    Monocytes Absolute 0.6 0.2 - 0.8 K/uL    Eosinophils Absolute 0.0 0.0 - 0.4 K/uL    Basophils Absolute 0.0 0.0 - 0.1 K/uL   Comprehensive Metabolic Panel w/ Reflex to MG   Result Value Ref Range    Sodium 139 136 - 145 mmol/L    Potassium reflex Magnesium 3.7 3.4 - 5.1 mmol/L    Chloride 101 98 - 107 mmol/L    CO2 29 20 - 30 mmol/L    Anion Gap 9 3 - 16    Glucose 137 (H) 74 - 106 mg/dL    BUN 15 6 - 20 mg/dL    CREATININE 0.8 0.4 - 1.2 mg/dL    GFR Non-African American >60 >59 GFR African American >59 >59    Calcium 9.5 8.5 - 10.5 mg/dL    Total Protein 7.3 6.4 - 8.3 g/dL    Alb 4.2 3.4 - 4.8 g/dL    Albumin/Globulin Ratio 1.4 0.8 - 2.0    Total Bilirubin <0.2 (L) 0.3 - 1.2 mg/dL    Alkaline Phosphatase 171 (H) 25 - 100 U/L    ALT 25 4 - 36 U/L    AST 22 8 - 33 U/L    Globulin 3.1 g/dL   Troponin   Result Value Ref Range    Troponin <0.30 <0.30 ng/mL   Brain Natriuretic Peptide   Result Value Ref Range    Pro- 0 - 1,800 pg/mL   Lactic Acid, Plasma   Result Value Ref Range    Lactic Acid 1.8 0.4 - 2.0 mmol/L   D-Dimer, Quantitative   Result Value Ref Range    D-Dimer, Quant 0.46 0.00 - 0.60 ug/mL FEU   COVID-19   Result Value Ref Range    SARS-CoV-2, NAAT Not Detected Not Detected        All other labs were within normal range or not returned as of this dictation. EMERGENCY DEPARTMENT COURSE and DIFFERENTIAL DIAGNOSIS/MDM:   Vitals:    Vitals:    12/08/20 1510   BP: 124/61   Pulse: 117   Resp: 24   SpO2: 97%   Weight: 211 lb (95.7 kg)   Height: 5' 5\" (1.651 m)       MEDICATIONS ADMINISTERED IN ED:  Medications   0.9 % sodium chloride bolus (500 mLs Intravenous New Bag 12/8/20 1603)   methylPREDNISolone sodium (SOLU-MEDROL) injection 125 mg (125 mg Intravenous Given 12/8/20 1603)   ipratropium-albuterol (DUONEB) nebulizer solution 1 ampule (1 ampule Inhalation Given 12/8/20 1632)       After initial evaluation and examination I did have a conversation with the patient about the upcoming plan, treatment possible disposition which they were agreeable to at the time of this dictation. Patient advised we give her some Solu-Medrol 125 mg IV. Patient also be given a small fluid bolus of 500 mL. Patient with portable chest radiograph performed to rule out any pneumonic process. Patient will have rapid influenza and a rapid COVID-19 test performed here.   We are performing a rapid Covid test because I do believe the patient would benefit from a nebulizer but we cannot give this to her unless she test negative on her rapid test.  Patient will have CBC, CMP, troponin, BNP, lactic acid, D-dimer, blood culture x1 and a twelve-lead EKG performed. Patient's final disposition will be determined once her radiological diagnostic studies been performed reviewed. Patient was witnessed to drop down to 82 on her pulse ox with her 3 L nasal cannula when she got up to go to the restroom at the bedside commode. After she finished she came immediately back up to 95%. Influenza swab was negative    Covid screen was not detected. Blood work showed white count 9600, hemoglobin is 14.2, hematocrit 44.1, platelet count 447. Lactic acid was negative at 1.8. D-dimer was negative at 0.46. Apprehensive metabolic panel was benign except for glucose 137 mildly elevated, alkaline phosphatase 171 also mildly elevated. proBNP was negative at 123. Troponin was nondetectable less than 0.30. Portable chest radiograph read by radiology as stable chest with mild hazy density in interstitial prominence    Patient's radiological diagnostic studies were discussed with her she does state her understanding. Patient advised that her findings on blood work, swabs and chest radiograph. Number Seshan with the patient she would prefer to be discharged home. She states that she feels comfortable with turning her oxygen up as her knee demands especially with exertion. Patient advised placed on a little doxycycline for some antibiotic coverage along with a long 15-day steroid taper and probably place her on some Mucinex since her cough is productive to see if this helps continue with keeping the cough productive and getting stuff up. Patient was advised that she does need to use her nebulizers at home like they are prescribed not when she just feels like she needs them. She states her understanding of this. Otherwise patient be discharged home in stable condition.     The patient advised that she does need to

## 2020-12-08 NOTE — PROGRESS NOTES
Patient came to ER today with c/o cough and SOB.  States that she feels like she may have pneumonia again and hasn't \"felt the best since her previous stay in the hospital\" states the only person she has been around that is sick is her son and states \"I know my son and he doesn't have covid\"

## 2020-12-08 NOTE — ED NOTES
Pt left ED ambulatory with belongings at this time. Pt verbalized understanding of discharge instructions and prescriptions.       Dick Tejada RN  12/08/20 5135

## 2020-12-13 LAB — BLOOD CULTURE, ROUTINE: NORMAL

## 2020-12-22 ENCOUNTER — PREP FOR SURGERY (OUTPATIENT)
Dept: OTHER | Facility: HOSPITAL | Age: 55
End: 2020-12-22

## 2020-12-22 DIAGNOSIS — H25.11 NUCLEAR SCLEROTIC CATARACT OF RIGHT EYE: Primary | ICD-10-CM

## 2020-12-22 DIAGNOSIS — Z11.59 SPECIAL SCREENING EXAMINATION FOR UNSPECIFIED VIRAL DISEASE: Primary | ICD-10-CM

## 2020-12-22 RX ORDER — TETRACAINE HYDROCHLORIDE 5 MG/ML
1 SOLUTION OPHTHALMIC SEE ADMIN INSTRUCTIONS
Status: CANCELLED | OUTPATIENT
Start: 2021-01-04

## 2020-12-22 RX ORDER — PREDNISOLONE ACETATE 10 MG/ML
1 SUSPENSION/ DROPS OPHTHALMIC SEE ADMIN INSTRUCTIONS
Status: CANCELLED | OUTPATIENT
Start: 2021-01-04

## 2020-12-22 RX ORDER — SODIUM CHLORIDE 0.9 % (FLUSH) 0.9 %
3 SYRINGE (ML) INJECTION EVERY 12 HOURS SCHEDULED
Status: CANCELLED | OUTPATIENT
Start: 2021-01-04

## 2020-12-22 RX ORDER — SODIUM CHLORIDE 0.9 % (FLUSH) 0.9 %
1-10 SYRINGE (ML) INJECTION AS NEEDED
Status: CANCELLED | OUTPATIENT
Start: 2021-01-04

## 2020-12-22 RX ORDER — CYCLOPENT/TROPIC/PHEN/KETR/WAT 1%-1%-2.5%
1 DROPS (EA) OPHTHALMIC (EYE)
Status: CANCELLED | OUTPATIENT
Start: 2021-01-04 | End: 2021-01-04

## 2020-12-28 RX ORDER — FOLIC ACID 1 MG/1
1 TABLET ORAL DAILY
COMMUNITY

## 2020-12-28 RX ORDER — NAPROXEN SODIUM 220 MG
220 TABLET ORAL 2 TIMES DAILY PRN
COMMUNITY
End: 2023-01-06

## 2020-12-28 RX ORDER — LEVOTHYROXINE SODIUM 0.15 MG/1
150 TABLET ORAL DAILY
COMMUNITY

## 2020-12-28 RX ORDER — CHOLECALCIFEROL (VITAMIN D3) 1250 MCG
50000 CAPSULE ORAL
COMMUNITY
End: 2023-01-06

## 2020-12-28 RX ORDER — MONTELUKAST SODIUM 10 MG/1
10 TABLET ORAL NIGHTLY
COMMUNITY
End: 2023-01-06

## 2020-12-28 RX ORDER — VENLAFAXINE 75 MG/1
225 TABLET ORAL DAILY
COMMUNITY

## 2020-12-28 RX ORDER — HYDROXYZINE HYDROCHLORIDE 10 MG/1
10 TABLET, FILM COATED ORAL 2 TIMES DAILY
COMMUNITY

## 2020-12-28 RX ORDER — NINTEDANIB 150 MG/1
150 CAPSULE ORAL 2 TIMES DAILY
COMMUNITY

## 2020-12-28 RX ORDER — HYDROCHLOROTHIAZIDE 25 MG/1
25 TABLET ORAL DAILY
COMMUNITY

## 2020-12-28 RX ORDER — FUROSEMIDE 20 MG/1
20 TABLET ORAL DAILY PRN
COMMUNITY

## 2020-12-30 ENCOUNTER — HOSPITAL ENCOUNTER (OUTPATIENT)
Facility: HOSPITAL | Age: 55
Discharge: HOME | End: 2020-12-30
Payer: MEDICARE

## 2020-12-30 LAB
ALBUMIN SERPL-MCNC: 4.3 G/DL (ref 3.4–4.8)
ALP BLD-CCNC: 162 U/L (ref 25–100)
ALT SERPL-CCNC: 26 U/L (ref 4–36)
AST SERPL-CCNC: 24 U/L (ref 8–33)
BILIRUB SERPL-MCNC: <0.2 MG/DL (ref 0.3–1.2)
BILIRUBIN DIRECT: <0.2 MG/DL (ref 0–0.2)
BILIRUBIN, INDIRECT: ABNORMAL MG/DL (ref 0.2–0.8)
TOTAL PROTEIN: 7.1 G/DL (ref 6.4–8.3)

## 2020-12-30 PROCEDURE — 80076 HEPATIC FUNCTION PANEL: CPT

## 2020-12-30 PROCEDURE — 36415 COLL VENOUS BLD VENIPUNCTURE: CPT

## 2020-12-31 ENCOUNTER — LAB (OUTPATIENT)
Dept: LAB | Facility: HOSPITAL | Age: 55
End: 2020-12-31

## 2020-12-31 ENCOUNTER — APPOINTMENT (OUTPATIENT)
Dept: LAB | Facility: HOSPITAL | Age: 55
End: 2020-12-31

## 2020-12-31 DIAGNOSIS — Z11.59 SPECIAL SCREENING EXAMINATION FOR UNSPECIFIED VIRAL DISEASE: ICD-10-CM

## 2020-12-31 PROCEDURE — U0004 COV-19 TEST NON-CDC HGH THRU: HCPCS

## 2020-12-31 PROCEDURE — C9803 HOPD COVID-19 SPEC COLLECT: HCPCS

## 2021-01-01 LAB — SARS-COV-2 RNA RESP QL NAA+PROBE: NOT DETECTED

## 2021-01-04 ENCOUNTER — HOSPITAL ENCOUNTER (OUTPATIENT)
Facility: HOSPITAL | Age: 56
Setting detail: HOSPITAL OUTPATIENT SURGERY
Discharge: HOME | End: 2021-01-04
Attending: OPHTHALMOLOGY | Admitting: OPHTHALMOLOGY

## 2021-01-04 ENCOUNTER — ANESTHESIA (OUTPATIENT)
Dept: PERIOP | Facility: HOSPITAL | Age: 56
End: 2021-01-04

## 2021-01-04 ENCOUNTER — ANESTHESIA EVENT (OUTPATIENT)
Dept: PERIOP | Facility: HOSPITAL | Age: 56
End: 2021-01-04

## 2021-01-04 VITALS
TEMPERATURE: 98.6 F | SYSTOLIC BLOOD PRESSURE: 104 MMHG | DIASTOLIC BLOOD PRESSURE: 69 MMHG | HEART RATE: 90 BPM | BODY MASS INDEX: 35.16 KG/M2 | OXYGEN SATURATION: 96 % | RESPIRATION RATE: 18 BRPM | HEIGHT: 65 IN | WEIGHT: 211 LBS

## 2021-01-04 DIAGNOSIS — H25.11 NUCLEAR SCLEROTIC CATARACT OF RIGHT EYE: ICD-10-CM

## 2021-01-04 PROCEDURE — 25010000002 PROPOFOL 200 MG/20ML EMULSION: Performed by: NURSE ANESTHETIST, CERTIFIED REGISTERED

## 2021-01-04 PROCEDURE — V2632 POST CHMBR INTRAOCULAR LENS: HCPCS | Performed by: OPHTHALMOLOGY

## 2021-01-04 DEVICE — LENS ACRYSOF IQ SA60WF W/ULTRASERT 6X13MM ACU0T0 20.5: Type: IMPLANTABLE DEVICE | Site: POSTERIOR CHAMBER | Status: FUNCTIONAL

## 2021-01-04 RX ORDER — PROPOFOL 10 MG/ML
INJECTION, EMULSION INTRAVENOUS AS NEEDED
Status: DISCONTINUED | OUTPATIENT
Start: 2021-01-04 | End: 2021-01-04 | Stop reason: SURG

## 2021-01-04 RX ORDER — SODIUM CHLORIDE 0.9 % (FLUSH) 0.9 %
1-10 SYRINGE (ML) INJECTION AS NEEDED
Status: DISCONTINUED | OUTPATIENT
Start: 2021-01-04 | End: 2021-01-04 | Stop reason: HOSPADM

## 2021-01-04 RX ORDER — TETRACAINE HYDROCHLORIDE 5 MG/ML
1 SOLUTION OPHTHALMIC SEE ADMIN INSTRUCTIONS
Status: COMPLETED | OUTPATIENT
Start: 2021-01-04 | End: 2021-01-04

## 2021-01-04 RX ORDER — BALANCED SALT SOLUTION 6.4; .75; .48; .3; 3.9; 1.7 MG/ML; MG/ML; MG/ML; MG/ML; MG/ML; MG/ML
SOLUTION OPHTHALMIC AS NEEDED
Status: DISCONTINUED | OUTPATIENT
Start: 2021-01-04 | End: 2021-01-04 | Stop reason: HOSPADM

## 2021-01-04 RX ORDER — LIDOCAINE HYDROCHLORIDE 40 MG/ML
INJECTION, SOLUTION RETROBULBAR; TOPICAL AS NEEDED
Status: DISCONTINUED | OUTPATIENT
Start: 2021-01-04 | End: 2021-01-04 | Stop reason: HOSPADM

## 2021-01-04 RX ORDER — ACETAZOLAMIDE 500 MG/1
500 CAPSULE, EXTENDED RELEASE ORAL ONCE
Status: COMPLETED | OUTPATIENT
Start: 2021-01-04 | End: 2021-01-04

## 2021-01-04 RX ORDER — CYCLOPENT/TROPIC/PHEN/KETR/WAT 1%-1%-2.5%
1 DROPS (EA) OPHTHALMIC (EYE)
Status: COMPLETED | OUTPATIENT
Start: 2021-01-04 | End: 2021-01-04

## 2021-01-04 RX ORDER — KETAMINE HYDROCHLORIDE 50 MG/ML
INJECTION, SOLUTION, CONCENTRATE INTRAMUSCULAR; INTRAVENOUS AS NEEDED
Status: DISCONTINUED | OUTPATIENT
Start: 2021-01-04 | End: 2021-01-04 | Stop reason: SURG

## 2021-01-04 RX ORDER — PREDNISOLONE ACETATE 10 MG/ML
SUSPENSION/ DROPS OPHTHALMIC AS NEEDED
Status: DISCONTINUED | OUTPATIENT
Start: 2021-01-04 | End: 2021-01-04 | Stop reason: HOSPADM

## 2021-01-04 RX ORDER — PREDNISOLONE ACETATE 10 MG/ML
1 SUSPENSION/ DROPS OPHTHALMIC SEE ADMIN INSTRUCTIONS
Status: DISCONTINUED | OUTPATIENT
Start: 2021-01-04 | End: 2021-01-04 | Stop reason: HOSPADM

## 2021-01-04 RX ORDER — SODIUM CHLORIDE 0.9 % (FLUSH) 0.9 %
3 SYRINGE (ML) INJECTION EVERY 12 HOURS SCHEDULED
Status: DISCONTINUED | OUTPATIENT
Start: 2021-01-04 | End: 2021-01-04 | Stop reason: HOSPADM

## 2021-01-04 RX ORDER — TETRACAINE HYDROCHLORIDE 5 MG/ML
SOLUTION OPHTHALMIC AS NEEDED
Status: DISCONTINUED | OUTPATIENT
Start: 2021-01-04 | End: 2021-01-04 | Stop reason: HOSPADM

## 2021-01-04 RX ORDER — SODIUM CHLORIDE, SODIUM LACTATE, POTASSIUM CHLORIDE, CALCIUM CHLORIDE 600; 310; 30; 20 MG/100ML; MG/100ML; MG/100ML; MG/100ML
1000 INJECTION, SOLUTION INTRAVENOUS CONTINUOUS
Status: DISCONTINUED | OUTPATIENT
Start: 2021-01-04 | End: 2021-01-04 | Stop reason: HOSPADM

## 2021-01-04 RX ADMIN — PROPOFOL 50 MG: 10 INJECTION, EMULSION INTRAVENOUS at 12:25

## 2021-01-04 RX ADMIN — Medication 1 DROP: at 10:58

## 2021-01-04 RX ADMIN — TETRACAINE HYDROCHLORIDE 1 DROP: 5 SOLUTION OPHTHALMIC at 10:51

## 2021-01-04 RX ADMIN — Medication 1 DROP: at 10:53

## 2021-01-04 RX ADMIN — ACETAZOLAMIDE 500 MG: 500 CAPSULE, EXTENDED RELEASE ORAL at 12:52

## 2021-01-04 RX ADMIN — SODIUM CHLORIDE, POTASSIUM CHLORIDE, SODIUM LACTATE AND CALCIUM CHLORIDE 1000 ML: 600; 310; 30; 20 INJECTION, SOLUTION INTRAVENOUS at 10:59

## 2021-01-04 RX ADMIN — TETRACAINE HYDROCHLORIDE 1 DROP: 5 SOLUTION OPHTHALMIC at 10:52

## 2021-01-04 RX ADMIN — Medication 1 DROP: at 11:03

## 2021-01-04 RX ADMIN — PROPOFOL 50 MG: 10 INJECTION, EMULSION INTRAVENOUS at 12:31

## 2021-01-04 RX ADMIN — KETAMINE HYDROCHLORIDE 25 MG: 50 INJECTION, SOLUTION INTRAMUSCULAR; INTRAVENOUS at 12:25

## 2021-01-04 NOTE — OP NOTE
OPERATIVE NOTE    Date of Procedure: 1/4/2021  Patient Name: Renetta Ortega MRN: [id number]  YOB: 1965     Preoperative Diagnosis: Right nuclear sclerotic cataract.     Postoperative Diagnosis: Right nuclear sclerotic cataract.     Procedure Performed: Phacoemulsification with implantation of a  foldable posterior chamber intraocular lens, Right eye.     Surgeon: Sohan Rojas MD     Anesthesia:  Monitored Anesthesia Care (MAC)      Brief History and Indication: The patient presents with a history of past progressive loss of vision.  Patient was diagnosed with cataract and requests removal for increased ability to read and see.     Operation Description: The patient was taken to the OR and prepped and draped in the usual sterile ophthalmic fashion. A lid speculum was placed in the eye.  A #75 blade was then used to make a stab incision two o’clock hours from the intended temporal clear cornea groove. The anterior chamber was then inflated with a Viscoelastic. A metal microkeratome blade was then used to enter the anterior chamber at the temporal clear cornea site. A three level tunnel incision was made. A curvilinear capsulorrhexis was then performed with a bent cystotome needle and capsulorrhexis forceps.  BSS on a 30 gauge bent cannula was used to hydro-dissect, and hydro-delineate the lens. Good fluid waves and hydro-delineation were noted. Phacoemulsification was then used to remove nuclear material without complications. The residual cortical and lenticular material was then removed with irrigation and aspiration. Viscoelastics were then used to inflate the bag in a soft shell technique. A PCIOL was injected into the bag. Post-implantation, there were no rents or tears in the bag and the lens was noted to be stable and centered. The residual Viscoelastic was then removed with irrigation and aspiration.  The wound was checked and found to be without leaks. Therefore a Polydex ointment  and one drop of a Prednisilone eye drop was placed in the eye.     Implant Information:   Implant Name Type Inv. Item Serial No.  Lot No. LRB No. Used Action   LENS ACRYSOF IQ [id number] W/ULTRASERT [id number] ACU0T0 20.5 - [id number] 087 - [id number] Implant LENS ACRYSOF IQ [id number] W/ULTRASERT [id number] ACU0T0 20.5 [id number] MARIAJOSE  Right 1 Implanted       Complications: None     []   Changed to complex procedure due to: []  hypermature, white cataract. Blue dye was used. []   small iris. A Malyugin Ring was used.    Discharge and Condition  The patient was transported to same day surgery in excellent condition and scheduled for follow-up tomorrow morning. The patient was given instructions on use of eye drops for the operative eye and was specifically instructed to call Dr. Rojas at his office or home for any nausea, vomiting, headache, decreased visual acuity, or pain, or if the patient had any general concerns.    Sohan Rojas MD  1/4/2021

## 2021-01-04 NOTE — H&P
"      [facility] [place] [place] [facility]         History and Physical    Patient Name: Renetta Leija  MRN: [id number]  : 1965  Gender: female     HPI: Patient complaint of PPLOV Right eye diagnosed with cataract. Patient requests PHACO PCIOL for Increase of VA/ADL.    History:    Past Medical History:   Diagnosis Date   • Anxiety and depression    • Body piercing     both ears   • Cataract, bilateral    • Disease of thyroid gland    • Oxygen dependent     3L/NC continuous.  titrates with activity.    • Pneumonia    • Pulmonary fibrosis (CMS/HCC)    • Wears dentures     upper plate only   • Wears glasses        Past Surgical History:   Procedure Laterality Date   • APPENDECTOMY     • BREAST BIOPSY Left     benign   • CARDIAC CATHETERIZATION  2019    \"everything was clear\" per pt report   • COLONOSCOPY     • LAPAROSCOPIC CHOLECYSTECTOMY     • WISDOM TOOTH EXTRACTION         Social History     Socioeconomic History   • Marital status: Single     Spouse name: Not on file   • Number of children: Not on file   • Years of education: Not on file   • Highest education level: Not on file   Tobacco Use   • Smoking status: Former Smoker     Packs/day: 1.00     Years: 30.00     Pack years: 30.00     Types: Cigarettes     Quit date: 2010     Years since quitting: 10.0   • Smokeless tobacco: Never Used   Substance and Sexual Activity   • Alcohol use: Never     Frequency: Never   • Drug use: Never   • Sexual activity: Defer       History reviewed. No pertinent family history.    Prior to Admission Medications:  Medications Prior to Admission   Medication Sig Dispense Refill Last Dose   • Cholecalciferol (Vitamin D3) 1.25 MG (41675 UT) capsule Take 50,000 Units by mouth Every 7 (Seven) Days.   1/3/2021 at 0800   • folic acid (FOLVITE) 1 MG tablet Take 1 mg by mouth Daily.   1/3/2021 at 0800   • furosemide (LASIX) 20 MG tablet Take 20 mg by mouth Daily As Needed.   1/3/2021 at 0800   • hydroCHLOROthiazide " (HYDRODIURIL) 25 MG tablet Take 25 mg by mouth Daily.   1/3/2021 at 0800   • hydrOXYzine (ATARAX) 10 MG tablet Take 10 mg by mouth 2 (two) times a day.   1/4/2021 at 0900   • levothyroxine (SYNTHROID, LEVOTHROID) 125 MCG tablet Take 125 mcg by mouth Daily.   1/4/2021 at 0900   • montelukast (SINGULAIR) 10 MG tablet Take 10 mg by mouth Every Night.   1/3/2021 at 1800   • naproxen sodium (ALEVE) 220 MG tablet Take 220 mg by mouth 2 (Two) Times a Day As Needed.   1/3/2021 at 2100   • Nintedanib Esylate (Ofev) 150 MG capsule Take 150 mg by mouth 2 (two) times a day.   1/4/2021 at 0900   • venlafaxine (EFFEXOR) 75 MG tablet Take 225 mg by mouth Daily.   1/4/2021 at 0900   • Zinc 50 MG capsule Take 1 capsule by mouth Daily.   1/3/2021 at 0800       Allergies:  No Known Allergies     Vitals: Temp:  [98.2 °F (36.8 °C)] 98.2 °F (36.8 °C)  Heart Rate:  [105] 105  Resp:  [18] 18  BP: (131)/(83) 131/83    Review of Systems:   Within Normal Limits Abnormal   HEENT [x]    []     Cardiovascular [x]   []     Gastrointestinal [x]   []     Genitourinary [x]   []     Neurologic [x]   []     Pulmonary [x]   []       Physical Exam:   Within Normal Limits Abnormal   HEENT [x]    []     Heart [x]   []     Lungs [x]   []     Abdomen [x]   []     Extremities [x]   []       Impression: Right nuclear sclerotic cataract.     Plan: CATARACT PHACO EXTRACTION WITH INTRAOCULAR LENS IMPLANT RIGHT (Right)     Sohan Rojas MD  1/4/2021

## 2021-01-04 NOTE — ANESTHESIA PREPROCEDURE EVALUATION
Anesthesia Evaluation     Patient summary reviewed and Nursing notes reviewed   NPO Solid Status: > 8 hours  NPO Liquid Status: > 8 hours           Airway   Mallampati: I  TM distance: >3 FB  Neck ROM: full  No difficulty expected  Dental - normal exam     Pulmonary - normal exam   (+) pneumonia resolved ,   Cardiovascular - negative cardio ROS and normal exam        Neuro/Psych  (+) psychiatric history Anxiety and Depression,     GI/Hepatic/Renal/Endo    (+) obesity,       Musculoskeletal (-) negative ROS    Abdominal  - normal exam    Bowel sounds: normal.   Substance History - negative use     OB/GYN negative ob/gyn ROS         Other                        Anesthesia Plan    ASA 3     MAC     intravenous induction     Anesthetic plan, all risks, benefits, and alternatives have been provided, discussed and informed consent has been obtained with: patient.    Plan discussed with attending.

## 2021-01-04 NOTE — ANESTHESIA POSTPROCEDURE EVALUATION
Patient: Renetta Leija    Procedure Summary     Date: 01/04/21 Room / Location: [facility] OR 1 /  [facility]    Anesthesia Start: 1218 Anesthesia Stop: 1238    Procedure: CATARACT PHACO EXTRACTION WITH INTRAOCULAR LENS IMPLANT RIGHT (Right Eye) Diagnosis:       Nuclear sclerotic cataract of right eye      (Nuclear sclerotic cataract of right eye [H25.11])    Surgeon: Sohan Rojas MD Provider: Shant Mejia CRNA    Anesthesia Type: MAC ASA Status: 3          Anesthesia Type: MAC    Vitals  Vitals Value Taken Time   /69 01/04/21 1302   Temp 98.6 °F (37 °C) 01/04/21 1240   Pulse 90 01/04/21 1302   Resp 18 01/04/21 1302   SpO2 96 % 01/04/21 1302           Post Anesthesia Care and Evaluation    Patient location during evaluation: PACU  Patient participation: complete - patient participated  Level of consciousness: awake and alert  Pain management: satisfactory to patient  Airway patency: patent  Anesthetic complications: No anesthetic complications    Cardiovascular status: acceptable and hemodynamically stable  Respiratory status: acceptable  Hydration status: acceptable

## 2021-01-04 NOTE — DISCHARGE INSTRUCTIONS
Post Operative Cataract Instructions     Right Eye  POST OPERATIVE INSTRUCTIONS  • You have received anesthesia today. DO NOT drive, drink alcohol, sign legal documents.   • After surgery, your eye will not hurt. It may feel scratchy, sticky or uncomfortable. Your eye will be sensitive to light.  • Most people see better 1-3 days after the procedure, but it could take 3 weeks to get the full benefits and reach your visual potential. If your vision is blurry for a few days it is normal, and means you may have swelling outside or inside the eye. For some patients, a bubble is placed and there will be blurriness.   • You should receive a post-op kit with tape and an eye shield. Wear the shield for the first 3 nights after surgery to keep you from rubbing the eye.  • You may wear glasses or sunglasses during the day.  You must keep eye protected at all times.  • Most people are able to return to their normal routine 1-3 days after surgery, however, due to the brain adjusting to your new vision you may have trouble judging distances and want to be careful when driving and going up and downstairs.   • You can shower and wash your hair the day after surgery. Keep water, shampoo, hair spray and shaving lotion out of the eye, especially for the first week.   • If eyes become stuck together after surgery you may soak with warm cloth.  • During the first week, you should AVOID:   1. Rubbing or putting pressure on your eye.  2. Eye make-up, face cream or lotion, hair coloring or perms  3. Strenuous activities, such as running or lifting weights, as to avoid sweat from getting in the eye. Avoid swimming, hot tubs, fumes or gopal conditions.   4. Sleeping on operative side.  5. Excessive sneezing or coughing.  6. Hanging the head down for periods of time. Keep your head above your heart.    Some discomfort and blurred vision after surgery are normal, but if you have any unusual pain, swelling, bleeding or sudden decrease in  vision, contact our office immediately.     Emergency assistance is available at any time by calling:    Dr.Mark Crystal Rojas  [phone]    If unable to reach call [facility] @ [phone]    You have been prescribed an eye drop to use after surgery, please follow these instructions and bring eye drops and instructions with you to post op appointment:    Prednisolone Acetate: Shake well before use    USE 1 DROP 4 (FOUR) TIMES A DAY FOR 1 WEEK THEN STARTING WEEK 2, ONLY USE 2 (TWO) TIMES A DAY UNTIL YOU RUN OUT OF DROPS.     PLACE A NELLIE IN THE DAY COLUMN EACH TIME YOU USE TO KEEP ON SCHEDULE    WEEK 1-USE 4  (FOUR) TIMES A DAY DAY 1  []   []    []   []     DAY 2  []   []    []   []  DAY 3  []   []    []   []  DAY 4  []   []    []   []  DAY 5  []   []    []   []  DAY 6  []   []    []   []  DAY 7  []   []    []   []      WEEK 2-USE 2 (TWO)  TIMES A DAY DAY 1  []   []  DAY 2  []   []  DAY 3  []   []  DAY 4  []   []  DAY 5  []   []  DAY 6  []   []  DAY 7  []   []      WEEK 3-USE 2 (TWO) TIMES A DAY DAY 1  []   []  DAY 2  []   []  DAY 3  []   []  DAY 4  []   []  DAY 5  []   []  DAY 6  []   []  DAY 7  []   []      WEEK 4-USE 2 (TWO)TIMES A DAY DAY 1  []   []  DAY 2  []   []  DAY 3  []   []  DAY 4  []   []  DAY 5  []   []  DAY 6  []   []  DAY 7  []   []      No pushing, pulling, tugging,  heavy lifting, or strenuous activity.  No major decision making, driving, or drinking alcoholic beverages for 24 hours. ( due to the medications you have  received)  Always use good hand hygiene/washing techniques.  NO driving while taking pain medications.    * if you have an incision:  Check your incision area every day for signs of infection.   Check for:  * more redness, swelling, or pain  *more fluid or blood  *warmth  *pus or bad smell    To assist you in voiding:  Drink plenty of fluids  Listen to running water while attempting to void.    If you are  unable to urinate and you have an uncomfortable urge to void or it has been   6 hours since you were discharged, return to the Emergency Room

## 2021-01-12 ENCOUNTER — PREP FOR SURGERY (OUTPATIENT)
Dept: OTHER | Facility: HOSPITAL | Age: 56
End: 2021-01-12

## 2021-01-12 DIAGNOSIS — H25.12 NUCLEAR SCLEROTIC CATARACT OF LEFT EYE: Primary | ICD-10-CM

## 2021-01-12 DIAGNOSIS — Z11.59 SPECIAL SCREENING EXAMINATION FOR UNSPECIFIED VIRAL DISEASE: Primary | ICD-10-CM

## 2021-01-12 RX ORDER — SODIUM CHLORIDE 0.9 % (FLUSH) 0.9 %
3 SYRINGE (ML) INJECTION EVERY 12 HOURS SCHEDULED
Status: CANCELLED | OUTPATIENT
Start: 2021-01-18

## 2021-01-12 RX ORDER — TETRACAINE HYDROCHLORIDE 5 MG/ML
1 SOLUTION OPHTHALMIC SEE ADMIN INSTRUCTIONS
Status: CANCELLED | OUTPATIENT
Start: 2021-01-18

## 2021-01-12 RX ORDER — PREDNISOLONE ACETATE 10 MG/ML
1 SUSPENSION/ DROPS OPHTHALMIC SEE ADMIN INSTRUCTIONS
Status: CANCELLED | OUTPATIENT
Start: 2021-01-18

## 2021-01-12 RX ORDER — CYCLOPENT/TROPIC/PHEN/KETR/WAT 1%-1%-2.5%
1 DROPS (EA) OPHTHALMIC (EYE)
Status: CANCELLED | OUTPATIENT
Start: 2021-01-18 | End: 2021-01-18

## 2021-01-12 RX ORDER — SODIUM CHLORIDE 0.9 % (FLUSH) 0.9 %
1-10 SYRINGE (ML) INJECTION AS NEEDED
Status: CANCELLED | OUTPATIENT
Start: 2021-01-18

## 2021-01-13 PROBLEM — H25.12 NUCLEAR SCLEROTIC CATARACT OF LEFT EYE: Status: ACTIVE | Noted: 2021-01-13

## 2021-01-14 ENCOUNTER — LAB (OUTPATIENT)
Dept: LAB | Facility: HOSPITAL | Age: 56
End: 2021-01-14

## 2021-01-14 DIAGNOSIS — Z11.59 SPECIAL SCREENING EXAMINATION FOR UNSPECIFIED VIRAL DISEASE: ICD-10-CM

## 2021-01-14 PROCEDURE — U0004 COV-19 TEST NON-CDC HGH THRU: HCPCS

## 2021-01-14 PROCEDURE — C9803 HOPD COVID-19 SPEC COLLECT: HCPCS

## 2021-01-15 LAB — SARS-COV-2 RNA RESP QL NAA+PROBE: NOT DETECTED

## 2021-01-18 ENCOUNTER — ANESTHESIA EVENT (OUTPATIENT)
Dept: PERIOP | Facility: HOSPITAL | Age: 56
End: 2021-01-18

## 2021-01-18 ENCOUNTER — ANESTHESIA (OUTPATIENT)
Dept: PERIOP | Facility: HOSPITAL | Age: 56
End: 2021-01-18

## 2021-01-18 ENCOUNTER — HOSPITAL ENCOUNTER (OUTPATIENT)
Facility: HOSPITAL | Age: 56
Setting detail: HOSPITAL OUTPATIENT SURGERY
Discharge: HOME | End: 2021-01-18
Attending: OPHTHALMOLOGY | Admitting: OPHTHALMOLOGY

## 2021-01-18 VITALS
WEIGHT: 211 LBS | BODY MASS INDEX: 35.16 KG/M2 | DIASTOLIC BLOOD PRESSURE: 72 MMHG | SYSTOLIC BLOOD PRESSURE: 111 MMHG | HEIGHT: 65 IN | TEMPERATURE: 98.1 F | OXYGEN SATURATION: 99 % | HEART RATE: 74 BPM | RESPIRATION RATE: 18 BRPM

## 2021-01-18 DIAGNOSIS — H25.12 NUCLEAR SCLEROTIC CATARACT OF LEFT EYE: ICD-10-CM

## 2021-01-18 PROCEDURE — 25010000002 PROPOFOL 200 MG/20ML EMULSION: Performed by: NURSE ANESTHETIST, CERTIFIED REGISTERED

## 2021-01-18 PROCEDURE — V2632 POST CHMBR INTRAOCULAR LENS: HCPCS | Performed by: OPHTHALMOLOGY

## 2021-01-18 PROCEDURE — 25010000003 LIDOCAINE 1 % SOLUTION: Performed by: NURSE ANESTHETIST, CERTIFIED REGISTERED

## 2021-01-18 DEVICE — LENS ACRYSOF IQ SA60WF W/ULTRASERT 6X13MM ACU0T0 20.5: Type: IMPLANTABLE DEVICE | Site: POSTERIOR CHAMBER | Status: FUNCTIONAL

## 2021-01-18 RX ORDER — ACETAZOLAMIDE 500 MG/1
500 CAPSULE, EXTENDED RELEASE ORAL ONCE
Status: COMPLETED | OUTPATIENT
Start: 2021-01-18 | End: 2021-01-18

## 2021-01-18 RX ORDER — CYCLOPENT/TROPIC/PHEN/KETR/WAT 1%-1%-2.5%
1 DROPS (EA) OPHTHALMIC (EYE)
Status: COMPLETED | OUTPATIENT
Start: 2021-01-18 | End: 2021-01-18

## 2021-01-18 RX ORDER — BALANCED SALT SOLUTION 6.4; .75; .48; .3; 3.9; 1.7 MG/ML; MG/ML; MG/ML; MG/ML; MG/ML; MG/ML
SOLUTION OPHTHALMIC AS NEEDED
Status: DISCONTINUED | OUTPATIENT
Start: 2021-01-18 | End: 2021-01-18 | Stop reason: HOSPADM

## 2021-01-18 RX ORDER — PROPOFOL 10 MG/ML
INJECTION, EMULSION INTRAVENOUS AS NEEDED
Status: DISCONTINUED | OUTPATIENT
Start: 2021-01-18 | End: 2021-01-18 | Stop reason: SURG

## 2021-01-18 RX ORDER — LIDOCAINE HYDROCHLORIDE 10 MG/ML
INJECTION, SOLUTION INFILTRATION; PERINEURAL AS NEEDED
Status: DISCONTINUED | OUTPATIENT
Start: 2021-01-18 | End: 2021-01-18 | Stop reason: SURG

## 2021-01-18 RX ORDER — PREDNISOLONE ACETATE 10 MG/ML
1 SUSPENSION/ DROPS OPHTHALMIC SEE ADMIN INSTRUCTIONS
Status: DISCONTINUED | OUTPATIENT
Start: 2021-01-18 | End: 2021-01-18 | Stop reason: HOSPADM

## 2021-01-18 RX ORDER — SODIUM CHLORIDE 0.9 % (FLUSH) 0.9 %
3 SYRINGE (ML) INJECTION EVERY 12 HOURS SCHEDULED
Status: DISCONTINUED | OUTPATIENT
Start: 2021-01-18 | End: 2021-01-18 | Stop reason: HOSPADM

## 2021-01-18 RX ORDER — SODIUM CHLORIDE, SODIUM LACTATE, POTASSIUM CHLORIDE, CALCIUM CHLORIDE 600; 310; 30; 20 MG/100ML; MG/100ML; MG/100ML; MG/100ML
1000 INJECTION, SOLUTION INTRAVENOUS CONTINUOUS
Status: DISCONTINUED | OUTPATIENT
Start: 2021-01-18 | End: 2021-01-18 | Stop reason: HOSPADM

## 2021-01-18 RX ORDER — LIDOCAINE HYDROCHLORIDE 40 MG/ML
INJECTION, SOLUTION RETROBULBAR; TOPICAL AS NEEDED
Status: DISCONTINUED | OUTPATIENT
Start: 2021-01-18 | End: 2021-01-18 | Stop reason: HOSPADM

## 2021-01-18 RX ORDER — PREDNISOLONE ACETATE 10 MG/ML
SUSPENSION/ DROPS OPHTHALMIC AS NEEDED
Status: DISCONTINUED | OUTPATIENT
Start: 2021-01-18 | End: 2021-01-18 | Stop reason: HOSPADM

## 2021-01-18 RX ORDER — TETRACAINE HYDROCHLORIDE 5 MG/ML
1 SOLUTION OPHTHALMIC SEE ADMIN INSTRUCTIONS
Status: COMPLETED | OUTPATIENT
Start: 2021-01-18 | End: 2021-01-18

## 2021-01-18 RX ORDER — KETAMINE HCL IN NACL, ISO-OSM 100MG/10ML
SYRINGE (ML) INJECTION AS NEEDED
Status: DISCONTINUED | OUTPATIENT
Start: 2021-01-18 | End: 2021-01-18 | Stop reason: SURG

## 2021-01-18 RX ORDER — PREDNISOLONE ACETATE 10 MG/ML
SUSPENSION/ DROPS OPHTHALMIC
Qty: 2 ML | Refills: 0
Start: 2021-01-18 | End: 2023-01-06

## 2021-01-18 RX ORDER — TETRACAINE HYDROCHLORIDE 5 MG/ML
SOLUTION OPHTHALMIC AS NEEDED
Status: DISCONTINUED | OUTPATIENT
Start: 2021-01-18 | End: 2021-01-18 | Stop reason: HOSPADM

## 2021-01-18 RX ORDER — SODIUM CHLORIDE 0.9 % (FLUSH) 0.9 %
1-10 SYRINGE (ML) INJECTION AS NEEDED
Status: DISCONTINUED | OUTPATIENT
Start: 2021-01-18 | End: 2021-01-18 | Stop reason: HOSPADM

## 2021-01-18 RX ADMIN — Medication 1 DROP: at 12:20

## 2021-01-18 RX ADMIN — TETRACAINE HYDROCHLORIDE 1 DROP: 5 SOLUTION OPHTHALMIC at 12:14

## 2021-01-18 RX ADMIN — TETRACAINE HYDROCHLORIDE 1 DROP: 5 SOLUTION OPHTHALMIC at 12:13

## 2021-01-18 RX ADMIN — ACETAZOLAMIDE 500 MG: 500 CAPSULE, EXTENDED RELEASE ORAL at 13:41

## 2021-01-18 RX ADMIN — Medication 1 DROP: at 12:15

## 2021-01-18 RX ADMIN — SODIUM CHLORIDE, POTASSIUM CHLORIDE, SODIUM LACTATE AND CALCIUM CHLORIDE 1000 ML: 600; 310; 30; 20 INJECTION, SOLUTION INTRAVENOUS at 12:20

## 2021-01-18 RX ADMIN — Medication 25 MG: at 13:10

## 2021-01-18 RX ADMIN — LIDOCAINE HYDROCHLORIDE 1 ML: 10 INJECTION, SOLUTION INFILTRATION; PERINEURAL at 13:10

## 2021-01-18 RX ADMIN — PROPOFOL 50 MG: 10 INJECTION, EMULSION INTRAVENOUS at 13:10

## 2021-01-18 RX ADMIN — Medication 1 DROP: at 12:25

## 2021-01-18 NOTE — OP NOTE
OPERATIVE NOTE    Date of Procedure: 1/18/2021  Patient Name: Renetta Ortega MRN: [id number]  YOB: 1965     Preoperative Diagnosis: Left nuclear sclerotic cataract.     Postoperative Diagnosis: Left nuclear sclerotic cataract.     Procedure Performed: Phacoemulsification with implantation of a  foldable posterior chamber intraocular lens, Left eye.     Surgeon: Sohan Rojas MD     Anesthesia:  Monitored Anesthesia Care (MAC)      Brief History and Indication: The patient presents with a history of past progressive loss of vision.  Patient was diagnosed with cataract and requests removal for increased ability to read and see.     Operation Description: The patient was taken to the OR and prepped and draped in the usual sterile ophthalmic fashion. A lid speculum was placed in the eye.  A #75 blade was then used to make a stab incision two o’clock hours from the intended temporal clear cornea groove. The anterior chamber was then inflated with a Viscoelastic. A metal microkeratome blade was then used to enter the anterior chamber at the temporal clear cornea site. A three level tunnel incision was made. A curvilinear capsulorrhexis was then performed with a bent cystotome needle and capsulorrhexis forceps.  BSS on a 30 gauge bent cannula was used to hydro-dissect, and hydro-delineate the lens. Good fluid waves and hydro-delineation were noted. Phacoemulsification was then used to remove nuclear material without complications. The residual cortical and lenticular material was then removed with irrigation and aspiration. Viscoelastics were then used to inflate the bag in a soft shell technique. A PCIOL was injected into the bag. Post-implantation, there were no rents or tears in the bag and the lens was noted to be stable and centered. The residual Viscoelastic was then removed with irrigation and aspiration.  The wound was checked and found to be without leaks. Therefore a Polydex ointment  and one drop of a Prednisilone eye drop was placed in the eye.     Implant Information:   Implant Name Type Inv. Item Serial No.  Lot No. LRB No. Used Action   LENS ACRYSOF IQ SA60WF W/ULTRASERT 6X13MM ACU0T0 20.5 - [id number] - [id number] Implant LENS ACRYSOF IQ SA60WF W/ULTRASERT 6X13MM ACU0T0 20.5 [id number] MARIAJOSE  Left 1 Implanted       Complications: None     []   Changed to complex procedure due to: []  hypermature, white cataract. Blue dye was used. []   small iris. A Malyugin Ring was used.    Discharge and Condition  The patient was transported to same day surgery in excellent condition and scheduled for follow-up tomorrow morning. The patient was given instructions on use of eye drops for the operative eye and was specifically instructed to call Dr. Rojas at his office or home for any nausea, vomiting, headache, decreased visual acuity, or pain, or if the patient had any general concerns.    Sohan Rojas MD  1/18/2021

## 2021-01-18 NOTE — ANESTHESIA POSTPROCEDURE EVALUATION
Patient: Renetta Leija    Procedure Summary     Date: 01/18/21 Room / Location: [facility] OR 1 /  [facility]    Anesthesia Start: 1310 Anesthesia Stop: 1327    Procedure: CATARACT PHACO EXTRACTION WITH INTRAOCULAR LENS IMPLANT LEFT (Left Eye) Diagnosis:       Nuclear sclerotic cataract of left eye      (Nuclear sclerotic cataract of left eye [H25.12])    Surgeon: Sohan Rojas MD Provider: Curly Baldwin CRNA    Anesthesia Type: MAC ASA Status: 3          Anesthesia Type: MAC    Vitals  Vitals Value Taken Time   /72 01/18/21 1346   Temp 98.1 °F (36.7 °C) 01/18/21 1324   Pulse 74 01/18/21 1346   Resp 18 01/18/21 1346   SpO2 99 % 01/18/21 1346           Post Anesthesia Care and Evaluation    Patient location during evaluation: bedside  Patient participation: complete - patient participated  Level of consciousness: awake  Pain score: 0  Pain management: adequate  Airway patency: patent  Anesthetic complications: No anesthetic complications  PONV Status: controlled  Cardiovascular status: acceptable and stable  Respiratory status: acceptable and room air  Hydration status: acceptable

## 2021-01-18 NOTE — H&P
"      [facility]         History and Physical    Patient Name: Renetta Leija  MRN: [id number]  : 1965  Gender: female     HPI: Patient complaint of PPLOV Left eye diagnosed with cataract. Patient requests PHACO PCIOL for Increase of VA/ADL.    History:    Past Medical History:   Diagnosis Date   • Anxiety and depression    • Arthritis    • At high risk for pneumonia     \"get pneumonia easily due to pulmonary fibrosis\"   • Body piercing     both ears   • Cataract, bilateral    • Constipation    • Disease of thyroid gland     hypothyroidism   • GERD (gastroesophageal reflux disease)    • Oxygen dependent     on continuous O2 -3 liters NC titrates as needed with exertion   • Pulmonary fibrosis (CMS/HCC)    • Wears dentures     upper plate only-asked not to wear adhesive dos   • Wears glasses        Past Surgical History:   Procedure Laterality Date   • APPENDECTOMY     • BREAST BIOPSY Left     benign   • CARDIAC CATHETERIZATION  2019    \"everything was clear\" per pt report   • CATARACT EXTRACTION W/ INTRAOCULAR LENS IMPLANT Right 2021    Procedure: CATARACT PHACO EXTRACTION WITH INTRAOCULAR LENS IMPLANT RIGHT;  Surgeon: Sohan Rojas MD;  Location: [facility];  Service: Ophthalmology;  Laterality: Right;   •  SECTION      x2   • COLONOSCOPY     • LAPAROSCOPIC CHOLECYSTECTOMY     • WISDOM TOOTH EXTRACTION         Social History     Socioeconomic History   • Marital status: Single     Spouse name: Not on file   • Number of children: Not on file   • Years of education: Not on file   • Highest education level: Not on file   Tobacco Use   • Smoking status: Former Smoker     Packs/day: 1.00     Years: 30.00     Pack years: 30.00     Types: Cigarettes     Quit date: 2010     Years since quitting: 10.0   • Smokeless tobacco: Never Used   Substance and Sexual Activity   • Alcohol use: Never     Frequency: Never   • Drug use: Never   • Sexual activity: Defer       History " reviewed. No pertinent family history.    Prior to Admission Medications:  Medications Prior to Admission   Medication Sig Dispense Refill Last Dose   • Cholecalciferol (Vitamin D3) 1.25 MG (55654 UT) capsule Take 50,000 Units by mouth Every 7 (Seven) Days.   1/17/2021 at 0800   • folic acid (FOLVITE) 1 MG tablet Take 1 mg by mouth Daily.   1/17/2021 at 0800   • furosemide (LASIX) 20 MG tablet Take 20 mg by mouth Daily As Needed.   Past Month at Unknown time   • hydroCHLOROthiazide (HYDRODIURIL) 25 MG tablet Take 25 mg by mouth Daily.   1/17/2021 at 0800   • hydrOXYzine (ATARAX) 10 MG tablet Take 10 mg by mouth 2 (two) times a day.   1/17/2021 at 2100   • levothyroxine (SYNTHROID, LEVOTHROID) 125 MCG tablet Take 125 mcg by mouth Daily.   1/18/2021 at 1000   • montelukast (SINGULAIR) 10 MG tablet Take 10 mg by mouth Every Night.   1/17/2021 at 2100   • naproxen sodium (ALEVE) 220 MG tablet Take 220 mg by mouth 2 (Two) Times a Day As Needed.   Past Month at Unknown time   • Nintedanib Esylate (Ofev) 150 MG capsule Take 150 mg by mouth 2 (two) times a day.   1/18/2021 at 1000   • venlafaxine (EFFEXOR) 75 MG tablet Take 225 mg by mouth Daily.   1/18/2021 at 1000   • Zinc 50 MG capsule Take 1 capsule by mouth Daily.   1/17/2021 at 100       Allergies:  No Known Allergies     Vitals: Temp:  [97.8 °F (36.6 °C)] 97.8 °F (36.6 °C)  Heart Rate:  [92] 92  Resp:  [18] 18  BP: (126)/(77) 126/77    Review of Systems:   Within Normal Limits Abnormal   HEENT [x]    []     Cardiovascular [x]   []     Gastrointestinal [x]   []     Genitourinary [x]   []     Neurologic [x]   []     Pulmonary [x]   []       Physical Exam:   Within Normal Limits Abnormal   HEENT [x]    []     Heart [x]   []     Lungs [x]   []     Abdomen [x]   []     Extremities [x]   []       Impression: Left nuclear sclerotic cataract.     Plan: CATARACT PHACO EXTRACTION WITH INTRAOCULAR LENS IMPLANT LEFT (Left)     Sohan Rojas MD  1/18/2021

## 2021-01-18 NOTE — DISCHARGE INSTRUCTIONS
Post Operative Cataract Instructions     Left Eye  POST OPERATIVE INSTRUCTIONS  • You have received anesthesia today. DO NOT drive, drink alcohol, sign legal documents.   • After surgery, your eye will not hurt. It may feel scratchy, sticky or uncomfortable. Your eye will be sensitive to light.  • Most people see better 1-3 days after the procedure, but it could take 3 weeks to get the full benefits and reach your visual potential. If your vision is blurry for a few days it is normal, and means you may have swelling outside or inside the eye. For some patients, a bubble is placed and there will be blurriness.   • You should receive a post-op kit with tape and an eye shield. Wear the shield for the first 3 nights after surgery to keep you from rubbing the eye.  • You may wear glasses or sunglasses during the day.  You must keep eye protected at all times.  • Most people are able to return to their normal routine 1-3 days after surgery, however, due to the brain adjusting to your new vision you may have trouble judging distances and want to be careful when driving and going up and downstairs.   • You can shower and wash your hair the day after surgery. Keep water, shampoo, hair spray and shaving lotion out of the eye, especially for the first week.   • If eyes become stuck together after surgery you may soak with warm cloth.  • During the first week, you should AVOID:   1. Rubbing or putting pressure on your eye.  2. Eye make-up, face cream or lotion, hair coloring or perms  3. Strenuous activities, such as running or lifting weights, as to avoid sweat from getting in the eye. Avoid swimming, hot tubs, fumes or gopal conditions.   4. Sleeping on operative side.  5. Excessive sneezing or coughing.  6. Hanging the head down for periods of time. Keep your head above your heart.    Some discomfort and blurred vision after surgery are normal, but if you have any unusual pain, swelling, bleeding or sudden decrease in  vision, contact our office immediately.     Emergency assistance is available at any time by calling:    Dr.Mark Crystal Rojas  [phone]    If unable to reach call [facility] @ [phone]    You have been prescribed an eye drop to use after surgery, please follow these instructions and bring eye drops and instructions with you to post op appointment:    Prednisolone Acetate: Shake well before use    USE 1 DROP 4 (FOUR) TIMES A DAY FOR 1 WEEK THEN STARTING WEEK 2, ONLY USE 2 (TWO) TIMES A DAY UNTIL YOU RUN OUT OF DROPS.     PLACE A NELLIE IN THE DAY COLUMN EACH TIME YOU USE TO KEEP ON SCHEDULE    WEEK 1-USE 4  (FOUR) TIMES A DAY DAY 1  []   []    []   []     DAY 2  []   []    []   []  DAY 3  []   []    []   []  DAY 4  []   []    []   []  DAY 5  []   []    []   []  DAY 6  []   []    []   []  DAY 7  []   []    []   []      WEEK 2-USE 2 (TWO)  TIMES A DAY DAY 1  []   []  DAY 2  []   []  DAY 3  []   []  DAY 4  []   []  DAY 5  []   []  DAY 6  []   []  DAY 7  []   []      WEEK 3-USE 2 (TWO) TIMES A DAY DAY 1  []   []  DAY 2  []   []  DAY 3  []   []  DAY 4  []   []  DAY 5  []   []  DAY 6  []   []  DAY 7  []   []      WEEK 4-USE 2 (TWO)TIMES A DAY DAY 1  []   []  DAY 2  []   []  DAY 3  []   []  DAY 4  []   []  DAY 5  []   []  DAY 6  []   []  DAY 7  []   []          No pushing, pulling, tugging,  heavy lifting, or strenuous activity.  No major decision making, driving, or drinking alcoholic beverages for 24 hours. ( due to the medications you have  received)  Always use good hand hygiene/washing techniques.  NO driving while taking pain medications.    * if you have an incision:  Check your incision area every day for signs of infection.   Check for:  * more redness, swelling, or pain  *more fluid or blood  *warmth  *pus or bad smell    To assist you in voiding:  Drink plenty of fluids  Listen to running water while attempting to void.    If you  are unable to urinate and you have an uncomfortable urge to void or it has been   6 hours since you were discharged, return to the Emergency Room

## 2021-02-01 RX ORDER — ERGOCALCIFEROL 1.25 MG/1
CAPSULE ORAL
Qty: 12 CAPSULE | Refills: 1 | Status: ON HOLD | OUTPATIENT
Start: 2021-02-01 | End: 2021-05-26

## 2021-02-16 RX ORDER — HYDROXYZINE HYDROCHLORIDE 10 MG/1
TABLET, FILM COATED ORAL
Qty: 45 TABLET | Refills: 2 | Status: SHIPPED | OUTPATIENT
Start: 2021-02-16 | End: 2021-03-23

## 2021-03-23 ENCOUNTER — HOSPITAL ENCOUNTER (OUTPATIENT)
Facility: HOSPITAL | Age: 56
Discharge: HOME | End: 2021-03-23
Payer: MEDICARE

## 2021-03-23 ENCOUNTER — OFFICE VISIT (OUTPATIENT)
Dept: PRIMARY CARE CLINIC | Age: 56
End: 2021-03-23
Payer: MEDICARE

## 2021-03-23 VITALS
OXYGEN SATURATION: 93 % | HEIGHT: 65 IN | WEIGHT: 238.6 LBS | TEMPERATURE: 97.9 F | SYSTOLIC BLOOD PRESSURE: 136 MMHG | RESPIRATION RATE: 18 BRPM | HEART RATE: 116 BPM | BODY MASS INDEX: 39.75 KG/M2 | DIASTOLIC BLOOD PRESSURE: 70 MMHG

## 2021-03-23 DIAGNOSIS — Z00.00 ROUTINE GENERAL MEDICAL EXAMINATION AT A HEALTH CARE FACILITY: ICD-10-CM

## 2021-03-23 DIAGNOSIS — I10 ESSENTIAL HYPERTENSION: ICD-10-CM

## 2021-03-23 DIAGNOSIS — Z72.89 OTHER PROBLEMS RELATED TO LIFESTYLE: ICD-10-CM

## 2021-03-23 DIAGNOSIS — E66.01 CLASS 2 SEVERE OBESITY WITH SERIOUS COMORBIDITY AND BODY MASS INDEX (BMI) OF 39.0 TO 39.9 IN ADULT, UNSPECIFIED OBESITY TYPE (HCC): ICD-10-CM

## 2021-03-23 DIAGNOSIS — Z11.4 SCREENING FOR HIV WITHOUT PRESENCE OF RISK FACTORS: ICD-10-CM

## 2021-03-23 DIAGNOSIS — E55.9 VITAMIN D DEFICIENCY: ICD-10-CM

## 2021-03-23 DIAGNOSIS — F41.8 DEPRESSION WITH ANXIETY: ICD-10-CM

## 2021-03-23 DIAGNOSIS — Z00.00 ROUTINE GENERAL MEDICAL EXAMINATION AT A HEALTH CARE FACILITY: Primary | ICD-10-CM

## 2021-03-23 LAB
A/G RATIO: 1.4 (ref 0.8–2)
ALBUMIN SERPL-MCNC: 4.1 G/DL (ref 3.4–4.8)
ALP BLD-CCNC: 145 U/L (ref 25–100)
ALT SERPL-CCNC: 26 U/L (ref 4–36)
ANION GAP SERPL CALCULATED.3IONS-SCNC: 10 MMOL/L (ref 3–16)
AST SERPL-CCNC: 17 U/L (ref 8–33)
BASOPHILS ABSOLUTE: 0 K/UL (ref 0–0.1)
BASOPHILS RELATIVE PERCENT: 0.3 %
BILIRUB SERPL-MCNC: <0.2 MG/DL (ref 0.3–1.2)
BUN BLDV-MCNC: 16 MG/DL (ref 6–20)
CALCIUM SERPL-MCNC: 9.8 MG/DL (ref 8.5–10.5)
CHLORIDE BLD-SCNC: 98 MMOL/L (ref 98–107)
CO2: 35 MMOL/L (ref 20–30)
CREAT SERPL-MCNC: 0.8 MG/DL (ref 0.4–1.2)
EOSINOPHILS ABSOLUTE: 0.2 K/UL (ref 0–0.4)
EOSINOPHILS RELATIVE PERCENT: 1.4 %
FOLATE: >20 NG/ML
GFR AFRICAN AMERICAN: >59
GFR NON-AFRICAN AMERICAN: >60
GLOBULIN: 3 G/DL
GLUCOSE BLD-MCNC: 71 MG/DL (ref 74–106)
HCT VFR BLD CALC: 46.1 % (ref 37–47)
HEMOGLOBIN: 14.4 G/DL (ref 11.5–16.5)
HEPATITIS C ANTIBODY INTERPRETATION: NORMAL
IMMATURE GRANULOCYTES #: 0.1 K/UL
IMMATURE GRANULOCYTES %: 0.8 % (ref 0–5)
LYMPHOCYTES ABSOLUTE: 2.6 K/UL (ref 1.5–4)
LYMPHOCYTES RELATIVE PERCENT: 20.7 %
MAGNESIUM: 2.3 MG/DL (ref 1.7–2.4)
MCH RBC QN AUTO: 30.4 PG (ref 27–32)
MCHC RBC AUTO-ENTMCNC: 31.2 G/DL (ref 31–35)
MCV RBC AUTO: 97.5 FL (ref 80–100)
MONOCYTES ABSOLUTE: 0.9 K/UL (ref 0.2–0.8)
MONOCYTES RELATIVE PERCENT: 6.9 %
NEUTROPHILS ABSOLUTE: 8.9 K/UL (ref 2–7.5)
NEUTROPHILS RELATIVE PERCENT: 69.9 %
PDW BLD-RTO: 13.9 % (ref 11–16)
PLATELET # BLD: 312 K/UL (ref 150–400)
PMV BLD AUTO: 10 FL (ref 6–10)
POTASSIUM SERPL-SCNC: 4.2 MMOL/L (ref 3.4–5.1)
RBC # BLD: 4.73 M/UL (ref 3.8–5.8)
SODIUM BLD-SCNC: 143 MMOL/L (ref 136–145)
TOTAL PROTEIN: 7.1 G/DL (ref 6.4–8.3)
TSH SERPL DL<=0.05 MIU/L-ACNC: 8.21 UIU/ML (ref 0.27–4.2)
VITAMIN D 25-HYDROXY: 35.8 (ref 32–100)
WBC # BLD: 12.8 K/UL (ref 4–11)

## 2021-03-23 PROCEDURE — 82306 VITAMIN D 25 HYDROXY: CPT

## 2021-03-23 PROCEDURE — G8482 FLU IMMUNIZE ORDER/ADMIN: HCPCS | Performed by: INTERNAL MEDICINE

## 2021-03-23 PROCEDURE — 87390 HIV-1 AG IA: CPT

## 2021-03-23 PROCEDURE — 86803 HEPATITIS C AB TEST: CPT

## 2021-03-23 PROCEDURE — 82746 ASSAY OF FOLIC ACID SERUM: CPT

## 2021-03-23 PROCEDURE — 86701 HIV-1ANTIBODY: CPT

## 2021-03-23 PROCEDURE — 80053 COMPREHEN METABOLIC PANEL: CPT

## 2021-03-23 PROCEDURE — 83735 ASSAY OF MAGNESIUM: CPT

## 2021-03-23 PROCEDURE — 86702 HIV-2 ANTIBODY: CPT

## 2021-03-23 PROCEDURE — G0438 PPPS, INITIAL VISIT: HCPCS | Performed by: INTERNAL MEDICINE

## 2021-03-23 PROCEDURE — 84443 ASSAY THYROID STIM HORMONE: CPT

## 2021-03-23 PROCEDURE — 3017F COLORECTAL CA SCREEN DOC REV: CPT | Performed by: INTERNAL MEDICINE

## 2021-03-23 PROCEDURE — 85025 COMPLETE CBC W/AUTO DIFF WBC: CPT

## 2021-03-23 RX ORDER — HYDROXYZINE HYDROCHLORIDE 25 MG/1
25 TABLET, FILM COATED ORAL 2 TIMES DAILY PRN
Qty: 45 TABLET | Refills: 1 | Status: SHIPPED | OUTPATIENT
Start: 2021-03-23 | End: 2021-12-07 | Stop reason: SDUPTHER

## 2021-03-23 RX ORDER — ALBUTEROL SULFATE 90 UG/1
2 AEROSOL, METERED RESPIRATORY (INHALATION) EVERY 6 HOURS PRN
Qty: 1 INHALER | Refills: 3 | Status: SHIPPED | OUTPATIENT
Start: 2021-03-23 | End: 2022-06-21 | Stop reason: SDUPTHER

## 2021-03-23 ASSESSMENT — PATIENT HEALTH QUESTIONNAIRE - PHQ9
1. LITTLE INTEREST OR PLEASURE IN DOING THINGS: 3
SUM OF ALL RESPONSES TO PHQ QUESTIONS 1-9: 6
SUM OF ALL RESPONSES TO PHQ9 QUESTIONS 1 & 2: 6

## 2021-03-23 ASSESSMENT — LIFESTYLE VARIABLES: HOW OFTEN DO YOU HAVE A DRINK CONTAINING ALCOHOL: 0

## 2021-03-23 NOTE — PROGRESS NOTES
Medicare Annual Wellness Visit  Name: Wing Renteria Date: 3/23/2021   MRN: [id number] Sex: Female   Age: 63 y.o. Ethnicity: Non-/Non    : 1965 Race: Jack Heurta is here for Medicare AWV    Screenings for behavioral, psychosocial and functional/safety risks, and cognitive dysfunction are all negative except as indicated below. These results, as well as other patient data from the [address] form, are documented in Flowsheets linked to this Encounter. No Known Allergies  Prior to Visit Medications    Medication Sig Taking?  Authorizing Provider   albuterol sulfate HFA (PROAIR HFA) 108 (90 Base) MCG/ACT inhaler Inhale 2 puffs into the lungs every 6 hours as needed for Wheezing Yes Gwenette Schlatter, MD   hydrOXYzine (ATARAX) 10 MG tablet TAKE 1 TABLET BY MOUTH TWICE DAILY AS NEEDED FOR ANXIETY Yes Gwenette Schlatter, MD   vitamin D (ERGOCALCIFEROL) 1.25 MG (82166 UT) CAPS capsule TAKE 1 CAPSULE BY MOUTH 1 TIME A WEEK Yes Gwenette Schlatter, MD   folic acid (FOLVITE) 1 MG tablet Take 1 tablet by mouth daily Yes Gwenette Schlatter, MD   venlafaxine (EFFEXOR XR) 75 MG extended release capsule TAKE 3 CAPSULES BY MOUTH DAILY Yes DAVIAN Hudson CNP   pantoprazole (PROTONIX) 40 MG tablet Take 1 tablet by mouth once daily Yes DAVIAN Hudson CNP   hydroCHLOROthiazide (HYDRODIURIL) 25 MG tablet take 1 tablet by mouth once daily Yes DAVIAN Hudson CNP   Roflumilast (DALIRESP) 500 MCG tablet Take 1 tablet by mouth daily Yes OPAL Medina   levothyroxine (SYNTHROID) 125 MCG tablet Take 1 tablet by mouth Daily TAKE 1 TABLET BY MOUTH ONCE DAILY Yes Gwenette Schlatter, MD   cetirizine (ZYRTEC) 10 MG tablet Take 1 tablet by mouth daily Yes Gwenette Schlatter, MD   ipratropium-albuterol (DUONEB) 0.5-2.5 (3) MG/3ML SOLN nebulizer solution Inhale 3 mLs into the lungs every 6 hours as needed for Shortness of Breath Yes Gwenette Schlatter, MD   Nintedanib Esylate 150 MG CAPS Take by mouth 2 times daily Yes Historical Provider, MD   ibuprofen (ADVIL;MOTRIN) 800 MG tablet take 1 tablet by mouth every 8 hours if needed for pain Yes Historical Provider, MD   furosemide (LASIX) 20 MG tablet Take 1 tablet by mouth 2 times daily as needed (worsening swelling, weight up by 3lbs or more)  Patient not taking: Reported on 3/23/2021  OPAL Cope     Past Medical History:   Diagnosis Date    [facility] [place] (acromioclavicular) joint bone spurs     bilateral 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     Hyperlipidemia     Hypertension     Hypothyroidism     [facility]     Lung fibrosis ([facility]     Lung nodules     bilateral lungs     Past Surgical History:   Procedure Laterality Date     SECTION      GALLBLADDER SURGERY      HYSTERECTOMY       Family History   Problem Relation Age of Onset    Cancer Mother         colon    Cancer Father         bladder    High Blood Pressure Father     Stroke Father     High Blood Pressure Sister        CareTeam (Including outside providers/suppliers regularly involved in providing care):   Patient Care Team:  Tyson Dallas MD as PCP - General  Tyson Dallas MD as PCP - REHABILITATION [facility] Empaneled Provider  Tyson Dallas MD as Consulting Physician (Internal Medicine)  Vickie Sheldon RN as Care Transitions Nurse    Wt Readings from Last 3 Encounters:   21 238 lb 9.6 oz (108.2 kg)   20 211 lb (95.7 kg)   20 219 lb 8 oz (99.6 kg)     Vitals:    21 0947   BP: 136/70   Pulse: 116   Resp: 18   Temp: 97.9 °F (36.6 °C)   SpO2: 93%   Weight: 238 lb 9.6 oz (108.2 kg)   Height: 5' 5\" (1.651 m)     Body mass index is 39.71 kg/m². Based upon direct observation of the patient, evaluation of cognition reveals recent and remote memory intact. Physical Exam  Vitals signs and nursing note reviewed.    Constitutional:       Appearance: Normal appearance. She is well-developed. She is obese. HENT:      Head: Normocephalic and atraumatic. Right Ear: External ear normal.      Left Ear: External ear normal.      Nose: Nose normal.   Eyes:      Conjunctiva/sclera: Conjunctivae normal.      Pupils: Pupils are equal, round, and reactive to light. Neck:      Musculoskeletal: Neck supple. No neck rigidity or muscular tenderness. Thyroid: No thyromegaly. Vascular: No JVD. Cardiovascular:      Rate and Rhythm: Normal rate and regular rhythm. Heart sounds: Normal heart sounds. Pulmonary:      Effort: Pulmonary effort is normal.      Breath sounds: No wheezing or rales. Abdominal:      General: Bowel sounds are normal. There is no distension. Palpations: Abdomen is soft. Tenderness: There is no abdominal tenderness. Musculoskeletal:         General: No tenderness. Right lower leg: No edema. Left lower leg: No edema. Comments: Clubbing to fingernails. Skin:     Findings: No erythema or rash. Neurological:      Mental Status: She is alert and oriented to person, place, and time. Psychiatric:         Mood and Affect: Mood normal.         Behavior: Behavior normal.         Thought Content:  Thought content normal.         Judgment: Judgment normal.            Lab Results   Component Value Date     12/08/2020    K 3.7 12/08/2020     12/08/2020    CO2 29 12/08/2020    GLUCOSE 137 12/08/2020    BUN 15 12/08/2020    CREATININE 0.8 12/08/2020    CALCIUM 9.5 12/08/2020    PROT 7.1 12/30/2020    LABALBU 4.3 12/30/2020    BILITOT <0.2 12/30/2020    ALT 26 12/30/2020    AST 24 12/30/2020       Hemoglobin A1C (%)   Date Value   06/08/2017 5.4     Microscopic Examination (no units)   Date Value   02/25/2017 Not Indicated     LDL Calculated (mg/dL)   Date Value   10/14/2019 134 (H)         Lab Results   Component Value Date    WBC 9.6 12/08/2020    NEUTROABS 7.0 12/08/2020    HGB 14.2 12/08/2020    HCT 44.1 12/08/2020    MCV 95.9 12/08/2020     12/08/2020       Lab Results   Component Value Date    TSH 2.68 11/16/2020       Patient's complete Health Risk Assessment and screening values have been reviewed and are found in Flowsheets. The following problems were reviewed today and where indicated follow up appointments were made and/or referrals ordered. Depression:  PHQ-2 Score: 6  PHQ-9 Total Score: 6    Severity:1-4 = minimal depression, 5-9 = mild depression, 10-14 = moderate depression, 15-19 = moderately severe depression, 20-27 = severe depression  Depression Interventions:  · Patient taking Effexor as well as 10 mg of Atarax. I am going to increase her Atarax to 25 mg and advised her to seek counseling. General Health and ACP:  General  In general, how would you say your health is?: (!) Poor  In the past 7 days, have you experienced any of the following? New or Increased Pain, New or Increased Fatigue, Loneliness, Social Isolation, Stress or Anger?: (!) New or Increased Fatigue, Stress, Loneliness  Do you get the social and emotional support that you need?: Yes  Do you have a Living Will?: (!) No  Advance Directives     Power of  Living Will ACP-Advance Directive ACP-Power of     Not on File Not on File Not on File Not on File      General Health Risk Interventions:  · Poor self-assessment of health status: Patient following up with appropriate specialists. · Stress: patient's comments regarding reasons for stress and/or anger: She states that her breathing has gotten worse lately and she is unsure of the cause. She also takes care of her special needs son.    · No Living Will: Advance Care Planning addressed with patient today    Health Habits/Nutrition:  Health Habits/Nutrition  Do you exercise for at least 20 minutes 2-3 times per week?: (!) No  Have you lost any weight without trying in the past 3 months?: No  Do you eat only one meal per day?: No  Have you seen the dentist within the past year?: N/A - wear dentures  Body mass index: (!) 39.7  Health Habits/Nutrition Interventions:  · Inadequate physical activity:  Patient with breathing problems, on oxygen. Advised her the importance of being as active as tolerated. ADL:  ADLs  In the past 7 days, did you need help from others to perform any of the following everyday activities? Eating, dressing, grooming, bathing, toileting, or walking/balance?: None  In the past 7 days, did you need help from others to take care of any of the following?  Laundry, housekeeping, banking/finances, shopping, telephone use, food preparation, transportation, or taking medications?: (!) Laundry, Housekeeping  ADL Interventions:  · Patient declines any further evaluation/treatment for this issue    Personalized Preventive Plan   Current Health Maintenance Status  Immunization History   Administered Date(s) Administered    COVID-19, J&J, PF, 0.5 mL 03/17/2021    Hepatitis A Adult (Havrix, Vaqta) 08/10/2018    Influenza Virus Vaccine 11/05/2019    Influenza, MDCK Quadv, with preserv IM (Flucelvax 4 yrs and older) 11/03/2017    Influenza, Quadv, IM, (6 mo and older Fluzone, Flulaval, Fluarix and 3 yrs and older Afluria) 09/30/2020    Influenza, Quadv, IM, PF (6 mo and older Fluzone, Flulaval, Fluarix, and 3 yrs and older Afluria) 10/11/2016    PPD Test 08/13/2015, 10/31/2017    Pneumococcal Conjugate 13-valent ([id number]) 08/13/2015    Pneumococcal Polysaccharide ([id number]) 08/01/2011    Tdap (Boostrix, Adacel) 03/07/2018        Health Maintenance   Topic Date Due    Hepatitis C screen  Never done    HIV screen  Never done    Pneumococcal 0-64 years Vaccine (3 of 3 - PPSV23) 08/01/2016    Cervical cancer screen  03/12/2018    Diabetes screen  06/08/2020    Annual Wellness Visit (AWV)  Never done    Low dose CT lung screening  10/27/2021    TSH testing  11/16/2021    Potassium monitoring  12/08/2021    Creatinine monitoring 12/08/2021    Breast cancer screen  10/05/2022    Lipid screen  10/14/2024    Colon cancer screen colonoscopy  02/03/2026    DTaP/Tdap/Td vaccine (2 - Td) 03/07/2028    Flu vaccine  Completed    COVID-19 Vaccine  Completed    Hepatitis A vaccine  Aged Out    Hepatitis B vaccine  Aged Out    Hib vaccine  Aged Out    Meningococcal (ACWY) vaccine  Aged Out     Recommendations for [employer] Due: see orders and patient instructions/AVS.    EKG reviewed from last December. Normal sinus rhythm with slightly poor R wave progression. Discussed cardiovascular risk. Patient being followed by cardiology. She has had a recent cardiac workup due to her increase in shortness of air. I want her to take Lasix MWF. Advised her on importance of weight loss and increase activity level. Recommended screening schedule for the next 5-10 years is provided to the patient in written form: see Patient Instructions/AVS.    Written by Jessica Castorena CMA acting as scribe for Dr. Scott Hernandez on 3/23/2021 at 10:08 AM.      I, Dr. Nika Tejeda, personally performed the services described in the documentation as scribed by Jessica Castorena CMA, in my presence and it is both accurate and complete.

## 2021-03-23 NOTE — PATIENT INSTRUCTIONS
Advance Directives: Care Instructions  Overview  An advance directive is a legal way to state your wishes at the end of your life. It tells your family and your doctor what to do if you can't say what you want. There are two main types of advance directives. You can change them any time your wishes change. Living will. This form tells your family and your doctor your wishes about life support and other treatment. The form is also called a declaration. Medical power of . This form lets you name a person to make treatment decisions for you when you can't speak for yourself. This person is called a health care agent (health care proxy, health care surrogate). The form is also called a durable power of  for health care. If you do not have an advance directive, decisions about your medical care may be made by a family member, or by a doctor or a  who doesn't know you. It may help to think of an advance directive as a gift to the people who care for you. If you have one, they won't have to make tough decisions by themselves. Follow-up care is a key part of your treatment and safety. Be sure to make and go to all appointments, and call your doctor if you are having problems. It's also a good idea to know your test results and keep a list of the medicines you take. What should you include in an advance directive? Many states have a unique advance directive form. (It may ask you to address specific issues.) Or you might use a universal form that's approved by many states. If your form doesn't tell you what to address, it may be hard to know what to include in your advance directive. Use the questions below to help you get started. · Who do you want to make decisions about your medical care if you are not able to? · What life-support measures do you want if you have a serious illness that gets worse over time or can't be cured? · What are you most afraid of that might happen? (Maybe you're afraid of having pain, losing your independence, or being kept alive by machines.)  · Where would you prefer to die? (Your home? A hospital? A nursing home?)  · Do you want to donate your organs when you die? · Do you want certain Evangelical practices performed before you die? When should you call for help? Be sure to contact your doctor if you have any questions. Where can you learn more? Go to [email or web address] and sign in to your Kavalia account. Enter R264 in the [employer] box to learn more about \"Advance Directives: Care Instructions. \"     If you do not have an account, please click on the \"Sign Up Now\" link. Current as of: July 17, 2020               Content Version: 12.8  © 2006-2021 Healthwise, [employer]. Care instructions adapted under license by [facility] ([facility]). If you have questions about a medical condition or this instruction, always ask your healthcare professional. [place] [address] any warranty or liability for your use of this information. Learning About Medical Power of   What is a medical power of ? A medical power of , also called a durable power of  for health care, is one type of the legal forms called advance directives. It lets you name the person you want to make treatment decisions for you if you can't speak or decide for yourself. The person you choose is called your health care agent. This person is also called a health care proxy or health care surrogate. A medical power of  may be called something else in your state. How do you choose a health care agent? Choose your health care agent carefully. This person may or may not be a family member. Talk to the person before you make your final decision. Make sure he or she is comfortable with this responsibility. It's a good idea to choose someone who:  · Is at least 25years old.   · Knows you well and you learn more? Go to [email or web address] and sign in to your Clear Books account. Enter [id number] in the [facility] box to learn more about \"Learning About [address]. \"     If you do not have an account, please click on the \"Sign Up Now\" link. Current as of: July 17, 2020               Content Version: 12.8  © 2006-2021 [employer]. Care instructions adapted under license by [facility] [place] ([facility]). If you have questions about a medical condition or this instruction, always ask your healthcare professional. [address] any warranty or liability for your use of this information. Learning About Living Perroy  What is a living will? A living will, also called a declaration, is a legal form. It tells your family and your doctor your wishes when you can't speak for yourself. It's used by the health professionals who will treat you as you near the end of your life or if you get seriously hurt or ill. If you put your wishes in writing, your loved ones and others will know what kind of care you want. They won't need to guess. This can ease your mind and be helpful to others. And you can change or cancel your living will at any time. A living will is not the same as an estate or property will. An estate will explains what you want to happen with your money and property after you die. How do you use it? A living will is used to describe the kinds of treatment or life support you want as you near the end of your life or if you get seriously hurt or ill. Keep these facts in mind about living villarreal. · Your living will is used only if you can't speak or make decisions for yourself. Most often, one or more doctors must certify that you can't speak or decide for yourself before your living will takes effect. · If you get better and can speak for yourself again, you can accept or refuse any treatment.  It doesn't matter what you said in your living will. · Some states may limit your right to refuse treatment in certain cases. For example, you may need to clearly state in your living will that you don't want artificial hydration and nutrition, such as being fed through a tube. Is a living will a legal document? A living will is a legal document. Each state has its own laws about living villarreal. And a living will may be called something else in your state. Here are some things to know about living villarreal. · You don't need an  to complete a living will. But legal advice can be helpful if your state's laws are unclear. It can also help if your health history is complicated or your family can't agree on what should be in your living will. · You can change your living will at any time. Some people find that their wishes about end-of-life care change as their health changes. If you make big changes to your living will, complete a new form. · If you move to another state, make sure that your living will is legal in the state where you now live. In most cases, doctors will respect your wishes even if you have a form from a different state. · You might use a universal form that has been approved by many states. This kind of form can sometimes be filled out and stored online. Your digital copy will then be available wherever you have a connection to the internet. The doctors and nurses who need to treat you can find it right away. · Your state may offer an online registry. This is another place where you can store your living will online. · It's a good idea to get your living will notarized. This means using a person called a  to watch two people sign, or witness, your living will. What should you know when you create a living will? Here are some questions to ask yourself as you make your living will:  · Do you know enough about life support methods that might be used?  If not, talk to your doctor so you know what might be done if you can't breathe on your own, your heart stops, or you can't swallow. · What things would you still want to be able to do after you receive life-support methods? Would you want to be able to walk? To speak? To eat on your own? To live without the help of machines? · Do you want certain Jainism practices performed if you become very ill? · If you have a choice, where do you want to be cared for? In your home? At a hospital or nursing home? · If you have a choice at the end of your life, where would you prefer to die? At home? In a hospital or nursing home? Somewhere else? · Would you prefer to be buried or cremated? · Do you want your organs to be donated after you die? What should you do with your living will? · Make sure that your family members and your health care agent have copies of your living will (also called a declaration). · Give your doctor a copy of your living will. Ask him or her to keep it as part of your medical record. If you have more than one doctor, make sure that each one has a copy. · Put a copy of your living will where it can be easily found. For example, some people may put a copy on their refrigerator door. If you are using a digital copy, be sure your doctor, family members, and health care agent know how to find and access it. Where can you learn more? Go to [email or web address] and sign in to your ZanAqua account. Enter O393 in the [facility] box to learn more about \"Learning About Living [place]. \"     If you do not have an account, please click on the \"Sign Up Now\" link. Current as of: July 17, 2020               Content Version: 12.8  © 7360-0602 Healthwise, [employer]. Care instructions adapted under license by [facility] ([facility]). If you have questions about a medical condition or this instruction, always ask your healthcare professional. [address] any warranty or liability for your use of this information. Learning About Low-Carbohydrate Diets  What is a low-carbohydrate diet? A low-carbohydrate (or \"low-carb\") diet limits foods and drinks that have carbohydrates. This includes grains, fruits, milk and yogurt, and starchy vegetables like potatoes, beans, and corn. It also avoids foods and drinks that have added sugar. Instead, low-carb diets include foods that are high in protein and fat. Why might you follow a low-carb diet? Low-carb diets may be used for a variety of reasons, such as for weight loss. People who have diabetes may use a low-carb diet to help manage their blood sugar levels. What should you do before you start the diet? Talk to your doctor before you try any diet. This is even more important if you have health problems like kidney disease, heart disease, or diabetes. Your doctor may suggest that you meet with a registered dietitian. A dietitian can help you make an eating plan that works for you. What foods do you eat on a low-carb diet? On a low-carb diet, you choose foods that are high in protein and fat. Examples of these are:  · Meat, poultry, and fish. · Eggs. · Nuts, such as walnuts, pecans, almonds, and peanuts. · Peanut butter and other nut butters. · Tofu. · Avocado. · [place]. · Non-starchy vegetables like broccoli, cauliflower, green beans, mushrooms, peppers, lettuce, and spinach. · Unsweetened non-dairy milks like almond milk and coconut milk. · Cheese, cottage cheese, and cream cheese. Current as of: December 17, 2020               Content Version: 12.8  © 2006-2021 Healthwise, [employer]. Care instructions adapted under license by [facility] ([facility]). If you have questions about a medical condition or this instruction, always ask your healthcare professional. [address] any warranty or liability for your use of this information. Personalized Preventive Plan for Rissa Hernandez - 3/23/2021  Medicare offers a range of preventive health benefits. Some of the tests and screenings are paid in full while other may be subject to a deductible, co-insurance, and/or copay. Some of these benefits include a comprehensive review of your medical history including lifestyle, illnesses that may run in your family, and various assessments and screenings as appropriate. After reviewing your medical record and screening and assessments performed today your provider may have ordered immunizations, labs, imaging, and/or referrals for you. A list of these orders (if applicable) as well as your Preventive Care list are included within your After Visit Summary for your review. Other Preventive Recommendations:    · A preventive eye exam performed by an eye specialist is recommended every 1-2 years to screen for glaucoma; cataracts, macular degeneration, and other eye disorders. · A preventive dental visit is recommended every 6 months. · Try to get at least 150 minutes of exercise per week or 10,000 steps per day on a pedometer . · Order or download the FREE \"Exercise & Physical Activity: Your Everyday Guide\" from The "[employer]. Call 1-194.753.3736 or search The "[employer] online. · You need 6450-4017 mg of calcium and 3935-8668 IU of vitamin D per day. It is possible to meet your calcium requirement with diet alone, but a vitamin D supplement is usually necessary to meet this goal.  · When exposed to the sun, use a sunscreen that protects against both UVA and UVB radiation with an SPF of 30 or greater. Reapply every 2 to 3 hours or after sweating, drying off with a towel, or swimming. · Always wear a seat belt when traveling in a car. Always wear a helmet when riding a bicycle or motorcycle.

## 2021-03-24 ENCOUNTER — TELEPHONE (OUTPATIENT)
Dept: BEHAVIORAL/MENTAL HEALTH | Age: 56
End: 2021-03-24

## 2021-03-24 LAB
HIV AG/AB: NORMAL
HIV ANTIGEN: NORMAL
HIV-1 ANTIBODY: NORMAL
HIV-2 AB: NORMAL

## 2021-03-24 RX ORDER — LEVOTHYROXINE SODIUM 137 UG/1
137 TABLET ORAL DAILY
Qty: 90 TABLET | Refills: 1 | Status: SHIPPED | OUTPATIENT
Start: 2021-03-24 | End: 2021-05-06

## 2021-03-24 NOTE — TELEPHONE ENCOUNTER
3:45PM: Pt contacted [facility] back identifying that the remainder of the week around noon would work for her, [facility] offered pt appt 3/25/2021 @ 11AM, pt agreeable. Pt does not have mychart. Will have phone session.

## 2021-03-25 ENCOUNTER — TELEPHONE (OUTPATIENT)
Dept: BEHAVIORAL/MENTAL HEALTH | Age: 56
End: 2021-03-25

## 2021-03-25 ENCOUNTER — TELEPHONE (OUTPATIENT)
Dept: PRIMARY CARE CLINIC | Age: 56
End: 2021-03-25

## 2021-03-25 NOTE — TELEPHONE ENCOUNTER
----- Message from Clark Melissa MD sent at 3/24/2021  4:54 PM EDT -----  Increase Synthroid to 137. Prescription was sent.

## 2021-03-25 NOTE — TELEPHONE ENCOUNTER
Pt contacted [facility] back and identified that Tuesday 3/30/2021 @ 1:30PM would work for her. [facility] to put her on schedule, [facility] again provided pt with 24 hour helpline/crisis number.

## 2021-03-25 NOTE — TELEPHONE ENCOUNTER
3:30AM: Pt contacted [facility] early in the morning requesting to cancel the meeting for this date, and reschedule for next week. 8:30AM: Pt contacted [facility] again to ensure [facility] got the message to reschedule. Pt stated \"I've been up about all night having a really rough day so I want to cancel for today. \"

## 2021-03-30 ENCOUNTER — VIRTUAL VISIT (OUTPATIENT)
Dept: BEHAVIORAL/MENTAL HEALTH | Age: 56
End: 2021-03-30

## 2021-03-30 DIAGNOSIS — F41.8 DEPRESSION WITH ANXIETY: Primary | ICD-10-CM

## 2021-03-31 NOTE — PROGRESS NOTES
This note will not be viewable in [facility] for the following reason(s). This is a Psychotherapy Note. Behavioral Health Consultation  DEYSI Terry, [facility]  Behavior Health Consultant  3/30/2021  1:30 PM      Time spent with Patient: 60 minutes Via audio and Visual Services. This is patient's first [facility] appointment. Reason for Consult: Depression  Referring Provider: Tyson Dallas MD  [address],  [place] [address]  Pt. Location:  Home (Due to COVID-19 pandemic)  Provider Location: [facility] ([facility]): [address] [place] [address].      S:  Pt is a 63year old  male presenting for services following  referral from PCP. As per PCP Referral: F41.8- Depression with anxiety. Reason for Referral? Traditional Longer Term Counseling. \" Pt to be seen for Depression and Anxiety. As per Provider note \"Depression:  PHQ-2 Score: 6     PHQ-9 Total Score: 6  Depression Interventions:  Patient taking Effexor as well as 10 mg of Atarax. I am going to increase her Atarax to 25 mg and advised her to seek counseling.   When [facility] talked with Pt on this date, pt stated Im not too good today, still having breathing issues, they cant figure out whats going on, just trying to hang in there.  Pt identified that she told Dr. Mannie Wilkins Im overwhelmed, I have a special needs son (22nd chromosome didnt develop right), and am living with a fatal disease with for my lungs. On medicine to slow down the process but was denied on the transplant list.  World we live in is hard on everyone. My Daughter is a , and she told me I needed a counselor. She lives with me and helps out with me and my son. She knows my behaviors, she knows my triggers.   Pt identified she had been [facility] since September, [facility] in November. Daily Gradually getting worse instead of better, it all takes a toll on me; Lost  almost 7 years ago (cancer)- July. Health issues, homelife, everything has just caved in on me.  Pt also identified having a problem with anger, when I get so angry I just snap. Im impulsive sometimes. I have a sister that wont speak to me, because  popped his mouth off at me, (about my special needs son), told him how I felt, we havent spoke in over a year because of that.  Pt identified wanting help with this stating I dont want to make enemies. I dont take anything no more. I want to stop doing that. I know I cant erase something Bina said, and I feel bad after sometimes, I say some things I regret. I dont want that.    Pt identified that she was stressed about her health, however felt that she had dealt with it, and coped with it.  Pt identified that she has a 6 week checkup with her lung doctor on Thursday, Pt identified I hate hospitals, but Im to the point that I want them to admit me to figure out what is wrong and fix it.   Pt did identify that with the Pandemic, her anxiety had increased. Pt identified that she got the Vaccine, but she was still actively wearing her mask. O:  MSE:  Appearance: Not Evaluated, as session was over phone. Attitude: \"cooperative\", \"friendly\",  Consciousness: \"alert\"  Orientation: \"oriented to person, place, time, general circumstance\"  Memory: \"intact  Attention/Concentration: \"intact during session\"  Psychomotor Activity:\" Not Evaluated, as session was over phone. Eye Contact: Not Evaluated, as session was over phone. Speech: \"normal rate and volume, well-articulated\",  Mood: \"anxious, with dysphoria noted.   Affect: \"congruent with thought content and mood\"  Perception: \"within normal limits\"  Thought Content: \"within normal limits\"   Thought Process: \"logical, goal-directed, coherent\"   Insight: \"good  Judgment: \"not assessed\"  Morbid ideation: Denies  Suicide Assessment: No Suicidal Ideation at this time    History:    Medications:   Current Outpatient Medications   Medication Sig Dispense Refill    levothyroxine (SYNTHROID) 137 MCG tablet Take 1 tablet by mouth Daily TAKE 1 TABLET BY MOUTH ONCE DAILY 90 tablet 1    albuterol sulfate HFA (PROAIR HFA) 108 (90 Base) MCG/ACT inhaler Inhale 2 puffs into the lungs every 6 hours as needed for Wheezing 1 Inhaler 3    hydrOXYzine (ATARAX) 25 MG tablet Take 1 tablet by mouth 2 times daily as needed for Anxiety 45 tablet 1    vitamin D (ERGOCALCIFEROL) 1.25 MG (38863 UT) CAPS capsule TAKE 1 CAPSULE BY MOUTH 1 TIME A WEEK 12 capsule 1    folic acid (FOLVITE) 1 MG tablet Take 1 tablet by mouth daily 90 tablet 1    venlafaxine (EFFEXOR XR) 75 MG extended release capsule TAKE 3 CAPSULES BY MOUTH DAILY 270 capsule 3    pantoprazole (PROTONIX) 40 MG tablet Take 1 tablet by mouth once daily 90 tablet 3    hydroCHLOROthiazide (HYDRODIURIL) 25 MG tablet take 1 tablet by mouth once daily 90 tablet 5    Roflumilast (DALIRESP) 500 MCG tablet Take 1 tablet by mouth daily 30 tablet 0    furosemide (LASIX) 20 MG tablet Take 1 tablet by mouth 2 times daily as needed (worsening swelling, weight up by 3lbs or more) (Patient not taking: Reported on 3/23/2021) 60 tablet 3    cetirizine (ZYRTEC) 10 MG tablet Take 1 tablet by mouth daily 30 tablet 3    ipratropium-albuterol (DUONEB) 0.5-2.5 (3) MG/3ML SOLN nebulizer solution Inhale 3 mLs into the lungs every 6 hours as needed for Shortness of Breath 360 mL 0    Nintedanib Esylate 150 MG CAPS Take by mouth 2 times daily       No current facility-administered medications for this visit. Social History:   Social History     Socioeconomic History    Marital status:       Spouse name: Not on file    Number of children: Not on file    Years of education: Not on file    Highest education level: Not on file   Occupational History    Not on file   Social Needs    Financial resource strain: Not on file    Food insecurity     Worry: Not on file     Inability: Not on file    Transportation needs     Medical: Not on file     Non-medical: Not on file   Tobacco Use    Smoking status: Former Smoker     Packs/day: 1.00     Years: 30.00     Pack years: 30.00     Quit date: 2007     Years since quittin.2    Smokeless tobacco: Never Used   Substance and Sexual Activity    Alcohol use: No    Drug use: No    Sexual activity: Not on file   Lifestyle    Physical activity     Days per week: Not on file     Minutes per session: Not on file    Stress: Not on file   Relationships    Social connections     Talks on phone: Not on file     Gets together: Not on file     Attends Gnosticist service: Not on file     Active member of club or organization: Not on file     Attends meetings of clubs or organizations: Not on file     Relationship status: Not on file    Intimate partner violence     Fear of current or ex partner: Not on file     Emotionally abused: Not on file     Physically abused: Not on file     Forced sexual activity: Not on file   Other Topics Concern    Not on file   Social History Narrative    Not on file       TOBACCO:   reports that she quit smoking about 14 years ago. She has a 30.00 pack-year smoking history. She has never used smokeless tobacco.  ETOH:   reports no history of alcohol use. Family History:   Family History   Problem Relation Age of Onset    Cancer Mother         colon    Cancer Father         bladder    High Blood Pressure Father     Stroke Father     High Blood Pressure Sister        A:  See below. Risk Assessment completed. No flowsheet data found. Interpretation of TITI-7 score: 5-9 = mild anxiety, 10-14 = moderate anxiety, 15+ = severe anxiety. Recommend referral to behavioral health for scores 10 or greater. PHQ Scores 3/23/2021 2018 2017   PHQ2 Score 6 0 1   PHQ9 Score 6 0 1     Interpretation of Total Score Depression Severity: 1-4 = Minimal depression, 5-9 = Mild depression, 10-14 = Moderate depression, 15-19 = Moderately severe depression, 20-27 = Severe depression      Diagnosis:    1.  F41.8: Depression with anxiety        Patient Active Problem List   Diagnosis    Hypothyroidism    Hyperlipidemia    [facility]    Gastroesophageal reflux disease    COPD with acute exacerbation ([facility]    Essential hypertension    Acute on chronic respiratory failure with hypoxia (HCC)    IgG deficiency (HCC)    Mixed anxiety depressive disorder    Insomnia    Sinus tachycardia    COPD exacerbation (HCC)    Generalized weakness    Yeast infection         Plan:  Pt interventions:  Pt provided informed consent verbally for the behavioral health program, and telehealth program. Discussed with patient model of service to include the limits of confidentiality (i.e. abuse reporting, suicide intervention, etc.) and short-term intervention focused approach. Pt indicated understanding. Feedback given to PCP via EHR. [facility] provided psychoeducation, including information about Coping Skills, as well as specific ones for anxiety and depression. Discussed sensory coping skills, as well as coping skills for anger. Utilized Oxygen mask analogy to educate pt, need to take care of self, Pt stated I never thought about counseling before, hard to ask for help.   Discussion of longer term therapeutic options, involving outpatient therapy including telehealth options that were available. Pt identified that she felt she would benefit from longer term traditional outpatient counseling, and medication management. [facility] Provided with contact information for [facility], As well as 24 hour crisis line. Provided with information for [facility] based on Pts insurance, and wanting to see someone in person for therapy. Pt Behavioral Change Plan:     Safety plan identified. Provided with 24 hour crisis number to use if symptoms intensify.  ([phone])   Provided with psychoeducation, via email about coping skills, cognitive distortions, challenging cognitive distortions, coping with anxiety, coping with depression,  sensory coping, deep breathing, and progressive muscle relaxation, as well as Coping skills, for anger.  [facility] examined options for ongoing treatment, related to insurance coverage.  [facility] provided information for [facility], to contact to get set up with Counseling and Medication Management.  [facility] provided pt with contact information. Pt to contact [facility] back if she had any questions, or needed to talk before she could get an appointment.    Electronically signed by Katie Lowe LCSW on 4/2/2021 at 10:34 AM

## 2021-04-21 RX ORDER — FOLIC ACID 1 MG/1
1 TABLET ORAL DAILY
Qty: 90 TABLET | Refills: 1 | Status: SHIPPED | OUTPATIENT
Start: 2021-04-21 | End: 2021-10-18

## 2021-04-28 ENCOUNTER — HOSPITAL ENCOUNTER (OUTPATIENT)
Dept: OCCUPATIONAL THERAPY | Facility: HOSPITAL | Age: 56
Setting detail: THERAPIES SERIES
Discharge: HOME | End: 2021-04-28
Payer: MEDICARE

## 2021-04-28 PROCEDURE — 97165 OT EVAL LOW COMPLEX 30 MIN: CPT

## 2021-04-28 PROCEDURE — 97530 THERAPEUTIC ACTIVITIES: CPT

## 2021-04-29 DIAGNOSIS — R53.1 GENERALIZED WEAKNESS: Primary | ICD-10-CM

## 2021-04-29 NOTE — PROGRESS NOTES
Occupational Therapy  Initial Assessment  Date:  2021    Patient Name: Elena Altman  MRN: [id number]     :  1965     Restrictions  Restrictions/Precautions: General Precautions  Required Braces or Orthoses?: No    Subjective  Chart Reviewed: Yes  Patient assessed for rehabilitation services?: Yes  Family / Caregiver Present: No  Subjective: Pt reports pain in R lower back since lung biopsy 8 years ago, presently presents 5/10; upon end of day, pain level is typically 8/10    Social/Functional History  Lives With: Family  Type of Home: House  Home Layout: One level  Home Access: Stairs to enter with rails  Entrance Stairs - Number of Steps: 1- No difficulty accessing  Bathroom Shower/Tub: Tub/Shower unit(Currently sponge bathes due to SOB)  Bathroom Toilet: Standard  Bathroom Equipment: Shower chair  Home Equipment: Oxygen  ADL Assistance: Independent  Homemaking Assistance: Needs assistance  Homemaking Responsibilities: No  Ambulation Assistance: Independent  Transfer Assistance: Independent  Active : Yes  Mode of Transportation: Car  Leisure & Hobbies: Coloring, sewing, crafting, quilting, caring for son     Objective   Vision: Impaired  Vision Exceptions: Wears glasses at all times  Hearing: Within functional limits    Orientation Status: Within Functional Limits  Cognitive Status: WFL  Sensation Status: WFL  Posture: Good  Observation: Pt ambulated into therapy gym, NAD, 5L 02; Pleasant & cooperative  Sitting Balance: Independent  Standing Balance: Stand by assistance  Functional Mobility: Stand by assistance- No device       UE AROM : WFL  Gross LUE Strength: Exceptions; MMT 4-/5     Hand Dominance: Right  Left Hand Strength -  (lbs)- Impaired  Handle Setting 2: 31.7, 38.7, 36.5, 37.8  Right Hand Strength -  (lbs)- Impaired  Handle Setting 2: 35.3, 36.3, 34.2, 38.0    Fine Motor Skills  9-Hole Peg Test: Functional  L hand: 19 seconds  R hand: 19 seconds    HEP issued  Education about energy conservation    Activity tolerance: Pt tolerated tx w fatigue & SOB present    Assessment  Performance deficits / Impairments: Decreased functional mobility ; Decreased endurance;Decreased ADL status; Decreased balance;Decreased strength;Decreased high-level IADLs  Prognosis: Good  Decision Making: Low Complexity  REQUIRES OT FOLLOW UP: Yes      Pt seen for skilled OT evaluation & interventions; pain 5/10 in lower back area. Pt recently was discharged from hospital due to SOB from interstitial pulmonary disease. Pt present w  activity tolerance, SOB, decreased  strength, & no positive energy conservation techniques. Pt is currently unable to perform high level IADL tasks. OT provided pt education about importance of using EC techniques. HEP issued; pt expressed understanding of how to perform & potential benefits. Pt is a good candidate to benefit from skilled OT interventions on an OP basis to promote functional independence & regain activity tolerance.      Plan  Times per week: 2  Times per day: Daily  Plan weeks: 6-8  Current Treatment Recommendations: Strengthening, Patient/Caregiver Education & Training, Home Management Training, Equipment Evaluation, Education, & procurement, Balance Training, Functional Mobility Training, Endurance Training, Self-Care / ADL    Goals  Short term goals  Time Frame for Short term goals: 3-4 weeks  Short term goal 1: Pt will be Mod I w HEP  Short term goal 2: Pt will tolerate 10 minutes of standing activity  Short term goal 3: Pt will increase  strengt by 10#  Short term goal 4: Pt O2 stats will remain >/= 90% 75% of session  Short term goal 5: Pt will ID 2-3 EC techniques  Short term goal 6: Pt to increase MMT to 4+/5    Long term goals  Time Frame for Long term goals : 5-8 weeks  Long term goal 1: Pt will report follow through w HEP 5/7 days per week  Long term goal 2: Pt will tolerate 20 minutes of standing activity  Long term goal 3: Pt will increase  strength by 20#  Long term goal 4: Pt O2 stats will remain >/= 90% 90% of session  Long term goal 5: Pt will ID 4-6 EC techniques  Long term goal 6: Pt to increase MMT to 5/5     Therapy Time   Individual   Time In 0338   Time Out 0441   Minutes ISACC ThomasR/L  This note serves as D/C summary if patient is discharged prior to next visit. Certification of Medical Necessity: It will be understood that this treatment plan is certified medically necessary by the documenting therapist and referring physician mentioned in this report. Unless the physician indicated otherwise through written correspondence with our office, all further referrals will act as certification of medical necessity on this treatment plan. Thank you for this referral.  If you have questions regarding this plan of care, please call [phone].     Please sign and return this plan to:   FAX: [phone]      Signature:                                 Date:

## 2021-04-30 ENCOUNTER — HOSPITAL ENCOUNTER (OUTPATIENT)
Dept: OCCUPATIONAL THERAPY | Facility: HOSPITAL | Age: 56
Setting detail: THERAPIES SERIES
Discharge: HOME | End: 2021-04-30
Payer: MEDICARE

## 2021-04-30 ENCOUNTER — HOSPITAL ENCOUNTER (OUTPATIENT)
Dept: PHYSICAL THERAPY | Facility: HOSPITAL | Age: 56
Setting detail: THERAPIES SERIES
Discharge: HOME | End: 2021-04-30
Payer: MEDICARE

## 2021-04-30 PROCEDURE — 97530 THERAPEUTIC ACTIVITIES: CPT

## 2021-04-30 PROCEDURE — 97162 PT EVAL MOD COMPLEX 30 MIN: CPT

## 2021-04-30 PROCEDURE — 97110 THERAPEUTIC EXERCISES: CPT

## 2021-04-30 NOTE — PROGRESS NOTES
Physical Therapy  Initial Assessment  Date: 2021  Patient Name: Elena Altman  MRN: [id number]  : 1965     Treatment Diagnosis: Functional decline; general weakness, IPF    Restrictions  Restrictions/Precautions  Restrictions/Precautions: General Precautions  Required Braces or Orthoses?: No  Position Activity Restriction  Other position/activity restrictions: Oxygen dependent, ranging from 3-8 liters    Subjective   General  Chart Reviewed: Yes  Patient assessed for rehabilitation services?: Yes  Response To Previous Treatment: Not applicable  Family / Caregiver Present: No  Referring Practitioner: Shayla Cartwright MD  Diagnosis: Functional decline; IPF; general weakness  Follows Commands: Within Functional Limits  PT Visit Information  PT Insurance Information: Medicare, Medicaid  Subjective  Subjective: Patient reports: she has an 8-year history of lung disease, Interstitial Pulmonary Fibrosis; has gotten progressively worse over time, more recently is experiencing what she describes as a \"flare up\" = where she is experiencing increased SOB, significant drops in her oxygen saturation - into the 70's, causing a significant decrease in her functional capacity-- noting very limited ability to perform personal care tasks, household chores such as laundry, meal preparation, etc; has has recent CT scan that reported showed no changes from 2 years ago       Vision/Hearing  Vision  Vision: Impaired  Hearing  Hearing: Within functional limits    Orientation  Orientation  Overall Orientation Status: Within Normal Limits    Social/Functional History       Objective     Observation/Palpation  Posture: Fair  Observation: Gait: steady pulling O2 tank; O2 @ 6 liters    AROM RLE (degrees)  RLE AROM: WFL  AROM LLE (degrees)  LLE AROM : WFL  AROM RUE (degrees)  RUE AROM : WFL  AROM LUE (degrees)  LUE AROM : WFL    Strength RLE  R Hip Flexion: 4+/5  R Hip ABduction: 4+/5  R Hip ADduction: 4+/5  R Knee Flexion: 4/5  R Knee Training               Goals  Short term goals  Time Frame for Short term goals: 4 weeks  Short term goal 1: Patient to be independent with HEP. Short term goal 2: Patient to achieve a 30\" sit to stand test x 12 reps with O2 staying at 90% or greater. Short term goal 3: Patient to achieve a TUG test of 14\" or less. Short term goal 4: Patient to report a 15% improvement in performing routine ADL's and I-ADL's. Long term goals  Time Frame for Long term goals : 8 weeks  Long term goal 1: Patient to achieve a 30\" sit to stand test x 14 reps with O2 staying at 90% or greater. Long term goal 2: Patient to achieve a TUG test of 12\" or less - showing a decrease in fall risk, and improvement in gait efficiency. Long term goal 3: Patient to report a 25-50% improvement in performing routine ADL's and I-ADL's. Long term goal 4: Patient to achieve ability to ambulate community distances with O2, with O2 saturation > 90% for duration of activity. Therapy Time   Individual Concurrent Group Co-treatment   Time In           Time Out           Minutes                   Stephen Martinez PT       Certification of Medical Necessity: It will be understood that this treatment plan is certified medically necessary by the documenting therapist and referring physician mentioned in this report. Unless the physician indicated otherwise through written correspondence with our office, all further referrals will act as certification of medical necessity on this treatment plan. Thank you for this referral.  If you have questions regarding this plan of care, please call [phone].           Revisions to this plan (optional):                     Please sign and return this plan to:   FAX: [phone]      Signature:                                 Date:

## 2021-05-03 NOTE — PROGRESS NOTES
Occupational Therapy  Daily Note  Date:  2021    Patient Name: Domenico Coe  MRN: [id number]     :  1965     Restrictions  Restrictions/Precautions: General Precautions  Required Braces or Orthoses?: No    Subjective  Chart Reviewed: Yes  Patient assessed for rehabilitation services?: Yes  Family / Caregiver Present: No  Subjective: Pt reports pain in R lower back since lung biopsy 8 years ago, presently presents 5/10; upon end of day, pain level is typically 8/10     Objective   Posture: Good  Observation: Pt ambulated into therapy gym, NAD, 8L 02; Pleasant & cooperative  Sitting Balance: Independent  Standing Balance: Stand by assistance  Functional Mobility: Stand by assistance- No device      Arm bike - 6 minutes total  Start: 92% O2; 122 HR; 8L 02  After 1st 3 minute: 90% 125; HR 8L O2  After 2nd 3 minutes: 84%; 129 HR; 5L O2  Rebounded to: 94% O2; 120 HR; 8L O2    Hand strengthening therex  Green [employer] flex- 5#  All x15  Individual x15    Orange Pnkster  Extending x15  Crushing x15  Pinching x15  Warping x15    HEP w orange theraband  Shoulder flexion/extension x15  Shoulder add/abduction x15  Horizontal add/abduction x15  Internal/external rotation x15  Elbow flexion/extension x15  Supination/Pronation x15  Wrist flexion/extension x15  Wrist radial/ulnar deviation x15    Education about energy conservation  AE education    Activity tolerance: Pt tolerated tx w fatigue & SOB present    Assessment  Performance deficits / Impairments: Decreased functional mobility ; Decreased endurance;Decreased ADL status; Decreased balance;Decreased strength;Decreased high-level IADLs     Pt seen for skilled OT interventions; pain 5/10 in lower back area. Pt recently was discharged from hospital due to SOB from interstitial pulmonary disease. Pt O2 stats dropped with increased activity; pt performs best with O2 output at higher level.  OT led UB therex & hand programs; pt tolerated fair with rest breaks; pt O2 rebounds quickly. OT provided pt education about importance of using EC techniques; OT & pt discussed how lifting arms above or below center of gravity can cause SOB & the importance of keeping frequently used items at eye level. HEP previously issued; pt expressed understanding of how to perform & potential benefits; pt able to perform with SBA. OT issued long handled shoe horn due to difficulty donning shoes. Pt able to use independently after demo. OT, PT, & pt discussed potential of using rollator walker to conserve energy. OT collaborated with MD office about potential DME order. Upon end of session, pt O2 96% with 113 HR at 6L O2. Pt is a good candidate to continue to benefit from skilled OT interventions on an OP basis to promote functional independence & regain activity tolerance.     Plan  Times per week: 2  Times per day: Daily  Plan weeks: 6-8  Current Treatment Recommendations: Strengthening, Patient/Caregiver Education & Training, Home Management Training, Equipment Evaluation, Education, & procurement, Balance Training, Functional Mobility Training, Endurance Training, Self-Care / ADL    Goals  Short term goals  Time Frame for Short term goals: 3-4 weeks  Short term goal 1: Pt will be Mod I w HEP  Short term goal 2: Pt will tolerate 10 minutes of standing activity  Short term goal 3: Pt will increase  strengt by 10#  Short term goal 4: Pt O2 stats will remain >/= 90% 75% of session  Short term goal 5: Pt will ID 2-3 EC techniques  Short term goal 6: Pt to increase MMT to 4+/5    Long term goals  Time Frame for Long term goals : 5-8 weeks  Long term goal 1: Pt will report follow through w HEP 5/7 days per week  Long term goal 2: Pt will tolerate 20 minutes of standing activity  Long term goal 3: Pt will increase  strength by 20#  Long term goal 4: Pt O2 stats will remain >/= 90% 90% of session  Long term goal 5: Pt will ID 4-6 EC techniques  Long term goal 6: Pt to increase MMT to 5/5     Therapy Time   Individual   Time In  1035   Time Out  1139   Minutes  Karan VICENTE 18., OTR/L  This note serves as D/C summary if patient is discharged prior to next visit.

## 2021-05-05 ENCOUNTER — HOSPITAL ENCOUNTER (OUTPATIENT)
Facility: HOSPITAL | Age: 56
Discharge: HOME | End: 2021-05-05
Payer: MEDICARE

## 2021-05-05 ENCOUNTER — OFFICE VISIT (OUTPATIENT)
Dept: PRIMARY CARE CLINIC | Age: 56
End: 2021-05-05
Payer: MEDICARE

## 2021-05-05 VITALS
OXYGEN SATURATION: 97 % | HEART RATE: 108 BPM | DIASTOLIC BLOOD PRESSURE: 74 MMHG | TEMPERATURE: 97.6 F | RESPIRATION RATE: 18 BRPM | BODY MASS INDEX: 39.34 KG/M2 | SYSTOLIC BLOOD PRESSURE: 126 MMHG | WEIGHT: 236.4 LBS

## 2021-05-05 DIAGNOSIS — E03.9 HYPOTHYROIDISM, UNSPECIFIED TYPE: ICD-10-CM

## 2021-05-05 DIAGNOSIS — F41.9 ANXIETY: ICD-10-CM

## 2021-05-05 DIAGNOSIS — J84.9 INTERSTITIAL LUNG DISEASE (HCC): ICD-10-CM

## 2021-05-05 DIAGNOSIS — I10 ESSENTIAL HYPERTENSION: ICD-10-CM

## 2021-05-05 DIAGNOSIS — D80.3 IGG DEFICIENCY (HCC): ICD-10-CM

## 2021-05-05 DIAGNOSIS — E66.01 CLASS 2 SEVERE OBESITY WITH SERIOUS COMORBIDITY AND BODY MASS INDEX (BMI) OF 39.0 TO 39.9 IN ADULT, UNSPECIFIED OBESITY TYPE (HCC): ICD-10-CM

## 2021-05-05 DIAGNOSIS — J84.9 INTERSTITIAL LUNG DISEASE (HCC): Primary | ICD-10-CM

## 2021-05-05 PROCEDURE — 80053 COMPREHEN METABOLIC PANEL: CPT

## 2021-05-05 PROCEDURE — 85025 COMPLETE CBC W/AUTO DIFF WBC: CPT

## 2021-05-05 PROCEDURE — 3017F COLORECTAL CA SCREEN DOC REV: CPT | Performed by: INTERNAL MEDICINE

## 2021-05-05 PROCEDURE — 84443 ASSAY THYROID STIM HORMONE: CPT

## 2021-05-05 PROCEDURE — G8427 DOCREV CUR MEDS BY ELIG CLIN: HCPCS | Performed by: INTERNAL MEDICINE

## 2021-05-05 PROCEDURE — G8417 CALC BMI ABV UP PARAM F/U: HCPCS | Performed by: INTERNAL MEDICINE

## 2021-05-05 PROCEDURE — 99214 OFFICE O/P EST MOD 30 MIN: CPT | Performed by: INTERNAL MEDICINE

## 2021-05-05 PROCEDURE — 1036F TOBACCO NON-USER: CPT | Performed by: INTERNAL MEDICINE

## 2021-05-05 RX ORDER — DIAZEPAM 2 MG/1
2 TABLET ORAL NIGHTLY PRN
Qty: 30 TABLET | Refills: 0 | Status: SHIPPED | OUTPATIENT
Start: 2021-05-05 | End: 2022-07-01 | Stop reason: SDUPTHER

## 2021-05-05 ASSESSMENT — ENCOUNTER SYMPTOMS
ABDOMINAL PAIN: 0
COUGH: 1
WHEEZING: 1
SORE THROAT: 0
SINUS PRESSURE: 0
NAUSEA: 0
SHORTNESS OF BREATH: 1
BACK PAIN: 1
EYE DISCHARGE: 0
VOMITING: 0

## 2021-05-05 NOTE — PROGRESS NOTES
SUBJECTIVE:    Patient ID: Rissa Hernandez is a 63 y. o.female. Chief Complaint   Patient presents with    COPD    Anxiety         HPI:  Patient has had COPD/ interstitial lung disease for a long time. She has been using nebulizer inhalation treatments as ordered. She is on oxygen. She has some dyspnea with exertion. With on and off cough, SOB and wheezing that does respond to treatment at times. She has been using the Albuterol inhaler. Last Pneumovax was 2015 . She quit smoking in 2007. She was recently admitted to [facility] for hypoxia and COPD exacerbation. Since discharge, she is still having shortness of breath and is requiring 4 L of oxygen especially on exertion. Patient has had hypertension for several years. She has been compliant with taking medications, without side effects from it. She has been following a low-sodium, is not active and never exercises. Weight is stable, compared to last visit. Her blood pressure is stable at this time. Patient  has had hypothyroidism for the past few years. She has been compliant with taking her medication without side effects. There are no symptoms of hypo or hyper thyroidism. Patient with obesity for a long time. She reports that due to her breathing problems she is unable to exercise and in turn lose weight. She has started PT and OT recently and hopes this will help rehabilitate her lungs a little and help her gain strength. Patient has had a nerve problem for long time. Her sx have recently gotten worse. She easily get agitated and nervous. She has some difficulties falling and maintaining sleep at time. She denies any suicidal ideation. Patient's medications, allergies, past medical, surgical, social and family histories were reviewed and updated as appropriate in the electronic medical record/chart.         Outpatient Medications Marked as Taking for the 5/5/21 encounter (Office Visit) with John Love MD   Medication Sig Dispense Refill  folic acid (FOLVITE) 1 MG tablet TAKE 1 TABLET BY MOUTH DAILY 90 tablet 1    levothyroxine (SYNTHROID) 137 MCG tablet Take 1 tablet by mouth Daily TAKE 1 TABLET BY MOUTH ONCE DAILY 90 tablet 1    albuterol sulfate HFA (PROAIR HFA) 108 (90 Base) MCG/ACT inhaler Inhale 2 puffs into the lungs every 6 hours as needed for Wheezing 1 Inhaler 3    hydrOXYzine (ATARAX) 25 MG tablet Take 1 tablet by mouth 2 times daily as needed for Anxiety 45 tablet 1    vitamin D (ERGOCALCIFEROL) 1.25 MG (02575 UT) CAPS capsule TAKE 1 CAPSULE BY MOUTH 1 TIME A WEEK 12 capsule 1    venlafaxine (EFFEXOR XR) 75 MG extended release capsule TAKE 3 CAPSULES BY MOUTH DAILY 270 capsule 3    pantoprazole (PROTONIX) 40 MG tablet Take 1 tablet by mouth once daily 90 tablet 3    hydroCHLOROthiazide (HYDRODIURIL) 25 MG tablet take 1 tablet by mouth once daily 90 tablet 5    Roflumilast (DALIRESP) 500 MCG tablet Take 1 tablet by mouth daily 30 tablet 0    furosemide (LASIX) 20 MG tablet Take 1 tablet by mouth 2 times daily as needed (worsening swelling, weight up by 3lbs or more) 60 tablet 3    cetirizine (ZYRTEC) 10 MG tablet Take 1 tablet by mouth daily 30 tablet 3    ipratropium-albuterol (DUONEB) 0.5-2.5 (3) MG/3ML SOLN nebulizer solution Inhale 3 mLs into the lungs every 6 hours as needed for Shortness of Breath 360 mL 0    Nintedanib Esylate 150 MG CAPS Take by mouth 2 times daily          Review of Systems   Constitutional: Negative for chills and fever. HENT: Negative for congestion, sinus pressure and sore throat. Eyes: Negative for discharge and visual disturbance. Respiratory: Positive for cough, shortness of breath and wheezing. At baseline    Cardiovascular: Negative for chest pain and palpitations. AYALA   Gastrointestinal: Negative for abdominal pain, nausea and vomiting. Endocrine: Negative for cold intolerance and heat intolerance. Genitourinary: Negative for dysuria, frequency and urgency. Musculoskeletal: Positive for arthralgias and back pain. Skin: Negative for rash and wound. Neurological: Negative for syncope, numbness and headaches. Hematological: Negative. Psychiatric/Behavioral: Negative for agitation, self-injury, sleep disturbance and suicidal ideas. The patient is nervous/anxious (occasional ). 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    Years: .    Pack years: .    Quit date: 2007    Years since quittin.3   Smokeless Tobacco Never Used       OBJECTIVE:   Wt Readings from Last 3 Encounters:   21 236 lb 6.4 oz (107.2 kg)   21 238 lb 9.6 oz (108.2 kg)   20 211 lb (95.7 kg)     BP Readings from Last 3 Encounters:   21 126/74   21 136/70   20 129/84       /74   Pulse 108   Temp 97.6 °F (36.4 °C)   Resp 18   Wt 236 lb 6.4 oz (107.2 kg)   SpO2 97% Comment: 4 liters  BMI 39.34 kg/m²      Physical Exam  Vitals signs and nursing note reviewed. Constitutional:       Appearance: Normal appearance. She is well-developed. She is obese. HENT:      Head: Normocephalic and atraumatic.       Right Ear: External ear normal.      Left Ear: External ear normal. Nose: Nose normal.   Eyes:      Conjunctiva/sclera: Conjunctivae normal.      Pupils: Pupils are equal, round, and reactive to light. Neck:      Musculoskeletal: Neck supple. No neck rigidity or muscular tenderness. Thyroid: No thyromegaly. Vascular: No JVD. Cardiovascular:      Rate and Rhythm: Normal rate and regular rhythm. Heart sounds: Normal heart sounds. Pulmonary:      Effort: Pulmonary effort is normal.      Breath sounds: No wheezing or rales (fine throughout). Abdominal:      General: Bowel sounds are normal. There is no distension. Palpations: Abdomen is soft. Tenderness: There is no abdominal tenderness. Musculoskeletal:         General: No tenderness. Right lower leg: Edema (trace) present. Left lower leg: Edema (trace) present. Comments: Clubbing to fingernails. Skin:     Findings: No erythema or rash. Neurological:      Mental Status: She is alert and oriented to person, place, and time. Psychiatric:         Mood and Affect: Mood normal.         Behavior: Behavior normal.         Thought Content:  Thought content normal.         Judgment: Judgment normal.         Lab Results   Component Value Date     03/23/2021    K 4.2 03/23/2021    K 3.7 12/08/2020    CL 98 03/23/2021    CO2 35 03/23/2021    GLUCOSE 71 03/23/2021    BUN 16 03/23/2021    CREATININE 0.8 03/23/2021    CALCIUM 9.8 03/23/2021    PROT 7.1 03/23/2021    LABALBU 4.1 03/23/2021    BILITOT <0.2 03/23/2021    ALT 26 03/23/2021    AST 17 03/23/2021       Hemoglobin A1C (%)   Date Value   06/08/2017 5.4     Microscopic Examination (no units)   Date Value   02/25/2017 Not Indicated     LDL Calculated (mg/dL)   Date Value   10/14/2019 134 (H)         Lab Results   Component Value Date    WBC 12.8 03/23/2021    NEUTROABS 8.9 03/23/2021    HGB 14.4 03/23/2021    HCT 46.1 03/23/2021    MCV 97.5 03/23/2021     03/23/2021       Lab Results   Component Value Date    TSH 8.21 (H) 03/23/2021       ASSESSMENT/PLAN:     1. Interstitial lung disease ([address]  Patient with worsening symptoms specially increased oxygen requirements and worsening dyspnea on exertion. Advised her to take Lasix 2-3/week. Long conversation with her regarding weight control. Patient recently had extensive work-up including right heart cath. Patient reported might be in the process to start some new medications depending on the conversation between her cardiologist and pulmonologist.    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  Monitor labs periodically. 3. Essential hypertension  BP is stable. I have advised her on low-sodium diet, exercise and weight control. I am going to continue current medication. Will monitor her renal function every few months, have advised her to check blood pressure frequently and to keep a record of this. 4. Hypothyroidism, unspecified type  TSH is abnormal. I am going to check the TSH every few months. I am going to recheck and adjust medication as indicated. I have advised her on the importance of taking the medication on a daily basis. Lab Results   Component Value Date    TSH 8.21 (H) 03/23/2021     5. Class 2 severe obesity with serious comorbidity and body mass index (BMI) of 39.0 to 39.9 in adult, unspecified obesity type (HCC)  Proceed with OT and PT. Discussed appropriate diet and ways to become active considering her lung issues. 6. Anxiety  I am going to treat the patient with Valium. I advised the patient to seek counseling. I will reassess current condition every few months. Patient to call or RTC if experienced any deterioration of Sx.      - diazePAM (VALIUM) 2 MG tablet; Take 1 tablet by mouth nightly as needed for Anxiety or Sleep for up to 30 days. Dispense: 30 tablet;  Refill: 0       Written by Carlyon Severin, acting as a scribe for Dr. Shobha Siegel on 5/5/2021 at 2:33 PM.     I, Dr. Faustino Rowland, personally performed the services described in the documentation as scribed by Hayes Buckner CMA, in my presence and it is both accurate and complete.

## 2021-05-05 NOTE — PROGRESS NOTES
Chief Complaint   Patient presents with    COPD    Anxiety       Have you seen any other physician or provider since your last visit yes - [facility] admitted , cardiology     Have you had any other diagnostic tests since your last visit?  yes -     Have you changed or stopped any medications since your last visit? no

## 2021-05-06 LAB
A/G RATIO: 1.1 (ref 0.8–2)
ALBUMIN SERPL-MCNC: 3.9 G/DL (ref 3.4–4.8)
ALP BLD-CCNC: 155 U/L (ref 25–100)
ALT SERPL-CCNC: 17 U/L (ref 4–36)
ANION GAP SERPL CALCULATED.3IONS-SCNC: 10 MMOL/L (ref 3–16)
AST SERPL-CCNC: 21 U/L (ref 8–33)
BASOPHILS ABSOLUTE: 0.1 K/UL (ref 0–0.1)
BASOPHILS RELATIVE PERCENT: 0.6 %
BILIRUB SERPL-MCNC: <0.2 MG/DL (ref 0.3–1.2)
BUN BLDV-MCNC: 16 MG/DL (ref 6–20)
CALCIUM SERPL-MCNC: 9.8 MG/DL (ref 8.5–10.5)
CHLORIDE BLD-SCNC: 96 MMOL/L (ref 98–107)
CO2: 33 MMOL/L (ref 20–30)
CREAT SERPL-MCNC: 0.9 MG/DL (ref 0.4–1.2)
EOSINOPHILS ABSOLUTE: 0.5 K/UL (ref 0–0.4)
EOSINOPHILS RELATIVE PERCENT: 4.3 %
GFR AFRICAN AMERICAN: >59
GFR NON-AFRICAN AMERICAN: >60
GLOBULIN: 3.5 G/DL
GLUCOSE BLD-MCNC: 102 MG/DL (ref 74–106)
HCT VFR BLD CALC: 43.3 % (ref 37–47)
HEMOGLOBIN: 13.4 G/DL (ref 11.5–16.5)
IMMATURE GRANULOCYTES #: 0 K/UL
IMMATURE GRANULOCYTES %: 0.3 % (ref 0–5)
LYMPHOCYTES ABSOLUTE: 2.3 K/UL (ref 1.5–4)
LYMPHOCYTES RELATIVE PERCENT: 21.7 %
MCH RBC QN AUTO: 29.7 PG (ref 27–32)
MCHC RBC AUTO-ENTMCNC: 30.9 G/DL (ref 31–35)
MCV RBC AUTO: 96 FL (ref 80–100)
MONOCYTES ABSOLUTE: 0.7 K/UL (ref 0.2–0.8)
MONOCYTES RELATIVE PERCENT: 6.9 %
NEUTROPHILS ABSOLUTE: 6.9 K/UL (ref 2–7.5)
NEUTROPHILS RELATIVE PERCENT: 66.2 %
PDW BLD-RTO: 14.2 % (ref 11–16)
PLATELET # BLD: 318 K/UL (ref 150–400)
PMV BLD AUTO: 11.2 FL (ref 6–10)
POTASSIUM SERPL-SCNC: 4.1 MMOL/L (ref 3.4–5.1)
RBC # BLD: 4.51 M/UL (ref 3.8–5.8)
SODIUM BLD-SCNC: 139 MMOL/L (ref 136–145)
TOTAL PROTEIN: 7.4 G/DL (ref 6.4–8.3)
TSH SERPL DL<=0.05 MIU/L-ACNC: 16.41 UIU/ML (ref 0.27–4.2)
WBC # BLD: 10.4 K/UL (ref 4–11)

## 2021-05-06 RX ORDER — LEVOTHYROXINE SODIUM 175 UG/1
175 TABLET ORAL DAILY
Qty: 30 TABLET | Refills: 2 | Status: SHIPPED | OUTPATIENT
Start: 2021-05-06 | End: 2021-07-26 | Stop reason: SDUPTHER

## 2021-05-07 ENCOUNTER — HOSPITAL ENCOUNTER (OUTPATIENT)
Dept: OCCUPATIONAL THERAPY | Facility: HOSPITAL | Age: 56
Setting detail: THERAPIES SERIES
Discharge: HOME | End: 2021-05-07
Payer: MEDICARE

## 2021-05-07 ENCOUNTER — HOSPITAL ENCOUNTER (OUTPATIENT)
Dept: PHYSICAL THERAPY | Facility: HOSPITAL | Age: 56
Setting detail: THERAPIES SERIES
Discharge: HOME | End: 2021-05-07
Payer: MEDICARE

## 2021-05-07 PROCEDURE — 97110 THERAPEUTIC EXERCISES: CPT

## 2021-05-07 PROCEDURE — 97530 THERAPEUTIC ACTIVITIES: CPT

## 2021-05-07 NOTE — FLOWSHEET NOTE
Physical Therapy Daily Treatment Note   Date:  2021    TIme In:   5548                   Time Out: 26    Patient Name:  Elenita Winn    :  1965  MRN: [id number]    Restrictions/Precautions:  General precautions  Pertinent Medical History:    Medical/Treatment Diagnosis Information:  Functional decline; general weajness, IPF  ·    ·    Insurance/Certification information:   Medicare, Medicaid  Physician Information:   Rachel Mcguire MD  Plan of care signed (Y/N):  Yes  Visit# / total visits:    2 /    G-Code (if applicable):      Date / Visit # G-Code Applied:         Progress Note: []  Yes  [x]  No  Next due by: Visit #10      Pain level: 0  /10  Subjective:      Objective:   Observation:    Test measurements:  O2 dropped to low 80's/high 70s in between exercise set, but quickly increased with rest breaks to 90%   Palpation:    Exercises:  Exercise Resistance/Repetitions Other comments   Nustep  L4 1' x 5 with rest breaks 7   Standing marches 15\" x 5 7   Sit to stand 3 reps x 5 7   Standing heel/toe raises 3 x 10 7   Standing hip ABD 3 x 10 B 7                                        Other Therapeutic Activities:      Manual Treatments:      Modalities:        Timed Code Treatment Minutes:  45      Total Treatment Minutes:  48    Treatment/Activity Tolerance:  [x] Patient tolerated treatment well [] Patient limited by fatigue  [] Patient limited by pain  [] Patient limited by other medical complications  [x] Other: patient required several rest breaks in between each exercise set. Pain after treatment:     0 /10    Prognosis: [x] Good [] Fair  [] Poor    Patient Requires Follow-up: [] Yes  [] No    Plan:   [x] Continue per plan of care [] Alter current plan (see comments)  [] Plan of care initiated [] Hold pending MD visit [] Discharge    Plan for Next Session:        Electronically signed by:   Lukasz Lopez PTA
dizziness now resolved

## 2021-05-12 ENCOUNTER — HOSPITAL ENCOUNTER (OUTPATIENT)
Dept: OCCUPATIONAL THERAPY | Facility: HOSPITAL | Age: 56
Setting detail: THERAPIES SERIES
Discharge: HOME | End: 2021-05-12
Payer: MEDICARE

## 2021-05-12 ENCOUNTER — HOSPITAL ENCOUNTER (OUTPATIENT)
Dept: PHYSICAL THERAPY | Facility: HOSPITAL | Age: 56
Setting detail: THERAPIES SERIES
Discharge: HOME | End: 2021-05-12
Payer: MEDICARE

## 2021-05-12 PROCEDURE — 97530 THERAPEUTIC ACTIVITIES: CPT

## 2021-05-12 PROCEDURE — 97110 THERAPEUTIC EXERCISES: CPT

## 2021-05-12 NOTE — FLOWSHEET NOTE
Physical Therapy Daily Treatment Note   Date:  2021    TIme In:   9497                  Time Out:  1520    Patient Name:  Domenico Coe    :  1965  MRN: [id number]    Restrictions/Precautions:  General precautions  Pertinent Medical History:    Medical/Treatment Diagnosis Information:  Functional decline; general weajness, IPF        Insurance/Certification information:   Medicare, Medicaid  Physician Information:   Humberto Dixon MD  Plan of care signed (Y/N):  Yes  Visit# / total visits:    3 /    G-Code (if applicable):      Date / Visit # G-Code Applied:         Progress Note: []  Yes  [x]  No  Next due by: Visit #10      Pain level: 3/10  Subjective:      Objective:   Observation:    Test measurements:  O2 sats range 70-92%. Patient requires rest breaks to increase level to above 90% to continue with activity. Supplemental O2 utilized at [employer].  Palpation:    Exercises:  Exercise Resistance/Repetitions Other comments   Nustep  L4  7 minutes 12   Standing marches 15\"x5 12   Sit to stand 3 reps x 5 12   Standing heel/toe raises 3x10 12   Standing hip ABD 3x10 B 12   * LAQs 3x10 B 12   * Seated marching 15\"x5 12                              Other Therapeutic Activities:      Manual Treatments:      Modalities:        Timed Code Treatment Minutes:   39    Total Treatment Minutes:   46    Treatment/Activity Tolerance:  [x] Patient tolerated treatment well [] Patient limited by fatigue  [] Patient limited by pain  [] Patient limited by other medical complications  [x] Other:  Patient requires frequent rest breaks to allow for O2 return. Pain after treatment:     5/10  Reports some LBP from exercise.   States that is expected and not unusual.    Prognosis: [x] Good [] Fair  [] Poor    Patient Requires Follow-up: [] Yes  [] No    Plan:   [x] Continue per plan of care [] Alter current plan (see comments)  [] Plan of care initiated [] Hold pending MD visit [] Discharge    Plan for Next Session: Electronically signed by:  Hoang Headley PTA

## 2021-05-17 NOTE — PROGRESS NOTES
Occupational Therapy  Daily Note  Date:  2021    Patient Name: Rissa Hernandez  MRN: [id number]     :  1965     Restrictions  Restrictions/Precautions: General Precautions  Required Braces or Orthoses?: No    Subjective  Chart Reviewed: Yes  Patient assessed for rehabilitation services?: Yes  Family / Caregiver Present: No  Subjective: Pt reports pain in R lower back since lung biopsy 8 years ago, presently presents 5/10; upon end of day, pain level is typically 8/10     Objective   Posture: Good  Observation: Pt ambulated into therapy gym, NAD, 8L 02; Pleasant & cooperative  Sitting Balance: Independent  Standing Balance: Stand by assistance  Functional Mobility: Stand by assistance- No device      Ambulating into therapy gym: 87% O2; 127 HR; 5L O2  Rebound: 91% O2; 127 HR; 5L O2    UE Therex Program w 3# hand held weight  Shoulder flexion/extension x20  Shoulder add/abduction x20  Horizontal add/abduction x20  Forward circumduction x20  Reverse circumduction x20  Internal/external rotation x20  Medial/lateral rotation x20  Elbow flexion/extension x20  Supination/Pronation x20  Wrist flexion/extension x20  Wrist radial/ulnar deviation x20    After UB therex: 91% O2; 126 HR     Hand strengthening therex  [employer] flex- 5#  All x20  Individual x20    Orange Pnkster  Extending x20  Crushing x20  Pinching x20  Warping x20    Green Gripper x20    Hand strengthening pop beads x20    After Hand therex: 96% O2; 126 HR    Standing tolerance activity  Prior: 94% O2; 132 HR  After 4 Minutes: 80% O2; 140 HR  Rebound: 94% O2; 130 HR    Ambulation 50 ft/Exit to PT: 93% O2; 131 HR    Activity tolerance: Pt tolerated tx w fatigue & SOB present    Assessment  Performance deficits / Impairments: Decreased functional mobility ; Decreased endurance;Decreased ADL status; Decreased balance;Decreased strength;Decreased high-level IADLs     Pt seen for skilled OT interventions; pain 5/10 in lower back area.  Pt recently was discharged from hospital due to SOB from interstitial pulmonary disease. Pt O2 stats dropped with increased activity. OT led UB therex & hand programs; pt tolerated fair with rest breaks; pt O2 rebounds quickly. OT provided pt education about importance of using EC techniques as much as possible. HEP previously issued; pt expressed understanding of how to perform & potential benefits; pt able to perform with SBA. OT provided pt education about responsibility of communication with MD office for DME order of rollator. Upon end of session, pt O2 93% with 131 HR at 5L O2. Pt is a good candidate to continue to benefit from skilled OT interventions on an OP basis to promote functional independence & regain activity tolerance.     Plan  Times per week: 2  Times per day: Daily  Plan weeks: 6-8  Current Treatment Recommendations: Strengthening, Patient/Caregiver Education & Training, Home Management Training, Equipment Evaluation, Education, & procurement, Balance Training, Functional Mobility Training, Endurance Training, Self-Care / ADL    Goals  Short term goals  Time Frame for Short term goals: 3-4 weeks  Short term goal 1: Pt will be Mod I w HEP  Short term goal 2: Pt will tolerate 10 minutes of standing activity  Short term goal 3: Pt will increase  strengt by 10#  Short term goal 4: Pt O2 stats will remain >/= 90% 75% of session  Short term goal 5: Pt will ID 2-3 EC techniques  Short term goal 6: Pt to increase MMT to 4+/5    Long term goals  Time Frame for Long term goals : 5-8 weeks  Long term goal 1: Pt will report follow through w HEP 5/7 days per week  Long term goal 2: Pt will tolerate 20 minutes of standing activity  Long term goal 3: Pt will increase  strength by 20#  Long term goal 4: Pt O2 stats will remain >/= 90% of session  Long term goal 5: Pt will ID 4-6 EC techniques  Long term goal 6: Pt to increase MMT to 5/5    Therapy Time   Individual   Time In 339   Time Out 433   Minutes Anjum Jeffery Lay, OTR/L  This note serves as D/C summary if patient is discharged prior to next visit.

## 2021-05-26 ENCOUNTER — APPOINTMENT (OUTPATIENT)
Dept: CT IMAGING | Facility: HOSPITAL | Age: 56
DRG: 196 | End: 2021-05-26
Payer: MEDICARE

## 2021-05-26 ENCOUNTER — OFFICE VISIT (OUTPATIENT)
Dept: PRIMARY CARE CLINIC | Age: 56
End: 2021-05-26
Payer: MEDICARE

## 2021-05-26 ENCOUNTER — APPOINTMENT (OUTPATIENT)
Dept: GENERAL RADIOLOGY | Facility: HOSPITAL | Age: 56
DRG: 196 | End: 2021-05-26
Payer: MEDICARE

## 2021-05-26 ENCOUNTER — HOSPITAL ENCOUNTER (INPATIENT)
Facility: HOSPITAL | Age: 56
LOS: 1 days | Discharge: ANOTHER HOSPITAL | DRG: 196 | End: 2021-05-27
Attending: HOSPITALIST | Admitting: INTERNAL MEDICINE
Payer: MEDICARE

## 2021-05-26 VITALS — OXYGEN SATURATION: 51 % | HEART RATE: 135 BPM

## 2021-05-26 DIAGNOSIS — J96.21 ACUTE ON CHRONIC RESPIRATORY FAILURE WITH HYPOXIA (HCC): ICD-10-CM

## 2021-05-26 DIAGNOSIS — R09.02 HYPOXIA: Primary | ICD-10-CM

## 2021-05-26 DIAGNOSIS — J96.01 ACUTE RESPIRATORY FAILURE WITH HYPOXIA (HCC): ICD-10-CM

## 2021-05-26 DIAGNOSIS — J18.9 ATYPICAL PNEUMONIA: Primary | ICD-10-CM

## 2021-05-26 DIAGNOSIS — R06.02 SHORTNESS OF BREATH: ICD-10-CM

## 2021-05-26 LAB
A/G RATIO: 0.9 (ref 0.8–2)
ALBUMIN SERPL-MCNC: 3.5 G/DL (ref 3.4–4.8)
ALP BLD-CCNC: 138 U/L (ref 25–100)
ALT SERPL-CCNC: 24 U/L (ref 4–36)
ANION GAP SERPL CALCULATED.3IONS-SCNC: 8 MMOL/L (ref 3–16)
AST SERPL-CCNC: 23 U/L (ref 8–33)
BASE EXCESS ARTERIAL: 4.4 MMOL/L (ref -3–3)
BASOPHILS ABSOLUTE: 0.1 K/UL (ref 0–0.1)
BASOPHILS RELATIVE PERCENT: 0.5 %
BILIRUB SERPL-MCNC: <0.2 MG/DL (ref 0.3–1.2)
BUN BLDV-MCNC: 12 MG/DL (ref 6–20)
CALCIUM SERPL-MCNC: 9.1 MG/DL (ref 8.5–10.5)
CHLORIDE BLD-SCNC: 106 MMOL/L (ref 98–107)
CO2: 28 MMOL/L (ref 20–30)
CREAT SERPL-MCNC: 0.8 MG/DL (ref 0.4–1.2)
D DIMER: 0.67 UG/ML FEU (ref 0–0.6)
EOSINOPHILS ABSOLUTE: 0.2 K/UL (ref 0–0.4)
EOSINOPHILS RELATIVE PERCENT: 2.3 %
FIO2: 0.8 %
GFR AFRICAN AMERICAN: >59
GFR NON-AFRICAN AMERICAN: >60
GLOBULIN: 3.7 G/DL
GLUCOSE BLD-MCNC: 125 MG/DL (ref 74–106)
HCO3 ARTERIAL: 29.3 MMOL/L (ref 22–26)
HCT VFR BLD CALC: 40.8 % (ref 37–47)
HEMOGLOBIN: 13 G/DL (ref 11.5–16.5)
IMMATURE GRANULOCYTES #: 0 K/UL
IMMATURE GRANULOCYTES %: 0.3 % (ref 0–5)
LACTIC ACID: 1.8 MMOL/L (ref 0.4–2)
LYMPHOCYTES ABSOLUTE: 1.2 K/UL (ref 1.5–4)
LYMPHOCYTES RELATIVE PERCENT: 12.7 %
MCH RBC QN AUTO: 30.7 PG (ref 27–32)
MCHC RBC AUTO-ENTMCNC: 31.9 G/DL (ref 31–35)
MCV RBC AUTO: 96.2 FL (ref 80–100)
MONOCYTES ABSOLUTE: 0.7 K/UL (ref 0.2–0.8)
MONOCYTES RELATIVE PERCENT: 6.7 %
NEUTROPHILS ABSOLUTE: 7.6 K/UL (ref 2–7.5)
NEUTROPHILS RELATIVE PERCENT: 77.5 %
O2 SAT, ARTERIAL: 99.5 %
O2 THERAPY: ABNORMAL
PCO2 ARTERIAL: 45.3 MMHG (ref 35–45)
PDW BLD-RTO: 14.2 % (ref 11–16)
PH ARTERIAL: 7.43 (ref 7.35–7.45)
PLATELET # BLD: 261 K/UL (ref 150–400)
PMV BLD AUTO: 10.6 FL (ref 6–10)
PO2 ARTERIAL: 188.2 MMHG (ref 80–100)
POTASSIUM REFLEX MAGNESIUM: 3.8 MMOL/L (ref 3.4–5.1)
PRO-BNP: 605 PG/ML (ref 0–1800)
RAPID INFLUENZA  B AGN: NEGATIVE
RAPID INFLUENZA A AGN: NEGATIVE
RBC # BLD: 4.24 M/UL (ref 3.8–5.8)
SARS-COV-2, NAAT: NOT DETECTED
SODIUM BLD-SCNC: 142 MMOL/L (ref 136–145)
TCO2 ARTERIAL: 30.7 MMOL/L (ref 24–30)
TOTAL PROTEIN: 7.2 G/DL (ref 6.4–8.3)
TROPONIN: <0.3 NG/ML
WBC # BLD: 9.8 K/UL (ref 4–11)

## 2021-05-26 PROCEDURE — 87635 SARS-COV-2 COVID-19 AMP PRB: CPT

## 2021-05-26 PROCEDURE — 6360000002 HC RX W HCPCS: Performed by: HOSPITALIST

## 2021-05-26 PROCEDURE — 71045 X-RAY EXAM CHEST 1 VIEW: CPT

## 2021-05-26 PROCEDURE — 99285 EMERGENCY DEPT VISIT HI MDM: CPT

## 2021-05-26 PROCEDURE — 99215 OFFICE O/P EST HI 40 MIN: CPT | Performed by: NURSE PRACTITIONER

## 2021-05-26 PROCEDURE — 94761 N-INVAS EAR/PLS OXIMETRY MLT: CPT

## 2021-05-26 PROCEDURE — 87804 INFLUENZA ASSAY W/OPTIC: CPT

## 2021-05-26 PROCEDURE — 6360000002 HC RX W HCPCS: Performed by: PHYSICIAN ASSISTANT

## 2021-05-26 PROCEDURE — 82803 BLOOD GASES ANY COMBINATION: CPT

## 2021-05-26 PROCEDURE — 96366 THER/PROPH/DIAG IV INF ADDON: CPT

## 2021-05-26 PROCEDURE — 6370000000 HC RX 637 (ALT 250 FOR IP): Performed by: PHYSICIAN ASSISTANT

## 2021-05-26 PROCEDURE — 96367 TX/PROPH/DG ADDL SEQ IV INF: CPT

## 2021-05-26 PROCEDURE — G8427 DOCREV CUR MEDS BY ELIG CLIN: HCPCS | Performed by: NURSE PRACTITIONER

## 2021-05-26 PROCEDURE — 80053 COMPREHEN METABOLIC PANEL: CPT

## 2021-05-26 PROCEDURE — 96365 THER/PROPH/DIAG IV INF INIT: CPT

## 2021-05-26 PROCEDURE — 83880 ASSAY OF NATRIURETIC PEPTIDE: CPT

## 2021-05-26 PROCEDURE — 96375 TX/PRO/DX INJ NEW DRUG ADDON: CPT

## 2021-05-26 PROCEDURE — 94640 AIRWAY INHALATION TREATMENT: CPT

## 2021-05-26 PROCEDURE — 2580000003 HC RX 258: Performed by: HOSPITALIST

## 2021-05-26 PROCEDURE — 85379 FIBRIN DEGRADATION QUANT: CPT

## 2021-05-26 PROCEDURE — 85025 COMPLETE CBC W/AUTO DIFF WBC: CPT

## 2021-05-26 PROCEDURE — G8417 CALC BMI ABV UP PARAM F/U: HCPCS | Performed by: NURSE PRACTITIONER

## 2021-05-26 PROCEDURE — 84145 PROCALCITONIN (PCT): CPT

## 2021-05-26 PROCEDURE — 83605 ASSAY OF LACTIC ACID: CPT

## 2021-05-26 PROCEDURE — 87040 BLOOD CULTURE FOR BACTERIA: CPT

## 2021-05-26 PROCEDURE — 1200000000 HC SEMI PRIVATE

## 2021-05-26 PROCEDURE — 6360000002 HC RX W HCPCS: Performed by: INTERNAL MEDICINE

## 2021-05-26 PROCEDURE — 6360000004 HC RX CONTRAST MEDICATION: Performed by: HOSPITALIST

## 2021-05-26 PROCEDURE — 71275 CT ANGIOGRAPHY CHEST: CPT

## 2021-05-26 PROCEDURE — 6370000000 HC RX 637 (ALT 250 FOR IP): Performed by: HOSPITALIST

## 2021-05-26 PROCEDURE — 36415 COLL VENOUS BLD VENIPUNCTURE: CPT

## 2021-05-26 PROCEDURE — 93005 ELECTROCARDIOGRAM TRACING: CPT

## 2021-05-26 PROCEDURE — 84484 ASSAY OF TROPONIN QUANT: CPT

## 2021-05-26 PROCEDURE — 1036F TOBACCO NON-USER: CPT | Performed by: NURSE PRACTITIONER

## 2021-05-26 PROCEDURE — 3017F COLORECTAL CA SCREEN DOC REV: CPT | Performed by: NURSE PRACTITIONER

## 2021-05-26 RX ORDER — PROMETHAZINE HYDROCHLORIDE 25 MG/1
12.5 TABLET ORAL EVERY 6 HOURS PRN
Status: DISCONTINUED | OUTPATIENT
Start: 2021-05-26 | End: 2021-05-27 | Stop reason: HOSPADM

## 2021-05-26 RX ORDER — METHYLPREDNISOLONE SODIUM SUCCINATE 125 MG/2ML
125 INJECTION, POWDER, LYOPHILIZED, FOR SOLUTION INTRAMUSCULAR; INTRAVENOUS ONCE
Status: COMPLETED | OUTPATIENT
Start: 2021-05-26 | End: 2021-05-26

## 2021-05-26 RX ORDER — POLYETHYLENE GLYCOL 3350 17 G/17G
17 POWDER, FOR SOLUTION ORAL DAILY PRN
Status: DISCONTINUED | OUTPATIENT
Start: 2021-05-26 | End: 2021-05-27 | Stop reason: HOSPADM

## 2021-05-26 RX ORDER — AZITHROMYCIN 250 MG/1
250 TABLET, FILM COATED ORAL DAILY
Status: DISCONTINUED | OUTPATIENT
Start: 2021-05-27 | End: 2021-05-27 | Stop reason: HOSPADM

## 2021-05-26 RX ORDER — ACETAMINOPHEN 650 MG/1
650 SUPPOSITORY RECTAL EVERY 6 HOURS PRN
Status: DISCONTINUED | OUTPATIENT
Start: 2021-05-26 | End: 2021-05-27 | Stop reason: HOSPADM

## 2021-05-26 RX ORDER — LEVALBUTEROL INHALATION SOLUTION 1.25 MG/3ML
1.25 SOLUTION RESPIRATORY (INHALATION) EVERY 8 HOURS PRN
Status: DISCONTINUED | OUTPATIENT
Start: 2021-05-26 | End: 2021-05-27

## 2021-05-26 RX ORDER — MULTIVIT-MIN/IRON/FOLIC ACID/K 18-600-40
CAPSULE ORAL DAILY
COMMUNITY
End: 2022-09-12

## 2021-05-26 RX ORDER — CETIRIZINE HYDROCHLORIDE 10 MG/1
10 TABLET ORAL DAILY
Status: DISCONTINUED | OUTPATIENT
Start: 2021-05-26 | End: 2021-05-27 | Stop reason: HOSPADM

## 2021-05-26 RX ORDER — FUROSEMIDE 20 MG/1
20 TABLET ORAL 2 TIMES DAILY PRN
Status: DISCONTINUED | OUTPATIENT
Start: 2021-05-26 | End: 2021-05-27 | Stop reason: HOSPADM

## 2021-05-26 RX ORDER — FOLIC ACID 1 MG/1
1000 TABLET ORAL DAILY
Status: DISCONTINUED | OUTPATIENT
Start: 2021-05-26 | End: 2021-05-27 | Stop reason: HOSPADM

## 2021-05-26 RX ORDER — 0.9 % SODIUM CHLORIDE 0.9 %
1000 INTRAVENOUS SOLUTION INTRAVENOUS ONCE
Status: COMPLETED | OUTPATIENT
Start: 2021-05-26 | End: 2021-05-26

## 2021-05-26 RX ORDER — IPRATROPIUM BROMIDE AND ALBUTEROL SULFATE 2.5; .5 MG/3ML; MG/3ML
1 SOLUTION RESPIRATORY (INHALATION) EVERY 4 HOURS
COMMUNITY

## 2021-05-26 RX ORDER — ACETAMINOPHEN 325 MG/1
650 TABLET ORAL EVERY 6 HOURS PRN
Status: DISCONTINUED | OUTPATIENT
Start: 2021-05-26 | End: 2021-05-27 | Stop reason: HOSPADM

## 2021-05-26 RX ORDER — VENLAFAXINE HYDROCHLORIDE 37.5 MG/1
75 CAPSULE, EXTENDED RELEASE ORAL
Status: DISCONTINUED | OUTPATIENT
Start: 2021-05-27 | End: 2021-05-27 | Stop reason: HOSPADM

## 2021-05-26 RX ORDER — LEVALBUTEROL INHALATION SOLUTION 0.63 MG/3ML
0.63 SOLUTION RESPIRATORY (INHALATION) EVERY 8 HOURS PRN
Status: DISCONTINUED | OUTPATIENT
Start: 2021-05-26 | End: 2021-05-26

## 2021-05-26 RX ORDER — HYDROXYZINE HYDROCHLORIDE 25 MG/1
25 TABLET, FILM COATED ORAL 2 TIMES DAILY PRN
Status: DISCONTINUED | OUTPATIENT
Start: 2021-05-26 | End: 2021-05-27 | Stop reason: HOSPADM

## 2021-05-26 RX ORDER — ACETYLCYSTEINE 200 MG/ML
200 SOLUTION ORAL; RESPIRATORY (INHALATION)
Status: DISCONTINUED | OUTPATIENT
Start: 2021-05-26 | End: 2021-05-26

## 2021-05-26 RX ORDER — DIAZEPAM 2 MG/1
2 TABLET ORAL NIGHTLY PRN
Status: DISCONTINUED | OUTPATIENT
Start: 2021-05-26 | End: 2021-05-27

## 2021-05-26 RX ORDER — ACETAMINOPHEN 325 MG/1
650 TABLET ORAL ONCE
Status: COMPLETED | OUTPATIENT
Start: 2021-05-26 | End: 2021-05-26

## 2021-05-26 RX ORDER — ONDANSETRON 2 MG/ML
4 INJECTION INTRAMUSCULAR; INTRAVENOUS EVERY 6 HOURS PRN
Status: DISCONTINUED | OUTPATIENT
Start: 2021-05-26 | End: 2021-05-27 | Stop reason: HOSPADM

## 2021-05-26 RX ORDER — ACETYLCYSTEINE 200 MG/ML
200 SOLUTION ORAL; RESPIRATORY (INHALATION) EVERY 8 HOURS SCHEDULED
Status: DISCONTINUED | OUTPATIENT
Start: 2021-05-26 | End: 2021-05-27

## 2021-05-26 RX ORDER — ERGOCALCIFEROL 1.25 MG/1
50000 CAPSULE ORAL WEEKLY
Status: DISCONTINUED | OUTPATIENT
Start: 2021-05-27 | End: 2021-05-27 | Stop reason: HOSPADM

## 2021-05-26 RX ORDER — PANTOPRAZOLE SODIUM 40 MG/1
40 TABLET, DELAYED RELEASE ORAL DAILY
Status: DISCONTINUED | OUTPATIENT
Start: 2021-05-27 | End: 2021-05-27 | Stop reason: HOSPADM

## 2021-05-26 RX ORDER — GUAIFENESIN 600 MG/1
600 TABLET, EXTENDED RELEASE ORAL 2 TIMES DAILY
Status: DISCONTINUED | OUTPATIENT
Start: 2021-05-26 | End: 2021-05-27 | Stop reason: HOSPADM

## 2021-05-26 RX ADMIN — CETIRIZINE HYDROCHLORIDE 10 MG: 10 TABLET, FILM COATED ORAL at 16:59

## 2021-05-26 RX ADMIN — DIAZEPAM 2 MG: 2 TABLET ORAL at 21:16

## 2021-05-26 RX ADMIN — CEFTRIAXONE 1000 MG: 1 INJECTION, POWDER, FOR SOLUTION INTRAMUSCULAR; INTRAVENOUS at 12:10

## 2021-05-26 RX ADMIN — IOPAMIDOL 100 ML: 755 INJECTION, SOLUTION INTRAVENOUS at 13:20

## 2021-05-26 RX ADMIN — GUAIFENESIN 600 MG: 600 TABLET, EXTENDED RELEASE ORAL at 21:16

## 2021-05-26 RX ADMIN — SODIUM CHLORIDE 1000 ML: 9 INJECTION, SOLUTION INTRAVENOUS at 12:08

## 2021-05-26 RX ADMIN — LEVOTHYROXINE SODIUM 175 MCG: 0.05 TABLET ORAL at 16:59

## 2021-05-26 RX ADMIN — LEVALBUTEROL 1.25 MG: 1.25 SOLUTION RESPIRATORY (INHALATION) at 20:04

## 2021-05-26 RX ADMIN — LEVALBUTEROL 0.63 MG: 0.63 SOLUTION RESPIRATORY (INHALATION) at 13:29

## 2021-05-26 RX ADMIN — LEVALBUTEROL 0.63 MG: 0.63 SOLUTION RESPIRATORY (INHALATION) at 12:44

## 2021-05-26 RX ADMIN — FOLIC ACID 1000 MCG: 1 TABLET ORAL at 16:59

## 2021-05-26 RX ADMIN — ACETYLCYSTEINE 200 MG: 200 SOLUTION ORAL; RESPIRATORY (INHALATION) at 20:05

## 2021-05-26 RX ADMIN — AZITHROMYCIN DIHYDRATE 500 MG: 500 INJECTION, POWDER, LYOPHILIZED, FOR SOLUTION INTRAVENOUS at 13:19

## 2021-05-26 RX ADMIN — ACETAMINOPHEN 650 MG: 325 TABLET ORAL at 12:12

## 2021-05-26 RX ADMIN — METHYLPREDNISOLONE SODIUM SUCCINATE 125 MG: 125 INJECTION, POWDER, FOR SOLUTION INTRAMUSCULAR; INTRAVENOUS at 12:10

## 2021-05-26 RX ADMIN — ENOXAPARIN SODIUM 40 MG: 40 INJECTION SUBCUTANEOUS at 21:16

## 2021-05-26 ASSESSMENT — ENCOUNTER SYMPTOMS
COUGH: 1
EYES NEGATIVE: 1
DIARRHEA: 0
NAUSEA: 0
VOMITING: 0
SHORTNESS OF BREATH: 1
SINUS PRESSURE: 1
SINUS PAIN: 1
WHEEZING: 1

## 2021-05-26 ASSESSMENT — PAIN SCALES - GENERAL
PAINLEVEL_OUTOF10: 0
PAINLEVEL_OUTOF10: 0

## 2021-05-26 NOTE — PROGRESS NOTES
SUBJECTIVE:    Patient ID: Shahzad Farias is a 61 y. o.female. Chief Complaint   Patient presents with    Shortness of Breath         HPI:    Pt presents to clinic with c/o sinus infection 3-4 days. Other S&S worsening SOB, AYALA, postnasal drainage and congestion. Hx chronic lung disease. Denies fevers, chills, body aches, syncope, CP, palpitations. Had covid vaccines. On arrival, dyspneic and use of accessory muscles in moderate resp distress. Oxygen saturations 51% on 8L NC and reading accurately. . Cyanosis present bilateral hands, lips and ears. We brought out our full oxygen tank and new NC and placed patient on 10L NC and patient oxygen saturation slowly increased to 85% on 10L NC after about 10-15 mins. Pt with hx pulmonary fibrosis, ILD, pulm htn, COPD on 3-4L continuous oxygen at home. At baseline she has SOB, AYALA due to chronic lung issues but worse today and requiring 10L NC to have oxygen saturation 80s. Earlier this month on 5/5/21 at her PCP visit, she was 97% on 4L NC and . Patient's medications, allergies, past medical, surgical, social and family histories were reviewed and updated as appropriate in electronic medical record. No outpatient medications have been marked as taking for the 5/26/21 encounter (Office Visit) with DAVIAN Cason CNP. Review of Systems   Constitutional: Positive for fatigue. Negative for chills, diaphoresis and fever. HENT: Positive for congestion, postnasal drip, sinus pressure and sinus pain. Eyes: Negative. Respiratory: Positive for cough, shortness of breath and wheezing. Chronic lung disease. See HPI   Cardiovascular: Negative. Gastrointestinal: Negative for diarrhea, nausea and vomiting. Musculoskeletal: Negative. Allergic/Immunologic: Positive for environmental allergies. Neurological: Negative.         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     Hyperlipidemia     Hypertension     Hypothyroidism     Interstitial lung disease (HCC)     Lung fibrosis (HCC)     Lung nodules     bilateral lungs    Pulmonary hypertension (HCC)      Past Surgical History:   Procedure Laterality Date     SECTION      GALLBLADDER SURGERY      HYSTERECTOMY       Family History   Problem Relation Age of Onset    Cancer Mother         colon    Cancer Father         bladder    High Blood Pressure Father     Stroke Father     High Blood Pressure Sister       Social History     Tobacco Use   Smoking Status Former Smoker    Packs/day: 1.00    Years: 30.00    Pack years: 30.00    Quit date: 2007    Years since quittin.4   Smokeless Tobacco Never Used       OBJECTIVE:   Wt Readings from Last 3 Encounters:   21 236 lb (107 kg)   21 236 lb 6.4 oz (107.2 kg)   21 238 lb 9.6 oz (108.2 kg)     BP Readings from Last 3 Encounters:   21 121/63   21 126/74   21 136/70       Pulse 135   SpO2 (!) 51% Comment: on 8L NC     Physical Exam  Vitals and nursing note reviewed. Constitutional:       General: She is in acute distress. Appearance: She is ill-appearing. HENT:      Head: Normocephalic. Right Ear: External ear normal.      Left Ear: External ear normal.      Nose: Congestion present. Eyes:      Conjunctiva/sclera: Conjunctivae normal.      Pupils: Pupils are equal, round, and reactive to light. Cardiovascular:      Rate and Rhythm: Regular rhythm. Tachycardia present. Pulmonary:      Effort: Respiratory distress present. Breath sounds: Wheezing present. Comments: Oxygen sats 51% on 8L NC on arrival and moderate resp distress. Placed on 10L NC and 10-15 mins increased to 85% (see HPI).   Scattered inspiratory and expiratory wheezing bilaterally and diminished in bases. Faint audible wheezing present too. Abdominal:      Tenderness: There is no guarding. Musculoskeletal:      Cervical back: Normal range of motion. Skin:     Capillary Refill: Capillary refill takes more than 3 seconds. Comments: Cyanosis present bilateral hands, lips and ears. Pale otherwise. Chronic clubbing of bilateral fingernails with cap refill >3 secs. Neurological:      General: No focal deficit present. Mental Status: She is alert and oriented to person, place, and time. Comments: Answering questions appropriately. No facial droop, speech is pressured from SOB but clear. No unilateral weakness. Psychiatric:         Mood and Affect: Mood normal.         Behavior: Behavior normal.         Thought Content: Thought content normal.         Lab Results   Component Value Date     05/05/2021    K 4.1 05/05/2021    K 3.7 12/08/2020    CL 96 05/05/2021    CO2 33 05/05/2021    GLUCOSE 102 05/05/2021    BUN 16 05/05/2021    CREATININE 0.9 05/05/2021    CALCIUM 9.8 05/05/2021    PROT 7.4 05/05/2021    LABALBU 3.9 05/05/2021    BILITOT <0.2 05/05/2021    ALT 17 05/05/2021    AST 21 05/05/2021     Hemoglobin A1C (%)   Date Value   06/08/2017 5.4     Microscopic Examination (no units)   Date Value   02/25/2017 Not Indicated     LDL Calculated (mg/dL)   Date Value   10/14/2019 134 (H)       Lab Results   Component Value Date    WBC 9.8 05/26/2021    NEUTROABS 7.6 05/26/2021    HGB 13.0 05/26/2021    HCT 40.8 05/26/2021    MCV 96.2 05/26/2021     05/26/2021     Lab Results   Component Value Date    TSH 16.41 (H) 05/05/2021         ASSESSMENT/PLAN:     1. Hypoxia  2. Acute on chronic respiratory failure with hypoxia (HCC)  3. Shortness of breath     On arrival, dyspneic and use of accessory muscles in moderate resp distress. See HPI, physical exam above. Oxygen saturations 51% on 8L NC and reading accurately. .  Placed patient on 10L NC and patient oxygen saturation slowly increased to 85% on 10L NC after about 10-15 mins. Son present and family member arrived to help with him    EMS called due to increasing oxygen demands, hypoxia, resp distress and need for higher level of care. EMS arrived and report given and they are transporting patient to [facility] ED for further evaluation and treatment. PCP Dr Manuel Watson updated and notified as well. No orders of the defined types were placed in this encounter.

## 2021-05-26 NOTE — PLAN OF CARE
Problem: Respiratory  Goal: No pulmonary complications  Outcome: Ongoing  Goal: O2 Sat > 90%  Outcome: Ongoing  Goal: Supplemental O2 requirements decreased  Outcome: Ongoing     Problem: GI  Goal: No bowel complications  Outcome: Ongoing  Goal: Bowel movement at least every other day  Outcome: Ongoing

## 2021-05-26 NOTE — ED TRIAGE NOTES
Pt arrived via [facility]. Pt was at PCP and O2 stats were at 51%. Pt arrived on non-rebreather at 87-91%. Pt states she has a upper respiratory infection. States this is 3 days of symptoms. Pt went up on NC from 3L to 6L. Pt describes sputum at green and bloody.  Rahlu Murphy, Student

## 2021-05-26 NOTE — PROGRESS NOTES
Glory placed on pt at this time. Pt O2 sat dropped to 66%. Pt placed on 40 L at 100% O2. Pt compensated well after couple minutes. Pt dropped back down to 30L and 55% Oxygen. Will continue to monitor.

## 2021-05-26 NOTE — ED NOTES
MD Dickinson spoke with [facility], on call, who accepted patient for admission. Patient to be admitted to medical unit room 3. RADHA Gil to give verbal report to RADHA Herron.       Loyda Ramos RN  05/26/21 1642

## 2021-05-26 NOTE — FLOWSHEET NOTE
05/26/21 1641   Assessment   Charting Type Admission   Neurological   Neuro (WDL) WDL   Level of Consciousness Alert (0)   [place] Coma Scale   Eye Opening 4   Best Verbal Response 5   Best Motor Response 6   Ojse Coma Scale Score 15   NIH/MNHISS Stroke Scale   NIH/MNIHSS Stroke Scale Assessed No   HEENT   HEENT (WDL) X   Right Eye Impaired vision   Left Eye Impaired vision   Teeth Dentures upper   Respiratory   Respiratory (WDL) X   Respiratory Quality/Effort Dyspnea at rest;Dyspnea with exertion   L Breath Sounds Coarse Crackles   R Breath Sounds Coarse Crackles   Breath Sounds   Right Upper Lobe Diminished   Right Middle Lobe Diminished   Right Lower Lobe Coarse Crackles   Left Upper Lobe Diminished   Left Lower Lobe Coarse Crackles   Cough/Sputum   Cough Croupy;Productive   Frequency Infrequent   Sputum Amount Small   Sputum Color Green;Brown   Tenacity Thick   Cough and Deep Breathe   Cough and Deep Breathe Yes   Cardiac   Cardiac (WDL) X   Cardiac Rhythm ST   Cardiac Monitor   Telemetry Monitor On No   Gastrointestinal   Abdominal (WDL) WDL   Last BM (including prior to admit) 05/25/21   RUQ Bowel Sounds Active   LUQ Bowel Sounds Active   RLQ Bowel Sounds Active   LLQ Bowel Sounds Active   Peripheral Vascular   Peripheral Vascular (WDL) WDL   Edema None   Skin Color/Condition   Skin Color/Condition (WDL) X   Skin Color Dusky   Skin Condition/Temp Warm;Dry   Skin Integrity   Skin Integrity (WDL) WDL   Musculoskeletal   Musculoskeletal (WDL) X   RUE Weakness   LUE Weakness   RL Extremity Weakness   LL Extremity Weakness   Genitourinary   Genitourinary (WDL) WDL   Psychosocial   Psychosocial (WDL) WDL   Pt report from [facility]. Pt to ED via ambulance. Pt was seen at [facility] for sinus infection, pt had low O2 sats. Pt awake in bed. Pt alert and oriented. Pt very dyspenic at rest and on exertion. Pt currently on 30L at 55% O2 on vapotherm. Pt lung sounds diminished with crackles noted in bases óscar.  Pt encouraged to cough and deep breathe. Pt states green/brown/bld tinge sputum. Pt states mucus is really thick. Pt states weakness all over. Pt skin color dusky. Pt O2 sat noted to be 87-94%. RN notes pt drops at times with talking. Pt states normally using 3L NC oxygen at home. Pt states having multiple episodes of O2 dropping the last few days. Pt call bell and bedside table within reach. Will continue to monitor pt.

## 2021-05-26 NOTE — PROGRESS NOTES
Chief Complaint   Patient presents with    Shortness of Breath       Have you seen any other physician or provider since your last visit no    Have you had any other diagnostic tests since your last visit? no    Have you changed or stopped any medications since your last visit? no     Patient come in today and her O2 was 51 % on 8 liters. Patient was transported the the hospital by EMS.

## 2021-05-26 NOTE — ED PROVIDER NOTES
[facility]      Pt Name: Elenita Winn  MRN: [id number]  YOB: 1965  Date of evaluation: 5/26/2021  Provider: Danae Braden, [facility]       Chief Complaint   Patient presents with    Shortness of Breath         HISTORY OF PRESENT ILLNESS  (Location/Symptom, Timing/Onset, Context/Setting, Quality, Duration, Modifying Factors, Severity.)   Elenita Winn is a 61 y.o. female who presents to the emergency department for shortness of breath. Patient states that she has been having sinus congestion/sinusitis type symptoms for the last several days. She states over the last 3 days she has had increased shortness of breath and smothering sensation. She normally uses 3 L nasal cannula at home states she has been having to turn it up to 6 L at times because of some shortness of breath with this condition. States she is having a lot of nasal drainage and discharge. She denied any fevers or chills but was found to have a low-grade temperature of 100 orally here upon arrival.  Patient attempted to go to her primary care provider's office today for evaluation I drove her self there with her son but her smothering got worse so they ended up calling EMS. Upon arrival of EMS the patient was satting at 51% on her 3L nasal cannula. She did have portable tank with her. They did turn her oxygen up but and it improved but she did not get any better than the 80% range she is then placed on a nonrebreather and went up to 98%. Upon arrival here patient was on nonrebreather she is talking without any difficulty. States that she does have a cough which is dry and [place] but nonproductive. Advised that she did get the [address] [place] [address] for COVID-19 back in March of this year. She has no concerns for COVID-19. Denies any sick contacts.   Patient states that she has been putting off going to the doctor with the symptoms because they went on a small little mini vacation with her children and she did not want to upset them by missing the vacation. Patient does have long history of pulmonary problems and is followed by physicians at [facility] [place] [facility]. Advised that she does have pulmonary fibrosis and is also been recently diagnosed with pulmonary hypertension and they are going to start her on some trial medication for this but has not started at this time. Denies any chest pain with the symptoms. Denies any nausea vomiting or diarrhea. Denies abdominal pain out of ordinary. Denies any numbness tingling weakness of the extremities out of ordinary. Patient was tachycardic upon arrival here anywhere between the 120s to 130 range. Nursing notes were reviewed. REVIEW OFSYSTEMS    (2-9 systems for level 4, 10 or more for level 5)   ROS:  General:  No fevers, no chills, no weakness  Cardiovascular:  No chest pain, no palpitations  Respiratory:  + shortness of breath, + cough-dry, +wheezing  Gastrointestinal:  No pain, no nausea, no vomiting, no diarrhea  Musculoskeletal:  No muscle pain, no joint pain  Skin:  No rash, no easy bruising  Neurologic:  No speech problems, no headache, no extremity weakness  Psychiatric:  No anxiety  Genitourinary:  No dysuria, no hematuria  ENT: +sinus congestion/drainage    Except as noted above the remainder of the review of systems was reviewed and negative.        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     Hyperlipidemia     Hypertension     Hypothyroidism     Interstitial lung disease ([address]     Lung fibrosis ([address]     Lung nodules     bilateral lungs    Pulmonary hypertension ([address]          SURGICAL HISTORY       Past Surgical History:   Procedure Laterality Date     SECTION      GALLBLADDER SURGERY      HYSTERECTOMY           CURRENT MEDICATIONS       Previous Medications    ALBUTEROL SULFATE HFA (PROAIR HFA) 108 (90 BASE) MCG/ACT INHALER    Inhale 2 puffs into the lungs every 6 hours as needed for Wheezing    CETIRIZINE (ZYRTEC) 10 MG TABLET    Take 1 tablet by mouth daily    DIAZEPAM (VALIUM) 2 MG TABLET    Take 1 tablet by mouth nightly as needed for Anxiety or Sleep for up to 30 days. FOLIC ACID (FOLVITE) 1 MG TABLET    TAKE 1 TABLET BY MOUTH DAILY    FUROSEMIDE (LASIX) 20 MG TABLET    Take 1 tablet by mouth 2 times daily as needed (worsening swelling, weight up by 3lbs or more)    HYDROCHLOROTHIAZIDE (HYDRODIURIL) 25 MG TABLET    take 1 tablet by mouth once daily    HYDROXYZINE (ATARAX) 25 MG TABLET    Take 1 tablet by mouth 2 times daily as needed for Anxiety    IPRATROPIUM-ALBUTEROL (DUONEB) 0.5-2.5 (3) MG/3ML SOLN NEBULIZER SOLUTION    Inhale 3 mLs into the lungs every 6 hours as needed for Shortness of Breath    LEVOTHYROXINE (SYNTHROID) 175 MCG TABLET    Take 1 tablet by mouth Daily TAKE 1 TABLET BY MOUTH ONCE DAILY    NINTEDANIB ESYLATE 150 MG CAPS    Take by mouth 2 times daily    PANTOPRAZOLE (PROTONIX) 40 MG TABLET    Take 1 tablet by mouth once daily    ROFLUMILAST (DALIRESP) 500 MCG TABLET    Take 1 tablet by mouth daily    VENLAFAXINE (EFFEXOR XR) 75 MG EXTENDED RELEASE CAPSULE    TAKE 3 CAPSULES BY MOUTH DAILY    VITAMIN D (ERGOCALCIFEROL) 1.25 MG (46055 UT) CAPS CAPSULE    TAKE 1 CAPSULE BY MOUTH 1 TIME A WEEK       ALLERGIES     Patient has no known allergies. FAMILY HISTORY       Family History   Problem Relation Age of Onset    Cancer Mother         colon    Cancer Father         bladder    High Blood Pressure Father     Stroke Father     High Blood Pressure Sister           SOCIAL HISTORY       Social History     Socioeconomic History    Marital status:       Spouse name: None    Number of children: None    Years of education: None  Highest education level: None   Occupational History    None   Tobacco Use    Smoking status: Former Smoker     Packs/day: 1.00     Years: 30.00     Pack years: 30.00     Quit date: 2007     Years since quittin.4    Smokeless tobacco: Never Used   Substance and Sexual Activity    Alcohol use: No    Drug use: No    Sexual activity: None   Other Topics Concern    None   Social History Narrative    None     Social Determinants of Health     Financial Resource Strain:     Difficulty of Paying Living Expenses:    Food Insecurity:     Worried About Running Out of Food in the Last Year:     Ran Out of Food in the Last Year:    Transportation Needs:     Lack of Transportation (Medical):  Lack of Transportation (Non-Medical):    Physical Activity:     Days of Exercise per Week:     Minutes of Exercise per Session:    Stress:     Feeling of Stress :    Social Connections:     Frequency of Communication with Friends and Family:     Frequency of Social Gatherings with Friends and Family:     Attends Orthodoxy Services:     Active Member of Clubs or Organizations:     Attends Club or Organization Meetings:     Marital Status:    Intimate Partner Violence:     Fear of Current or Ex-Partner:     Emotionally Abused:     Physically Abused:     Sexually Abused:          PHYSICAL EXAM    (up to 7 for level 4, 8 or more for level 5)     ED Triage Vitals [21 1141]   BP Temp Temp Source Pulse Resp SpO2 Height Weight   -- -- Oral -- 30 (!) 87 % -- --       Physical Exam  General :Patient is awake, alert, oriented, in no acute distress, nontoxic appearing  HEENT: Pupils are equally round and reactive to light, EOMI, conjunctivae clear. Oral mucosa is moist, no exudate. Uvula is midline  Cardiac: Heart regular rate, rhythm, no murmurs, rubs, or gallops, no peripheral edema noted  Lungs: . There is no use of accessory muscles. Abdomen: Abdomen is soft, nontender, nondistended.  There is no firm or pulsatile masses, no rebound rigidity or guarding. Musculoskeletal: 5 out of 5 strength in all 4 extremities. No focal muscle deficits are appreciated  Neuro: Motor intact, sensory intact, level of consciousness is normal  Dermatology: Skin is warm and dry  Psych: Mentation is grossly normal, cognition is grossly normal. Affect is appropriate. DIAGNOSTIC RESULTS     EKG: All EKG's are interpreted by the Emergency Department Physician who either signs or Co-signs this chart in the [address] a cardiologist.    The EKG interpreted by me shows sinus tachycardia. Ventricular rate 120 bpm, OH interval is 136 ms, QRS durations 90 ms, QT/QTc interval is 284/415 ms. No acute T wave inversions concerning for acute myocardial ischemia. No ST elevations concerning for acute myocardial infarction. RADIOLOGY:   Non-plain film images such as CT, Ultrasound and MRI are read by the radiologist. Plain radiographic images are visualized and preliminarily interpreted by the emergency physician with the below findings:      ? Radiologist's Report Reviewed:  CTA PULMONARY W CONTRAST   Final Result      No evidence of acute pulmonary emboli on this motion degraded examination. More prominent symmetrical bronchiectasis with associated subpleural fibrotic changes throughout both lungs compared to prior studies dating back to 2016 consistent with progression of interstitial lung disease. Diffuse groundglass opacities throughout both lungs, potentially secondary to an acute interstitial pneumonitis or potentially atypical infection with supporting clinical history. New mild mediastinal and hilar lymphadenopathy, favored to be reactive in nature given the associated groundglass opacities rather than neoplastic. Coronary artery calcification. XR CHEST PORTABLE   Final Result      Chronic interstitial changes.             ED BEDSIDE ULTRASOUND:   Performed by ED Physician - none    LABS:    I have reviewed and interpreted all of the currently available lab results from this visit (ifapplicable):  Results for orders placed or performed during the hospital encounter of 05/26/21   COVID-19, Rapid    Specimen: Nasopharyngeal Swab   Result Value Ref Range    SARS-CoV-2, NAAT Not Detected Not Detected   Rapid Influenza A/B Antigens    Specimen: Nasopharyngeal   Result Value Ref Range    Rapid Influenza A Ag Negative Negative    Rapid Influenza B Ag Negative Negative   CBC Auto Differential   Result Value Ref Range    WBC 9.8 4.0 - 11.0 K/uL    RBC 4.24 3.80 - 5.80 M/uL    Hemoglobin 13.0 11.5 - 16.5 g/dL    Hematocrit 40.8 37.0 - 47.0 %    MCV 96.2 80.0 - 100.0 fL    MCH 30.7 27.0 - 32.0 pg    MCHC 31.9 31.0 - 35.0 g/dL    RDW 14.2 11.0 - 16.0 %    Platelets 657 916 - 126 K/uL    MPV 10.6 (H) 6.0 - 10.0 fL    Neutrophils % 77.5 %    Immature Granulocytes % 0.3 0.0 - 5.0 %    Lymphocytes % 12.7 %    Monocytes % 6.7 %    Eosinophils % 2.3 %    Basophils % 0.5 %    Neutrophils Absolute 7.6 (H) 2.0 - 7.5 K/uL    Immature Granulocytes # 0.0 K/uL    Lymphocytes Absolute 1.2 (L) 1.5 - 4.0 K/uL    Monocytes Absolute 0.7 0.2 - 0.8 K/uL    Eosinophils Absolute 0.2 0.0 - 0.4 K/uL    Basophils Absolute 0.1 0.0 - 0.1 K/uL   Comprehensive Metabolic Panel w/ Reflex to MG   Result Value Ref Range    Sodium 142 136 - 145 mmol/L    Potassium reflex Magnesium 3.8 3.4 - 5.1 mmol/L    Chloride 106 98 - 107 mmol/L    CO2 28 20 - 30 mmol/L    Anion Gap 8 3 - 16    Glucose 125 (H) 74 - 106 mg/dL    BUN 12 6 - 20 mg/dL    CREATININE 0.8 0.4 - 1.2 mg/dL    GFR Non-African American >60 >59    GFR African American >59 >59    Calcium 9.1 8.5 - 10.5 mg/dL    Total Protein 7.2 6.4 - 8.3 g/dL    Albumin 3.5 3.4 - 4.8 g/dL    Albumin/Globulin Ratio 0.9 0.8 - 2.0    Total Bilirubin <0.2 (L) 0.3 - 1.2 mg/dL    Alkaline Phosphatase 138 (H) 25 - 100 U/L    ALT 24 4 - 36 U/L    AST 23 8 - 33 U/L    Globulin 3.7 g/dL   Troponin   Result Value Ref Range Troponin <0.30 <0.30 ng/mL   Brain Natriuretic Peptide   Result Value Ref Range    Pro- 0 - 1,800 pg/mL   D-Dimer, Quantitative   Result Value Ref Range    D-Dimer, Quant 0.67 (HH) 0.00 - 0.60 ug/mL FEU   Lactic Acid, Plasma   Result Value Ref Range    Lactic Acid 1.8 0.4 - 2.0 mmol/L   Blood Gas, Arterial   Result Value Ref Range    pH, Arterial 7.430 7.350 - 7.450    pCO2, Arterial 45.3 (H) 35.0 - 45.0 mmHg    pO2, Arterial 188.2 (H) 80.0 - 100.0 mmHg    HCO3, Arterial 29.3 (H) 22.0 - 26.0 mmol/L    Base Excess, Arterial 4.4 (H) -3.0 - 3.0 mmol/L    O2 Sat, Arterial 99.5 >92 %    TCO2, Arterial 30.7 (H) 24.0 - 30.0 mmol/L    O2 Therapy Unknown     FIO2 0.80 Not Established %        All other labs were within normal range or not returned as of this dictation.     EMERGENCY DEPARTMENT COURSE and DIFFERENTIAL DIAGNOSIS/MDM:   Vitals:    Vitals:    05/26/21 1310 05/26/21 1333 05/26/21 1334 05/26/21 1345   BP: 117/77  (!) 132/95 (!) 132/95   Pulse: 110  118 120   Resp: 22 26 22   Temp:       TempSrc:       SpO2: 99% 99% 97% 99%   Weight:       Height:           MEDICATIONS ADMINISTERED IN ED:  Medications   levalbuterol (XOPENEX) nebulization 0.63 mg (0.63 mg Nebulization Given 5/26/21 1329)   levalbuterol (XOPENEX) nebulization 0.63 mg (has no administration in time range)   0.9 % sodium chloride bolus (0 mLs Intravenous Stopped 5/26/21 1358)   cefTRIAXone (ROCEPHIN) 1000 mg IVPB in 50 mL D5W minibag (0 mg Intravenous Stopped 5/26/21 1303)   azithromycin (ZITHROMAX) 500 mg in D5W 250ml addavial (500 mg Intravenous New Bag 5/26/21 1319)   acetaminophen (TYLENOL) tablet 650 mg (650 mg Oral Given 5/26/21 1212)   methylPREDNISolone sodium (SOLU-MEDROL) injection 125 mg (125 mg Intravenous Given 5/26/21 1210)   iopamidol (ISOVUE-370) 76 % injection 100 mL (100 mLs Intravenous Given 5/26/21 1320)       After initial evaluation and examination I did have a conversation with the patient about the upcoming plan, nonrebreather she is actually up to 99 to 100% which she actually would benefit better from a simple mask but we do not have one here in the hospital they are in the process of setting her up with a different type of compressor and device to see if we can get her sort of in that between area where she is not needing the nonrebreather. Otherwise the patient's been resting come from the stretcher no acute distress she still laughing joking. Advised that we do believe she needs to be admitted to the hospital especially with the findings for the possible atypical pneumonia. Patient is agreeable to this. Case will be discussed with on-call hospitalist to see if they are agreeable with admitting the patient here. Case discussed with Verna she did agree to admit the patient to the hospital here for further evaluation work-up for acute hypoxic respiratory failure and atypical pneumonia. ED crit    CRITICAL CARE:  The high probability of sudden, clinically significant deterioration in the patient's condition required the highest level of my preparedness to intervene urgently. The services I provided to this patient were to treat and/or prevent clinically significant deterioration that could result in: Worsening cardiopulmonary symptoms/compromise. Services included the following: chart data review, reviewing nursing notes and/or old charts, documentation time, consultant collaboration regarding findings and treatment options, medication orders and management, direct patient care, re-evaluations, vital sign assessments and ordering, interpreting and reviewing diagnostic studies/lab tests. Aggregate critical care time was 35 minutes, which includes only time during which I was engaged in work directly related to the patient's care, as described above, whether at the bedside or elsewhere in the Emergency Department.   It did not include time spent performing other reported procedures or the services of residents, students, nurses or physician assistants. CONSULTS:  None    PROCEDURES:  Procedures    CRITICAL CARE TIME    Total Critical Care time was 0 minutes, excluding separately reportable procedures. There was a high probability of clinically significant/life threatening deterioration in the patient's condition which required my urgent intervention. FINAL IMPRESSION      1. Atypical pneumonia    2. Acute respiratory failure with hypoxia (HCC)          DISPOSITION/PLAN   DISPOSITION        PATIENT REFERRED TO:  No follow-up provider specified. DISCHARGE MEDICATIONS:  New Prescriptions    No medications on file       Comment: Please note this report has been produced using speech recognition software and may contain errorsrelated to that system including errors in grammar, punctuation, and spelling, as well as words and phrases that may be inappropriate. If there are any questions or concerns please feel free to contact the dictating providerfor clarification.     Samra Perez DO  Attending Emergency Physician              Samra Perez DO  05/26/21 3635

## 2021-05-26 NOTE — ED NOTES
Pt arrived to ED on NRB at 15L. SpO2 after pt stood and transferred from stretcher to bed was 87-91% .  Maintaining around 92% on NRB       Guerita Colbert RN  05/26/21 2613

## 2021-05-26 NOTE — ED NOTES
Pt up to bedside. No complaints att.  Daughter in room with pt     Conner Snyder Duty  05/26/21 9226

## 2021-05-27 VITALS
DIASTOLIC BLOOD PRESSURE: 77 MMHG | SYSTOLIC BLOOD PRESSURE: 112 MMHG | OXYGEN SATURATION: 95 % | RESPIRATION RATE: 20 BRPM | WEIGHT: 243 LBS | TEMPERATURE: 97.6 F | BODY MASS INDEX: 40.48 KG/M2 | HEIGHT: 65 IN | HEART RATE: 111 BPM

## 2021-05-27 PROBLEM — I27.20 PULMONARY HYPERTENSION (HCC): Status: ACTIVE | Noted: 2021-05-27

## 2021-05-27 PROBLEM — E66.813 CLASS 3 SEVERE OBESITY DUE TO EXCESS CALORIES WITH SERIOUS COMORBIDITY IN ADULT: Status: ACTIVE | Noted: 2021-05-27

## 2021-05-27 PROBLEM — E66.01 CLASS 3 SEVERE OBESITY DUE TO EXCESS CALORIES WITH SERIOUS COMORBIDITY IN ADULT (HCC): Status: ACTIVE | Noted: 2021-05-27

## 2021-05-27 PROBLEM — F32.A DEPRESSION: Status: ACTIVE | Noted: 2017-10-28

## 2021-05-27 LAB
A/G RATIO: 1 (ref 0.8–2)
ALBUMIN SERPL-MCNC: 3.4 G/DL (ref 3.4–4.8)
ALP BLD-CCNC: 140 U/L (ref 25–100)
ALT SERPL-CCNC: 20 U/L (ref 4–36)
ANION GAP SERPL CALCULATED.3IONS-SCNC: 5 MMOL/L (ref 3–16)
AST SERPL-CCNC: 19 U/L (ref 8–33)
BILIRUB SERPL-MCNC: <0.2 MG/DL (ref 0.3–1.2)
BUN BLDV-MCNC: 12 MG/DL (ref 6–20)
CALCIUM SERPL-MCNC: 8.8 MG/DL (ref 8.5–10.5)
CHLORIDE BLD-SCNC: 105 MMOL/L (ref 98–107)
CO2: 31 MMOL/L (ref 20–30)
CREAT SERPL-MCNC: 0.6 MG/DL (ref 0.4–1.2)
GFR AFRICAN AMERICAN: >59
GFR NON-AFRICAN AMERICAN: >60
GLOBULIN: 3.3 G/DL
GLUCOSE BLD-MCNC: 137 MG/DL (ref 74–106)
HCT VFR BLD CALC: 37.8 % (ref 37–47)
HEMOGLOBIN: 11.9 G/DL (ref 11.5–16.5)
MCH RBC QN AUTO: 30.7 PG (ref 27–32)
MCHC RBC AUTO-ENTMCNC: 31.5 G/DL (ref 31–35)
MCV RBC AUTO: 97.7 FL (ref 80–100)
PDW BLD-RTO: 14.4 % (ref 11–16)
PLATELET # BLD: 257 K/UL (ref 150–400)
PMV BLD AUTO: 11 FL (ref 6–10)
POTASSIUM REFLEX MAGNESIUM: 4 MMOL/L (ref 3.4–5.1)
PROCALCITONIN: 0.1 NG/ML (ref 0–0.15)
RBC # BLD: 3.87 M/UL (ref 3.8–5.8)
SODIUM BLD-SCNC: 141 MMOL/L (ref 136–145)
TOTAL PROTEIN: 6.7 G/DL (ref 6.4–8.3)
WBC # BLD: 11.6 K/UL (ref 4–11)

## 2021-05-27 PROCEDURE — 6360000002 HC RX W HCPCS: Performed by: PHYSICIAN ASSISTANT

## 2021-05-27 PROCEDURE — 94761 N-INVAS EAR/PLS OXIMETRY MLT: CPT

## 2021-05-27 PROCEDURE — 85027 COMPLETE CBC AUTOMATED: CPT

## 2021-05-27 PROCEDURE — 6360000002 HC RX W HCPCS: Performed by: INTERNAL MEDICINE

## 2021-05-27 PROCEDURE — 80053 COMPREHEN METABOLIC PANEL: CPT

## 2021-05-27 PROCEDURE — 94667 MNPJ CHEST WALL 1ST: CPT

## 2021-05-27 PROCEDURE — 36415 COLL VENOUS BLD VENIPUNCTURE: CPT

## 2021-05-27 PROCEDURE — 2580000003 HC RX 258: Performed by: PHYSICIAN ASSISTANT

## 2021-05-27 PROCEDURE — 2700000000 HC OXYGEN THERAPY PER DAY

## 2021-05-27 PROCEDURE — 94640 AIRWAY INHALATION TREATMENT: CPT

## 2021-05-27 PROCEDURE — 99236 HOSP IP/OBS SAME DATE HI 85: CPT | Performed by: INTERNAL MEDICINE

## 2021-05-27 PROCEDURE — 6370000000 HC RX 637 (ALT 250 FOR IP): Performed by: PHYSICIAN ASSISTANT

## 2021-05-27 RX ORDER — ALPRAZOLAM 0.25 MG/1
0.25 TABLET ORAL NIGHTLY PRN
Status: DISCONTINUED | OUTPATIENT
Start: 2021-05-27 | End: 2021-05-27 | Stop reason: HOSPADM

## 2021-05-27 RX ORDER — FUROSEMIDE 10 MG/ML
40 INJECTION INTRAMUSCULAR; INTRAVENOUS ONCE
Status: COMPLETED | OUTPATIENT
Start: 2021-05-27 | End: 2021-05-27

## 2021-05-27 RX ORDER — ACETYLCYSTEINE 200 MG/ML
200 SOLUTION ORAL; RESPIRATORY (INHALATION) 3 TIMES DAILY
Status: DISCONTINUED | OUTPATIENT
Start: 2021-05-27 | End: 2021-05-27 | Stop reason: HOSPADM

## 2021-05-27 RX ORDER — LEVALBUTEROL INHALATION SOLUTION 1.25 MG/3ML
1.25 SOLUTION RESPIRATORY (INHALATION) EVERY 6 HOURS
Status: DISCONTINUED | OUTPATIENT
Start: 2021-05-27 | End: 2021-05-27 | Stop reason: HOSPADM

## 2021-05-27 RX ADMIN — FOLIC ACID 1000 MCG: 1 TABLET ORAL at 08:11

## 2021-05-27 RX ADMIN — ACETYLCYSTEINE 200 MG: 200 SOLUTION ORAL; RESPIRATORY (INHALATION) at 04:47

## 2021-05-27 RX ADMIN — CEFTRIAXONE 1000 MG: 1 INJECTION, POWDER, FOR SOLUTION INTRAMUSCULAR; INTRAVENOUS at 08:13

## 2021-05-27 RX ADMIN — GUAIFENESIN 600 MG: 600 TABLET, EXTENDED RELEASE ORAL at 08:10

## 2021-05-27 RX ADMIN — ROFLUMILAST 500 MCG: 500 TABLET ORAL at 08:13

## 2021-05-27 RX ADMIN — PANTOPRAZOLE SODIUM 40 MG: 40 TABLET, DELAYED RELEASE ORAL at 08:13

## 2021-05-27 RX ADMIN — FUROSEMIDE 40 MG: 10 INJECTION, SOLUTION INTRAMUSCULAR; INTRAVENOUS at 11:43

## 2021-05-27 RX ADMIN — AZITHROMYCIN MONOHYDRATE 250 MG: 250 TABLET ORAL at 08:13

## 2021-05-27 RX ADMIN — CETIRIZINE HYDROCHLORIDE 10 MG: 10 TABLET, FILM COATED ORAL at 08:13

## 2021-05-27 RX ADMIN — LEVALBUTEROL 1.25 MG: 1.25 SOLUTION RESPIRATORY (INHALATION) at 10:45

## 2021-05-27 RX ADMIN — VENLAFAXINE HYDROCHLORIDE 75 MG: 37.5 CAPSULE, EXTENDED RELEASE ORAL at 08:10

## 2021-05-27 RX ADMIN — ERGOCALCIFEROL 50000 UNITS: 1.25 CAPSULE ORAL at 08:13

## 2021-05-27 RX ADMIN — LEVALBUTEROL 1.25 MG: 1.25 SOLUTION RESPIRATORY (INHALATION) at 04:47

## 2021-05-27 RX ADMIN — ACETYLCYSTEINE 200 MG: 200 SOLUTION ORAL; RESPIRATORY (INHALATION) at 10:46

## 2021-05-27 RX ADMIN — LEVOTHYROXINE SODIUM 175 MCG: 0.05 TABLET ORAL at 08:10

## 2021-05-27 NOTE — PROGRESS NOTES
New order for pt transfer. Pt going to room 133 on 10th floor of [facility]. Report to be called to [phone].

## 2021-05-27 NOTE — FLOWSHEET NOTE
05/27/21 0836   Assessment   Charting Type Shift assessment   Neurological   Neuro (WDL) WDL   Level of Consciousness Alert (0)   Jose Coma Scale   Eye Opening 4   Best Verbal Response 5   Best Motor Response 6   Jose Coma Scale Score 15   NIH/MNHISS Stroke Scale   NIH/MNIHSS Stroke Scale Assessed No   HEENT   HEENT (WDL) X   Right Eye Impaired vision   Left Eye Impaired vision   Teeth Dentures upper   Respiratory   Respiratory (WDL) X   Respiratory Quality/Effort Dyspnea at rest;Dyspnea with exertion   Chest Assessment Chest expansion symmetrical;Trachea midline   L Breath Sounds Coarse Crackles   R Breath Sounds Coarse Crackles   Breath Sounds   Right Upper Lobe Diminished   Right Middle Lobe Diminished   Right Lower Lobe Coarse Crackles   Left Upper Lobe Diminished   Left Lower Lobe Coarse Crackles   Cardiac   Cardiac (WDL) X   Cardiac Regularity Regular   Cardiac Rhythm ST   Cardiac Monitor   Telemetry Monitor On Yes   Telemetry Audible Yes   Telemetry Alarms Set Yes   Telemetry Box Number [id number]   Telemetry Monitor Alarm Parameters    Gastrointestinal   Abdominal (WDL) WDL   RUQ Bowel Sounds Active   LUQ Bowel Sounds Active   RLQ Bowel Sounds Active   LLQ Bowel Sounds Active   Peripheral Vascular   Peripheral Vascular (WDL) WDL   Edema None   Skin Color/Condition   Skin Color/Condition (WDL) X   Skin Color Pale   Skin Condition/Temp Warm;Dry   Skin Integrity   Skin Integrity (WDL) WDL   Musculoskeletal   Musculoskeletal (WDL) X   RUE Weakness   LUE Weakness   RL Extremity Weakness   LL Extremity Weakness   Genitourinary   Genitourinary (WDL) WDL   Urethral Catheter Straight-tip 16 fr   Placement Date/Time: 05/26/21 2360   Catheter Type: Straight-tip  Tube Size (fr): 16 fr  Catheter Balloon Size: 10 mL  Collection Container: Standard  Securement Method: Securing device (Describe)   Catheter Indications Need for fluid volume management of the critically ill patient in a critical care setting   Site Assessment Pink   Urine Color Yellow   Urine Appearance Clear   Psychosocial   Psychosocial (WDL) WDL   Pt awake in bed. Pt alert and oriented. Pt appears in no acute distress. Pt currently on AIRVO on 30L 65 FIO2. Pt currently on telemetry monitoring showing NSR. Pt lung sounds diminished with coarse crackles in bases óscar. Pt encouraged to cough and deep breathe. Pt kelly cath intact. Pt call bell and bedside table within reach. Will continue to monitor pt.

## 2021-05-27 NOTE — H&P
Discharge Summary        Patient ID: Catracho Tate                 Patient's PCP: Holly Horn MD     Admit Date:    5/26/2021      Discharge Date:   5/27/2021     Admitting Physician: Holly Horn MD     Discharge Physician: OPAL Ortez      Reason for this admission:   Acute on chronic hypoxic respiratory failure secondary to idiopathic pulmonary fibrosis and mild pulmonary hypertension     Discharge Diagnoses: Active Hospital Problems     Diagnosis Date Noted    Pulmonary hypertension ([address] [I27.20] 05/27/2021    Class 3 severe obesity due to excess calories with serious comorbidity in adult [facility]) [E66.01] 05/27/2021    Idiopathic pulmonary fibrosis ([address] [J84.112]      Acute on chronic respiratory failure with hypoxia ([address] [J96.21] 10/28/2017    Depression [F32.9] 10/28/2017    Essential hypertension [I10]      Hypothyroidism [E03.9] 04/15/2011    Hyperlipidemia [E78.5] 04/15/2011         Procedures:  CTA PULMONARY W CONTRAST   Final Result       No evidence of acute pulmonary emboli on this motion degraded examination.       More prominent symmetrical bronchiectasis with associated subpleural fibrotic changes throughout both lungs compared to prior studies dating back to 2016 consistent with progression of interstitial lung disease.       Diffuse groundglass opacities throughout both lungs, potentially secondary to an acute interstitial pneumonitis or potentially atypical infection with supporting clinical history.       New mild mediastinal and hilar lymphadenopathy, favored to be reactive in nature given the associated groundglass opacities rather than neoplastic.       Coronary artery calcification.           XR CHEST PORTABLE   Final Result       Chronic interstitial changes.                Consults:   IP CONSULT TO CASE MANAGEMENT  PT OT     HISTORY OF PRESENT ILLNESS:   The patient is a 60 y. o. female with PMH of idiopathic pulmonary fibrosis, chronic respiratory failure (3-8L at home), depression, hypothyroidism, HTN, obesity and mild pulmonary hypertension who presented to the ER with complaints of SOA. Per chart review and pt report, she developed sinus congestion and drainage 3-4 days ago. She then started \"smothering\" and feeling very SOA, so she adjusted her home O2 from 3L to 6L at rest. She also noted a temp of 100 degrees at home. She went to her PCP's office on 5/26 for evaluation, but upon arrival, she felt so bad, she called EMS. Upon EMS arrival, O2 sat found to be 51% on 3L NC. Her portable O2 was increased to maximum (~6L) with improvement of sats to 80%. EMS placed pt on a NRB with improvement to 98%. She was then transported to the ER.  Note patient did receive the [facility] & Darci vaccine in March of this year. Jaime Catherine denies any recent travel or known exposure to COVID-19.  For her IPF and mild pulmonary hypertension, she follows with [facility] pulmonology and cardiology.     Patient routine evaluation in the emergency department.  Vitals upon arrival: Blood pressure 121/63, heart rate 131, respiratory rate 38, temperature 100, oxygen sat 88% on 6 L.  Patient was then transition to a 50% Ventimask with O2 sats remaining in the mid 80s.  She was then placed on a 15 L nonrebreather mask with improvement to 95-99% O2 sats.  Labs: WBC 9.8, hemoglobin 13.1, hematocrit 40.8, platelets 094, sodium 142, potassium 3.8, chloride 106, CO2 28, BUN 12, creatinine 0.8, anion gap 8, lactic acid 1.8, glucose 125, proBNP 605, troponin less than 0.30.  Blood cultures pending.  ABG on 15 L nonrebreather: pH 7.430, PCO2 45.3, PO2 188.2, HCO3 29.3, oxygen sat 99. 5.  Chest x-ray with chronic interstitial changes.  CTA chest: No evidence of acute pulmonary emboli on this motion degraded examination.  More prominent symmetrical bronchiectasis with associated subpleural fibrotic changes throughout both lungs compared to prior studies dating back to 2016 consistent with progression of interstitial lung disease.  Diffuse groundglass opacities throughout both lungs, potentially secondary to an acute interstitial pneumonitis or potentially atypical infection with supporting clinical history.  New mild mediastinal and hilar lymphadenopathy, favored to be reactive in nature given the associated groundglass opacities rather than neoplastic.  Coronary artery calcification.     Pt given IV fluids, Rocephin, Zithromax and nebs while in the emergency department with slight improvement of her respiratory status.  She was then admitted for further care.     Patient seen and examined this morning.  She reports feeling some better today in comparison to yesterday.  She is currently resting comfortably on 30 L heated high flow nasal cannula.  She has received IV Rocephin, azithromycin, Mucinex, Robitussin, Xopenex, Lasix, Mucomyst and chest vest therapy since admission.  CODE STATUS discussed and patient insist on remaining a full code.     Case discussed with García Roberto, [address] [place] [address], [address] [place] [address] Cardiologist, Cristine Martinez and [address] [place] [address] Pulmonary HTN specialist Kaplan Melissa. Pt accepted to [address] [place] [address] by Dr. Samra Calvo.     Review of system  Constitutional:  Denies fever or chills. Positive for fatigue and generalized weakness. Eyes:  Denies eye pain or redness  HENT:  Denies sore throat. Positive for sinus congestion.   Respiratory:  Positive for cough, SOA, AYALA.   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 She has a 30.00 pack-year smoking history. She has never used smokeless tobacco.  ETOH:   reports no history of alcohol use. OCCUPATION:  None      Family History:   Family History             Problem Relation Age of Onset    Cancer Mother           colon    Cancer Father           bladder    High Blood Pressure Father      Stroke Father      High Blood Pressure Sister              Allergies:  Patient has no known allergies.     Medications Prior to Admission:    Home Medications           Prior to Admission medications    Medication Sig Start Date End Date Taking? Authorizing Provider   ipratropium-albuterol (DUONEB) 0.5-2.5 (3) MG/3ML SOLN nebulizer solution Inhale 1 vial into the lungs every 4 hours     Yes Historical Provider, MD   Cholecalciferol (VITAMIN D) 50 MCG (2000 UT) CAPS capsule Take by mouth daily     Yes Historical Provider, MD   levothyroxine (SYNTHROID) 175 MCG tablet Take 1 tablet by mouth Daily TAKE 1 TABLET BY MOUTH ONCE DAILY 21   Yes Lashanda Cadena MD   diazePAM (VALIUM) 2 MG tablet Take 1 tablet by mouth nightly as needed for Anxiety or Sleep for up to 30 days.  21 Yes Lashanda Cadena MD   folic acid (FOLVITE) 1 MG tablet TAKE 1 TABLET BY MOUTH DAILY 21   Yes Lashanda Cadena MD   albuterol sulfate HFA (PROAIR HFA) 108 (90 Base) MCG/ACT inhaler Inhale 2 puffs into the lungs every 6 hours as needed for Wheezing 3/23/21   Yes Lashanda Cadena MD hydrOXYzine (ATARAX) 25 MG tablet Take 1 tablet by mouth 2 times daily as needed for Anxiety 3/23/21   Yes Humberto Dixon MD   venlafaxine (EFFEXOR XR) 75 MG extended release capsule TAKE 3 CAPSULES BY MOUTH DAILY 11/16/20   Yes Ghanshyam Moment, APRN - CNP   pantoprazole (PROTONIX) 40 MG tablet Take 1 tablet by mouth once daily 11/16/20   Yes Ghanshyam Moment, APRN - CNP   hydroCHLOROthiazide (HYDRODIURIL) 25 MG tablet take 1 tablet by mouth once daily 11/16/20   Yes Ghanshyam Moment, APRN - CNP   furosemide (LASIX) 20 MG tablet Take 1 tablet by mouth 2 times daily as needed (worsening swelling, weight up by 3lbs or more) 11/8/20   Yes OPAL Corbett   cetirizine (ZYRTEC) 10 MG tablet Take 1 tablet by mouth daily 7/22/20   Yes Humberto Dixon MD   Nintedanib Esylate 150 MG CAPS Take by mouth 2 times daily     Yes Historical Provider, MD            Vital Signs  Temp: 98.4 °F (36.9 °C)  Pulse: 88  Resp: 20  BP: 132/84  SpO2: 94 %  O2 Device: Heated high flow cannula  O2 Flow Rate (L/min): 30 L/min     Vital signs reviewed in electronic chart.     Physical exam  Constitutional:  Well developed, well nourished, obese female sitting upright in bed in no acute distress. On 30L high flow NC. Eyes:  no scleral icterus, conjunctiva normal   HENT:  Atraumatic, external ears normal, nose normal, oropharynx moist, no pharyngeal exudates. Neck- supple, no JVD, no lymphadenopathy  Respiratory:  Slight increased WOB, crackles at bases, scant rhonchi, no rales. On 30 L heated high flow NC. Cardiovascular:  Normal rate, normal rhythm, no murmurs, no gallops, no rubs, no edema   GI:  Soft, nondistended, obese, normal bowel sounds, nontender, no voluntary guarding  Musculoskeletal:  No cyanosis or obvious acute deformity. Moving all extremities   Integument:  Warm and dry.   Lymphatic:  No cervical or axillary lymphadenopathy noted   Neurologic:  Alert & oriented x 3, no apparent focal deficits noted   Psychiatric:  Speech and behavior appropriate               Lab Results   Component Value Date     WBC 11.6 (H) 05/27/2021     HGB 11.9 05/27/2021     HCT 37.8 05/27/2021     MCV 97.7 05/27/2021      05/27/2021               Lab Results   Component Value Date      05/27/2021     K 4.0 05/27/2021      05/27/2021     CO2 31 (H) 05/27/2021     BUN 12 05/27/2021     CREATININE 0.6 05/27/2021     GLUCOSE 137 (H) 05/27/2021     CALCIUM 8.8 05/27/2021     PROT 6.7 05/27/2021     LABALBU 3.4 05/27/2021     BILITOT <0.2 (L) 05/27/2021     ALKPHOS 140 (H) 05/27/2021     AST 19 05/27/2021     ALT 20 05/27/2021     LABGLOM >60 05/27/2021     GFRAA >59 05/27/2021     AGRATIO 1.0 05/27/2021     GLOB 3.3 05/27/2021               PA/lat CXR:   CTA PULMONARY W CONTRAST   Final Result       No evidence of acute pulmonary emboli on this motion degraded examination.       More prominent symmetrical bronchiectasis with associated subpleural fibrotic changes throughout both lungs compared to prior studies dating back to 2016 consistent with progression of interstitial lung disease.       Diffuse groundglass opacities throughout both lungs, potentially secondary to an acute interstitial pneumonitis or potentially atypical infection with supporting clinical history.       New mild mediastinal and hilar lymphadenopathy, favored to be reactive in nature given the associated groundglass opacities rather than neoplastic.       Coronary artery calcification.           XR CHEST PORTABLE   Final Result       Chronic interstitial changes.             Assessment and Plan/Hospital course:           Active Hospital Problems     Diagnosis Date Noted    Pulmonary hypertension ([facility] [I27.20] 05/27/2021    Class 3 severe obesity due to excess calories with serious comorbidity in adult [facility]) [E66.01] 05/27/2021    Idiopathic pulmonary fibrosis ([address] [J84.112]      Acute on chronic respiratory failure with hypoxia ([address] [J96.21] 10/28/2017    Depression

## 2021-05-27 NOTE — CARE COORDINATION
PT eval held today per RN/RT recommendation d/t patient with compromised respiratory status and inability to maintain O2 sats at safe level to advance activity. PT will follow up with patient tomorrow (5/28) and proceed with eval as tolerated and appropriate.

## 2021-05-27 NOTE — CARE COORDINATION
Hold OT evaluation on this date per nursing rec. Will attempt on 5/28 barring any complications/concerns.

## 2021-05-27 NOTE — DISCHARGE SUMMARY
Discharge Summary      Patient ID: Lee Moore      Patient's PCP: Flaquita Medeiros MD    Admit Date: 5/26/2021     Discharge Date:   5/27/2021    Admitting Physician: Flaquita Medeiros MD    Discharge Physician: OPAL Prieto     Reason for this admission:   Acute on chronic hypoxic respiratory failure secondary to idiopathic pulmonary fibrosis and mild pulmonary hypertension    Discharge Diagnoses: Active Hospital Problems    Diagnosis Date Noted    Pulmonary hypertension ([address] [I27.20] 05/27/2021    Class 3 severe obesity due to excess calories with serious comorbidity in [facility]) [E66.01] 05/27/2021    Idiopathic pulmonary fibrosis ([address] [J84.112]     Acute on chronic respiratory failure with hypoxia ([address] [J96.21] 10/28/2017    Depression [F32.9] 10/28/2017    Essential hypertension [I10]     Hypothyroidism [E03.9] 04/15/2011    Hyperlipidemia [E78.5] 04/15/2011       Procedures:  CTA PULMONARY W CONTRAST   Final Result      No evidence of acute pulmonary emboli on this motion degraded examination. More prominent symmetrical bronchiectasis with associated subpleural fibrotic changes throughout both lungs compared to prior studies dating back to 2016 consistent with progression of interstitial lung disease. Diffuse groundglass opacities throughout both lungs, potentially secondary to an acute interstitial pneumonitis or potentially atypical infection with supporting clinical history. New mild mediastinal and hilar lymphadenopathy, favored to be reactive in nature given the associated groundglass opacities rather than neoplastic. Coronary artery calcification. XR CHEST PORTABLE   Final Result      Chronic interstitial changes.             Consults:   IP CONSULT TO CASE MANAGEMENT  PT OT    HISTORY OF PRESENT ILLNESS:   The patient is a 61 y.o. female with PMH of idiopathic pulmonary fibrosis, chronic respiratory failure (3-8L at home), depression, hypothyroidism, HTN, obesity and mild pulmonary hypertension who presented to the ER with complaints of SOA. Per chart review and pt report, she developed sinus congestion and drainage 3-4 days ago. She then started \"smothering\" and feeling very SOA, so she adjusted her home O2 from 3L to 6L at rest. She also noted a temp of 100 degrees at home. She went to her PCP's office on 5/26 for evaluation, but upon arrival, she felt so bad, she called EMS. Upon EMS arrival, O2 sat found to be 51% on 3L NC. Her portable O2 was increased to maximum (~6L) with improvement of sats to 80%. EMS placed pt on a NRB with improvement to 98%. She was then transported to the ER. Note patient did receive the [employer] vaccine in March of this year. She denies any recent travel or known exposure to COVID-19. For her IPF and mild pulmonary hypertension, she follows with [facility] pulmonology and cardiology.     Patient routine evaluation in the emergency department. Vitals upon arrival: Blood pressure 121/63, heart rate 131, respiratory rate 38, temperature 100, oxygen sat 88% on 6 L. Patient was then transition to a 50% Ventimask with O2 sats remaining in the mid 80s. She was then placed on a 15 L nonrebreather mask with improvement to 95-99% O2 sats. Labs: WBC 9.8, hemoglobin 13.1, hematocrit 40.8, platelets 999, sodium 142, potassium 3.8, chloride 106, CO2 28, BUN 12, creatinine 0.8, anion gap 8, lactic acid 1.8, glucose 125, proBNP 605, troponin less than 0.30. Blood cultures pending. ABG on 15 L nonrebreather: pH 7.430, PCO2 45.3, PO2 188.2, HCO3 29.3, oxygen sat 99.5. Chest x-ray with chronic interstitial changes. CTA chest: No evidence of acute pulmonary emboli on this motion degraded examination. More prominent symmetrical bronchiectasis with associated subpleural fibrotic changes throughout both lungs compared to prior studies dating back to 2016 consistent with progression of interstitial lung disease.   Diffuse groundglass opacities throughout both lungs, potentially secondary to an acute interstitial pneumonitis or potentially atypical infection with supporting clinical history. New mild mediastinal and hilar lymphadenopathy, favored to be reactive in nature given the associated groundglass opacities rather than neoplastic. Coronary artery calcification.     Pt given IV fluids, Rocephin, Zithromax and nebs while in the emergency department with slight improvement of her respiratory status. She was then admitted for further care.     Patient seen and examined this morning. She reports feeling some better today in comparison to yesterday. She is currently resting comfortably on 30 L heated high flow nasal cannula. She has received IV Rocephin, azithromycin, Mucinex, Robitussin, Xopenex, Lasix, Mucomyst and chest vest therapy since admission. CODE STATUS discussed and patient insist on remaining a full code.     Case discussed with García Roberto, [address] [place] [address], [facility] Cardiologist, Herbert Rojo and [facility] Pulmonary HTN specialist Carolee Cho. Pt accepted to [facility] by Dr. Chay Vizcaino. Review of system  Constitutional:  Denies fever or chills. Positive for fatigue and generalized weakness. Eyes:  Denies eye pain or redness  HENT:  Denies sore throat. Positive for sinus congestion. Respiratory:  Positive for cough, SOA, AYALA.    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 She has a 30.00 pack-year smoking history. She has never used smokeless tobacco.  ETOH:   reports no history of alcohol use. OCCUPATION:  None     Family History:       Problem Relation Age of Onset   Patel Cancer Mother         colon    Cancer Father         bladder    High Blood Pressure Father     Stroke Father     High Blood Pressure Sister        Allergies:  Patient has no known allergies. Medications Prior to Admission:    Prior to Admission medications    Medication Sig Start Date End Date Taking? Authorizing Provider   ipratropium-albuterol (DUONEB) 0.5-2.5 (3) MG/3ML SOLN nebulizer solution Inhale 1 vial into the lungs every 4 hours   Yes Historical Provider, MD   Cholecalciferol (VITAMIN D) 50 MCG (2000 UT) CAPS capsule Take by mouth daily   Yes Historical Provider, MD   levothyroxine (SYNTHROID) 175 MCG tablet Take 1 tablet by mouth Daily TAKE 1 TABLET BY MOUTH ONCE DAILY 21  Yes Faisal Collado MD   diazePAM (VALIUM) 2 MG tablet Take 1 tablet by mouth nightly as needed for Anxiety or Sleep for up to 30 days.  21 Yes Faisal Collado MD   folic acid (FOLVITE) 1 MG tablet TAKE 1 TABLET BY MOUTH DAILY 21  Yes Faisal Collado MD   albuterol sulfate HFA (PROAIR HFA) 108 (90 Base) MCG/ACT inhaler Inhale 2 puffs into the lungs every 6 hours as needed for Wheezing 3/23/21  Yes Faisal Collado MD   hydrOXYzine (ATARAX) 25 MG tablet Take 1 tablet by mouth 2 times daily as needed for Anxiety 3/23/21  Yes Faisal Collado MD   venlafaxine (EFFEXOR XR) 75 MG extended release capsule TAKE 3 CAPSULES BY MOUTH DAILY 11/16/20  Yes DAVIAN Pinedo CNP   pantoprazole (PROTONIX) 40 MG tablet Take 1 tablet by mouth once daily 11/16/20  Yes DAVIAN Pinedo CNP   hydroCHLOROthiazide (HYDRODIURIL) 25 MG tablet take 1 tablet by mouth once daily 11/16/20  Yes DAVIAN Pinedo CNP   furosemide (LASIX) 20 MG tablet Take 1 tablet by mouth 2 times daily as needed (worsening swelling, weight up by 3lbs or more) 11/8/20  Yes OPAL Mattson   cetirizine (ZYRTEC) 10 MG tablet Take 1 tablet by mouth daily 7/22/20  Yes Jacob Houston MD   Nintedanib Esylate 150 MG CAPS Take by mouth 2 times daily   Yes Historical Provider, MD       Vital Signs  Temp: 98.4 °F (36.9 °C)  Pulse: 88  Resp: 20  BP: 132/84  SpO2: 94 %  O2 Device: Heated high flow cannula  O2 Flow Rate (L/min): 30 L/min    Vital signs reviewed in electronic chart. Physical exam  Constitutional:  Well developed, well nourished, obese female sitting upright in bed in no acute distress. On 30L high flow NC. Eyes:  no scleral icterus, conjunctiva normal   HENT:  Atraumatic, external ears normal, nose normal, oropharynx moist, no pharyngeal exudates. Neck- supple, no JVD, no lymphadenopathy  Respiratory:  Slight increased WOB, crackles at bases, scant rhonchi, no rales. On 30 L heated high flow NC. Cardiovascular:  Normal rate, normal rhythm, no murmurs, no gallops, no rubs, no edema   GI:  Soft, nondistended, obese, normal bowel sounds, nontender, no voluntary guarding  Musculoskeletal:  No cyanosis or obvious acute deformity. Moving all extremities   Integument:  Warm and dry.   Lymphatic:  No cervical or axillary lymphadenopathy noted   Neurologic:  Alert & oriented x 3, no apparent focal deficits noted   Psychiatric:  Speech and behavior appropriate       Lab Results   Component Value Date    WBC 11.6 (H) 05/27/2021    HGB 11.9 05/27/2021    HCT 37.8 05/27/2021    MCV 97.7 05/27/2021     05/27/2021       Lab Results   Component Value Date  05/27/2021    K 4.0 05/27/2021     05/27/2021    CO2 31 (H) 05/27/2021    BUN 12 05/27/2021    CREATININE 0.6 05/27/2021    GLUCOSE 137 (H) 05/27/2021    CALCIUM 8.8 05/27/2021    PROT 6.7 05/27/2021    LABALBU 3.4 05/27/2021    BILITOT <0.2 (L) 05/27/2021    ALKPHOS 140 (H) 05/27/2021    AST 19 05/27/2021    ALT 20 05/27/2021    LABGLOM >60 05/27/2021    GFRAA >59 05/27/2021    AGRATIO 1.0 05/27/2021    GLOB 3.3 05/27/2021           PA/lat CXR:   CTA PULMONARY W CONTRAST   Final Result      No evidence of acute pulmonary emboli on this motion degraded examination. More prominent symmetrical bronchiectasis with associated subpleural fibrotic changes throughout both lungs compared to prior studies dating back to 2016 consistent with progression of interstitial lung disease. Diffuse groundglass opacities throughout both lungs, potentially secondary to an acute interstitial pneumonitis or potentially atypical infection with supporting clinical history. New mild mediastinal and hilar lymphadenopathy, favored to be reactive in nature given the associated groundglass opacities rather than neoplastic. Coronary artery calcification. XR CHEST PORTABLE   Final Result      Chronic interstitial changes. Assessment and Plan/Hospital course:      Active Hospital Problems    Diagnosis Date Noted    Pulmonary hypertension ([address] [I27.20] 05/27/2021    Class 3 severe obesity due to excess calories with serious comorbidity in adult [facility]) [E66.01] 05/27/2021    Idiopathic pulmonary fibrosis ([address] [J84.112]     Acute on chronic respiratory failure with hypoxia ([address] [J96.21] 10/28/2017    Depression [F32.9] 10/28/2017    Essential hypertension [I10]     Hypothyroidism [E03.9] 04/15/2011    Hyperlipidemia [E78.5] 04/15/2011         Disposition: Transfer, [facility], , Cardiology    Discharged Condition: Stable    Activity: bedrest  Diet: cardiac diet    Discharge Medications: Current Discharge Medication List           Details   ipratropium-albuterol (DUONEB) 0.5-2.5 (3) MG/3ML SOLN nebulizer solution Inhale 1 vial into the lungs every 4 hours      Cholecalciferol (VITAMIN D) 50 MCG (2000 UT) CAPS capsule Take by mouth daily      levothyroxine (SYNTHROID) 175 MCG tablet Take 1 tablet by mouth Daily TAKE 1 TABLET BY MOUTH ONCE DAILY  Qty: 30 tablet, Refills: 2      diazePAM (VALIUM) 2 MG tablet Take 1 tablet by mouth nightly as needed for Anxiety or Sleep for up to 30 days. Qty: 30 tablet, Refills: 0    Associated Diagnoses: Anxiety      folic acid (FOLVITE) 1 MG tablet TAKE 1 TABLET BY MOUTH DAILY  Qty: 90 tablet, Refills: 1      albuterol sulfate HFA (PROAIR HFA) 108 (90 Base) MCG/ACT inhaler Inhale 2 puffs into the lungs every 6 hours as needed for Wheezing  Qty: 1 Inhaler, Refills: 3      hydrOXYzine (ATARAX) 25 MG tablet Take 1 tablet by mouth 2 times daily as needed for Anxiety  Qty: 45 tablet, Refills: 1    Associated Diagnoses: Routine general medical examination at a health care facility; Class 2 severe obesity with serious comorbidity and body mass index (BMI) of 39.0 to 39.9 in adult, unspecified obesity type ([facility]  Essential hypertension      venlafaxine (EFFEXOR XR) 75 MG extended release capsule TAKE 3 CAPSULES BY MOUTH DAILY  Qty: 270 capsule, Refills: 3    Associated Diagnoses: Depression with anxiety      pantoprazole (PROTONIX) 40 MG tablet Take 1 tablet by mouth once daily  Qty: 90 tablet, Refills: 3      hydroCHLOROthiazide (HYDRODIURIL) 25 MG tablet take 1 tablet by mouth once daily  Qty: 90 tablet, Refills: 5    Associated Diagnoses: Essential hypertension      furosemide (LASIX) 20 MG tablet Take 1 tablet by mouth 2 times daily as needed (worsening swelling, weight up by 3lbs or more)  Qty: 60 tablet, Refills: 3      cetirizine (ZYRTEC) 10 MG tablet Take 1 tablet by mouth daily  Qty: 30 tablet, Refills: 3      Nintedanib Esylate 150 MG CAPS Take by mouth 2 times daily              Patient was seen and examined by Dr. Lynn Smalls and plan of care reviewed. Signed:  Electronically signed by OPAL Simon on 5/27/2021 at 1:39 PM       Thank you Socrates Lyle MD for the opportunity to be involved in this patient's care. If you have any questions or concerns please feel free to contact me at [phone].

## 2021-05-27 NOTE — ACP (ADVANCE CARE PLANNING)
Advance Care Planning     General Advance Care Planning (ACP) Conversation    Date of Conversation: 5/26/2021  Conducted with: Patient with Decision Making Capacity per PA at [facility]:      Primary Decision Maker: [facility] [phone]    Today we documented Decision Maker(s) consistent with Legal Next of Kin hierarchy.     Content/Action Overview:  Has NO ACP documents/care preferences - information provided, considering goals and options  Reviewed DNR/DNI and patient elects Full Code (Attempt Resuscitation)    Length of Voluntary ACP Conversation in minutes:  <16 minutes (Non-Billable)    Bret Alvarez RN

## 2021-05-30 LAB
BLOOD CULTURE, ROUTINE: NORMAL
CULTURE, BLOOD 2: NORMAL

## 2021-06-07 ENCOUNTER — CARE COORDINATION (OUTPATIENT)
Dept: CARE COORDINATION | Age: 56
End: 2021-06-07

## 2021-06-07 NOTE — CARE COORDINATION
[facility] Transitions Initial Follow Up Call    Call within 2 business days of discharge: Yes     Patient: Johann Meyers Patient : 1965 MRN: <[id number]>    Last Discharge [address] [place] [address]       Complaint Diagnosis Description Type Department Provider    21 Shortness of Breath Atypical pneumonia . .. ED to Hosp-Admission (Discharged) (ADMITTED) CHRISTIAN Kay MD; Iris Lamb DO          RARS: Readmission Risk Score: 15       Spoke with: Attempting HFU unsuccessful. Message left with contact information.      Discharge department/facility: [address] [place] [address]    Non-face-to-face services provided:  Scheduled appointment with PCP-Veronica  Obtained and reviewed discharge summary and/or continuity of care documents    Follow Up  Future Appointments   Date Time Provider Anastacio Fisher   2021  2:30 PM Dagmar Alpers, PT [facility] PT See and W   2021  3:30 PM Nuha Delma, OT [facility] OT See and W   2021 10:30 AM Nuha Delma, OT [facility] OT See and W   2021 11:30 AM Dagmar Alpers, PT [facility] PT See and W   2021  2:30 PM Dagmar Alpers, PT [facility] PT See and W   2021  3:30 PM Nuha Delma, OT [facility] OT See and W   2021 10:30 AM Nuha Delma, OT [facility] OT See and W   2021 11:30 AM Dagmar Alpers, PT [facility] PT See and W   2021  2:30 PM Dagmar Alpers, PT [facility] PT See and W   2021  3:30 PM Nuha Delma, OT [facility] OT See and W   2021 10:30 AM Nuha Delma, OT [facility] OT See and W   2021 11:30 AM Dagmar Alpers, PT [facility] PT See and W   2021  2:30 PM Dagmar Alpers, PT [facility] PT See and W   2021  3:30 PM Nuha Delma, OT [facility] OT See and W   2021 10:30 AM Nuha Delma, OT [facility] OT See and W   2021 11:30 AM Dagmar Alpers, PT [facility] PT See and W   2021  2:30 PM Dagmar Alpers, PT [facility] PT See and W   2021  3:30 PM Nuha Delma, OT [facility] OT See and W   2021  1:30 PM MD Natalie Robins [facility]   2021 10:30 AM Nuha Delma, OT [facility] OT See and W 7/9/2021 11:30 AM Qiana Arnold, PT [facility] PT See and W   7/14/2021  2:30 PM Qiana Arnold, PT [facility] PT See and W 7/14/2021  3:30 PM Geraldyne Dieter, OT [facility] OT See and W   7/16/2021 10:30 AM Geraldyne Dieter, OT [facility] OT See and W 7/16/2021 11:30 AM Qiana Arnold, PT [facility] PT See and W 7/21/2021  2:30 PM Qiana Arnold, PT [facility] PT See and W 7/21/2021  3:30 PM Geraldyne Dieter, OT [facility] OT See and W 7/23/2021 10:30 AM Geraldyne Dieter, OT [facility] OT See and W 7/23/2021 11:30 AM Qiana Arnold, PT [facility] PT See and W 7/28/2021  2:30 PM Qiana Arnold, PT [facility] PT See and W   7/28/2021  3:30 PM Geraldyne Dieter, OT [facility] OT See and W 7/30/2021 10:30 AM Geraldyne Dieter, OT [facility] OT See and W 7/30/2021 11:30 AM Qiana Arnold, PT [facility] PT See and W   8/4/2021  2:30 PM Qiana Arnold, PT [facility] PT See and W   8/4/2021  3:30 PM Geraldyne Dieter, OT [facility] OT See and W   8/6/2021 10:30 AM Geraldyne Dieter, OT [facility] OT See and W 8/6/2021 11:30 AM Qiana Arnold, PT [facility] PT See and W 8/11/2021  2:30 PM Qiana Arnold, PT [facility] PT See and W 8/11/2021  3:30 PM Geraldyne Dieter, OT [facility] OT See and W   8/13/2021 10:30 AM Geraldyne Dieter, OT [facility] OT See and W 8/13/2021 11:30 AM Qiana Arnold, PT [facility] PT See and W   8/18/2021  2:30 PM Qiana Arnold, PT [facility] PT See and W   8/18/2021  3:30 PM Geraldyne Dieter, OT [facility] OT See and W   8/20/2021 10:30 AM Geraldyne Dieter, OT [facility] OT See and W   8/20/2021 11:30 AM Qiana East, PT [facility] PT See and W   8/25/2021  2:30 PM Qiana Arnrandolph, PT [facility] PT See and W   8/25/2021  3:30 PM Geraldyne Dieter, OT [facility] OT See and W   8/27/2021 10:30 AM Geraldyne Dieter, OT [facility] OT See and W 8/27/2021 11:30 AM Qiana Arnrandolph, PT [facility] PT See and Nicolas Tobin RN

## 2021-06-09 NOTE — CARE COORDINATION
[facility] Transitions Initial Follow Up Call    Call within 2 business days of discharge: Yes     Patient: Jayla Milder Patient : 1965 MRN: <[id number]>    Last Discharge [address] [place] [address]       Complaint Diagnosis Description Type Department Provider    21 Shortness of Breath Atypical pneumonia . .. ED to Hosp-Admission (Discharged) (ADMITTED) CHRISTIAN Hogan MD; Loretta Thomas DO          RARS: Readmission Risk Score: 15       Spoke with: Attempting HFU call, unsuccessful. Message left with contact information. Needs HFU scheduled.      Discharge department/facility: [address] services provided:  Obtained and reviewed discharge summary and/or continuity of care documents    Follow Up  Future Appointments   Date Time Provider Anastacio Fisher   2021 10:30 AM Gearldean Paganini, OT [facility] OT See and W   2021 11:30 AM Sierra [employer], PT [facility] PT See and W   2021  2:30 PM [employer], PT [facility] PT See and W   2021  3:30 PM Gearldean Paganini, OT [facility] OT See and W   2021 10:30 AM Gearldean Paganini, OT [facility] OT See and W   2021 11:30 AM [employer], PT [facility] PT See and W   2021  2:30 PM [employer], PT [facility] PT See and W   2021  3:30 PM Gearldean Paganini, OT [facility] OT See and W   2021 10:30 AM Gearldean Paganini, OT [facility] OT See and W   2021 11:30 AM [employer], PT [facility] PT See and W   2021  2:30 PM [employer], PT [facility] PT See and W   2021  3:30 PM Gearldean Paganini, OT [facility] OT See and W   2021 10:30 AM Gearldean Paganini, OT [facility] OT See and W   2021 11:30 AM [employer], PT [facility] PT See and W   2021  2:30 PM [employer], PT [facility] PT See and W   2021  3:30 PM Gearldean Paganini, OT [facility] OT See and W   2021  1:30 PM MD Natalie Morse [facility]   2021 10:30 AM Gearldean Paganini, OT [facility] OT See and W   2021 11:30 AM [employer], PT [facility] PT See and W   2021  2:30 PM [employer], PT [facility] PT See and Tk Ped   2021 3:30 PM Nuha Delma, OT [facility] OT See and W   7/16/2021 10:30 AM Nuha Delma, OT [facility] OT See and W   7/16/2021 11:30 AM Dagmar Alpers, PT [facility] PT See and W   7/21/2021  2:30 PM Dagmar Alpers, PT [facility] PT See and W   7/21/2021  3:30 PM Nuha Delma, OT [facility] OT See and W   7/23/2021 10:30 AM Nuha Delma, OT [facility] OT See and W   7/23/2021 11:30 AM Dagmar Alpers, PT [facility] PT See and W   7/28/2021  2:30 PM Dagmar Alpers, PT [facility] PT See and W   7/28/2021  3:30 PM Nuha Delma, OT [facility] OT See and W   7/30/2021 10:30 AM Nuha Delma, OT [facility] OT See and W   7/30/2021 11:30 AM Dagmar Alpers, PT [facility] PT See and W   8/4/2021  2:30 PM Dagmar Alpers, PT [facility] PT See and W   8/4/2021  3:30 PM Nuha Delma, OT [facility] OT See and W   8/6/2021 10:30 AM Nuha Delma, OT [facility] OT See and W   8/6/2021 11:30 AM Dagmar Alpers, PT [facility] PT See and W   8/11/2021  2:30 PM Dagmar Alpers, PT [facility] PT See and W   8/11/2021  3:30 PM Nuha Delma, OT [facility] OT See and W   8/13/2021 10:30 AM Nuha Delma, OT [facility] OT See and W   8/13/2021 11:30 AM Dagmar Alpers, PT [facility] PT See and W   8/18/2021  2:30 PM Dagmar Alpers, PT [facility] PT See and W   8/18/2021  3:30 PM Nuah Delma, OT [facility] OT See and W   8/20/2021 10:30 AM Nuha Delma, OT [facility] OT See and W   8/20/2021 11:30 AM Dagmar Alpers, PT [facility] PT See and W   8/25/2021  2:30 PM Dagmar Alpers, PT [facility] PT See and W   8/25/2021  3:30 PM Nuha Delma, OT [facility] OT See and W   8/27/2021 10:30 AM Nuha Delma, OT [facility] OT See and W   8/27/2021 11:30 AM Dagmar Alpers, PT [facility] PT See and Fabien Mills RN

## 2021-06-09 NOTE — CARE COORDINATION
[facility] 45 Transitions Initial Follow Up Call    Call within 2 business days of discharge: Yes     Patient: Yuly Villagomez Patient : 1965 MRN: <[id number]>    Last Discharge [facility]       Complaint Diagnosis Description Type Department Provider    21 Shortness of Breath Atypical pneumonia . .. ED to Hosp-Admission (Discharged) (ADMITTED) CHRISTIAN MS Lexis Delacruz MD; Samra Perez, [facility] 21  Viral pneumonia       RARS: Readmission Risk Score: 13       Spoke with: Edith Marte returned my call. She tells me that she is doing much better and feels great. She reports many changes in meds and inhalers. We discussed a HFU appointment and will schedule today. I offered appointment for this afternoon but she couldn't get way in.       Discharge department/facility: [address] [place] [address]    Non-face-to-face services provided:  Scheduled appointment with PCP-Veronica  Obtained and reviewed discharge summary and/or continuity of care documents    Follow Up  Future Appointments   Date Time Provider Anastacio Fisher   2021 10:30 AM Russ [place], OT [facility] OT See and W   2021 11:30 AM Rabia Chavis, PT [facility] PT See and W   2021  2:30 PM Rabia Chavis, PT [facility] PT See and W   2021  3:30 PM Russ [place], OT [facility] OT See and W   2021 10:30 AM Russ [place], OT [facility] OT See and W   2021 11:30 AM Rabia Chavis, PT [facility] PT See and W   2021  2:30 PM Rabia Chavis, PT [facility] PT See and W   2021  3:30 PM Russ [place], OT [facility] OT See and W   2021 10:30 AM Russ [place], OT [facility] OT See and W   2021 11:30 AM Rabia Chavis, PT [facility] PT See and W   2021  2:30 PM Rabia Chavis, PT [facility] PT See and W   2021  3:30 PM Russ [place], OT [facility] OT See and W   2021 10:30 AM Russ [place], OT [facility] OT See and W   2021 11:30 AM Rabia Chavis, PT [facility] PT See and W   2021  2:30 PM Rabia Chavis, PT [facility] PT See and W   2021  3:30 PM Russ [place], OT [facility] OT

## 2021-06-14 ENCOUNTER — OFFICE VISIT (OUTPATIENT)
Dept: PRIMARY CARE CLINIC | Age: 56
End: 2021-06-14
Payer: MEDICARE

## 2021-06-14 ENCOUNTER — HOSPITAL ENCOUNTER (OUTPATIENT)
Facility: HOSPITAL | Age: 56
Discharge: HOME | End: 2021-06-14
Payer: MEDICARE

## 2021-06-14 VITALS
BODY MASS INDEX: 45.1 KG/M2 | SYSTOLIC BLOOD PRESSURE: 116 MMHG | DIASTOLIC BLOOD PRESSURE: 64 MMHG | TEMPERATURE: 97.3 F | WEIGHT: 271 LBS | RESPIRATION RATE: 18 BRPM

## 2021-06-14 DIAGNOSIS — Z79.52 CURRENT CHRONIC USE OF SYSTEMIC STEROIDS: ICD-10-CM

## 2021-06-14 DIAGNOSIS — J96.11 CHRONIC RESPIRATORY FAILURE WITH HYPOXIA (HCC): ICD-10-CM

## 2021-06-14 DIAGNOSIS — I27.20 PULMONARY HTN (HCC): ICD-10-CM

## 2021-06-14 DIAGNOSIS — I10 ESSENTIAL HYPERTENSION: ICD-10-CM

## 2021-06-14 DIAGNOSIS — E03.9 HYPOTHYROIDISM, UNSPECIFIED TYPE: ICD-10-CM

## 2021-06-14 DIAGNOSIS — J96.11 CHRONIC RESPIRATORY FAILURE WITH HYPOXIA (HCC): Primary | ICD-10-CM

## 2021-06-14 LAB
A/G RATIO: 1.5 (ref 0.8–2)
ALBUMIN SERPL-MCNC: 4.3 G/DL (ref 3.4–4.8)
ALP BLD-CCNC: 118 U/L (ref 25–100)
ALT SERPL-CCNC: 28 U/L (ref 4–36)
ANION GAP SERPL CALCULATED.3IONS-SCNC: 11 MMOL/L (ref 3–16)
AST SERPL-CCNC: 17 U/L (ref 8–33)
BASOPHILS ABSOLUTE: 0.1 K/UL (ref 0–0.1)
BASOPHILS RELATIVE PERCENT: 0.2 %
BILIRUB SERPL-MCNC: 0.3 MG/DL (ref 0.3–1.2)
BUN BLDV-MCNC: 27 MG/DL (ref 6–20)
CALCIUM SERPL-MCNC: 10.1 MG/DL (ref 8.5–10.5)
CHLORIDE BLD-SCNC: 90 MMOL/L (ref 98–107)
CO2: 34 MMOL/L (ref 20–30)
CREAT SERPL-MCNC: 0.8 MG/DL (ref 0.4–1.2)
EOSINOPHILS ABSOLUTE: 0.1 K/UL (ref 0–0.4)
EOSINOPHILS RELATIVE PERCENT: 0.5 %
GFR AFRICAN AMERICAN: >59
GFR NON-AFRICAN AMERICAN: >60
GLOBULIN: 2.9 G/DL
GLUCOSE BLD-MCNC: 85 MG/DL (ref 74–106)
HCT VFR BLD CALC: 46.4 % (ref 37–47)
HEMOGLOBIN: 14.5 G/DL (ref 11.5–16.5)
IMMATURE GRANULOCYTES #: 0.2 K/UL
IMMATURE GRANULOCYTES %: 0.7 % (ref 0–5)
LYMPHOCYTES ABSOLUTE: 1.4 K/UL (ref 1.5–4)
LYMPHOCYTES RELATIVE PERCENT: 6.1 %
MAGNESIUM: 2.3 MG/DL (ref 1.7–2.4)
MCH RBC QN AUTO: 29.9 PG (ref 27–32)
MCHC RBC AUTO-ENTMCNC: 31.3 G/DL (ref 31–35)
MCV RBC AUTO: 95.7 FL (ref 80–100)
MONOCYTES ABSOLUTE: 0.8 K/UL (ref 0.2–0.8)
MONOCYTES RELATIVE PERCENT: 3.2 %
NEUTROPHILS ABSOLUTE: 20.7 K/UL (ref 2–7.5)
NEUTROPHILS RELATIVE PERCENT: 89.3 %
PDW BLD-RTO: 14.1 % (ref 11–16)
PLATELET # BLD: 323 K/UL (ref 150–400)
PMV BLD AUTO: 10.6 FL (ref 6–10)
POTASSIUM SERPL-SCNC: 4.1 MMOL/L (ref 3.4–5.1)
RBC # BLD: 4.85 M/UL (ref 3.8–5.8)
SODIUM BLD-SCNC: 135 MMOL/L (ref 136–145)
TOTAL PROTEIN: 7.2 G/DL (ref 6.4–8.3)
TSH SERPL DL<=0.05 MIU/L-ACNC: 0.58 UIU/ML (ref 0.27–4.2)
WBC # BLD: 23.2 K/UL (ref 4–11)

## 2021-06-14 PROCEDURE — 84443 ASSAY THYROID STIM HORMONE: CPT

## 2021-06-14 PROCEDURE — 99495 TRANSJ CARE MGMT MOD F2F 14D: CPT | Performed by: INTERNAL MEDICINE

## 2021-06-14 PROCEDURE — 85025 COMPLETE CBC W/AUTO DIFF WBC: CPT

## 2021-06-14 PROCEDURE — 83735 ASSAY OF MAGNESIUM: CPT

## 2021-06-14 PROCEDURE — 80053 COMPREHEN METABOLIC PANEL: CPT

## 2021-06-14 PROCEDURE — 1111F DSCHRG MED/CURRENT MED MERGE: CPT | Performed by: INTERNAL MEDICINE

## 2021-06-14 RX ORDER — DIAZEPAM 2 MG/1
2 TABLET ORAL NIGHTLY PRN
COMMUNITY
End: 2021-12-07 | Stop reason: ALTCHOICE

## 2021-06-14 RX ORDER — PREDNISONE 20 MG/1
60 TABLET ORAL DAILY
COMMUNITY
Start: 2021-06-06 | End: 2021-07-06

## 2021-06-14 RX ORDER — SILDENAFIL CITRATE 20 MG/1
20 TABLET ORAL 3 TIMES DAILY
COMMUNITY
Start: 2021-06-05 | End: 2021-06-19

## 2021-06-14 SDOH — ECONOMIC STABILITY: FOOD INSECURITY: WITHIN THE PAST 12 MONTHS, YOU WORRIED THAT YOUR FOOD WOULD RUN OUT BEFORE YOU GOT MONEY TO BUY MORE.: PATIENT DECLINED

## 2021-06-14 SDOH — ECONOMIC STABILITY: FOOD INSECURITY: WITHIN THE PAST 12 MONTHS, THE FOOD YOU BOUGHT JUST DIDN'T LAST AND YOU DIDN'T HAVE MONEY TO GET MORE.: PATIENT DECLINED

## 2021-06-14 ASSESSMENT — ENCOUNTER SYMPTOMS
WHEEZING: 1
COUGH: 1
SORE THROAT: 0
VOMITING: 0
SHORTNESS OF BREATH: 1
EYE DISCHARGE: 0
SINUS PRESSURE: 0
ABDOMINAL PAIN: 0
NAUSEA: 0
BACK PAIN: 1

## 2021-06-14 ASSESSMENT — SOCIAL DETERMINANTS OF HEALTH (SDOH): HOW HARD IS IT FOR YOU TO PAY FOR THE VERY BASICS LIKE FOOD, HOUSING, MEDICAL CARE, AND HEATING?: PATIENT DECLINED

## 2021-06-14 NOTE — PROGRESS NOTES
Post-Discharge Transitional Care Management Services or Hospital Follow Up      Lee Moore   YOB: 1965    Date of Office Visit:  6/14/2021  Date of Hospital Admission: 5/2721  Date of Hospital Discharge: 06/05/21  Readmission Risk Score(high >=14%. Medium >=10%):Readmission Risk Score: 13      Care management risk score Rising risk (score 2-5) and Complex Care (Scores >=6): 5     Non face to face  following discharge, date last encounter closed (first attempt may have been earlier):   6/9/2021  2:27 PM 6/9/2021  2:27 PM    Call initiated 2 business days of discharge: Yes     Patient Active Problem List   Diagnosis    Hypothyroidism    Hyperlipidemia    Interstitial lung disease ([address]    Gastroesophageal reflux disease    COPD with acute exacerbation ([address]    Essential hypertension    Acute on chronic respiratory failure with hypoxia (HCC)    IgG deficiency ([address]    Depression    Insomnia    Sinus tachycardia    COPD exacerbation (HCC)    Generalized weakness    Yeast infection    Idiopathic pulmonary fibrosis ([address]    Pulmonary hypertension ([address]    Class 3 severe obesity due to excess calories with serious comorbidity in adult ([address]       No Known Allergies    Medications listed as ordered at the time of discharge from hospital   Antonia Higuera 999 Medication Instructions DEVIN:    Printed on:06/14/21 1245   Medication Information                      albuterol sulfate HFA (PROAIR HFA) 108 (90 Base) MCG/ACT inhaler  Inhale 2 puffs into the lungs every 6 hours as needed for Wheezing             cetirizine (ZYRTEC) 10 MG tablet  Take 1 tablet by mouth daily             Cholecalciferol (VITAMIN D) 50 MCG (2000 UT) CAPS capsule  Take by mouth daily             diazePAM (VALIUM) 2 MG tablet  Take 2 mg by mouth nightly as needed.              folic acid (FOLVITE) 1 MG tablet  TAKE 1 TABLET BY MOUTH DAILY             furosemide (LASIX) 20 MG tablet  Take 1 tablet by mouth 2 times daily as needed (worsening swelling, weight up by 3lbs or more)             hydroCHLOROthiazide (HYDRODIURIL) 25 MG tablet  take 1 tablet by mouth once daily             hydrOXYzine (ATARAX) 25 MG tablet  Take 1 tablet by mouth 2 times daily as needed for Anxiety             ipratropium-albuterol (DUONEB) 0.5-2.5 (3) MG/3ML SOLN nebulizer solution  Inhale 1 vial into the lungs every 4 hours             levothyroxine (SYNTHROID) 175 MCG tablet  Take 1 tablet by mouth Daily TAKE 1 TABLET BY MOUTH ONCE DAILY             Nintedanib Esylate 150 MG CAPS  Take by mouth 2 times daily             pantoprazole (PROTONIX) 40 MG tablet  Take 1 tablet by mouth once daily             predniSONE (DELTASONE) 20 MG tablet  Take 60 mg by mouth daily             sildenafil (REVATIO) 20 MG tablet  Take 20 mg by mouth 3 times daily             Treprostinil (TYVASO IN)  Inhale 1 puff into the lungs 4 times daily             venlafaxine (EFFEXOR XR) 75 MG extended release capsule  TAKE 3 CAPSULES BY MOUTH DAILY                   Medications marked \"taking\" at this time  Outpatient Medications Marked as Taking for the 6/14/21 encounter (Office Visit) with Tyson Dallas MD   Medication Sig Dispense Refill    predniSONE (DELTASONE) 20 MG tablet Take 60 mg by mouth daily      sildenafil (REVATIO) 20 MG tablet Take 20 mg by mouth 3 times daily      Treprostinil (TYVASO IN) Inhale 1 puff into the lungs 4 times daily      diazePAM (VALIUM) 2 MG tablet Take 2 mg by mouth nightly as needed.       ipratropium-albuterol (DUONEB) 0.5-2.5 (3) MG/3ML SOLN nebulizer solution Inhale 1 vial into the lungs every 4 hours      Cholecalciferol (VITAMIN D) 50 MCG (2000 UT) CAPS capsule Take by mouth daily      levothyroxine (SYNTHROID) 175 MCG tablet Take 1 tablet by mouth Daily TAKE 1 TABLET BY MOUTH ONCE DAILY 30 tablet 2    folic acid (FOLVITE) 1 MG tablet TAKE 1 TABLET BY MOUTH DAILY 90 tablet 1    albuterol sulfate HFA (PROAIR HFA) 108 (90 Base) MCG/ACT inhaler Inhale 2 puffs into the lungs every 6 hours as needed for Wheezing 1 Inhaler 3    hydrOXYzine (ATARAX) 25 MG tablet Take 1 tablet by mouth 2 times daily as needed for Anxiety 45 tablet 1    venlafaxine (EFFEXOR XR) 75 MG extended release capsule TAKE 3 CAPSULES BY MOUTH DAILY 270 capsule 3    pantoprazole (PROTONIX) 40 MG tablet Take 1 tablet by mouth once daily 90 tablet 3    hydroCHLOROthiazide (HYDRODIURIL) 25 MG tablet take 1 tablet by mouth once daily 90 tablet 5    furosemide (LASIX) 20 MG tablet Take 1 tablet by mouth 2 times daily as needed (worsening swelling, weight up by 3lbs or more) 60 tablet 3    cetirizine (ZYRTEC) 10 MG tablet Take 1 tablet by mouth daily 30 tablet 3    Nintedanib Esylate 150 MG CAPS Take by mouth 2 times daily          Medications patient taking as of now reconciled against medications ordered at time of hospital discharge: Yes    Chief Complaint   Patient presents with    Follow-Up from Hospital       HPI    Inpatient course: Discharge summary reviewed- see chart. Interval history/Current status:   Patient was admitted for chronic respiratory failure. She presented to this office on 5/26/2021 and was found to be hypoxic (51%) on 8 L of oxygen. She was sent direct admit by EMS to [facility] and after needing increased oxygen support she was transferred one day later to [facility] ICU. She was placed on HFNC for maintenance of saturations. She was found to have new opacities on her lungs and was treated with antibiotics and steroids. She was started on Tyvaso and Viagra. She was also discharged with 60mg of Prednisone per day. She was discharged home on 5 L of oxygen and Lasix daily as well. She reports that she is feeling much better and is breathing much easier. Today she is on 4 L while sitting. She does report that she does desat on exertion. Review of Systems   Constitutional: Positive for fatigue. Negative for chills and fever.    HENT: Negative for congestion, sinus pressure and sore throat. Eyes: Negative for discharge and visual disturbance. Respiratory: Positive for cough, shortness of breath and wheezing. Cardiovascular: Negative for chest pain and palpitations. AYALA   Gastrointestinal: Negative for abdominal pain, nausea and vomiting. Endocrine: Negative for cold intolerance and heat intolerance. Genitourinary: Negative for dysuria, frequency and urgency. Musculoskeletal: Positive for arthralgias and back pain. Skin: Negative for rash and wound. Neurological: Negative for syncope, numbness and headaches. Hematological: Negative. Psychiatric/Behavioral: Negative for agitation, self-injury, sleep disturbance and suicidal ideas. The patient is nervous/anxious (occasional ). Vitals:    06/14/21 1205   BP: 116/64   Resp: 18   Temp: 97.3 °F (36.3 °C)   Weight: 271 lb (122.9 kg)     Body mass index is 45.1 kg/m². Wt Readings from Last 3 Encounters:   06/14/21 271 lb (122.9 kg)   05/26/21 243 lb (110.2 kg)   05/05/21 236 lb 6.4 oz (107.2 kg)     BP Readings from Last 3 Encounters:   06/14/21 116/64   05/27/21 112/77   05/05/21 126/74       Physical Exam  Vitals and nursing note reviewed. Constitutional:       Appearance: Normal appearance. She is well-developed. She is obese. Comments: On O2   HENT:      Head: Normocephalic and atraumatic. Right Ear: External ear normal.      Left Ear: External ear normal.      Nose: Nose normal.      Mouth/Throat:      Mouth: Mucous membranes are moist.      Pharynx: Oropharynx is clear. Eyes:      Conjunctiva/sclera: Conjunctivae normal.      Pupils: Pupils are equal, round, and reactive to light. Neck:      Thyroid: No thyromegaly. Vascular: No JVD. Cardiovascular:      Rate and Rhythm: Normal rate and regular rhythm. Heart sounds: Normal heart sounds. Pulmonary:      Effort: Pulmonary effort is normal.      Breath sounds: Rales (few fine in the bases) present.  No not helping.    - CBC Auto Differential; Future  - Comprehensive Metabolic Panel; Future  - Magnesium; Future  - OR DISCHARGE MEDS RECONCILED W/ CURRENT OUTPATIENT MED LIST    2. Essential hypertension  BP is stable. I have advised her on low-sodium diet, exercise and weight control. I am going to continue current medication. Will monitor her renal function every few months, have advised her to check blood pressure frequently and to keep a record of this. - CBC Auto Differential; Future  - Comprehensive Metabolic Panel; Future  - Magnesium; Future    3. Hypothyroidism, unspecified type  I am going to check the TSH today. I am going to continue the current dose of Levothyroxine. I have advised her on the importance of taking the medication on a daily basis. - TSH without Reflex; Future    4. Current chronic use of systemic steroids  Patient is on high-dose steroids related to her lung condition. Advised the patient to contact her pulmonologist and to discuss the potential cutting down the dose slowly since her appointment not till few more weeks. Discussed potential side effect. Long conversation regarding monitoring her diet and try to stay active. - CBC Auto Differential; Future  - Comprehensive Metabolic Panel; Future  - Magnesium; Future  - OR DISCHARGE MEDS RECONCILED W/ CURRENT OUTPATIENT MED LIST    5. Pulmonary HTN ([facility] [address]  Patient was started recently on sildenafil. Continue oxygen. Continue follow-up with pulmonology. Long conversation regarding weight control. Continue inhalers as scheduled. - CBC Auto Differential; Future  - Comprehensive Metabolic Panel; Future  - Magnesium;  Future  - OR DISCHARGE MEDS RECONCILED W/ CURRENT OUTPATIENT MED LIST      Medical Decision Making: moderate complexity    Written by Israel Cowart CMA acting as scribe for Dr. Jose Harris on 6/14/2021 at 12:58 PM.    I, Dr. Rosita Chan, personally performed the services described in the documentation as scribed by Irma Salmon CMA, in my presence and it is both accurate and complete.

## 2021-07-06 ENCOUNTER — TELEPHONE (OUTPATIENT)
Dept: PRIMARY CARE CLINIC | Age: 56
End: 2021-07-06

## 2021-07-26 RX ORDER — LEVOTHYROXINE SODIUM 175 UG/1
175 TABLET ORAL DAILY
Qty: 30 TABLET | Refills: 2 | Status: SHIPPED | OUTPATIENT
Start: 2021-07-26 | End: 2021-10-19

## 2021-08-27 ENCOUNTER — TELEPHONE (OUTPATIENT)
Dept: PRIMARY CARE CLINIC | Age: 56
End: 2021-08-27

## 2021-08-27 RX ORDER — AMOXICILLIN AND CLAVULANATE POTASSIUM 875; 125 MG/1; MG/1
1 TABLET, FILM COATED ORAL 2 TIMES DAILY
Qty: 20 TABLET | Refills: 0 | Status: SHIPPED | OUTPATIENT
Start: 2021-08-27 | End: 2021-09-06

## 2021-08-27 NOTE — TELEPHONE ENCOUNTER
----- Message from [facility] sent at 8/27/2021 12:57 PM EDT -----  Subject: Message to Provider    QUESTIONS  Information for Provider? Pt was calling to ask if the pcp or his nurse   can call in antibiotics for her as her stats are low. Pt is experiencing   some sinus with green fluid, as the pt knows she can't fight the infection   if she's seeing green. ---------------------------------------------------------------------------  --------------  Alex Rivers INFO  What is the best way for the office to contact you? OK to leave message on   voicemail, OK to respond with electronic message via [employer] portal (only   for patients who have registered [employer] account)  Preferred Call Back Phone Number? [phone]  ---------------------------------------------------------------------------  --------------  SCRIPT ANSWERS  Relationship to Patient?  Self

## 2021-10-18 RX ORDER — FOLIC ACID 1 MG/1
1 TABLET ORAL DAILY
Qty: 90 TABLET | Refills: 1 | Status: SHIPPED | OUTPATIENT
Start: 2021-10-18 | End: 2022-04-18

## 2021-10-19 RX ORDER — LEVOTHYROXINE SODIUM 175 UG/1
TABLET ORAL
Qty: 30 TABLET | Refills: 2 | Status: SHIPPED | OUTPATIENT
Start: 2021-10-19 | End: 2022-01-10

## 2021-11-09 RX ORDER — HYDROXYZINE HYDROCHLORIDE 10 MG/1
TABLET, FILM COATED ORAL
Qty: 45 TABLET | Refills: 2 | Status: SHIPPED | OUTPATIENT
Start: 2021-11-09 | End: 2022-04-04

## 2021-12-07 ENCOUNTER — OFFICE VISIT (OUTPATIENT)
Dept: PRIMARY CARE CLINIC | Age: 56
End: 2021-12-07
Payer: MEDICARE

## 2021-12-07 ENCOUNTER — HOSPITAL ENCOUNTER (OUTPATIENT)
Facility: HOSPITAL | Age: 56
Discharge: HOME | End: 2021-12-07
Payer: MEDICARE

## 2021-12-07 VITALS
WEIGHT: 220.2 LBS | SYSTOLIC BLOOD PRESSURE: 116 MMHG | DIASTOLIC BLOOD PRESSURE: 64 MMHG | RESPIRATION RATE: 18 BRPM | HEART RATE: 115 BPM | BODY MASS INDEX: 36.64 KG/M2 | TEMPERATURE: 97.7 F | OXYGEN SATURATION: 95 %

## 2021-12-07 DIAGNOSIS — J96.11 CHRONIC RESPIRATORY FAILURE WITH HYPOXIA (HCC): ICD-10-CM

## 2021-12-07 DIAGNOSIS — J96.11 CHRONIC RESPIRATORY FAILURE WITH HYPOXIA (HCC): Primary | ICD-10-CM

## 2021-12-07 DIAGNOSIS — E03.9 HYPOTHYROIDISM, UNSPECIFIED TYPE: ICD-10-CM

## 2021-12-07 DIAGNOSIS — I27.20 PULMONARY HTN (HCC): ICD-10-CM

## 2021-12-07 DIAGNOSIS — R53.81 DECLINING FUNCTIONAL STATUS: ICD-10-CM

## 2021-12-07 DIAGNOSIS — I10 ESSENTIAL HYPERTENSION: ICD-10-CM

## 2021-12-07 DIAGNOSIS — R53.83 FATIGUE, UNSPECIFIED TYPE: ICD-10-CM

## 2021-12-07 LAB
A/G RATIO: 1.3 (ref 0.8–2)
ALBUMIN SERPL-MCNC: 4 G/DL (ref 3.4–4.8)
ALP BLD-CCNC: 188 U/L (ref 25–100)
ALT SERPL-CCNC: 43 U/L (ref 4–36)
ANION GAP SERPL CALCULATED.3IONS-SCNC: 13 MMOL/L (ref 3–16)
AST SERPL-CCNC: 39 U/L (ref 8–33)
BASOPHILS ABSOLUTE: 0 K/UL (ref 0–0.1)
BASOPHILS RELATIVE PERCENT: 0.3 %
BILIRUB SERPL-MCNC: <0.2 MG/DL (ref 0.3–1.2)
BUN BLDV-MCNC: 15 MG/DL (ref 6–20)
CALCIUM SERPL-MCNC: 9.8 MG/DL (ref 8.5–10.5)
CHLORIDE BLD-SCNC: 97 MMOL/L (ref 98–107)
CO2: 30 MMOL/L (ref 20–30)
CREAT SERPL-MCNC: 0.7 MG/DL (ref 0.4–1.2)
EOSINOPHILS ABSOLUTE: 0.1 K/UL (ref 0–0.4)
EOSINOPHILS RELATIVE PERCENT: 0.6 %
FOLATE: >20 NG/ML
GFR AFRICAN AMERICAN: >59
GFR NON-AFRICAN AMERICAN: >60
GLOBULIN: 3 G/DL
GLUCOSE BLD-MCNC: 138 MG/DL (ref 74–106)
HCT VFR BLD CALC: 43.3 % (ref 37–47)
HEMOGLOBIN: 13.2 G/DL (ref 11.5–16.5)
IMMATURE GRANULOCYTES #: 0.1 K/UL
IMMATURE GRANULOCYTES %: 0.5 % (ref 0–5)
LYMPHOCYTES ABSOLUTE: 1.5 K/UL (ref 1.5–4)
LYMPHOCYTES RELATIVE PERCENT: 14.1 %
MCH RBC QN AUTO: 28.6 PG (ref 27–32)
MCHC RBC AUTO-ENTMCNC: 30.5 G/DL (ref 31–35)
MCV RBC AUTO: 93.9 FL (ref 80–100)
MONOCYTES ABSOLUTE: 0.5 K/UL (ref 0.2–0.8)
MONOCYTES RELATIVE PERCENT: 4.8 %
NEUTROPHILS ABSOLUTE: 8.3 K/UL (ref 2–7.5)
NEUTROPHILS RELATIVE PERCENT: 79.7 %
PDW BLD-RTO: 14.6 % (ref 11–16)
PLATELET # BLD: 282 K/UL (ref 150–400)
PMV BLD AUTO: 11.2 FL (ref 6–10)
POTASSIUM SERPL-SCNC: 4.1 MMOL/L (ref 3.4–5.1)
RBC # BLD: 4.61 M/UL (ref 3.8–5.8)
SODIUM BLD-SCNC: 140 MMOL/L (ref 136–145)
TOTAL PROTEIN: 7 G/DL (ref 6.4–8.3)
TSH SERPL DL<=0.05 MIU/L-ACNC: 1.44 UIU/ML (ref 0.27–4.2)
VITAMIN B-12: 790 PG/ML (ref 211–911)
WBC # BLD: 10.4 K/UL (ref 4–11)

## 2021-12-07 PROCEDURE — 82607 VITAMIN B-12: CPT

## 2021-12-07 PROCEDURE — 80053 COMPREHEN METABOLIC PANEL: CPT

## 2021-12-07 PROCEDURE — 85025 COMPLETE CBC W/AUTO DIFF WBC: CPT

## 2021-12-07 PROCEDURE — G8427 DOCREV CUR MEDS BY ELIG CLIN: HCPCS | Performed by: INTERNAL MEDICINE

## 2021-12-07 PROCEDURE — 1036F TOBACCO NON-USER: CPT | Performed by: INTERNAL MEDICINE

## 2021-12-07 PROCEDURE — 82746 ASSAY OF FOLIC ACID SERUM: CPT

## 2021-12-07 PROCEDURE — 84443 ASSAY THYROID STIM HORMONE: CPT

## 2021-12-07 PROCEDURE — G8417 CALC BMI ABV UP PARAM F/U: HCPCS | Performed by: INTERNAL MEDICINE

## 2021-12-07 PROCEDURE — 3017F COLORECTAL CA SCREEN DOC REV: CPT | Performed by: INTERNAL MEDICINE

## 2021-12-07 PROCEDURE — 99214 OFFICE O/P EST MOD 30 MIN: CPT | Performed by: INTERNAL MEDICINE

## 2021-12-07 PROCEDURE — G8484 FLU IMMUNIZE NO ADMIN: HCPCS | Performed by: INTERNAL MEDICINE

## 2021-12-07 RX ORDER — SILDENAFIL CITRATE 20 MG/1
20 TABLET ORAL 3 TIMES DAILY
COMMUNITY
Start: 2021-10-26 | End: 2022-06-21

## 2021-12-07 RX ORDER — HYDROCODONE BITARTRATE AND ACETAMINOPHEN 5; 325 MG/1; MG/1
1 TABLET ORAL 2 TIMES DAILY PRN
Qty: 30 TABLET | Refills: 0 | Status: SHIPPED | OUTPATIENT
Start: 2021-12-07 | End: 2022-01-10 | Stop reason: SDUPTHER

## 2021-12-07 RX ORDER — DIGOXIN 125 MCG
125 TABLET ORAL DAILY
COMMUNITY
Start: 2021-09-13 | End: 2022-09-13

## 2021-12-07 ASSESSMENT — ENCOUNTER SYMPTOMS
BACK PAIN: 1
SHORTNESS OF BREATH: 1
ABDOMINAL PAIN: 0
VOMITING: 0
WHEEZING: 1
SORE THROAT: 0
COUGH: 1
NAUSEA: 0
EYE DISCHARGE: 0
SINUS PRESSURE: 0

## 2021-12-07 NOTE — PROGRESS NOTES
SUBJECTIVE:    Patient ID: Evan Garcia is a 61 y. o.female. Chief Complaint   Patient presents with    COPD    Anxiety         HPI:  Patient has had COPD for a long time. She has been using nebulizer inhalation treatments as ordered. She is on oxygen. She has some dyspnea with exertion. With on and off cough, SOB and wheezing that does respond to some degree to treatment. She has been using the Albuterol inhaler . Last Pneumovax was 2015. Patient reported her symptoms progressively getting worse. She staying home almost all the time except for doctor visit etc.  She is able to walk between rooms with distant exertion. She uses wheelchair when she has a longer distance to walk. Using oxygen all the time. She was diagnosed also with pulmonary hypertension and has been on Revatio. She reported not much improvement. She reported her pulmonologist wanted to try pain medication to see if will provide some symptomatic relief. She was given a prescription of morphine but she reported it was too much for her and she could not able to handle it. Weight has been trending down. She reports her appetite is down because she gets really out of breath even with chewing food. Patient has had hypertension for several years. She has been compliant with taking medications, without side effects from it. She has been following a low-sodium, is not active and never exercises. Weight is down, compared to last visit. Her blood pressure is stable at this time. Patient  has had hypothyroidism for the past few years. She has been compliant with taking her medication without side effects. There are no symptoms of hypo or hyper thyroidism. Patient's medications, allergies, past medical, surgical, social and family histories were reviewed and updated as appropriate in electronic medical record.         Outpatient Medications Marked as Taking for the 12/7/21 encounter (Office Visit) with Kristie Salas MD Medication Sig Dispense Refill    digoxin (LANOXIN) 125 MCG tablet Take 125 mcg by mouth daily      sildenafil (REVATIO) 20 MG tablet Take 20 mg by mouth 3 times daily      HYDROcodone-acetaminophen (NORCO) 5-325 MG per tablet Take 1 tablet by mouth 2 times daily as needed for Pain for up to 30 days. Intended supply: 30 days 30 tablet 0    hydrOXYzine (ATARAX) 10 MG tablet TAKE 1 TABLET BY MOUTH TWICE DAILY AS NEEDED FOR ANXIETY 45 tablet 2    levothyroxine (SYNTHROID) 175 MCG tablet TAKE 1 TABLET BY MOUTH EVERY DAY 30 tablet 2    venlafaxine (EFFEXOR XR) 75 MG extended release capsule TAKE 3 CAPSULES BY MOUTH DAILY 270 capsule 3    pantoprazole (PROTONIX) 40 MG tablet TAKE 1 TABLET BY MOUTH EVERY DAY 90 tablet 3    folic acid (FOLVITE) 1 MG tablet TAKE 1 TABLET BY MOUTH DAILY 90 tablet 1    Treprostinil (TYVASO IN) Inhale 1 puff into the lungs 4 times daily      ipratropium-albuterol (DUONEB) 0.5-2.5 (3) MG/3ML SOLN nebulizer solution Inhale 1 vial into the lungs every 4 hours      Cholecalciferol (VITAMIN D) 50 MCG (2000 UT) CAPS capsule Take by mouth daily      albuterol sulfate HFA (PROAIR HFA) 108 (90 Base) MCG/ACT inhaler Inhale 2 puffs into the lungs every 6 hours as needed for Wheezing 1 Inhaler 3    hydroCHLOROthiazide (HYDRODIURIL) 25 MG tablet take 1 tablet by mouth once daily 90 tablet 5    furosemide (LASIX) 20 MG tablet Take 1 tablet by mouth 2 times daily as needed (worsening swelling, weight up by 3lbs or more) 60 tablet 3    Nintedanib Esylate 150 MG CAPS Take by mouth 2 times daily          Review of Systems   Constitutional: Positive for fatigue and unexpected weight change. Negative for chills and fever. HENT: Negative for congestion, sinus pressure and sore throat. Eyes: Negative for discharge and visual disturbance. Respiratory: Positive for cough, shortness of breath and wheezing. Cardiovascular: Negative for chest pain and palpitations.         AYALA Gastrointestinal: Negative for abdominal pain, nausea and vomiting. Endocrine: Negative for cold intolerance and heat intolerance. Genitourinary: Negative for dysuria, frequency and urgency. Musculoskeletal: Positive for arthralgias and back pain. Skin: Negative for rash and wound. Neurological: Negative for syncope, numbness and headaches. Hematological: Negative. Psychiatric/Behavioral: Negative for agitation and sleep disturbance. The patient is nervous/anxious. 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     Hyperlipidemia     Hypertension     Hypothyroidism     Interstitial lung disease (HCC)     Lung fibrosis (HCC)     Lung nodules     bilateral lungs    Pulmonary hypertension (HCC)      Past Surgical History:   Procedure Laterality Date     SECTION      GALLBLADDER SURGERY      HYSTERECTOMY       Family History   Problem Relation Age of Onset    Cancer Mother         colon    Cancer Father         bladder    High Blood Pressure Father     Stroke Father     High Blood Pressure Sister       Social History     Tobacco Use   Smoking Status Former Smoker    Packs/day: 1.00    Years: 30.00    Pack years: 30.00    Quit date: 2007    Years since quittin.9   Smokeless Tobacco Never Used       OBJECTIVE:   Wt Readings from Last 3 Encounters:   21 220 lb 3.2 oz (99.9 kg)   21 271 lb (122.9 kg)   21 243 lb (110.2 kg)     BP Readings from Last 3 Encounters:   21 116/64   21 116/64   21 112/77       /64   Pulse 115   Temp 97.7 °F (36.5 °C)   Resp 18   Wt 220 lb 3.2 oz (99.9 kg)   SpO2 95% Comment: 6 liters 02  BMI 36.64 kg/m²      Physical Exam  Vitals and nursing note reviewed.    Constitutional:       Appearance: Normal appearance. She is well-developed. She is obese. Comments: In wheelchair and on oxygen   HENT:      Head: Normocephalic and atraumatic. Right Ear: External ear normal.      Left Ear: External ear normal.      Nose: Nose normal.      Mouth/Throat:      Mouth: Mucous membranes are moist.      Pharynx: Oropharynx is clear. Eyes:      Conjunctiva/sclera: Conjunctivae normal.      Pupils: Pupils are equal, round, and reactive to light. Neck:      Thyroid: No thyromegaly. Vascular: No JVD. Cardiovascular:      Rate and Rhythm: Normal rate and regular rhythm. Heart sounds: Normal heart sounds. Pulmonary:      Effort: Pulmonary effort is normal.      Breath sounds: Normal breath sounds. No wheezing or rales. Abdominal:      General: Bowel sounds are normal. There is no distension. Palpations: Abdomen is soft. Tenderness: There is no abdominal tenderness. Musculoskeletal:         General: No tenderness. Cervical back: Neck supple. No rigidity. No muscular tenderness. Right lower leg: No edema. Left lower leg: No edema. Skin:     Findings: No erythema or rash. Neurological:      General: No focal deficit present. Mental Status: She is alert and oriented to person, place, and time.    Psychiatric:         Behavior: Behavior normal.         Judgment: Judgment normal.         Lab Results   Component Value Date     06/14/2021    K 4.1 06/14/2021    K 4.0 05/27/2021    CL 90 06/14/2021    CO2 34 06/14/2021    GLUCOSE 85 06/14/2021    BUN 27 06/14/2021    CREATININE 0.8 06/14/2021    CALCIUM 10.1 06/14/2021    PROT 7.2 06/14/2021    LABALBU 4.3 06/14/2021    BILITOT 0.3 06/14/2021    ALT 28 06/14/2021    AST 17 06/14/2021       Hemoglobin A1C (%)   Date Value   06/08/2017 5.4     Microscopic Examination (no units)   Date Value   02/25/2017 Not Indicated     LDL Calculated (mg/dL)   Date Value   10/14/2019 134 (H)         Lab Results   Component Value Date    WBC 23.2 06/14/2021    NEUTROABS 20.7 06/14/2021    HGB 14.5 06/14/2021    HCT 46.4 06/14/2021    MCV 95.7 06/14/2021     06/14/2021       Lab Results   Component Value Date    TSH 0.58 06/14/2021         ASSESSMENT/PLAN:     1. Chronic respiratory failure with hypoxia (HCC)  Patient with complicated lung condition including interstitial lung disease, COPD and pulmonary hypertension. Continue oxygen and other inhalers/medications. Continue follow-up with pulmonology. Encourage staying on oxygen all the time. Discussed contacting her pulmonologist in regards any need for CPAP or trilogy at night.    - CBC Auto Differential; Future  - Comprehensive Metabolic Panel; Future  - Folate; Future  - TSH without Reflex; Future  - Vitamin B12; Future  - HYDROcodone-acetaminophen (NORCO) 5-325 MG per tablet; Take 1 tablet by mouth 2 times daily as needed for Pain for up to 30 days. Intended supply: 30 days  Dispense: 30 tablet; Refill: 0    2. Pulmonary HTN ([facility] [address]  Continue current treatment and continue follow-up with pulmonology/cardiology clinic. Continue oxygen and other inhalers/medications. Discussed the importance of weight control. Discussed the importance of staying active. - CBC Auto Differential; Future  - Comprehensive Metabolic Panel; Future  - Folate; Future  - TSH without Reflex; Future  - Vitamin B12; Future  - HYDROcodone-acetaminophen (NORCO) 5-325 MG per tablet; Take 1 tablet by mouth 2 times daily as needed for Pain for up to 30 days. Intended supply: 30 days  Dispense: 30 tablet; Refill: 0    3. Declining functional status  Worsening. Mobility getting more and more limited related to her pulmonary issues. Discussed importance of weight control and try to increase activity level. Try to get other medical problems under better control. Monitor labs and try to improve any possible issue.  - CBC Auto Differential; Future  - Comprehensive Metabolic Panel; Future  - Folate;  Future  - TSH without Reflex; Future  - Vitamin B12; Future    4. Essential hypertension  BP is stable. I have advised her on low-sodium diet, exercise and weight control. I am going to continue current medication . Will monitor her renal function every few months, have advised her to check blood pressure frequently and to keep a record of this. - CBC Auto Differential; Future  - Comprehensive Metabolic Panel; Future  - Folate; Future  - TSH without Reflex; Future  - Vitamin B12; Future    5. Hypothyroidism, unspecified type  Stable . I am going to check the TSH every few months. I am going to continue the current dose of Levothyroxine. I have advised her on the importance of taking the medication on a daily basis. Lab Results   Component Value Date    TSH 0.58 06/14/2021     - TSH without Reflex; Future    6. Fatigue, unspecified type  Will check lab. Further recommandation based on test results. Long discussion with patient regarding exercise and weight control. May consider checking sleep study. Patient to verify with pulmonologist.    Reviewed record from pulmonary clinic. Reviewed most recent blood work few months ago. Orders Placed This Encounter   Medications    HYDROcodone-acetaminophen (NORCO) 5-325 MG per tablet     Sig: Take 1 tablet by mouth 2 times daily as needed for Pain for up to 30 days. Intended supply: 30 days     Dispense:  30 tablet     Refill:  0     Reduce doses taken as pain becomes manageable      IJorge L MA am scribing for and in the presence of Bronson Turcios MD on this date of 12/07/21 at 11:26 AM    I, Dr. Bronson Turcios, personally performed the services described in the documentation as scribed by Jorge L Graves MA, in my presence and it is both accurate and complete.

## 2021-12-07 NOTE — PROGRESS NOTES
Chief Complaint   Patient presents with    COPD    Anxiety       Have you seen any other physician or provider since your last visit yes - pulm, card    Have you had any other diagnostic tests since your last visit? yes -     Have you changed or stopped any medications since your last visit?  yes

## 2021-12-27 ENCOUNTER — OFFICE VISIT (OUTPATIENT)
Dept: PRIMARY CARE CLINIC | Age: 56
End: 2021-12-27
Payer: MEDICARE

## 2021-12-27 ENCOUNTER — TELEPHONE (OUTPATIENT)
Dept: PRIMARY CARE CLINIC | Age: 56
End: 2021-12-27

## 2021-12-27 VITALS
TEMPERATURE: 98.7 F | HEART RATE: 111 BPM | SYSTOLIC BLOOD PRESSURE: 100 MMHG | DIASTOLIC BLOOD PRESSURE: 60 MMHG | OXYGEN SATURATION: 90 %

## 2021-12-27 DIAGNOSIS — J01.40 ACUTE PANSINUSITIS, RECURRENCE NOT SPECIFIED: ICD-10-CM

## 2021-12-27 DIAGNOSIS — J01.40 ACUTE PANSINUSITIS, RECURRENCE NOT SPECIFIED: Primary | ICD-10-CM

## 2021-12-27 PROCEDURE — G8427 DOCREV CUR MEDS BY ELIG CLIN: HCPCS | Performed by: NURSE PRACTITIONER

## 2021-12-27 PROCEDURE — 1036F TOBACCO NON-USER: CPT | Performed by: NURSE PRACTITIONER

## 2021-12-27 PROCEDURE — G8484 FLU IMMUNIZE NO ADMIN: HCPCS | Performed by: NURSE PRACTITIONER

## 2021-12-27 PROCEDURE — G8417 CALC BMI ABV UP PARAM F/U: HCPCS | Performed by: NURSE PRACTITIONER

## 2021-12-27 PROCEDURE — 99213 OFFICE O/P EST LOW 20 MIN: CPT | Performed by: NURSE PRACTITIONER

## 2021-12-27 PROCEDURE — 3017F COLORECTAL CA SCREEN DOC REV: CPT | Performed by: NURSE PRACTITIONER

## 2021-12-27 RX ORDER — LEVOFLOXACIN 500 MG/1
500 TABLET, FILM COATED ORAL DAILY
Qty: 7 TABLET | Refills: 0 | Status: SHIPPED | OUTPATIENT
Start: 2021-12-27 | End: 2022-01-03

## 2021-12-27 RX ORDER — FLUTICASONE PROPIONATE 50 MCG
SPRAY, SUSPENSION (ML) NASAL
Qty: 32 G | Refills: 0 | Status: SHIPPED | OUTPATIENT
Start: 2021-12-27 | End: 2021-12-27

## 2021-12-27 RX ORDER — PREDNISONE 1 MG/1
5 TABLET ORAL 2 TIMES DAILY
Qty: 10 TABLET | Refills: 0 | Status: SHIPPED | OUTPATIENT
Start: 2021-12-27 | End: 2022-01-01

## 2021-12-27 RX ORDER — FLUTICASONE PROPIONATE 50 MCG
SPRAY, SUSPENSION (ML) NASAL
Qty: 48 G | Refills: 0 | Status: SHIPPED | OUTPATIENT
Start: 2021-12-27 | End: 2022-03-07 | Stop reason: SDUPTHER

## 2021-12-27 NOTE — PROGRESS NOTES
Chief Complaint   Patient presents with    Cough         Sinus pressure, cough x 1 week. She has blood and green mucus when she blows her nose. She also complains of drainage from her sinuses.

## 2021-12-27 NOTE — PROGRESS NOTES
SUBJECTIVE:    Patient ID: Uvaldo Locke is a 61 y. o.female. Chief Complaint   Patient presents with    Cough         HPI:    Patient presents to clinic with c/o nasal and sinus congestion for the past week. Drainage started clear and now thick, green-yellow in color. Associated symptoms include frontal headache, fatigue, postnasal drainage, mild dry cough, sinus pressure, wheezing. On continuous oxygen. Denies chills, n/v/d, sick contact, SOB. No relieving or worsening factors reported. No fevers, chills, body aches. Vaccinated x2 and booster. Patient's medications, allergies, past medical, surgical, social and family histories were reviewed and updated as appropriate in electronic medical record. Outpatient Medications Marked as Taking for the 12/27/21 encounter (Office Visit) with DAVIAN Moreno - CNP   Medication Sig Dispense Refill    digoxin (LANOXIN) 125 MCG tablet Take 125 mcg by mouth daily      sildenafil (REVATIO) 20 MG tablet Take 20 mg by mouth 3 times daily      HYDROcodone-acetaminophen (NORCO) 5-325 MG per tablet Take 1 tablet by mouth 2 times daily as needed for Pain for up to 30 days.  Intended supply: 30 days 30 tablet 0    hydrOXYzine (ATARAX) 10 MG tablet TAKE 1 TABLET BY MOUTH TWICE DAILY AS NEEDED FOR ANXIETY 45 tablet 2    levothyroxine (SYNTHROID) 175 MCG tablet TAKE 1 TABLET BY MOUTH EVERY DAY 30 tablet 2    venlafaxine (EFFEXOR XR) 75 MG extended release capsule TAKE 3 CAPSULES BY MOUTH DAILY 270 capsule 3    pantoprazole (PROTONIX) 40 MG tablet TAKE 1 TABLET BY MOUTH EVERY DAY 90 tablet 3    folic acid (FOLVITE) 1 MG tablet TAKE 1 TABLET BY MOUTH DAILY 90 tablet 1    Treprostinil (TYVASO IN) Inhale 1 puff into the lungs 4 times daily      ipratropium-albuterol (DUONEB) 0.5-2.5 (3) MG/3ML SOLN nebulizer solution Inhale 1 vial into the lungs every 4 hours      Cholecalciferol (VITAMIN D) 50 MCG (2000 UT) CAPS capsule Take by mouth daily      albuterol sulfate HFA (PROAIR HFA) 108 (90 Base) MCG/ACT inhaler Inhale 2 puffs into the lungs every 6 hours as needed for Wheezing 1 Inhaler 3    hydroCHLOROthiazide (HYDRODIURIL) 25 MG tablet take 1 tablet by mouth once daily 90 tablet 5    furosemide (LASIX) 20 MG tablet Take 1 tablet by mouth 2 times daily as needed (worsening swelling, weight up by 3lbs or more) 60 tablet 3    Nintedanib Esylate 150 MG CAPS Take by mouth 2 times daily          Review of Systems   Constitutional: Positive for fatigue. HENT: Positive for congestion, postnasal drip, sinus pressure, sinus pain and sore throat. Respiratory: Positive for cough and wheezing. All other systems reviewed and are negative.       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     Hyperlipidemia     Hypertension     Hypothyroidism     Interstitial lung disease (HCC)     Lung fibrosis (HCC)     Lung nodules     bilateral lungs    Pulmonary hypertension (HCC)      Past Surgical History:   Procedure Laterality Date     SECTION      GALLBLADDER SURGERY      HYSTERECTOMY       Family History   Problem Relation Age of Onset    Cancer Mother         colon    Cancer Father         bladder    High Blood Pressure Father     Stroke Father     High Blood Pressure Sister       Social History     Tobacco Use   Smoking Status Former Smoker    Packs/day: 1.00    Years: 30.00    Pack years: 30.00    Quit date: 2007    Years since quittin.9   Smokeless Tobacco Never Used       OBJECTIVE:   Wt Readings from Last 3 Encounters:   21 220 lb 3.2 oz (99.9 kg)   21 271 lb (122.9 kg)   21 243 lb (110.2 kg)     BP Readings from Last 3 Encounters:   21 100/60   21 116/64   21 116/64       /60   Pulse 111 Temp 98.7 °F (37.1 °C)   SpO2 90% Comment: 5 liters     Physical Exam  Vitals and nursing note reviewed. Constitutional:       General: She is not in acute distress. HENT:      Head: Normocephalic. Right Ear: Tympanic membrane and external ear normal.      Left Ear: Tympanic membrane and external ear normal.      Nose: Mucosal edema and congestion present. Right Sinus: Maxillary sinus tenderness and frontal sinus tenderness present. Left Sinus: Maxillary sinus tenderness and frontal sinus tenderness present. Mouth/Throat:      Mouth: Mucous membranes are moist.      Pharynx: Oropharynx is clear. Uvula midline. Posterior oropharyngeal erythema present. No oropharyngeal exudate. Cardiovascular:      Rate and Rhythm: Normal rate and regular rhythm. Pulmonary:      Effort: Pulmonary effort is normal.      Breath sounds: Normal breath sounds. Comments: 5L NC  Musculoskeletal:      Cervical back: Normal range of motion. Neurological:      Mental Status: She is alert and oriented to person, place, and time.    Psychiatric:         Mood and Affect: Mood normal.         Behavior: Behavior normal.         Lab Results   Component Value Date     12/07/2021    K 4.1 12/07/2021    K 4.0 05/27/2021    CL 97 12/07/2021    CO2 30 12/07/2021    GLUCOSE 138 12/07/2021    BUN 15 12/07/2021    CREATININE 0.7 12/07/2021    CALCIUM 9.8 12/07/2021    PROT 7.0 12/07/2021    LABALBU 4.0 12/07/2021    BILITOT <0.2 12/07/2021    ALT 43 12/07/2021    AST 39 12/07/2021       Hemoglobin A1C (%)   Date Value   06/08/2017 5.4     Microscopic Examination (no units)   Date Value   02/25/2017 Not Indicated     LDL Calculated (mg/dL)   Date Value   10/14/2019 134 (H)         Lab Results   Component Value Date    WBC 10.4 12/07/2021    NEUTROABS 8.3 12/07/2021    HGB 13.2 12/07/2021    HCT 43.3 12/07/2021    MCV 93.9 12/07/2021     12/07/2021       Lab Results   Component Value Date    TSH 1.44 12/07/2021 ASSESSMENT/PLAN:     1. Acute pansinusitis, recurrence not specified  Take medications as prescribed. Continue home medications. Discussed symptom management. Return to clinic S&S worsen or no improvement noted. Patient verbalized understanding.     - levoFLOXacin (LEVAQUIN) 500 MG tablet; Take 1 tablet by mouth daily for 7 days  Dispense: 7 tablet; Refill: 0  - predniSONE (DELTASONE) 5 MG tablet; Take 1 tablet by mouth 2 times daily for 5 days  Dispense: 10 tablet;  Refill: 0         Orders Placed This Encounter   Medications    levoFLOXacin (LEVAQUIN) 500 MG tablet     Sig: Take 1 tablet by mouth daily for 7 days     Dispense:  7 tablet     Refill:  0    predniSONE (DELTASONE) 5 MG tablet     Sig: Take 1 tablet by mouth 2 times daily for 5 days     Dispense:  10 tablet     Refill:  0    fluticasone (FLONASE) 50 MCG/ACT nasal spray     Sig: Use daily for 10 days then as needed sinus congestion     Dispense:  32 g     Refill:  0

## 2021-12-28 NOTE — TELEPHONE ENCOUNTER
She can try to at the same time but if the 5 mg did not help at all then less likely it will help. Let me know.

## 2022-01-10 DIAGNOSIS — I27.20 PULMONARY HTN (HCC): ICD-10-CM

## 2022-01-10 DIAGNOSIS — J96.11 CHRONIC RESPIRATORY FAILURE WITH HYPOXIA (HCC): ICD-10-CM

## 2022-01-10 RX ORDER — HYDROCODONE BITARTRATE AND ACETAMINOPHEN 5; 325 MG/1; MG/1
1 TABLET ORAL 2 TIMES DAILY PRN
Qty: 30 TABLET | Refills: 0 | Status: SHIPPED | OUTPATIENT
Start: 2022-01-10 | End: 2022-02-16 | Stop reason: SDUPTHER

## 2022-01-16 DIAGNOSIS — I10 ESSENTIAL HYPERTENSION: ICD-10-CM

## 2022-01-17 RX ORDER — HYDROCHLOROTHIAZIDE 25 MG/1
TABLET ORAL
Qty: 90 TABLET | Refills: 5 | Status: SHIPPED | OUTPATIENT
Start: 2022-01-17

## 2022-01-31 ASSESSMENT — ENCOUNTER SYMPTOMS
COUGH: 1
SINUS PRESSURE: 1
SINUS PAIN: 1
SORE THROAT: 1
WHEEZING: 1

## 2022-02-16 DIAGNOSIS — I27.20 PULMONARY HTN (HCC): ICD-10-CM

## 2022-02-16 DIAGNOSIS — J96.11 CHRONIC RESPIRATORY FAILURE WITH HYPOXIA (HCC): ICD-10-CM

## 2022-02-16 RX ORDER — HYDROCODONE BITARTRATE AND ACETAMINOPHEN 5; 325 MG/1; MG/1
1 TABLET ORAL 2 TIMES DAILY PRN
Qty: 30 TABLET | Refills: 0 | Status: SHIPPED | OUTPATIENT
Start: 2022-02-16 | End: 2022-03-07

## 2022-02-18 RX ORDER — HYDROXYZINE HYDROCHLORIDE 10 MG/1
TABLET, FILM COATED ORAL
Qty: 45 TABLET | Refills: 2 | Status: CANCELLED | OUTPATIENT
Start: 2022-02-18

## 2022-03-07 ENCOUNTER — TELEMEDICINE (OUTPATIENT)
Dept: PRIMARY CARE CLINIC | Age: 57
End: 2022-03-07
Payer: MEDICARE

## 2022-03-07 DIAGNOSIS — J44.9 CHRONIC OBSTRUCTIVE PULMONARY DISEASE, UNSPECIFIED COPD TYPE (HCC): ICD-10-CM

## 2022-03-07 DIAGNOSIS — I10 ESSENTIAL HYPERTENSION: ICD-10-CM

## 2022-03-07 DIAGNOSIS — E03.9 HYPOTHYROIDISM, UNSPECIFIED TYPE: ICD-10-CM

## 2022-03-07 DIAGNOSIS — I27.20 PULMONARY HTN (HCC): ICD-10-CM

## 2022-03-07 DIAGNOSIS — J96.11 CHRONIC RESPIRATORY FAILURE WITH HYPOXIA (HCC): Primary | ICD-10-CM

## 2022-03-07 DIAGNOSIS — D80.3 IGG DEFICIENCY (HCC): ICD-10-CM

## 2022-03-07 DIAGNOSIS — R53.81 DECLINING FUNCTIONAL STATUS: ICD-10-CM

## 2022-03-07 PROCEDURE — 3023F SPIROM DOC REV: CPT | Performed by: INTERNAL MEDICINE

## 2022-03-07 PROCEDURE — 3017F COLORECTAL CA SCREEN DOC REV: CPT | Performed by: INTERNAL MEDICINE

## 2022-03-07 PROCEDURE — 1036F TOBACCO NON-USER: CPT | Performed by: INTERNAL MEDICINE

## 2022-03-07 PROCEDURE — G2025 DIS SITE TELE SVCS RHC/FQHC: HCPCS | Performed by: INTERNAL MEDICINE

## 2022-03-07 PROCEDURE — G8484 FLU IMMUNIZE NO ADMIN: HCPCS | Performed by: INTERNAL MEDICINE

## 2022-03-07 PROCEDURE — G8427 DOCREV CUR MEDS BY ELIG CLIN: HCPCS | Performed by: INTERNAL MEDICINE

## 2022-03-07 PROCEDURE — G8417 CALC BMI ABV UP PARAM F/U: HCPCS | Performed by: INTERNAL MEDICINE

## 2022-03-07 RX ORDER — TRAZODONE HYDROCHLORIDE 50 MG/1
50 TABLET ORAL NIGHTLY
COMMUNITY
Start: 2022-02-10 | End: 2022-03-12

## 2022-03-07 RX ORDER — FLUTICASONE PROPIONATE 50 MCG
SPRAY, SUSPENSION (ML) NASAL
Qty: 48 G | Refills: 0 | Status: SHIPPED | OUTPATIENT
Start: 2022-03-07 | End: 2022-07-01

## 2022-03-07 RX ORDER — HYDROCODONE BITARTRATE AND ACETAMINOPHEN 5; 325 MG/1; MG/1
1 TABLET ORAL 2 TIMES DAILY PRN
Qty: 30 TABLET | Refills: 0 | Status: SHIPPED | OUTPATIENT
Start: 2022-03-07 | End: 2022-06-09 | Stop reason: SDUPTHER

## 2022-03-07 RX ORDER — HYDROCODONE BITARTRATE AND ACETAMINOPHEN 5; 325 MG/1; MG/1
1 TABLET ORAL 2 TIMES DAILY PRN
Qty: 45 TABLET | Refills: 0 | Status: SHIPPED | OUTPATIENT
Start: 2022-03-07 | End: 2022-03-07 | Stop reason: SDUPTHER

## 2022-03-07 ASSESSMENT — ENCOUNTER SYMPTOMS
NAUSEA: 0
SINUS PRESSURE: 0
BACK PAIN: 1
VOMITING: 0
SORE THROAT: 0
ABDOMINAL PAIN: 0
EYE DISCHARGE: 0
WHEEZING: 1
COUGH: 1
SHORTNESS OF BREATH: 1

## 2022-03-07 NOTE — PROGRESS NOTES
SUBJECTIVE:    Patient ID: Alverto Ochoa is a 61 y. o.female. Chief Complaint   Patient presents with    COPD    Anxiety       This visit was conducted via Doxy. me visit pursuant to the emergency declaration and the Coronavirus Preparedness and Response. Patient was made aware that accurate medical decisions and definite diagnosis are difficult to obtain without direct evaluation and patient was agreeable. Verbal consent from the patient to proceed with this type of visit was obtained. The patient has also been advised to contact this office for worsening conditions or problems, and seek emergency medical treatment and/or call 911 if deemed necessary. The patient was at home during this visit and the provider was located at [facility]. HPI:  Patient has had COPD/pulmonary hypertension for a long time. She has been using nebulizer inhalation treatments as ordered. She is on oxygen. She has some dyspnea with exertion. With on and off cough, SOB and wheezing that does respond to treatment. She has been using the Albuterol inhaler. Last Pneumovax was 2015. Patient is staying home all the time except for doctors visits due to worsening sx. She is using a wheelchair and oxygen most of the time. She followed up with Cardiology since last visit and was told none of the medications they have given are helping her pulmonary hypertension. She reports her only option at this point is a double lung transplant. She is scheduled to have a sleep study with  on March 19th to determine her lung functions. Patient has had hypertension for several years. She has been compliant with taking medications, without side effects from it. She has been following a low-sodium, is not active and never exercises. Patient reports weight is down since last visit and BP not reported for virtual visit. Patient  has had hypothyroidism for the past few years.   She has been compliant with taking her medication without side effects. There are no symptoms of hypo or hyper thyroidism except for decreased energy which is chronic. Patient's medications, allergies, past medical, surgical, social and family histories were reviewed and updated as appropriate in electronic medical record. Outpatient Medications Marked as Taking for the 3/7/22 encounter (Telemedicine) with Siobhan Burgos MD   Medication Sig Dispense Refill    traZODone (DESYREL) 50 MG tablet Take 50 mg by mouth nightly      HYDROcodone-acetaminophen (NORCO) 5-325 MG per tablet Take 1 tablet by mouth 2 times daily as needed for Pain for up to 30 days.  Intended supply: 30 days 30 tablet 0    hydroCHLOROthiazide (HYDRODIURIL) 25 MG tablet TAKE 1 TABLET BY MOUTH EVERY DAY 90 tablet 5    levothyroxine (SYNTHROID) 175 MCG tablet TAKE 1 TABLET BY MOUTH EVERY DAY 30 tablet 2    fluticasone (FLONASE) 50 MCG/ACT nasal spray INSTILL 1-2 SPRAYS EVERY DAY AS DIRECTED FOR 10 DAYS THEN AS NEEDED FOR SINUS CONGESTION 48 g 0    digoxin (LANOXIN) 125 MCG tablet Take 125 mcg by mouth daily      sildenafil (REVATIO) 20 MG tablet Take 20 mg by mouth 3 times daily      hydrOXYzine (ATARAX) 10 MG tablet TAKE 1 TABLET BY MOUTH TWICE DAILY AS NEEDED FOR ANXIETY 45 tablet 2    venlafaxine (EFFEXOR XR) 75 MG extended release capsule TAKE 3 CAPSULES BY MOUTH DAILY 270 capsule 3    pantoprazole (PROTONIX) 40 MG tablet TAKE 1 TABLET BY MOUTH EVERY DAY 90 tablet 3    folic acid (FOLVITE) 1 MG tablet TAKE 1 TABLET BY MOUTH DAILY 90 tablet 1    Treprostinil (TYVASO IN) Inhale 1 puff into the lungs 4 times daily      ipratropium-albuterol (DUONEB) 0.5-2.5 (3) MG/3ML SOLN nebulizer solution Inhale 1 vial into the lungs every 4 hours      Cholecalciferol (VITAMIN D) 50 MCG (2000 UT) CAPS capsule Take by mouth daily      albuterol sulfate HFA (PROAIR HFA) 108 (90 Base) MCG/ACT inhaler Inhale 2 puffs into the lungs every 6 hours as needed for Wheezing 1 Inhaler 3    furosemide (LASIX) 20 MG tablet Take 1 tablet by mouth 2 times daily as needed (worsening swelling, weight up by 3lbs or more) 60 tablet 3    Nintedanib Esylate 150 MG CAPS Take by mouth 2 times daily          Review of Systems   Constitutional: Positive for fatigue. Negative for chills and fever. HENT: Negative for congestion, sinus pressure and sore throat. Eyes: Negative for discharge and visual disturbance. Respiratory: Positive for cough, shortness of breath and wheezing. Cardiovascular: Negative for chest pain and palpitations. AYALA   Gastrointestinal: Negative for abdominal pain, nausea and vomiting. Endocrine: Negative for cold intolerance and heat intolerance. Genitourinary: Negative for dysuria, frequency and urgency. Musculoskeletal: Positive for arthralgias and back pain. Skin: Negative for rash and wound. Neurological: Negative for syncope, numbness and headaches. Hematological: Negative. Psychiatric/Behavioral: Negative for agitation and sleep disturbance. The patient is nervous/anxious.         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     Hyperlipidemia     Hypertension     Hypothyroidism     Interstitial lung disease (HCC)     Lung fibrosis (HCC)     Lung nodules     bilateral lungs    Pulmonary hypertension (HCC)      Past Surgical History:   Procedure Laterality Date     SECTION      GALLBLADDER SURGERY      HYSTERECTOMY       Family History   Problem Relation Age of Onset    Cancer Mother         colon    Cancer Father         bladder    High Blood Pressure Father     Stroke Father     High Blood Pressure Sister       Social History     Tobacco Use   Smoking Status Former Smoker    Packs/day: 1.00    Years: 30.00    Pack years: 30.00    Quit date: 1/1/2007    Years since quitting: 15.1   Smokeless Tobacco Never Used       OBJECTIVE:   Wt Readings from Last 3 Encounters:   12/07/21 220 lb 3.2 oz (99.9 kg)   06/14/21 271 lb (122.9 kg)   05/26/21 243 lb (110.2 kg)     BP Readings from Last 3 Encounters:   12/27/21 100/60   12/07/21 116/64   06/14/21 116/64       There were no vitals taken for this visit. Physical Exam  Patient no acute distress. She is alert oriented x3. Patient is obese. Currently on oxygen through nasal cannula. Speech is clear. Patient seems to be slightly tachypneic during conversation. Breath sounds unremarkable during conversation. She is in a good spirit. Mood is intact. Lab Results   Component Value Date     12/07/2021    K 4.1 12/07/2021    K 4.0 05/27/2021    CL 97 12/07/2021    CO2 30 12/07/2021    GLUCOSE 138 12/07/2021    BUN 15 12/07/2021    CREATININE 0.7 12/07/2021    CALCIUM 9.8 12/07/2021    PROT 7.0 12/07/2021    LABALBU 4.0 12/07/2021    BILITOT <0.2 12/07/2021    ALT 43 12/07/2021    AST 39 12/07/2021       Hemoglobin A1C (%)   Date Value   06/08/2017 5.4     Microscopic Examination (no units)   Date Value   02/25/2017 Not Indicated     LDL Calculated (mg/dL)   Date Value   10/14/2019 134 (H)         Lab Results   Component Value Date    WBC 10.4 12/07/2021    NEUTROABS 8.3 12/07/2021    HGB 13.2 12/07/2021    HCT 43.3 12/07/2021    MCV 93.9 12/07/2021     12/07/2021       Lab Results   Component Value Date    TSH 1.44 12/07/2021         ASSESSMENT/PLAN:     1. Chronic respiratory failure with hypoxia (HCC)  patient with complicated lung condition including interstitial lung disease, COPD and pulmonary hypertension. Continue oxygen and other inhalers/medications (Viagra, Tyvaso and Nintedanib). Continue follow-up with pulmonology. Condition is slowly declining. Discussed importance of weight control.    - HYDROcodone-acetaminophen (NORCO) 5-325 MG per tablet;  Take 1 tablet by mouth 2 times daily as needed for Pain for up to 30 days. Intended supply: 30 days  Dispense: 30 tablet; Refill: 0    2. [facility]  Continue to monitor for now. 3. Chronic obstructive pulmonary disease, unspecified COPD type ([facility]  I have advised her on the nature of the disease and the importance of staying off smoking. I have instructed her to continue inhalers, nebulizer treatments and oxygen as directed. Up to date on Pneumovax. Immunization History   Administered Date(s) Administered    COVID-19, J&J, PF, 0.5 mL 03/17/2021    Hepatitis A Adult (Havrix, Vaqta) 08/10/2018    Influenza Virus Vaccine 11/05/2019    Influenza, MDCK Quadv, with preserv IM (Flucelvax 2 yrs and older) 11/03/2017    Influenza, Quadv, IM, (6 mo and older Fluzone, Flulaval, Fluarix and 3 yrs and older Afluria) 09/30/2020    Influenza, Quadv, IM, PF (6 mo and older Fluzone, Flulaval, Fluarix, and 3 yrs and older Afluria) 10/11/2016    PPD Test 08/13/2015, 10/31/2017    Pneumococcal Conjugate 13-valent ([id number]) 08/13/2015    Pneumococcal Polysaccharide ([id number]) 08/01/2011    Tdap (Boostrix, Adacel) 03/07/2018     4. Pulmonary HTN ([facility]  Continue current treatment and continue follow-up with pulmonology/cardiology clinics. Continue oxygen and other inhalers/medications. Discussed the importance of staying active.    - HYDROcodone-acetaminophen (NORCO) 5-325 MG per tablet; Take 1 tablet by mouth 2 times daily as needed for Pain for up to 30 days. Intended supply: 30 days  Dispense: 30 tablet; Refill: 0    5. Declining functional status  Worsening. Mobility getting more and more limited related to her pulmonary issues. Discussed importance of weight control and try to increase activity level. Try to get other medical problems under better control. Monitor labs and try to improve any possible issue.    - CBC with Auto Differential; Future  - Comprehensive Metabolic Panel; Future  - TSH; Future    6.  Essential hypertension  BP is stable. I have advised her on low-sodium diet, exercise and weight control. I am going to continue current medication. Will monitor her renal function every few months, have advised her to check blood pressure frequently and to keep a record of this. - CBC with Auto Differential; Future  - Comprehensive Metabolic Panel; Future  - TSH; Future    7. Hypothyroidism, unspecified type  Stable. I am going to check the TSH every few months. I am going to continue the current dose of Levothyroxine. I have advised her on the importance of taking the medication on a daily basis. Lab Results   Component Value Date    TSH 1.44 12/07/2021     - TSH; Future    Patient was seen by lung transplant team.  I advised her to consider that option if was recommended and if being informed regarding good success rate given the progressive decline in her condition currently. Time spent during Doxy virtual visit 25 minutes    Orders Placed This Encounter   Medications    fluticasone (FLONASE) 50 MCG/ACT nasal spray     Sig: INSTILL 1-2 SPRAYS EVERY DAY AS DIRECTED FOR 10 DAYS THEN AS NEEDED FOR SINUS CONGESTION     Dispense:  48 g     Refill:  0     **Patient requests 90 days supply**    DISCONTD: HYDROcodone-acetaminophen (NORCO) 5-325 MG per tablet     Sig: Take 1 tablet by mouth 2 times daily as needed for Pain for up to 30 days. Intended supply: 30 days     Dispense:  45 tablet     Refill:  0     Reduce doses taken as pain becomes manageable    HYDROcodone-acetaminophen (NORCO) 5-325 MG per tablet     Sig: Take 1 tablet by mouth 2 times daily as needed for Pain for up to 30 days.  Intended supply: 30 days     Dispense:  30 tablet     Refill:  0     Reduce doses taken as pain becomes manageable      Sabina SALINAS MA am scribing for and in the presence of Trinidad Maldonado MD on this date of 03/07/22 at 12:36 PM    I, Dr. Trinidad Maldonado, personally performed the services described in the documentation as scribed by Merline De Jesus MA, in my presence and it is both accurate and complete.

## 2022-03-07 NOTE — PROGRESS NOTES
Chief Complaint   Patient presents with    COPD    Anxiety       Have you seen any other physician or provider since your last visit yes - dr Brisa nichols last month ,[place]    Have you had any other diagnostic tests since your last visit? no    Have you changed or stopped any medications since your last visit?  yes - started trazodone

## 2022-03-28 ENCOUNTER — TELEPHONE (OUTPATIENT)
Dept: PRIMARY CARE CLINIC | Age: 57
End: 2022-03-28

## 2022-03-28 RX ORDER — FLUCONAZOLE 100 MG/1
100 TABLET ORAL DAILY
Qty: 5 TABLET | Refills: 0 | Status: SHIPPED | OUTPATIENT
Start: 2022-03-28 | End: 2022-04-02

## 2022-03-28 NOTE — TELEPHONE ENCOUNTER
Pt called requesting diflucan for yeast states she has been eating a lot of salads and is starting to have a lot of yeast.

## 2022-04-04 RX ORDER — HYDROXYZINE HYDROCHLORIDE 10 MG/1
TABLET, FILM COATED ORAL
Qty: 45 TABLET | Refills: 2 | Status: SHIPPED | OUTPATIENT
Start: 2022-04-04 | End: 2022-05-05

## 2022-04-14 RX ORDER — LEVOTHYROXINE SODIUM 175 UG/1
TABLET ORAL
Qty: 30 TABLET | Refills: 2 | Status: SHIPPED | OUTPATIENT
Start: 2022-04-14 | End: 2022-07-15

## 2022-04-18 RX ORDER — FOLIC ACID 1 MG/1
1 TABLET ORAL DAILY
Qty: 90 TABLET | Refills: 1 | Status: SHIPPED | OUTPATIENT
Start: 2022-04-18 | End: 2022-10-12

## 2022-05-05 ENCOUNTER — TELEPHONE (OUTPATIENT)
Dept: PRIMARY CARE CLINIC | Age: 57
End: 2022-05-05

## 2022-05-05 RX ORDER — HYDROXYZINE HYDROCHLORIDE 25 MG/1
25 TABLET, FILM COATED ORAL 2 TIMES DAILY PRN
Qty: 45 TABLET | Refills: 1 | Status: SHIPPED | OUTPATIENT
Start: 2022-05-05 | End: 2022-06-21 | Stop reason: SDUPTHER

## 2022-05-05 NOTE — TELEPHONE ENCOUNTER
Pt called asking if you could increase her hydroxyzine dose or change it to something different r/t incresed anxiety because of sons medical problems.  States it makes it harder for her to breathe when she is stressed out and her sats drops

## 2022-05-10 ENCOUNTER — OFFICE VISIT (OUTPATIENT)
Dept: PRIMARY CARE CLINIC | Age: 57
End: 2022-05-10
Payer: MEDICARE

## 2022-05-10 VITALS — TEMPERATURE: 98.9 F | BODY MASS INDEX: 32.45 KG/M2 | HEART RATE: 120 BPM | OXYGEN SATURATION: 93 % | WEIGHT: 195 LBS

## 2022-05-10 DIAGNOSIS — J96.11 CHRONIC RESPIRATORY FAILURE WITH HYPOXIA (HCC): Primary | ICD-10-CM

## 2022-05-10 DIAGNOSIS — J20.9 ACUTE BRONCHITIS, UNSPECIFIED ORGANISM: ICD-10-CM

## 2022-05-10 PROCEDURE — G8427 DOCREV CUR MEDS BY ELIG CLIN: HCPCS | Performed by: INTERNAL MEDICINE

## 2022-05-10 PROCEDURE — 3017F COLORECTAL CA SCREEN DOC REV: CPT | Performed by: INTERNAL MEDICINE

## 2022-05-10 PROCEDURE — G8417 CALC BMI ABV UP PARAM F/U: HCPCS | Performed by: INTERNAL MEDICINE

## 2022-05-10 PROCEDURE — 99213 OFFICE O/P EST LOW 20 MIN: CPT | Performed by: INTERNAL MEDICINE

## 2022-05-10 PROCEDURE — 1036F TOBACCO NON-USER: CPT | Performed by: INTERNAL MEDICINE

## 2022-05-10 RX ORDER — GUAIFENESIN 600 MG/1
600 TABLET, EXTENDED RELEASE ORAL 2 TIMES DAILY
Qty: 30 TABLET | Refills: 0 | Status: SHIPPED | OUTPATIENT
Start: 2022-05-10 | End: 2022-05-25

## 2022-05-10 RX ORDER — PREDNISONE 10 MG/1
30 TABLET ORAL DAILY
Qty: 15 TABLET | Refills: 0 | Status: SHIPPED | OUTPATIENT
Start: 2022-05-10 | End: 2022-05-15

## 2022-05-10 RX ORDER — LEVOFLOXACIN 500 MG/1
500 TABLET, FILM COATED ORAL DAILY
Qty: 7 TABLET | Refills: 0 | Status: SHIPPED | OUTPATIENT
Start: 2022-05-10 | End: 2022-05-17

## 2022-05-10 ASSESSMENT — ENCOUNTER SYMPTOMS
SORE THROAT: 0
BACK PAIN: 0
ABDOMINAL PAIN: 0
NAUSEA: 0
VOMITING: 0
EYE DISCHARGE: 0
SHORTNESS OF BREATH: 1
SINUS PRESSURE: 0
COUGH: 1

## 2022-05-10 NOTE — PROGRESS NOTES
SUBJECTIVE:    Patient ID: Astrid Bowling is a 61 y. o.female. Chief Complaint   Patient presents with    Shortness of Breath     pt states she gets this every year in May has to turn 02 u to 10 when walking at home and is still gasping for air    Congestion         HPI:  Patient presented today complaining of congestion, SOB and mild dry cough. She has some dyspnea with exertion. No fever or chills. She denies any sick contact. Sx for the past few days. Sx getting worse. Patient with hx of COPD/pulmonary hypertension for a long time. She has been using her inhalers and nebs with no relief. She is on 5 liters of O2 today. She reported O2 saturation in the low 90s on oxygen. Patient's medications, allergies, past medical, surgical, social and family histories were reviewed and updated as appropriate in electronic medical record.         Outpatient Medications Marked as Taking for the 5/10/22 encounter (Office Visit) with Soraya Lua MD   Medication Sig Dispense Refill    hydrOXYzine (ATARAX) 25 MG tablet Take 1 tablet by mouth 2 times daily as needed for Itching or Anxiety 45 tablet 1    folic acid (FOLVITE) 1 MG tablet TAKE 1 TABLET BY MOUTH DAILY 90 tablet 1    levothyroxine (SYNTHROID) 175 MCG tablet TAKE 1 TABLET BY MOUTH EVERY DAY 30 tablet 2    fluticasone (FLONASE) 50 MCG/ACT nasal spray INSTILL 1-2 SPRAYS EVERY DAY AS DIRECTED FOR 10 DAYS THEN AS NEEDED FOR SINUS CONGESTION 48 g 0    hydroCHLOROthiazide (HYDRODIURIL) 25 MG tablet TAKE 1 TABLET BY MOUTH EVERY DAY 90 tablet 5    digoxin (LANOXIN) 125 MCG tablet Take 125 mcg by mouth daily      sildenafil (REVATIO) 20 MG tablet Take 20 mg by mouth 3 times daily      venlafaxine (EFFEXOR XR) 75 MG extended release capsule TAKE 3 CAPSULES BY MOUTH DAILY 270 capsule 3    pantoprazole (PROTONIX) 40 MG tablet TAKE 1 TABLET BY MOUTH EVERY DAY 90 tablet 3    Treprostinil (TYVASO IN) Inhale 1 puff into the lungs 4 times daily      ipratropium-albuterol (DUONEB) 0.5-2.5 (3) MG/3ML SOLN nebulizer solution Inhale 1 vial into the lungs every 4 hours      Cholecalciferol (VITAMIN D) 50 MCG ( UT) CAPS capsule Take by mouth daily      albuterol sulfate HFA (PROAIR HFA) 108 (90 Base) MCG/ACT inhaler Inhale 2 puffs into the lungs every 6 hours as needed for Wheezing 1 Inhaler 3    furosemide (LASIX) 20 MG tablet Take 1 tablet by mouth 2 times daily as needed (worsening swelling, weight up by 3lbs or more) 60 tablet 3    Nintedanib Esylate 150 MG CAPS Take by mouth 2 times daily          Review of Systems   Constitutional: Negative for chills and fever. HENT: Positive for congestion. Negative for sinus pressure and sore throat. Eyes: Negative for discharge and visual disturbance. Respiratory: Positive for cough, shortness of breath and wheezing. Cardiovascular: Negative for chest pain and palpitations. AYALA   Gastrointestinal: Negative for abdominal pain, nausea and vomiting. Endocrine: Negative for cold intolerance and heat intolerance. Genitourinary: Negative for dysuria, frequency and urgency. Musculoskeletal: Positive for arthralgias. Negative for back pain. Skin: Negative for rash and wound. Neurological: Negative for syncope, numbness and headaches. Hematological: Negative. Psychiatric/Behavioral: Negative for agitation and sleep disturbance. The patient is not nervous/anxious.         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     Hyperlipidemia     Hypertension     Hypothyroidism     Interstitial lung disease (HCC)     Lung fibrosis (HCC)     Lung nodules     bilateral lungs    Pulmonary hypertension (HCC)      Past Surgical History:   Procedure Laterality Date     SECTION      GALLBLADDER SURGERY      HYSTERECTOMY       Family History   Problem Relation Age of Onset   [facility] Cancer Mother         colon    Cancer Father         bladder    High Blood Pressure Father     Stroke Father     High Blood Pressure Sister       Social History     Tobacco Use   Smoking Status Former Smoker    Packs/day: 1.00    Years: 30.00    Pack years: 30.00    Quit date: 1/1/2007    Years since quitting: 15.3   Smokeless Tobacco Never Used       OBJECTIVE:   Wt Readings from Last 3 Encounters:   12/07/21 220 lb 3.2 oz (99.9 kg)   06/14/21 271 lb (122.9 kg)   05/26/21 243 lb (110.2 kg)     BP Readings from Last 3 Encounters:   12/27/21 100/60   12/07/21 116/64   06/14/21 116/64       Pulse 120   Temp 98.9 °F (37.2 °C) Comment: oral  Wt 195 lb (88.5 kg) Comment: Pt reported  SpO2 93% Comment: 5 liters  BMI 32.45 kg/m²      Physical Exam  Vitals and nursing note reviewed. Constitutional:       Appearance: Normal appearance. She is well-developed. She is obese. Comments: On O2   HENT:      Head: Normocephalic and atraumatic. Right Ear: External ear normal.      Left Ear: External ear normal.      Nose: Nose normal.      Mouth/Throat:      Mouth: Mucous membranes are moist.      Pharynx: Oropharynx is clear. Eyes:      Conjunctiva/sclera: Conjunctivae normal.      Pupils: Pupils are equal, round, and reactive to light. Neck:      Thyroid: No thyromegaly. Vascular: No JVD. Cardiovascular:      Rate and Rhythm: Regular rhythm. Tachycardia present. Heart sounds: Normal heart sounds. Pulmonary:      Effort: Pulmonary effort is normal.      Breath sounds: Examination of the right-lower field reveals rhonchi. Examination of the left-lower field reveals rhonchi. Wheezing (end expiratory ) and rhonchi present. No rales. Abdominal:      General: Bowel sounds are normal. There is no distension. Palpations: Abdomen is soft. Tenderness: There is no abdominal tenderness.    Musculoskeletal: General: No tenderness. Cervical back: Neck supple. No rigidity. No muscular tenderness. Right lower leg: No edema. Left lower leg: No edema. Skin:     Findings: No erythema or rash. Neurological:      General: No focal deficit present. Mental Status: She is alert and oriented to person, place, and time. Psychiatric:         Behavior: Behavior normal.         Judgment: Judgment normal.         Lab Results   Component Value Date     12/07/2021    K 4.1 12/07/2021    K 4.0 05/27/2021    CL 97 12/07/2021    CO2 30 12/07/2021    GLUCOSE 138 12/07/2021    BUN 15 12/07/2021    CREATININE 0.7 12/07/2021    CALCIUM 9.8 12/07/2021    PROT 7.0 12/07/2021    LABALBU 4.0 12/07/2021    BILITOT <0.2 12/07/2021    ALT 43 12/07/2021    AST 39 12/07/2021       Hemoglobin A1C (%)   Date Value   06/08/2017 5.4     Microscopic Examination (no units)   Date Value   02/25/2017 Not Indicated     LDL Calculated (mg/dL)   Date Value   10/14/2019 134 (H)         Lab Results   Component Value Date    WBC 10.4 12/07/2021    NEUTROABS 8.3 12/07/2021    HGB 13.2 12/07/2021    HCT 43.3 12/07/2021    MCV 93.9 12/07/2021     12/07/2021       Lab Results   Component Value Date    TSH 1.44 12/07/2021         ASSESSMENT/PLAN:     1. Chronic respiratory failure with hypoxia (HCC)  Patient with complicated lung condition including interstitial lung disease, COPD and pulmonary hypertension. Continue oxygen and other inhalers/medications. Condition is slowly declining. Weight is down significantly since last visit. 2. Acute bronchitis, unspecified organism  I am going to treat her with Levaquin and prednisone. Continue inhalers and neb treatment. In addition to Mucinex, Tylenol PRN, rest, and increase fluid intake. RTC if sx no better.         Orders Placed This Encounter   Medications    levoFLOXacin (LEVAQUIN) 500 MG tablet     Sig: Take 1 tablet by mouth daily for 7 days     Dispense:  7 tablet Refill:  0    guaiFENesin (MUCINEX) 600 MG extended release tablet     Sig: Take 1 tablet by mouth 2 times daily for 15 days     Dispense:  30 tablet     Refill:  0    predniSONE (DELTASONE) 10 MG tablet     Sig: Take 3 tablets by mouth daily for 5 days     Dispense:  15 tablet     Refill:  0      Liliane SALINAS MA am scribing for and in the presence of Robert Mcfarland MD on this date of 05/10/22 at 1:52 PM    I, Dr. Robert Mcfarland, personally performed the services described in the documentation as scribed by Liliane Carvajal MA, in my presence and it is both accurate and complete.

## 2022-05-30 ASSESSMENT — ENCOUNTER SYMPTOMS: WHEEZING: 1

## 2022-06-09 DIAGNOSIS — I27.20 PULMONARY HTN (HCC): ICD-10-CM

## 2022-06-09 DIAGNOSIS — J96.11 CHRONIC RESPIRATORY FAILURE WITH HYPOXIA (HCC): ICD-10-CM

## 2022-06-09 RX ORDER — HYDROCODONE BITARTRATE AND ACETAMINOPHEN 5; 325 MG/1; MG/1
1 TABLET ORAL 2 TIMES DAILY PRN
Qty: 30 TABLET | Refills: 0 | Status: SHIPPED | OUTPATIENT
Start: 2022-06-09 | End: 2022-07-13

## 2022-06-17 ENCOUNTER — HOSPITAL ENCOUNTER (OUTPATIENT)
Facility: HOSPITAL | Age: 57
Discharge: HOME | End: 2022-06-17
Payer: MEDICARE

## 2022-06-17 DIAGNOSIS — E03.9 HYPOTHYROIDISM, UNSPECIFIED TYPE: ICD-10-CM

## 2022-06-17 DIAGNOSIS — I10 ESSENTIAL HYPERTENSION: ICD-10-CM

## 2022-06-17 DIAGNOSIS — R53.81 DECLINING FUNCTIONAL STATUS: ICD-10-CM

## 2022-06-17 LAB
A/G RATIO: 1.5 (ref 0.8–2)
ALBUMIN SERPL-MCNC: 4.1 G/DL (ref 3.4–4.8)
ALP BLD-CCNC: 177 U/L (ref 25–100)
ALT SERPL-CCNC: 38 U/L (ref 4–36)
ANION GAP SERPL CALCULATED.3IONS-SCNC: 10 MMOL/L (ref 3–16)
AST SERPL-CCNC: 40 U/L (ref 8–33)
BASOPHILS ABSOLUTE: 0 K/UL (ref 0–0.1)
BASOPHILS RELATIVE PERCENT: 0.4 %
BILIRUB SERPL-MCNC: <0.2 MG/DL (ref 0.3–1.2)
BUN BLDV-MCNC: 13 MG/DL (ref 6–20)
CALCIUM SERPL-MCNC: 9.3 MG/DL (ref 8.5–10.5)
CHLORIDE BLD-SCNC: 95 MMOL/L (ref 98–107)
CO2: 34 MMOL/L (ref 20–30)
CREAT SERPL-MCNC: 0.8 MG/DL (ref 0.4–1.2)
EOSINOPHILS ABSOLUTE: 0.1 K/UL (ref 0–0.4)
EOSINOPHILS RELATIVE PERCENT: 0.6 %
GFR AFRICAN AMERICAN: >59
GFR NON-AFRICAN AMERICAN: >60
GLOBULIN: 2.8 G/DL
GLUCOSE BLD-MCNC: 124 MG/DL (ref 74–106)
HCT VFR BLD CALC: 41.7 % (ref 37–47)
HEMOGLOBIN: 13.1 G/DL (ref 11.5–16.5)
IMMATURE GRANULOCYTES #: 0 K/UL
IMMATURE GRANULOCYTES %: 0.2 % (ref 0–5)
LYMPHOCYTES ABSOLUTE: 2.1 K/UL (ref 1.5–4)
LYMPHOCYTES RELATIVE PERCENT: 22.2 %
MCH RBC QN AUTO: 30 PG (ref 27–32)
MCHC RBC AUTO-ENTMCNC: 31.4 G/DL (ref 31–35)
MCV RBC AUTO: 95.4 FL (ref 80–100)
MONOCYTES ABSOLUTE: 0.6 K/UL (ref 0.2–0.8)
MONOCYTES RELATIVE PERCENT: 6 %
NEUTROPHILS ABSOLUTE: 6.7 K/UL (ref 2–7.5)
NEUTROPHILS RELATIVE PERCENT: 70.6 %
PDW BLD-RTO: 14.5 % (ref 11–16)
PLATELET # BLD: 273 K/UL (ref 150–400)
PMV BLD AUTO: 11.2 FL (ref 6–10)
POTASSIUM SERPL-SCNC: 3.7 MMOL/L (ref 3.4–5.1)
RBC # BLD: 4.37 M/UL (ref 3.8–5.8)
SODIUM BLD-SCNC: 139 MMOL/L (ref 136–145)
TOTAL PROTEIN: 6.9 G/DL (ref 6.4–8.3)
TSH SERPL DL<=0.05 MIU/L-ACNC: 0.59 UIU/ML (ref 0.27–4.2)
WBC # BLD: 9.5 K/UL (ref 4–11)

## 2022-06-17 PROCEDURE — 85025 COMPLETE CBC W/AUTO DIFF WBC: CPT

## 2022-06-17 PROCEDURE — 84443 ASSAY THYROID STIM HORMONE: CPT

## 2022-06-17 PROCEDURE — 80053 COMPREHEN METABOLIC PANEL: CPT

## 2022-06-21 ENCOUNTER — OFFICE VISIT (OUTPATIENT)
Dept: PRIMARY CARE CLINIC | Age: 57
End: 2022-06-21
Payer: MEDICARE

## 2022-06-21 VITALS
OXYGEN SATURATION: 98 % | HEART RATE: 110 BPM | BODY MASS INDEX: 31.69 KG/M2 | WEIGHT: 190.2 LBS | SYSTOLIC BLOOD PRESSURE: 126 MMHG | HEIGHT: 65 IN | RESPIRATION RATE: 20 BRPM | DIASTOLIC BLOOD PRESSURE: 62 MMHG

## 2022-06-21 DIAGNOSIS — I27.20 PULMONARY HTN (HCC): ICD-10-CM

## 2022-06-21 DIAGNOSIS — E03.9 HYPOTHYROIDISM, UNSPECIFIED TYPE: ICD-10-CM

## 2022-06-21 DIAGNOSIS — J96.11 CHRONIC RESPIRATORY FAILURE WITH HYPOXIA (HCC): Primary | ICD-10-CM

## 2022-06-21 DIAGNOSIS — R63.4 WEIGHT LOSS: ICD-10-CM

## 2022-06-21 DIAGNOSIS — R11.2 NAUSEA AND VOMITING, INTRACTABILITY OF VOMITING NOT SPECIFIED, UNSPECIFIED VOMITING TYPE: ICD-10-CM

## 2022-06-21 DIAGNOSIS — I10 ESSENTIAL HYPERTENSION: ICD-10-CM

## 2022-06-21 PROCEDURE — 99213 OFFICE O/P EST LOW 20 MIN: CPT | Performed by: INTERNAL MEDICINE

## 2022-06-21 PROCEDURE — 1036F TOBACCO NON-USER: CPT | Performed by: INTERNAL MEDICINE

## 2022-06-21 PROCEDURE — G8417 CALC BMI ABV UP PARAM F/U: HCPCS | Performed by: INTERNAL MEDICINE

## 2022-06-21 PROCEDURE — G8427 DOCREV CUR MEDS BY ELIG CLIN: HCPCS | Performed by: INTERNAL MEDICINE

## 2022-06-21 PROCEDURE — 3017F COLORECTAL CA SCREEN DOC REV: CPT | Performed by: INTERNAL MEDICINE

## 2022-06-21 RX ORDER — TRAZODONE HYDROCHLORIDE 50 MG/1
1 TABLET ORAL NIGHTLY
COMMUNITY
Start: 2022-04-14 | End: 2022-09-12

## 2022-06-21 RX ORDER — HYDROXYZINE HYDROCHLORIDE 25 MG/1
25 TABLET, FILM COATED ORAL 2 TIMES DAILY PRN
Qty: 45 TABLET | Refills: 1 | Status: SHIPPED | OUTPATIENT
Start: 2022-06-21

## 2022-06-21 RX ORDER — ALBUTEROL SULFATE 90 UG/1
2 AEROSOL, METERED RESPIRATORY (INHALATION) EVERY 6 HOURS PRN
Qty: 1 EACH | Refills: 5 | Status: SHIPPED | OUTPATIENT
Start: 2022-06-21

## 2022-06-21 RX ORDER — DEXLANSOPRAZOLE 60 MG/1
60 CAPSULE, DELAYED RELEASE ORAL DAILY
Qty: 30 CAPSULE | Refills: 5 | Status: SHIPPED | OUTPATIENT
Start: 2022-06-21 | End: 2022-09-12

## 2022-06-21 SDOH — ECONOMIC STABILITY: FOOD INSECURITY: WITHIN THE PAST 12 MONTHS, YOU WORRIED THAT YOUR FOOD WOULD RUN OUT BEFORE YOU GOT MONEY TO BUY MORE.: NEVER TRUE

## 2022-06-21 SDOH — ECONOMIC STABILITY: FOOD INSECURITY: WITHIN THE PAST 12 MONTHS, THE FOOD YOU BOUGHT JUST DIDN'T LAST AND YOU DIDN'T HAVE MONEY TO GET MORE.: NEVER TRUE

## 2022-06-21 ASSESSMENT — PATIENT HEALTH QUESTIONNAIRE - PHQ9
SUM OF ALL RESPONSES TO PHQ QUESTIONS 1-9: 7
SUM OF ALL RESPONSES TO PHQ QUESTIONS 1-9: 7
8. MOVING OR SPEAKING SO SLOWLY THAT OTHER PEOPLE COULD HAVE NOTICED. OR THE OPPOSITE, BEING SO FIGETY OR RESTLESS THAT YOU HAVE BEEN MOVING AROUND A LOT MORE THAN USUAL: 1
1. LITTLE INTEREST OR PLEASURE IN DOING THINGS: 0
10. IF YOU CHECKED OFF ANY PROBLEMS, HOW DIFFICULT HAVE THESE PROBLEMS MADE IT FOR YOU TO DO YOUR WORK, TAKE CARE OF THINGS AT HOME, OR GET ALONG WITH OTHER PEOPLE: 0
9. THOUGHTS THAT YOU WOULD BE BETTER OFF DEAD, OR OF HURTING YOURSELF: 0
7. TROUBLE CONCENTRATING ON THINGS, SUCH AS READING THE NEWSPAPER OR WATCHING TELEVISION: 0
6. FEELING BAD ABOUT YOURSELF - OR THAT YOU ARE A FAILURE OR HAVE LET YOURSELF OR YOUR FAMILY DOWN: 0
2. FEELING DOWN, DEPRESSED OR HOPELESS: 3
SUM OF ALL RESPONSES TO PHQ9 QUESTIONS 1 & 2: 3
3. TROUBLE FALLING OR STAYING ASLEEP: 1
4. FEELING TIRED OR HAVING LITTLE ENERGY: 1
SUM OF ALL RESPONSES TO PHQ QUESTIONS 1-9: 7
5. POOR APPETITE OR OVEREATING: 1
SUM OF ALL RESPONSES TO PHQ QUESTIONS 1-9: 7

## 2022-06-21 ASSESSMENT — ENCOUNTER SYMPTOMS
EYE DISCHARGE: 0
SINUS PRESSURE: 0
WHEEZING: 1
NAUSEA: 1
VOMITING: 1
ABDOMINAL PAIN: 0
SORE THROAT: 0
COUGH: 1
SHORTNESS OF BREATH: 1
BACK PAIN: 1

## 2022-06-21 ASSESSMENT — SOCIAL DETERMINANTS OF HEALTH (SDOH): HOW HARD IS IT FOR YOU TO PAY FOR THE VERY BASICS LIKE FOOD, HOUSING, MEDICAL CARE, AND HEATING?: NOT HARD AT ALL

## 2022-06-21 NOTE — PROGRESS NOTES
SUBJECTIVE:    Patient ID: Kennedy Huddleston is a 61 y. o.female. Chief Complaint   Patient presents with    Anxiety    COPD         HPI:  Patient has had COPD/pulmonary hypertension for a long time. She has been using nebulizer inhalation treatments as ordered. She is on oxygen. She has some dyspnea with exertion. With on and off cough, SOB and wheezing that does respond to treatment. She has been using the Albuterol inhaler. Last Pneumovax was 8/2015. She has followed up with Pulmonology since last visit. Patient states that the shape of her lungs is unchanged. Patient  has had hypothyroidism for the past few years. She has been compliant with taking her medication without side effects. There are no symptoms of hypo or hyper thyroidism except for decreased energy and wt loss. Patient complains of nausea and vomiting after eating. Symptoms for the past couple months. No foods make it better or worse. She has not taken anything OTC for this. She reported no dysphagia or odynophagia. BP is stable    Patient's medications, allergies, past medical, surgical, social and family histories were reviewed and updated as appropriate in electronic medical record. Outpatient Medications Marked as Taking for the 6/21/22 encounter (Office Visit) with Carlton Schmidt MD   Medication Sig Dispense Refill    traZODone (DESYREL) 50 MG tablet Take 1 tablet by mouth at bedtime      HYDROcodone-acetaminophen (NORCO) 5-325 MG per tablet Take 1 tablet by mouth 2 times daily as needed for Pain for up to 30 days.  Intended supply: 30 days 30 tablet 0    hydrOXYzine (ATARAX) 25 MG tablet Take 1 tablet by mouth 2 times daily as needed for Itching or Anxiety 45 tablet 1    folic acid (FOLVITE) 1 MG tablet TAKE 1 TABLET BY MOUTH DAILY 90 tablet 1    levothyroxine (SYNTHROID) 175 MCG tablet TAKE 1 TABLET BY MOUTH EVERY DAY 30 tablet 2    fluticasone (FLONASE) 50 MCG/ACT nasal spray INSTILL 1-2 SPRAYS EVERY DAY AS DIRECTED FOR 10 DAYS THEN AS NEEDED FOR SINUS CONGESTION 48 g 0    hydroCHLOROthiazide (HYDRODIURIL) 25 MG tablet TAKE 1 TABLET BY MOUTH EVERY DAY 90 tablet 5    digoxin (LANOXIN) 125 MCG tablet Take 125 mcg by mouth daily      venlafaxine (EFFEXOR XR) 75 MG extended release capsule TAKE 3 CAPSULES BY MOUTH DAILY 270 capsule 3    pantoprazole (PROTONIX) 40 MG tablet TAKE 1 TABLET BY MOUTH EVERY DAY 90 tablet 3    ipratropium-albuterol (DUONEB) 0.5-2.5 (3) MG/3ML SOLN nebulizer solution Inhale 1 vial into the lungs every 4 hours      Cholecalciferol (VITAMIN D) 50 MCG (2000 UT) CAPS capsule Take by mouth daily      albuterol sulfate HFA (PROAIR HFA) 108 (90 Base) MCG/ACT inhaler Inhale 2 puffs into the lungs every 6 hours as needed for Wheezing 1 Inhaler 3    furosemide (LASIX) 20 MG tablet Take 1 tablet by mouth 2 times daily as needed (worsening swelling, weight up by 3lbs or more) 60 tablet 3    Nintedanib Esylate 150 MG CAPS Take by mouth 2 times daily          Review of Systems   Constitutional: Positive for fatigue. Negative for chills and fever. HENT: Negative for congestion, sinus pressure and sore throat. Eyes: Negative for discharge and visual disturbance. Respiratory: Positive for cough, shortness of breath and wheezing. Cardiovascular: Negative for chest pain and palpitations. AYALA   Gastrointestinal: Positive for nausea and vomiting. Negative for abdominal pain. Endocrine: Negative for cold intolerance and heat intolerance. Genitourinary: Negative for dysuria, frequency and urgency. Musculoskeletal: Positive for arthralgias and back pain. Skin: Negative for rash and wound. Neurological: Negative for syncope, numbness and headaches. Hematological: Negative. Psychiatric/Behavioral: Negative for agitation and sleep disturbance. The patient is nervous/anxious.         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     Hyperlipidemia     Hypertension     Hypothyroidism     Interstitial lung disease (HCC)     Lung fibrosis (HCC)     Lung nodules     bilateral lungs    Pulmonary hypertension (HCC)      Past Surgical History:   Procedure Laterality Date     SECTION      GALLBLADDER SURGERY      HYSTERECTOMY       Family History   Problem Relation Age of Onset    Cancer Mother         colon    Cancer Father         bladder    High Blood Pressure Father     Stroke Father     High Blood Pressure Sister       Social History     Tobacco Use   Smoking Status Former Smoker    Packs/day: 1.00    Years: 30.00    Pack years: 30.00    Quit date: 2007    Years since quitting: 15.4   Smokeless Tobacco Never Used       OBJECTIVE:   Wt Readings from Last 3 Encounters:   22 190 lb 3.2 oz (86.3 kg)   05/10/22 195 lb (88.5 kg)   21 220 lb 3.2 oz (99.9 kg)     BP Readings from Last 3 Encounters:   22 126/62   21 100/60   21 116/64       /62   Pulse (!) 110   Resp 20   Ht 5' 5\" (1.651 m)   Wt 190 lb 3.2 oz (86.3 kg)   SpO2 98% Comment: 4 liters 02  BMI 31.65 kg/m²      Physical Exam  Vitals and nursing note reviewed. Constitutional:       Appearance: Normal appearance. She is well-developed. She is obese. Comments: She is on 6 liters of oxygen   HENT:      Head: Normocephalic and atraumatic. Right Ear: External ear normal.      Left Ear: External ear normal.      Nose: Nose normal.      Mouth/Throat:      Mouth: Mucous membranes are moist.      Pharynx: Oropharynx is clear. Eyes:      Conjunctiva/sclera: Conjunctivae normal.      Pupils: Pupils are equal, round, and reactive to light. Neck:      Thyroid: No thyromegaly. Vascular: No JVD. Cardiovascular:      Rate and Rhythm: Normal rate and regular rhythm.       Heart sounds: Normal heart sounds. Pulmonary:      Effort: Pulmonary effort is normal.      Breath sounds: Normal breath sounds. No wheezing or rales. Abdominal:      General: Bowel sounds are normal. There is no distension. Palpations: Abdomen is soft. Tenderness: There is no abdominal tenderness. Musculoskeletal:         General: No tenderness. Cervical back: Neck supple. No rigidity. No muscular tenderness. Right lower leg: No edema. Left lower leg: No edema. Skin:     Findings: No erythema or rash. Neurological:      General: No focal deficit present. Mental Status: She is alert and oriented to person, place, and time. Psychiatric:         Behavior: Behavior normal.         Judgment: Judgment normal.         Lab Results   Component Value Date     06/17/2022    K 3.7 06/17/2022    K 4.0 05/27/2021    CL 95 06/17/2022    CO2 34 06/17/2022    GLUCOSE 124 06/17/2022    BUN 13 06/17/2022    CREATININE 0.8 06/17/2022    CALCIUM 9.3 06/17/2022    PROT 6.9 06/17/2022    LABALBU 4.1 06/17/2022    BILITOT <0.2 06/17/2022    ALT 38 06/17/2022    AST 40 06/17/2022       Hemoglobin A1C (%)   Date Value   06/08/2017 5.4     Microscopic Examination (no units)   Date Value   02/25/2017 Not Indicated     LDL Calculated (mg/dL)   Date Value   10/14/2019 134 (H)         Lab Results   Component Value Date    WBC 9.5 06/17/2022    NEUTROABS 6.7 06/17/2022    HGB 13.1 06/17/2022    HCT 41.7 06/17/2022    MCV 95.4 06/17/2022     06/17/2022       Lab Results   Component Value Date    TSH 0.59 06/17/2022         ASSESSMENT/PLAN:     1. Chronic respiratory failure with hypoxia (HCC)  Continue current regimen. Continue oxygen. Discussed the importance of weight control and continue follow-up with pulmonology. 2. Pulmonary HTN ([address]  As per #1.    3. Essential hypertension  BP is stable. I have advised her on low-sodium diet, exercise and weight control.   I am going to continue current medication. Will monitor her renal function every few months, have advised her to check blood pressure frequently and to keep a record of this. 4. Hypothyroidism, unspecified type  Stable. I am going to check the TSH every few months. I am going to continue the current dose of Levothyroxine. I have advised her on the importance of taking the medication on a daily basis. 5. Weight loss  Patient lost about 80-90 pounds over the past year. Currently having nausea and vomiting. Symptoms mainly after eating. I am going to proceed with CT scan of the abdomen pelvis. May need esaphegogastroendoscopy/colonoscopy. - CT ABDOMEN PELVIS WO CONTRAST Additional Contrast? None; Future    6. Nausea and vomiting, intractability of vomiting not specified, unspecified vomiting type  We will change Protonix to Dexilant. Proceed with CT scan. May need scopes. - CT ABDOMEN PELVIS WO CONTRAST Additional Contrast? None; Future        Orders Placed This Encounter   Medications    hydrOXYzine HCl (ATARAX) 25 MG tablet     Sig: Take 1 tablet by mouth 2 times daily as needed for Itching or Anxiety     Dispense:  45 tablet     Refill:  1    albuterol sulfate HFA (PROAIR HFA) 108 (90 Base) MCG/ACT inhaler     Sig: Inhale 2 puffs into the lungs every 6 hours as needed for Wheezing     Dispense:  1 each     Refill:  5      I, Ade Girard MA am scribing for and in the presence of Tamika Gonzalez MD on this date of 06/21/22 at 3:36 PM    I, Dr. Tamika Gonzalez, personally performed the services described in the documentation as scribed by Ade Girard MA, in my presence and it is both accurate and complete.

## 2022-06-24 ENCOUNTER — HOSPITAL ENCOUNTER (OUTPATIENT)
Dept: CT IMAGING | Facility: HOSPITAL | Age: 57
Discharge: HOME | End: 2022-06-24
Payer: MEDICARE

## 2022-06-24 DIAGNOSIS — R63.4 WEIGHT LOSS: ICD-10-CM

## 2022-06-24 DIAGNOSIS — R11.2 NAUSEA AND VOMITING, INTRACTABILITY OF VOMITING NOT SPECIFIED, UNSPECIFIED VOMITING TYPE: ICD-10-CM

## 2022-06-24 PROCEDURE — 74176 CT ABD & PELVIS W/O CONTRAST: CPT

## 2022-06-27 ENCOUNTER — TELEPHONE (OUTPATIENT)
Dept: PRIMARY CARE CLINIC | Age: 57
End: 2022-06-27

## 2022-06-27 NOTE — TELEPHONE ENCOUNTER
Called wanting results of CT wasn't sure if you had looked at it    CT Result (most recent):  CT ABDOMEN PELVIS WO IV CONTRAST 06/24/2022    Narrative  EXAM: CT ABDOMEN AND PELVIS WITHOUT CONTRAST    INDICATION: Weight loss, Nausea and vomiting, intractability of vomiting not specified, unspecified vomiting type,    COMPARISON: None. TECHNIQUE: Axial CT imaging obtained from lung bases through pelvis. Axial images and multiplanar reformatted images are provided for review. Individualized dose optimization technique was used in order to meet ALARA standards for radiation dose  reduction. In addition to vendor specific dose reduction algorithms, the dose reduction techniques vary based on the specific scanner utilized but frequently include automated exposure control, adjustment of the mA and/or kV according to patient size,  and use of iterative reconstruction technique. IV Contrast: None  Oral Contrast: None    FINDINGS:    LUNG BASES: Subpleural lucencies with interlobular septal thickening and parenchymal bands are present at the lung bases compatible with pulmonary fibrosis. Ermias Gutter LIVER: Normal.    GALLBLADDER AND BILIARY DUCTS: Cholecystectomy. PANCREAS: Normal.    SPLEEN: Normal.    ADRENAL GLANDS: Normal.    KIDNEYS AND URETERS: Punctate, nonobstructing calculus is present in the upper pole of the right kidney. URINARY BLADDER: Normal.    REPRODUCTIVE ORGANS: No mass    BOWEL: Normal caliber. Normal appendix. LYMPH NODES: No abnormally enlarged nodes. PERITONEUM/RETROPERITONEUM: No ascites or free air. VESSELS: There are atherosclerotic calcifications along the walls of the abdominal aorta as well it's main branches and the iliac arterial vessels. .    ABDOMINAL WALL: Normal.    BONES: No destructive process. Impression  1. Punctate, nonobstructing calculus in the upper pole the right kidney.

## 2022-06-28 NOTE — TELEPHONE ENCOUNTER
Pt called back and informed. She is requesting stronger anxiety medicine maybe valium 5 she states she is still having trouble when she eats that her food comes back up. She said she discussed this in her visit and is sure it's her nerves.

## 2022-06-29 NOTE — TELEPHONE ENCOUNTER
Yes she thought if you could write her something different for anxiety like small dose valium it may help her nerves, she has been extra stressed with everything that is going on with her son.

## 2022-07-01 ENCOUNTER — TELEPHONE (OUTPATIENT)
Dept: PRIMARY CARE CLINIC | Age: 57
End: 2022-07-01

## 2022-07-01 DIAGNOSIS — F41.9 ANXIETY: ICD-10-CM

## 2022-07-01 RX ORDER — FLUTICASONE PROPIONATE 50 MCG
SPRAY, SUSPENSION (ML) NASAL
Qty: 48 G | Refills: 0 | Status: SHIPPED | OUTPATIENT
Start: 2022-07-01

## 2022-07-01 RX ORDER — DIAZEPAM 2 MG/1
2 TABLET ORAL NIGHTLY PRN
Qty: 30 TABLET | Refills: 0 | Status: SHIPPED | OUTPATIENT
Start: 2022-07-01 | End: 2022-07-13 | Stop reason: ALTCHOICE

## 2022-07-01 NOTE — TELEPHONE ENCOUNTER
Tried calling pt to discuss anxiety, no answer but left message stating that you would send her something to pharmacy later today

## 2022-07-13 ENCOUNTER — TELEPHONE (OUTPATIENT)
Dept: PRIMARY CARE CLINIC | Age: 57
End: 2022-07-13

## 2022-07-13 NOTE — TELEPHONE ENCOUNTER
I am not for sure what is the question. They can stop the medication and patient can stop the medication whenever they/she want to specially been used on as needed basis.

## 2022-07-13 NOTE — TELEPHONE ENCOUNTER
Pt called stating her transplant team called her and they need you to D/C the norco and valium for her due to she can't be on any narcotics.

## 2022-07-15 RX ORDER — LEVOTHYROXINE SODIUM 175 UG/1
TABLET ORAL
Qty: 30 TABLET | Refills: 2 | Status: SHIPPED | OUTPATIENT
Start: 2022-07-15 | End: 2022-10-08

## 2022-07-19 ENCOUNTER — TELEPHONE (OUTPATIENT)
Dept: PRIMARY CARE CLINIC | Age: 57
End: 2022-07-19

## 2022-07-19 NOTE — TELEPHONE ENCOUNTER
I would monitor and continue regimen as per pulmonology recommendation and obviously go to the emergency room with any worsening. Need to monitor O2 saturation closely.

## 2022-07-19 NOTE — TELEPHONE ENCOUNTER
Daughter messaged stating that pt was dx with Covid.  Pulmonologist is aware and \"has called in Molnupiravir\" Sx are sore throat, coughing, congestion, and low grade temp 100.5

## 2022-09-12 ENCOUNTER — OFFICE VISIT (OUTPATIENT)
Dept: PRIMARY CARE CLINIC | Age: 57
End: 2022-09-12
Payer: MEDICARE

## 2022-09-12 ENCOUNTER — HOSPITAL ENCOUNTER (OUTPATIENT)
Facility: HOSPITAL | Age: 57
Discharge: HOME | End: 2022-09-12
Payer: MEDICARE

## 2022-09-12 VITALS
HEIGHT: 65 IN | OXYGEN SATURATION: 97 % | RESPIRATION RATE: 20 BRPM | DIASTOLIC BLOOD PRESSURE: 63 MMHG | WEIGHT: 176 LBS | SYSTOLIC BLOOD PRESSURE: 118 MMHG | HEART RATE: 103 BPM | BODY MASS INDEX: 29.32 KG/M2

## 2022-09-12 DIAGNOSIS — F41.9 ANXIETY AND DEPRESSION: ICD-10-CM

## 2022-09-12 DIAGNOSIS — F32.A ANXIETY AND DEPRESSION: ICD-10-CM

## 2022-09-12 DIAGNOSIS — R19.7 DIARRHEA, UNSPECIFIED TYPE: ICD-10-CM

## 2022-09-12 DIAGNOSIS — Z00.00 MEDICARE ANNUAL WELLNESS VISIT, SUBSEQUENT: Primary | ICD-10-CM

## 2022-09-12 DIAGNOSIS — R63.4 WEIGHT LOSS: ICD-10-CM

## 2022-09-12 DIAGNOSIS — Z12.31 ENCOUNTER FOR SCREENING MAMMOGRAM FOR MALIGNANT NEOPLASM OF BREAST: ICD-10-CM

## 2022-09-12 LAB
A/G RATIO: 1.5 (ref 0.8–2)
ALBUMIN SERPL-MCNC: 4 G/DL (ref 3.4–4.8)
ALP BLD-CCNC: 174 U/L (ref 25–100)
ALT SERPL-CCNC: 33 U/L (ref 4–36)
ANION GAP SERPL CALCULATED.3IONS-SCNC: 10 MMOL/L (ref 3–16)
AST SERPL-CCNC: 29 U/L (ref 8–33)
BASOPHILS ABSOLUTE: 0 K/UL (ref 0–0.1)
BASOPHILS RELATIVE PERCENT: 0.3 %
BILIRUB SERPL-MCNC: 0.3 MG/DL (ref 0.3–1.2)
BUN BLDV-MCNC: 11 MG/DL (ref 6–20)
CALCIUM SERPL-MCNC: 9.3 MG/DL (ref 8.5–10.5)
CHLORIDE BLD-SCNC: 99 MMOL/L (ref 98–107)
CO2: 31 MMOL/L (ref 20–30)
CREAT SERPL-MCNC: 0.7 MG/DL (ref 0.4–1.2)
EOSINOPHILS ABSOLUTE: 0 K/UL (ref 0–0.4)
EOSINOPHILS RELATIVE PERCENT: 0.3 %
GFR AFRICAN AMERICAN: >59
GFR NON-AFRICAN AMERICAN: >60
GLOBULIN: 2.7 G/DL
GLUCOSE BLD-MCNC: 94 MG/DL (ref 74–106)
HCT VFR BLD CALC: 39.5 % (ref 37–47)
HEMOGLOBIN: 12.7 G/DL (ref 11.5–16.5)
IMMATURE GRANULOCYTES #: 0 K/UL
IMMATURE GRANULOCYTES %: 0.3 % (ref 0–5)
LYMPHOCYTES ABSOLUTE: 1.5 K/UL (ref 1.5–4)
LYMPHOCYTES RELATIVE PERCENT: 21 %
MCH RBC QN AUTO: 30.2 PG (ref 27–32)
MCHC RBC AUTO-ENTMCNC: 32.2 G/DL (ref 31–35)
MCV RBC AUTO: 93.8 FL (ref 80–100)
MONOCYTES ABSOLUTE: 0.4 K/UL (ref 0.2–0.8)
MONOCYTES RELATIVE PERCENT: 5.8 %
NEUTROPHILS ABSOLUTE: 5.1 K/UL (ref 2–7.5)
NEUTROPHILS RELATIVE PERCENT: 72.3 %
PDW BLD-RTO: 14 % (ref 11–16)
PLATELET # BLD: 232 K/UL (ref 150–400)
PMV BLD AUTO: 10.9 FL (ref 6–10)
POTASSIUM SERPL-SCNC: 3.4 MMOL/L (ref 3.4–5.1)
RBC # BLD: 4.21 M/UL (ref 3.8–5.8)
SODIUM BLD-SCNC: 140 MMOL/L (ref 136–145)
TOTAL PROTEIN: 6.7 G/DL (ref 6.4–8.3)
TSH SERPL DL<=0.05 MIU/L-ACNC: 0.18 UIU/ML (ref 0.27–4.2)
WBC # BLD: 7.1 K/UL (ref 4–11)

## 2022-09-12 PROCEDURE — 80053 COMPREHEN METABOLIC PANEL: CPT

## 2022-09-12 PROCEDURE — G0439 PPPS, SUBSEQ VISIT: HCPCS | Performed by: INTERNAL MEDICINE

## 2022-09-12 PROCEDURE — 84443 ASSAY THYROID STIM HORMONE: CPT

## 2022-09-12 PROCEDURE — 3017F COLORECTAL CA SCREEN DOC REV: CPT | Performed by: INTERNAL MEDICINE

## 2022-09-12 PROCEDURE — 85025 COMPLETE CBC W/AUTO DIFF WBC: CPT

## 2022-09-12 RX ORDER — SILDENAFIL CITRATE 20 MG/1
20 TABLET ORAL 3 TIMES DAILY
COMMUNITY
Start: 2022-08-22

## 2022-09-12 RX ORDER — ALPRAZOLAM 0.5 MG/1
0.5 TABLET ORAL NIGHTLY PRN
Qty: 30 TABLET | Refills: 0 | Status: SHIPPED | OUTPATIENT
Start: 2022-09-12 | End: 2022-10-12

## 2022-09-12 ASSESSMENT — PATIENT HEALTH QUESTIONNAIRE - PHQ9
SUM OF ALL RESPONSES TO PHQ QUESTIONS 1-9: 6
3. TROUBLE FALLING OR STAYING ASLEEP: 1
7. TROUBLE CONCENTRATING ON THINGS, SUCH AS READING THE NEWSPAPER OR WATCHING TELEVISION: 0
10. IF YOU CHECKED OFF ANY PROBLEMS, HOW DIFFICULT HAVE THESE PROBLEMS MADE IT FOR YOU TO DO YOUR WORK, TAKE CARE OF THINGS AT HOME, OR GET ALONG WITH OTHER PEOPLE: 1
4. FEELING TIRED OR HAVING LITTLE ENERGY: 1
SUM OF ALL RESPONSES TO PHQ QUESTIONS 1-9: 6
8. MOVING OR SPEAKING SO SLOWLY THAT OTHER PEOPLE COULD HAVE NOTICED. OR THE OPPOSITE, BEING SO FIGETY OR RESTLESS THAT YOU HAVE BEEN MOVING AROUND A LOT MORE THAN USUAL: 0
SUM OF ALL RESPONSES TO PHQ QUESTIONS 1-9: 6
2. FEELING DOWN, DEPRESSED OR HOPELESS: 3
SUM OF ALL RESPONSES TO PHQ QUESTIONS 1-9: 6
5. POOR APPETITE OR OVEREATING: 1
6. FEELING BAD ABOUT YOURSELF - OR THAT YOU ARE A FAILURE OR HAVE LET YOURSELF OR YOUR FAMILY DOWN: 0
9. THOUGHTS THAT YOU WOULD BE BETTER OFF DEAD, OR OF HURTING YOURSELF: 0

## 2022-09-12 ASSESSMENT — LIFESTYLE VARIABLES: HOW OFTEN DO YOU HAVE A DRINK CONTAINING ALCOHOL: NEVER

## 2022-09-12 NOTE — PATIENT INSTRUCTIONS
Advance Directives: Care Instructions  Overview  An advance directive is a legal way to state your wishes at the end of your life. It tells your family and your doctor what to do if you can't say what youwant. There are two main types of advance directives. You can change them any timeyour wishes change. Living will. This form tells your family and your doctor your wishes about life support and other treatment. The form is also called a declaration. Medical power of . This form lets you name a person to make treatment decisions for you when you can't speak for yourself. This person is called a health care agent (health care proxy, health care surrogate). The form is also called a durable power of  for health care. If you do not have an advance directive, decisions about your medical care maybe made by a family member, or by a doctor or a  who doesn't know you. It may help to think of an advance directive as a gift to the people who carefor you. If you have one, they won't have to make tough decisions by themselves. Follow-up care is a key part of your treatment and safety. Be sure to make and go to all appointments, and call your doctor if you are having problems. It's also a good idea to know your test results and keep alist of the medicines you take. What should you include in an advance directive? Many states have a unique advance directive form. (It may ask you to address specific issues.) Or you might use a universal form that's approved by manystates. If your form doesn't tell you what to address, it may be hard to know what to include in your advance directive. Use the questions below to help you getstarted. Who do you want to make decisions about your medical care if you are not able to? What life-support measures do you want if you have a serious illness that gets worse over time or can't be cured? What are you most afraid of that might happen?  (Maybe you're afraid of having pain, losing your independence, or being kept alive by machines.)  Where would you prefer to die? (Your home? A hospital? A nursing home?)  Do you want to donate your organs when you die? Do you want certain Samaritan practices performed before you die? When should you call for help? Be sure to contact your doctor if you have any questions. Where can you learn more? Go to https://chpepiceweb.IdealSeat. org and sign in to your CuÃ­date account. Enter R264 in the SkillPixels box to learn more about \"Advance Directives: Care Instructions. \"     If you do not have an account, please click on the \"Sign Up Now\" link. Current as of: October 18, 2021               Content Version: 13.3  © 2006-2022 Healthwise, [employer]. Care instructions adapted under license by [facility] ([facility]). If you have questions about a medical condition or this instruction, always ask your healthcare professional. [place] [address] any warranty or liability for your use of this information. Learning About Medical Power of   What is a medical power of ? A medical power of , also called a durable power of  for health care, is one type of the legal forms called advance directives. It lets you name the person you want to make treatment decisions for you if you can't speak or decide for yourself. The person you choose is called your health care agent. This person is also called a health care proxy or health care surrogate. A medical power of  may be called something else in your state. How do you choose a health care agent? Choose your health care agent carefully. This person may or may not be a familymember. Talk to the person before you make your final decision. Make sure he or she iscomfortable with this responsibility. It's a good idea to choose someone who:  Is at least 25years old.   Knows you well and understands what makes life meaningful for you.  Understands your Tenriism and moral values. Will do what you want, not what he or she wants. Will be able to make difficult choices at a stressful time. Will be able to refuse or stop treatment, if that is what you would want, even if you could die. Will be firm and confident with health professionals if needed. Will ask questions to get needed information. Lives near you or agrees to travel to you if needed. Your family may help you make medical decisions while you can still be part of that process. But it's important to choose one person to be your health careagent in case you aren't able to make decisions for yourself. If you don't fill out the legal form and name a health care agent, thedecisions your family can make may be limited. A health care agent may be called something else in your state. Who will make decisions for you if you don't have a health care agent? If you don't have a health care agent or a living will, you may not get the care you want. Decisions may be made by family members who disagree about your medical care. Or decisions may be made by a medical professional who doesn'tknow you well. In some cases, a  makes the decisions. When you name a health care agent, it is very clear who has the power to [place] decisions for you. How do you name a health care agent? You name your health care agent on a legal form. This form is usually called a medical power of . Ask your hospital, state bar association, or officeon aging where to find these forms. You must sign the form to make it legal. Some states require you to get the form notarized. This means that a person called a  watches you sign the form and then he or she signs the form. Some states also require thattwo or more witnesses sign the form. Be sure to tell your family members and doctors who your health care agent is. Where can you learn more? Go to https://nakul.healthHamstersoftpartners. org and sign in to your WinProbe account. Enter [id number] in the [facility] box to learn more about \"Learning About [address]. \"     If you do not have an account, please click on the \"Sign Up Now\" link. Current as of: October 18, 2021               Content Version: 13.3  © 2006-2022 Songdrop. Care instructions adapted under license by [facility] ([facility]). If you have questions about a medical condition or this instruction, always ask your healthcare professional. [place] [address] any warranty or liability for your use of this information. Learning About Living Perroy  What is a living will? A living will, also called a declaration, is a legal form. It tells your family and your doctor your wishes when you can't speak for yourself. It's used by the health professionals who will treat you as you near the end of your life or ifyou get seriously hurt or ill. If you put your wishes in writing, your loved ones and others will know what kind of care you want. They won't need to guess. This can ease your mind and behelpful to others. And you can change or cancel your living will at any time. A living will is not the same as an estate or property will. An estate willexplains what you want to happen with your money and property after you die. How do you use it? Keep these facts in mind about how a living will is used. Your living will is used only if you can't speak or make decisions for yourself. Most often, one or more doctors must certify that you can't speak or decide for yourself before your living will takes effect. If you get better and can speak for yourself again, you can accept or refuse any treatment. It doesn't matter what you said in your living will. Some states may limit your right to refuse treatment in certain cases.  For example, you may need to clearly state in your living will that you don't want artificial hydration and nutrition, such as being fed through a tube. Is a living will a legal document? A living will is a legal document. Each state has its own laws about livingwills. And a living will may be called something else in your state. Here are some things to know about living villarreal. You don't need an  to complete a living will. But legal advice can be helpful if your state's laws are unclear. It can also help if your health history is complicated or your family can't agree on what should be in your living will. You can change your living will at any time. Some people find that their wishes about end-of-life care change as their health changes. If you make big changes to your living will, complete a new form. If you move to another state, make sure that your living will is legal in the state where you now live. In most cases, doctors will respect your wishes even if you have a form from a different state. You might use a universal form that has been approved by many states. This kind of form can sometimes be filled out and stored online. Your digital copy will then be available wherever you have a connection to the internet. The doctors and nurses who need to treat you can find it right away. Your state may offer an online registry. This is another place where you can store your living will online. It's a good idea to get your living will notarized. This means using a person called a  to watch two people sign, or witness, your living will. What should you know when you create a living will? Here are some questions to ask yourself as you make your living will. Do you know enough about life support methods that might be used? If not, talk to your doctor so you know what might be done if you can't breathe on your own, your heart stops, or you can't swallow. What things would you still want to be able to do after you receive life-support methods? Would you want to be able to walk? To speak? To eat on your own?  To live without the help of machines? Do you want certain Yazidism practices performed if you become very ill? If you have a choice, where do you want to be cared for? In your home? At a hospital or nursing home? If you have a choice at the end of your life, where would you prefer to die? At home? In a hospital or nursing home? Somewhere else? Would you prefer to be buried or cremated? Do you want your organs to be donated after you die? What should you do with your living will? Make sure that your family members and your health care agent have copies of your living will (also called a declaration). Give your doctor a copy of your living will. Ask to have it kept as part of your medical record. If you have more than one doctor, make sure that each one has a copy. Put a copy of your living will where it can be easily found. For example, some people may put a copy on their refrigerator door. If you are using a digital copy, be sure your doctor, family members, and health care agent know how to find and access it. Where can you learn more? Go to https://Wirecom Technologies.TopFachhandel [email or web address] org and sign in to your CleveX account. Enter B023 in the Kylesi-Nalysis box to learn more about \"Learning About Living Perroy. \"     If you do not have an account, please click on the \"Sign Up Now\" link. Current as of: October 18, 2021               Content Version: 13.3  © 2536-6576 Healthwise, Incorporated. Care instructions adapted under license by [facility] ([facility]). If you have questions about a medical condition or this instruction, always ask your healthcare professional. [address] any warranty or liability for your use of this information. Personalized Preventive Plan for Earla Carbon - 9/12/2022  Medicare offers a range of preventive health benefits. Some of the tests and screenings are paid in full while other may be subject to a deductible, co-insurance, and/or copay.     Some of these benefits include a comprehensive review of your medical history including lifestyle, illnesses that may run in your family, and various assessments and screenings as appropriate. After reviewing your medical record and screening and assessments performed today your provider may have ordered immunizations, labs, imaging, and/or referrals for you. A list of these orders (if applicable) as well as your Preventive Care list are included within your After Visit Summary for your review. Other Preventive Recommendations:    A preventive eye exam performed by an eye specialist is recommended every 1-2 years to screen for glaucoma; cataracts, macular degeneration, and other eye disorders. A preventive dental visit is recommended every 6 months. Try to get at least 150 minutes of exercise per week or 10,000 steps per day on a pedometer . Order or download the FREE \"Exercise & Physical Activity: Your Everyday Guide\" from [employer]. Call 3-752.620.3537 or search [employer] online. You need 8000-9230 mg of calcium and 7971-2591 IU of vitamin D per day. It is possible to meet your calcium requirement with diet alone, but a vitamin D supplement is usually necessary to meet this goal.  When exposed to the sun, use a sunscreen that protects against both UVA and UVB radiation with an SPF of 30 or greater. Reapply every 2 to 3 hours or after sweating, drying off with a towel, or swimming. Always wear a seat belt when traveling in a car. Always wear a helmet when riding a bicycle or motorcycle.

## 2022-09-12 NOTE — PROGRESS NOTES
Chief Complaint   Patient presents with    Medicare AWV       Have you seen any other physician or provider since your last visit no    Have you had any other diagnostic tests since your last visit? no    Have you changed or stopped any medications since your last visit? no     I

## 2022-09-12 NOTE — PROGRESS NOTES
Medicare Annual Wellness Visit  Name: Yazan Frank Date: 2022   MRN: [id number] Sex: Female   Age: 61 y.o. Ethnicity: Non- / Non    : 1965 Race: White (non-)      Evelyn Murray is here for Medicare AWV    Screenings for behavioral, psychosocial and functional/safety risks, and cognitive dysfunction are all negative except as indicated below. These results, as well as other patient data from the [address] form, are documented in Flowsheets linked to this Encounter. No Known Allergies    Prior to Visit Medications    Medication Sig Taking?  Authorizing Provider   sildenafil (REVATIO) 20 MG tablet Take 20 mg by mouth 3 times daily Yes Historical Provider, MD   levothyroxine (SYNTHROID) 175 MCG tablet TAKE 1 TABLET BY MOUTH EVERY DAY Yes Ryder Julien MD   fluticasone (FLONASE) 50 MCG/ACT nasal spray SHAKE LIQUID AND USE 1 TO 2 SPRAYS IN EACH NOSTRIL EVERY DAY FOR 10 DAYS AS DIRECTED AS NEEDED FOR SINUS CONGESTION Yes Ryder Julien MD   traZODone (DESYREL) 50 MG tablet Take 1 tablet by mouth at bedtime Yes Historical Provider, MD   hydrOXYzine HCl (ATARAX) 25 MG tablet Take 1 tablet by mouth 2 times daily as needed for Itching or Anxiety Yes Ryder Julien MD   albuterol sulfate HFA (PROAIR HFA) 108 (90 Base) MCG/ACT inhaler Inhale 2 puffs into the lungs every 6 hours as needed for Wheezing Yes Ryder Julien MD   folic acid (FOLVITE) 1 MG tablet TAKE 1 TABLET BY MOUTH DAILY Yes Ryder Julien MD   hydroCHLOROthiazide (HYDRODIURIL) 25 MG tablet TAKE 1 TABLET BY MOUTH EVERY DAY Yes DAVIAN Enriquez CNP   digoxin (LANOXIN) 125 MCG tablet Take 125 mcg by mouth daily Yes Historical Provider, MD   venlafaxine (EFFEXOR XR) 75 MG extended release capsule TAKE 3 CAPSULES BY MOUTH DAILY Yes Ryder Julien MD   pantoprazole (PROTONIX) 40 MG tablet TAKE 1 TABLET BY MOUTH EVERY DAY Yes Ryder Julien MD   ipratropium-albuterol (DUONEB) 0.5-2.5 (3) MG/3ML SOLN nebulizer solution Inhale 1 vial into the lungs every 4 hours Yes Historical Provider, MD   furosemide (LASIX) 20 MG tablet Take 1 tablet by mouth 2 times daily as needed (worsening swelling, weight up by 3lbs or more) Yes OPAL Viera   Nintedanib Esylate 150 MG CAPS Take by mouth 2 times daily Yes Historical Provider, MD       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     Hyperlipidemia     Hypertension     Hypothyroidism     Interstitial lung disease (HCC)     Lung fibrosis (HCC)     Lung nodules     bilateral lungs    Pulmonary hypertension ([facility] [address]      Past Surgical History:   Procedure Laterality Date     SECTION      GALLBLADDER SURGERY      HYSTERECTOMY         Family History   Problem Relation Age of Onset    Cancer Mother         colon    Cancer Father         bladder    High Blood Pressure Father     Stroke Father     High Blood Pressure Sister        CareTeam (Including outside providers/suppliers regularly involved in providing care):   Patient Care Team:  Laren Ahumada, MD as PCP - General  Laren Ahumada, MD as PCP - [facility] Empaneled Provider  Laren Ahumada, MD as Consulting Physician (Internal Medicine)  Jatinder Dubon RN as Care Transitions Nurse    Wt Readings from Last 3 Encounters:   22 176 lb (79.8 kg)   22 190 lb 3.2 oz (86.3 kg)   05/10/22 195 lb (88.5 kg)     Vitals:    22 1113   BP: 118/63   Pulse: (!) 103   Resp: 20   SpO2: 97%   Weight: 176 lb (79.8 kg)   Height: 5' 5\" (1.651 m)     Body mass index is 29.29 kg/m². Based upon direct observation of the patient, evaluation of cognition reveals recent and remote memory intact. Physical Exam  Vitals and nursing note reviewed. Constitutional:       Appearance: Normal appearance. She is well-developed.       Comments: She is on 6 liters of oxygen    HENT:      Head: Normocephalic and atraumatic. Right Ear: External ear normal.      Left Ear: External ear normal.      Nose: Nose normal.      Mouth/Throat:      Mouth: Mucous membranes are moist.      Pharynx: Oropharynx is clear. Eyes:      Conjunctiva/sclera: Conjunctivae normal.      Pupils: Pupils are equal, round, and reactive to light. Neck:      Thyroid: No thyromegaly. Vascular: No JVD. Cardiovascular:      Rate and Rhythm: Normal rate and regular rhythm. Heart sounds: Normal heart sounds. Pulmonary:      Effort: Pulmonary effort is normal.      Breath sounds: Examination of the left-lower field reveals rales. Rales present. No wheezing. Abdominal:      General: Bowel sounds are normal. There is no distension. Palpations: Abdomen is soft. Tenderness: There is no abdominal tenderness. Musculoskeletal:         General: No tenderness. Cervical back: Neck supple. No rigidity. No muscular tenderness. Right lower leg: No edema. Left lower leg: No edema. Skin:     Findings: No erythema or rash. Neurological:      General: No focal deficit present. Mental Status: She is alert and oriented to person, place, and time.    Psychiatric:         Behavior: Behavior normal.         Judgment: Judgment normal.          Lab Results   Component Value Date/Time     06/17/2022 11:52 AM    K 3.7 06/17/2022 11:52 AM    K 4.0 05/27/2021 04:45 AM    CL 95 06/17/2022 11:52 AM    CO2 34 06/17/2022 11:52 AM    GLUCOSE 124 06/17/2022 11:52 AM    BUN 13 06/17/2022 11:52 AM    CREATININE 0.8 06/17/2022 11:52 AM    CALCIUM 9.3 06/17/2022 11:52 AM    PROT 6.9 06/17/2022 11:52 AM    LABALBU 4.1 06/17/2022 11:52 AM    BILITOT <0.2 06/17/2022 11:52 AM    ALT 38 06/17/2022 11:52 AM    AST 40 06/17/2022 11:52 AM       Hemoglobin A1C (%)   Date Value   06/08/2017 5.4     Microscopic Examination (no units)   Date Value   02/25/2017 Not Indicated     LDL Calculated (mg/dL)   Date Value   10/14/2019 134 (H)         Lab Results   Component Value Date/Time    WBC 9.5 06/17/2022 11:52 AM    NEUTROABS 6.7 06/17/2022 11:52 AM    HGB 13.1 06/17/2022 11:52 AM    HCT 41.7 06/17/2022 11:52 AM    MCV 95.4 06/17/2022 11:52 AM     06/17/2022 11:52 AM       Lab Results   Component Value Date    TSH 0.59 06/17/2022       Patient's complete Health Risk Assessment and screening values have been reviewed and are found in Flowsheets. The following problems were reviewed today and where indicated follow up appointments were made and/or referrals ordered.     Positive Risk Factor Screenings with Interventions:       Depression:  PHQ-9 Total Score: 6    Severity:1-4 = minimal depression, 5-9 = mild depression, 10-14 = moderate depression, 15-19 = moderately severe depression, 20-27 = severe depression  Depression Interventions:  Patient advised to follow-up in this office for further evaluation and treatment within 1 week  Medication prescribed- Xanax      General Health and ACP:  General  In general, how would you say your health is?: (!) Poor  In the past 7 days, have you experienced any of the following: New or Increased Pain, New or Increased Fatigue, Loneliness, Social Isolation, Stress or Anger?: (!) Yes  Select all that apply: (!) Stress, Loneliness  Do you get the social and emotional support that you need?: (!) No  Do you have a Living Will?: (!) No    Advance Directives       Power of  Living Will ACP-Advance Directive ACP-Power of     Not on File Not on File Not on File Not on File        General Health Risk Interventions:  No Living Will: [address] addressed with patient today    Health Habits/Nutrition:  Physical Activity: Inactive    Days of Exercise per Week: 0 days    Minutes of Exercise per Session: 10 min     Have you lost any weight without trying in the past 3 months?: (!) Yes  Body mass index: (!) 29.28  Have you seen the dentist within the past year?: (!) No  Health Habits/Nutrition Interventions:  Nutritional issues:  educational materials for healthy, well-balanced diet provided  Dental exam overdue:  patient encouraged to make appointment with his/her dentist, [facility]  Patient states she continues to have diarrhea after meals. Her weight is down  14 pounds since last visit.      Safety:  Do you have working smoke detectors?: (!) No  Do you have any tripping hazards - loose or unsecured carpets or rugs?: No  Do you have any tripping hazards - clutter in doorways, halls, or stairs?: No  Do you have either shower bars, grab bars, non-slip mats or non-slip surfaces in your shower or bathtub?: (!) No  Do all of your stairways have a railing or banister?: Not Applicable  Do you always fasten your seatbelt when you are in a car?: Yes  Safety Interventions:  Home safety tips provided    ADLs:  In the past 7 days, did you need help from others to perform any of the following everyday activities: Eating, dressing, grooming, bathing, toileting, or walking/balance?: No  In the past 7 days, did you need help from others to take care of any of the following: Laundry, housekeeping, banking/finances, shopping, telephone use, food preparation, transportation, or taking medications?: (!) Yes  Select all that apply: [employer], Housekeeping, Shopping  ADL Interventions:  Patient declines any further evaluation/treatment for this issue    Personalized Preventive Plan   Current Health Maintenance Status  Immunization History   Administered Date(s) Administered    COVID-19, J&J, (age 18y+), IM, 0.5 mL 03/17/2021    Hepatitis A Adult (Havrix, Vaqta) 08/10/2018    Influenza Virus Vaccine 11/05/2019    Influenza, AFLURIA (age 1 yrs+), FLUZONE, (age 10 mo+), MDV, 0.5mL 09/30/2020    Influenza, FLUARIX, FLULAVAL, FLUZONE (age 10 mo+) AND AFLURIA, (age 1 y+), PF, 0.5mL 10/11/2016    Influenza, FLUCELVAX, (age 10 mo+), MDCK, MDV, 0.5mL 11/03/2017    PPD Test 08/13/2015, 10/31/2017    Pneumococcal Conjugate 13-valent ([id number]) 08/13/2015    Pneumococcal Polysaccharide ([id number]) 08/01/2011    Tdap (Boostrix, Adacel) 03/07/2018        Health Maintenance   Topic Date Due    Diabetes screen  06/08/2020    COVID-19 Vaccine (2 - Luciano risk series) 04/14/2021    Annual Wellness Visit (AWV)  03/24/2022    Flu vaccine (1) 09/01/2022    Breast cancer screen  10/05/2022    Depression Monitoring  06/21/2023    Lipids  10/14/2024    Colorectal Cancer Screen  02/03/2026    DTaP/Tdap/Td vaccine (2 - Td or Tdap) 03/07/2028    Pneumococcal 0-64 years Vaccine (4 - PPSV23 or PCV20) 05/24/2029    Hepatitis C screen  Completed    HIV screen  Completed    Hepatitis A vaccine  Aged Out    Hepatitis B vaccine  Aged Out    Hib vaccine  Aged Out    Meningococcal (ACWY) vaccine  Aged Out     Recommendations for IPLocks Due: see orders and patient instructions/AVS.    EKG reviewed 5/26/2021     I did discuss cardiovascular risk with patient. Will make sure blood pressure and lipid profile under good control. Discussed the importance of increased activity level/staying active. Recommended screening schedule for the next 5-10 years is provided to the patient in written form: see Patient Instructions/AVS.    Patient has been dealing with chronic diarrhea. This been going on for a while now. I am going to proceed with stool studies. Advised her to hold nintedanib for about a week since could be side effect from it. Referral to GI if no better. Roxann Moore MA am scribing for and in the presence of Essie Sun MD on this date of 09/12/22 at 11:51 AM    I, Dr. Essie Sun, personally performed the services described in the documentation as scribed by Zully Montejo MA, in my presence and it is both accurate and complete.

## 2022-09-14 ENCOUNTER — HOSPITAL ENCOUNTER (OUTPATIENT)
Facility: HOSPITAL | Age: 57
Discharge: HOME | End: 2022-09-14
Payer: MEDICARE

## 2022-09-14 DIAGNOSIS — R19.7 DIARRHEA, UNSPECIFIED TYPE: ICD-10-CM

## 2022-09-14 PROCEDURE — 87209 SMEAR COMPLEX STAIN: CPT

## 2022-09-14 PROCEDURE — 87177 OVA AND PARASITES SMEARS: CPT

## 2022-09-20 ENCOUNTER — TELEPHONE (OUTPATIENT)
Dept: PRIMARY CARE CLINIC | Age: 57
End: 2022-09-20

## 2022-09-23 LAB — INTERPRETATION: NEGATIVE

## 2022-10-06 ENCOUNTER — TELEPHONE (OUTPATIENT)
Dept: PRIMARY CARE CLINIC | Age: 57
End: 2022-10-06

## 2022-10-06 ENCOUNTER — HOSPITAL ENCOUNTER (OUTPATIENT)
Dept: MAMMOGRAPHY | Facility: HOSPITAL | Age: 57
Discharge: HOME | End: 2022-10-06
Payer: MEDICARE

## 2022-10-06 VITALS — BODY MASS INDEX: 28.12 KG/M2 | WEIGHT: 169 LBS

## 2022-10-06 DIAGNOSIS — Z12.31 ENCOUNTER FOR SCREENING MAMMOGRAM FOR MALIGNANT NEOPLASM OF BREAST: ICD-10-CM

## 2022-10-06 PROCEDURE — 77067 SCR MAMMO BI INCL CAD: CPT

## 2022-10-06 NOTE — TELEPHONE ENCOUNTER
Referral was placed.   She can use Imodium on as-needed basis for now after each soft bowel movement

## 2022-10-06 NOTE — TELEPHONE ENCOUNTER
Pt called stating that she still has diarrhea \"every time she eats anything, has 4-5 episodes of it\" Asking if you will refer her to GI \"does not want to stop that lung med that you thought may be causing it\" States she is still losing weight and down to 169 lb

## 2022-10-08 RX ORDER — LEVOTHYROXINE SODIUM 0.15 MG/1
150 TABLET ORAL DAILY
Qty: 30 TABLET | Refills: 2 | Status: SHIPPED | OUTPATIENT
Start: 2022-10-08

## 2022-10-12 DIAGNOSIS — F41.8 DEPRESSION WITH ANXIETY: ICD-10-CM

## 2022-10-12 RX ORDER — FOLIC ACID 1 MG/1
1 TABLET ORAL DAILY
Qty: 90 TABLET | Refills: 1 | Status: SHIPPED | OUTPATIENT
Start: 2022-10-12

## 2022-10-12 RX ORDER — LEVOTHYROXINE SODIUM 175 UG/1
TABLET ORAL
Qty: 30 TABLET | Refills: 2 | OUTPATIENT
Start: 2022-10-12

## 2022-10-12 RX ORDER — DEXLANSOPRAZOLE 60 MG/1
CAPSULE, DELAYED RELEASE ORAL
Qty: 30 CAPSULE | Refills: 5 | Status: SHIPPED | OUTPATIENT
Start: 2022-10-12

## 2022-10-12 RX ORDER — PANTOPRAZOLE SODIUM 40 MG/1
TABLET, DELAYED RELEASE ORAL
Qty: 90 TABLET | Refills: 3 | Status: SHIPPED | OUTPATIENT
Start: 2022-10-12

## 2022-10-12 RX ORDER — VENLAFAXINE HYDROCHLORIDE 75 MG/1
CAPSULE, EXTENDED RELEASE ORAL
Qty: 270 CAPSULE | Refills: 3 | Status: SHIPPED | OUTPATIENT
Start: 2022-10-12

## 2022-10-28 ENCOUNTER — TELEPHONE (OUTPATIENT)
Dept: PRIMARY CARE CLINIC | Age: 57
End: 2022-10-28

## 2022-10-28 NOTE — TELEPHONE ENCOUNTER
Can u help with appointment plz.    Also she can try to discuss with Dr Darleen You to hold her lung med for about 1-2 weeks!!!

## 2022-10-28 NOTE — TELEPHONE ENCOUNTER
Pt still losing weight, and still having diarrhea. Asking if you can get her in to see GI at [facility] sooner that what she was scheduled in [place]. States all her other specialists are there so she doesn't care to go.

## 2022-11-01 NOTE — TELEPHONE ENCOUNTER
Tried calling pt to let her know Maty Small is going to see about getting her in with [facility] instead of [place] and left message about discussing lung medicine. I think Ruel Tian already mentioned it to her and she refused to talk to him about stopping it.

## 2022-11-14 ENCOUNTER — HOSPITAL ENCOUNTER (OUTPATIENT)
Dept: NON INVASIVE DIAGNOSTICS | Facility: HOSPITAL | Age: 57
Discharge: HOME | End: 2022-11-14
Payer: MEDICARE

## 2022-11-14 DIAGNOSIS — I27.20 PORTOPULMONARY HYPERTENSION (HCC): ICD-10-CM

## 2022-11-14 DIAGNOSIS — K76.6 PORTOPULMONARY HYPERTENSION (HCC): ICD-10-CM

## 2022-11-14 LAB
LV EF: 58 %
LVEF MODALITY: NORMAL

## 2022-11-14 PROCEDURE — 93306 TTE W/DOPPLER COMPLETE: CPT

## 2022-11-16 ENCOUNTER — OUTSIDE FACILITY SERVICE (OUTPATIENT)
Dept: CARDIOLOGY | Facility: CLINIC | Age: 57
End: 2022-11-16

## 2022-11-16 PROCEDURE — OUTSIDEPOS PR OUTSIDE POS PLACEHOLDER: Performed by: INTERNAL MEDICINE

## 2022-12-15 ENCOUNTER — OFFICE VISIT (OUTPATIENT)
Dept: PRIMARY CARE CLINIC | Age: 57
End: 2022-12-15

## 2022-12-15 ENCOUNTER — HOSPITAL ENCOUNTER (OUTPATIENT)
Facility: HOSPITAL | Age: 57
Discharge: HOME | End: 2022-12-15
Payer: MEDICARE

## 2022-12-15 VITALS
WEIGHT: 172 LBS | OXYGEN SATURATION: 99 % | HEIGHT: 65 IN | DIASTOLIC BLOOD PRESSURE: 84 MMHG | TEMPERATURE: 98.1 F | SYSTOLIC BLOOD PRESSURE: 123 MMHG | BODY MASS INDEX: 28.66 KG/M2

## 2022-12-15 DIAGNOSIS — E03.9 HYPOTHYROIDISM, UNSPECIFIED TYPE: ICD-10-CM

## 2022-12-15 DIAGNOSIS — I27.20 PULMONARY HYPERTENSION (HCC): ICD-10-CM

## 2022-12-15 DIAGNOSIS — J84.112 IDIOPATHIC PULMONARY FIBROSIS (HCC): ICD-10-CM

## 2022-12-15 DIAGNOSIS — J84.112 IDIOPATHIC PULMONARY FIBROSIS (HCC): Primary | ICD-10-CM

## 2022-12-15 PROCEDURE — 86762 RUBELLA ANTIBODY: CPT

## 2022-12-15 PROCEDURE — 86787 VARICELLA-ZOSTER ANTIBODY: CPT

## 2022-12-15 PROCEDURE — 86765 RUBEOLA ANTIBODY: CPT

## 2022-12-15 PROCEDURE — 84443 ASSAY THYROID STIM HORMONE: CPT

## 2022-12-15 PROCEDURE — 86735 MUMPS ANTIBODY: CPT

## 2022-12-15 PROCEDURE — 80053 COMPREHEN METABOLIC PANEL: CPT

## 2022-12-15 RX ORDER — TRAZODONE HYDROCHLORIDE 100 MG/1
TABLET ORAL
COMMUNITY
Start: 2022-11-08

## 2022-12-15 ASSESSMENT — ENCOUNTER SYMPTOMS
EYE PAIN: 0
COUGH: 0
ABDOMINAL PAIN: 0
SHORTNESS OF BREATH: 0
CONSTIPATION: 0
DIARRHEA: 0
SORE THROAT: 0

## 2022-12-15 NOTE — PROGRESS NOTES
Chief Complaint   Patient presents with    Pre-op Exam       Have you seen any other physician or provider since your last visit yes - patient has been approved for a lung transplant    Have you had any other diagnostic tests since your last visit?  yes     Have you changed or stopped any medications since your last visit? no       Immunizations Administered       Name Date Dose Route    Hepatitis B Adult (Engerix-B) 12/15/2022 1 mL Intramuscular    Site: Deltoid- Left    Lot: [id number]    NDC: 33642-726-96

## 2022-12-15 NOTE — PATIENT INSTRUCTIONS
Keep a list of your medicines with you. List all of the prescription medicines, nonprescription medicines, supplements, natural remedies, and vitamins that you take. Tell your healthcare providers who treat you about all of the products you are taking. Your provider can provide you with a form to keep track of them. Just ask. Follow the directions that come with your medicine, including information about food or alcohol. Make sure you know how and when to take your medicine. Do not take more or less than you are supposed to take. Keep all medicines out of the reach of children. Store medicines according to the directions on the label. Monitor yourself. Learn to know how your body reacts to your new medicine and keep track of how it makes you feel before attempting (If your provider has allowed you to do so) to drive or go to work. Seek emergency medical attention if you think you have used too much of this medicine. An overdose of any prescription medicine can be fatal. Overdose symptoms may include extreme drowsiness, muscle weakness, confusion, cold and clammy skin, pinpoint pupils, shallow breathing, slow heart rate, fainting, or coma. Don't share prescription medicines with others, even when they seem to have the same symptoms. What may be good for you may be harmful to others. If you are no longer taking a prescribed medication and you have pills left please take your pills out of their original containers. Mix crushed pills with an undesirable substance, such as cat litter or used coffee grounds. Put the mixture into a disposable container with a lid, such as an empty margarine tub, or into a sealable bag. Cover up or remove any of your personal information on the empty containers by covering it with black permanent marker or duct tape. Place the sealed container with the mixture, and the empty drug containers, in the trash.    If you use a medication that is in the form of a patch, dispose of used patches by folding them in half so that the sticky sides meet, and then flushing them down a toilet. They should not be placed in the household trash where children or pets can find them. If you have any questions, ask your provider or pharmacist for more information. Be sure to keep all appointments for provider visits or tests. We are committed to providing you with the best care possible. In order to help us achieve these goals please remember to bring all medications, herbal products, and over the counter supplements with you to each visit. If your provider has ordered testing for you, please be sure to follow up with our office if you have not received results within 7 days after the testing took place. *If you receive a survey after visiting one of our offices, please take time to share your experience concerning your physician office visit. These surveys are confidential and no health information about you is shared. We are eager to improve for you and we are counting on your feedback to help make that happen. ips to Help You Stop Smoking       Cigarette smoking is a preventable cause of death in the [place]. If you have thought about quitting but haven't been able to, here are some reasons why you should and some ways to do it. Here's Why   Quitting smoking now can decrease your risk of getting smoking-related illnesses like:   Heart disease   Stroke   Several types of cancer, including:   Lung   Mouth   Esophagus   Larynx   Bladder   Pancreas   Kidney   Chronic lung diseases:   Bronchitis   Emphysema   Asthma   Cataracts   Macular degeneration   Thyroid conditions   Hearing loss   Erectile dysfunction   Dementia   Osteoporosis   Here's How   Once you've decided to quit smoking, set your target quit date a few weeks away.  In the time leading up to your quit day, try some of these ideas offered by the [address] [place] [address] of the [facility] to help you successfully quit smoking. For the best results, work with your doctor. Together, you can test your lung function and compare the results to those of a nonsmoking person. The results can be given to you as your lung age. Finding out your lung age right after having the test done may help you to stop smoking. Your doctor can also discuss with you all of your options and refer you to smoking-cessation support groups. You may wish to use nicotine replacement (gum, patches, inhaler) or one of the prescription medications that have been shown to increase quit rates and prolong abstinence from smoking. But whatever you and your doctor decide on these matters, it will still be you who decides when an how to quit. Here are some techniques:   Switch Brands   Switch to a brand you find distasteful. Change to a brand that is low in tar and nicotine a couple of weeks before your target quit date. This will help change your smoking behavior. However, do not smoke more cigarettes, inhale them more often or more deeply, or place your fingertips over the holes in the filters. All of these actions will increase your nicotine intake, and the idea is to get your body used to functioning without nicotine. Cut Down the Number of Cigarettes You Smoke   Smoke only half of each cigarette. Each day, postpone the lighting of your first cigarette by one hour. Decide you'll only smoke during odd or even hours of the day. Decide beforehand how many cigarettes you'll smoke during the day. For each additional cigarette, give a dollar to your favorite olegario. Change your eating habits to help you cut down. For example, drink milk, which many people consider incompatible with smoking. End meals or snacks with something that won't lead to a cigarette. Reach for a glass of juice instead of a cigarette for a \"pick-me-up. \"   Remember: Cutting down can help you quit, but it's not a substitute for quitting.  If you're down to about seven cigarettes a day, it's time to set your target quit date, and get ready to stick to it. Don't Smoke \"Automatically\"   Smoke only those cigarettes you really want. Catch yourself before you light up a cigarette out of pure habit. Don't empty your ashtrays. This will remind you of how many cigarettes you've smoked each day, and the sight and the smell of stale cigarettes butts will be very unpleasant. Make yourself aware of each cigarette by using the opposite hand or putting cigarettes in an unfamiliar location or a different pocket to break the automatic reach. If you light up many times during the day without even thinking about it, try to look in a mirror each time you put a match to your cigarette. You may decide you don't need it. Make Smoking Inconvenient   Stop buying cigarettes by the carton. Wait until one pack is empty before you buy another. Stop carrying cigarettes with you at home or at work. Make them difficult to get to. Make Smoking Unpleasant   Smoke only under circumstances that aren't especially pleasurable for you. If you like to smoke with others, smoke alone. Turn your chair to an empty corner and focus only on the cigarette you are smoking and all its many negative effects. Collect all your cigarette butts in one large glass container as a visual reminder of the filth made by smoking. Just Before Quitting   Practice going without cigarettes. Don't think of never smoking again. Think of quitting in terms of one day at a time . Tell yourself you won't smoke today, and then don't. Clean your clothes to rid them of the cigarette smell, which can linger a long time. On the Day You Quit   Throw away all your cigarettes and matches. Hide your lighters and ashtrays. Visit the dentist and have your teeth cleaned to get rid of tobacco stains. Notice how nice they look and resolve to keep them that way.    Make a list of things you'd like to buy for yourself or someone else. Estimate the cost in terms of packs of cigarettes, and put the money aside to buy these presents. Keep very busy on the big day. Go to the movies, exercise, take long walks, or go bike riding. Remind your family and friends that this is your quit date, and ask them to help you over the rough spots of the first couple of days and weeks. Buy yourself a treat or do something special to celebrate. Telephone and Internet Support   Telephone, web-, and computer-based programs can offer you the support that you need to quit and to stay smoke-free. You can find many programs online, like the American [employer] Association's [place] from Smoking . Immediately After Quitting   Develop a clean, fresh, nonsmoking environment around yourselfat work and at home. Buy yourself flowersyou may be surprised how much you can enjoy their scent now. The first few days after you quit, spend as much free time as possible in places where smoking isn't allowed, such as [address] [place] [address], museums, theaters, department stores, and churches. Drink large quantities of water and fruit juice (but avoid sodas that contain caffeine). Try to avoid alcohol, coffee, and other beverages that you associate with cigarette smoking. Strike up conversation instead of a match for a cigarette. If you miss the sensation of having a cigarette in your hand, play with something elsea pencil, a paper clip, a marble. If you miss having something in your mouth, try toothpicks or a fake cigarette.

## 2022-12-15 NOTE — PROGRESS NOTES
Evelyn Murray (:  1965) is a 61 y.o. female,Established patient, here for evaluation of the following chief complaint(s):  Pre-op Exam           Subjective   SUBJECTIVE/OBJECTIVE:  HPI  Ms. Norma Sinclair is a pleasant 61year old female with idiopathic pulmonary fibrosis and Pulmonary hypertension on oxygen up 10 L with ambulation and 3 at rest who is going for bilateral lung transplant and needs several tests preop ordered. She had a cardiac workup with a heart cath; one vessel that was 50% occluded last week. It appears she is up-to-date on her pneumococcal vaccines. She has had yearly influenza vaccine as well as COVID vaccines. She is due for colonoscopy, Pap smear due to history of precancerous cervix cells status post total abdominal hysterectomy and BSO at the age of 35. She is required to have MMR vaccine and hepatitis B vaccine. Review of Systems   Constitutional:  Negative for chills, fatigue and fever. HENT:  Negative for congestion, ear pain, nosebleeds and sore throat. Eyes:  Negative for pain and visual disturbance. Respiratory:  Negative for cough and shortness of breath. Cardiovascular:  Negative for chest pain and palpitations. Gastrointestinal:  Negative for abdominal pain, constipation and diarrhea. Genitourinary:  Negative for difficulty urinating and flank pain. Musculoskeletal:  Negative for arthralgias, gait problem and myalgias. Skin:  Negative for rash. Neurological:  Negative for syncope, light-headedness and headaches. Psychiatric/Behavioral:  Negative for behavioral problems, confusion and dysphoric mood. Objective     Vitals:    12/15/22 1529   BP: 123/84   Temp: 98.1 °F (36.7 °C)   SpO2: 99%   Weight: 172 lb (78 kg)   Height: 5' 5\" (1.651 m)     Physical Exam  Vitals reviewed. Constitutional:       Appearance: Normal appearance. HENT:      Head: Normocephalic and atraumatic.       Right Ear: Tympanic membrane, ear canal and external ear normal.      Left Ear: Tympanic membrane, ear canal and external ear normal.      Nose: Nose normal.      Mouth/Throat:      Mouth: Mucous membranes are moist.      Pharynx: Oropharynx is clear. Eyes:      Extraocular Movements: Extraocular movements intact. Conjunctiva/sclera: Conjunctivae normal.      Pupils: Pupils are equal, round, and reactive to light. Neck:      Vascular: No carotid bruit. Cardiovascular:      Rate and Rhythm: Normal rate and regular rhythm. Pulses: Normal pulses. Heart sounds: Normal heart sounds. Pulmonary:      Effort: Pulmonary effort is normal.      Breath sounds: Normal breath sounds. Abdominal:      General: Bowel sounds are normal.      Palpations: Abdomen is soft. Musculoskeletal:         General: Normal range of motion. Cervical back: Normal range of motion. Skin:     General: Skin is warm. Findings: No rash. Neurological:      General: No focal deficit present. Mental Status: She is alert and oriented to person, place, and time. Psychiatric:         Mood and Affect: Mood normal.         Thought Content: Thought content normal.               ASSESSMENT/PLAN:  1. Idiopathic pulmonary fibrosis ([address]  Prepping for bilateral lung transplant  - Mumps, Measles, Rubella, and Varicella Zoster, IgG; Future  - External Referral To General Surgery  - External Referral To Gynecology  - Comprehensive Metabolic Panel; Future  - Hep B, ENGERIX-B, (age 21 yrs+), IM, 1mL, 3-dose  Have instructed her to go to the pharmacy for her shingles vaccine. She can return if titers are negative and she does need MMR vaccine and for continued doses of hep  B vaccine in 1 month then 6 months. 2. Pulmonary hypertension ([address]  - Comprehensive Metabolic Panel; Future  - Hep B, ENGERIX-B, (age 21 yrs+), IM, 1mL, 3-dose    3. Hypothyroidism, unspecified type  - TSH; Future  - Comprehensive Metabolic Panel;  Future      Return in about 4 weeks (around 1/12/2023) for Hep B vaccine due. An electronic signature was used to authenticate this note.     --Sangeeta Tyler PA-C

## 2022-12-16 LAB
A/G RATIO: 1.5 (ref 0.8–2)
ALBUMIN SERPL-MCNC: 4.1 G/DL (ref 3.4–4.8)
ALP BLD-CCNC: 163 U/L (ref 25–100)
ALT SERPL-CCNC: 28 U/L (ref 4–36)
ANION GAP SERPL CALCULATED.3IONS-SCNC: 9 MMOL/L (ref 3–16)
AST SERPL-CCNC: 27 U/L (ref 8–33)
BILIRUB SERPL-MCNC: 0.3 MG/DL (ref 0.3–1.2)
BUN BLDV-MCNC: 14 MG/DL (ref 6–20)
CALCIUM SERPL-MCNC: 9.5 MG/DL (ref 8.5–10.5)
CHLORIDE BLD-SCNC: 100 MMOL/L (ref 98–107)
CO2: 34 MMOL/L (ref 20–30)
CREAT SERPL-MCNC: 0.9 MG/DL (ref 0.4–1.2)
GFR SERPL CREATININE-BSD FRML MDRD: >60 ML/MIN/{1.73_M2}
GLOBULIN: 2.8 G/DL
GLUCOSE BLD-MCNC: 113 MG/DL (ref 74–106)
POTASSIUM SERPL-SCNC: 3.8 MMOL/L (ref 3.4–5.1)
SODIUM BLD-SCNC: 143 MMOL/L (ref 136–145)
TOTAL PROTEIN: 6.9 G/DL (ref 6.4–8.3)
TSH SERPL DL<=0.05 MIU/L-ACNC: 1.15 UIU/ML (ref 0.27–4.2)

## 2022-12-17 LAB
MEASLES IMMUNE (IGG): NORMAL
MUMPS AB IGG: NORMAL
RUBELLA ANTIBODY IGG: NORMAL
VARICELLA-ZOSTER VIRUS AB, IGG: NORMAL

## 2022-12-19 ENCOUNTER — OFFICE VISIT (OUTPATIENT)
Dept: PRIMARY CARE CLINIC | Age: 57
End: 2022-12-19
Payer: MEDICARE

## 2022-12-19 VITALS
HEART RATE: 108 BPM | WEIGHT: 171.2 LBS | BODY MASS INDEX: 28.49 KG/M2 | RESPIRATION RATE: 18 BRPM | SYSTOLIC BLOOD PRESSURE: 112 MMHG | OXYGEN SATURATION: 93 % | DIASTOLIC BLOOD PRESSURE: 64 MMHG

## 2022-12-19 DIAGNOSIS — J84.112 IDIOPATHIC PULMONARY FIBROSIS (HCC): Primary | ICD-10-CM

## 2022-12-19 DIAGNOSIS — E03.9 HYPOTHYROIDISM, UNSPECIFIED TYPE: ICD-10-CM

## 2022-12-19 DIAGNOSIS — Z12.11 SCREEN FOR COLON CANCER: ICD-10-CM

## 2022-12-19 DIAGNOSIS — I27.20 PULMONARY HYPERTENSION (HCC): ICD-10-CM

## 2022-12-19 PROCEDURE — G8484 FLU IMMUNIZE NO ADMIN: HCPCS | Performed by: INTERNAL MEDICINE

## 2022-12-19 PROCEDURE — 99213 OFFICE O/P EST LOW 20 MIN: CPT | Performed by: INTERNAL MEDICINE

## 2022-12-19 PROCEDURE — 1036F TOBACCO NON-USER: CPT | Performed by: INTERNAL MEDICINE

## 2022-12-19 PROCEDURE — 3017F COLORECTAL CA SCREEN DOC REV: CPT | Performed by: INTERNAL MEDICINE

## 2022-12-19 PROCEDURE — G8427 DOCREV CUR MEDS BY ELIG CLIN: HCPCS | Performed by: INTERNAL MEDICINE

## 2022-12-19 PROCEDURE — 3078F DIAST BP <80 MM HG: CPT | Performed by: INTERNAL MEDICINE

## 2022-12-19 PROCEDURE — G8417 CALC BMI ABV UP PARAM F/U: HCPCS | Performed by: INTERNAL MEDICINE

## 2022-12-19 PROCEDURE — 3074F SYST BP LT 130 MM HG: CPT | Performed by: INTERNAL MEDICINE

## 2022-12-19 RX ORDER — HYDROXYZINE HYDROCHLORIDE 25 MG/1
25 TABLET, FILM COATED ORAL 2 TIMES DAILY PRN
Qty: 45 TABLET | Refills: 1 | Status: SHIPPED | OUTPATIENT
Start: 2022-12-19

## 2022-12-19 ASSESSMENT — ENCOUNTER SYMPTOMS
NAUSEA: 0
ABDOMINAL PAIN: 0
EYE DISCHARGE: 0
SINUS PRESSURE: 0
VOMITING: 0
BACK PAIN: 1
SHORTNESS OF BREATH: 1
SORE THROAT: 0
WHEEZING: 1
COUGH: 1

## 2022-12-19 NOTE — PROGRESS NOTES
Chief Complaint   Patient presents with    COPD    Anxiety       Have you seen any other physician or provider since your last visit yes - [facility]     Have you had any other diagnostic tests since your last visit? no    Have you changed or stopped any medications since your last visit? no

## 2022-12-19 NOTE — PROGRESS NOTES
SUBJECTIVE:    Patient ID: Shlomo Quezada is a 61 y. o.female. Chief Complaint   Patient presents with    COPD    Anxiety         HPI:  Patient has had COPD/pulmonary hypertension for a long time. She has been using nebulizer inhalation treatments as ordered. She is on oxygen. She has some dyspnea with exertion. With on and off cough, SOB and wheezing that does respond to treatment. She has been using the Albuterol inhaler. Patient in the process of getting lung transplant. Patient  has had hypothyroidism for the past few years. She has been compliant with taking her medication without side effects. Patient's medications, allergies, past medical, surgical, social and family histories were reviewed and updated as appropriate in electronic medical record.         Outpatient Medications Marked as Taking for the 12/19/22 encounter (Office Visit) with Charles Cain MD   Medication Sig Dispense Refill    traZODone (DESYREL) 100 MG tablet       pantoprazole (PROTONIX) 40 MG tablet TAKE 1 TABLET BY MOUTH EVERY DAY 90 tablet 3    venlafaxine (EFFEXOR XR) 75 MG extended release capsule TAKE 3 CAPSULES BY MOUTH DAILY 374 capsule 3    folic acid (FOLVITE) 1 MG tablet TAKE 1 TABLET BY MOUTH DAILY 90 tablet 1    DEXILANT 60 MG CPDR delayed release capsule TAKE 1 CAPSULE BY MOUTH DAILY 30 capsule 5    levothyroxine (SYNTHROID) 150 MCG tablet Take 1 tablet by mouth Daily 30 tablet 2    sildenafil (REVATIO) 20 MG tablet Take 20 mg by mouth 3 times daily      fluticasone (FLONASE) 50 MCG/ACT nasal spray SHAKE LIQUID AND USE 1 TO 2 SPRAYS IN EACH NOSTRIL EVERY DAY FOR 10 DAYS AS DIRECTED AS NEEDED FOR SINUS CONGESTION 48 g 0    hydrOXYzine HCl (ATARAX) 25 MG tablet Take 1 tablet by mouth 2 times daily as needed for Itching or Anxiety 45 tablet 1    albuterol sulfate HFA (PROAIR HFA) 108 (90 Base) MCG/ACT inhaler Inhale 2 puffs into the lungs every 6 hours as needed for Wheezing 1 each 5    hydroCHLOROthiazide (HYDRODIURIL) 25 MG tablet TAKE 1 TABLET BY MOUTH EVERY DAY 90 tablet 5    digoxin (LANOXIN) 125 MCG tablet Take 125 mcg by mouth daily      ipratropium-albuterol (DUONEB) 0.5-2.5 (3) MG/3ML SOLN nebulizer solution Inhale 1 vial into the lungs every 4 hours      furosemide (LASIX) 20 MG tablet Take 1 tablet by mouth 2 times daily as needed (worsening swelling, weight up by 3lbs or more) 60 tablet 3    Nintedanib Esylate 150 MG CAPS Take by mouth 2 times daily          Review of Systems   Constitutional:  Positive for fatigue. Negative for chills and fever. HENT:  Negative for congestion, sinus pressure and sore throat. Eyes:  Negative for discharge and visual disturbance. Respiratory:  Positive for cough, shortness of breath and wheezing. Cardiovascular:  Negative for chest pain and palpitations. AYALA   Gastrointestinal:  Negative for abdominal pain, nausea and vomiting. Endocrine: Negative for cold intolerance and heat intolerance. Genitourinary:  Negative for dysuria, frequency and urgency. Musculoskeletal:  Positive for arthralgias and back pain. Skin:  Negative for rash and wound. Neurological:  Negative for syncope, numbness and headaches. Hematological: Negative. Psychiatric/Behavioral:  Negative for agitation and sleep disturbance. The patient is nervous/anxious.       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     Hyperlipidemia     Hypertension     Hypothyroidism     Interstitial lung disease (HCC)     Lung fibrosis (HCC)     Lung nodules     bilateral lungs    Pulmonary hypertension ([address]      Past Surgical History:   Procedure Laterality Date    BREAST BIOPSY Right      SECTION      GALLBLADDER SURGERY      HYSTERECTOMY (CERVIX STATUS UNKNOWN)       Family History   Problem Relation Age of Onset    Cancer Mother         colon    Cancer Father         bladder    High Blood Pressure Father     Stroke Father     High Blood Pressure Sister       Social History     Tobacco Use   Smoking Status Former    Packs/day: 1.00    Years: 30.00    Pack years: 30.00    Types: Cigarettes    Quit date: 1/1/2007    Years since quitting: 15.9   Smokeless Tobacco Never       OBJECTIVE:   Wt Readings from Last 3 Encounters:   12/19/22 171 lb 3.2 oz (77.7 kg)   12/15/22 172 lb (78 kg)   10/06/22 169 lb (76.7 kg)     BP Readings from Last 3 Encounters:   12/19/22 112/64   12/15/22 123/84   09/12/22 118/63       /64   Pulse (!) 108   Resp 18   Wt 171 lb 3.2 oz (77.7 kg)   SpO2 93% Comment: 3 liters 02  BMI 28.49 kg/m²      Physical Exam  Vitals and nursing note reviewed. Constitutional:       Appearance: Normal appearance. She is well-developed. Comments: She is on 3 liters of oxygen today. States she has to increase when up walking. HENT:      Head: Normocephalic and atraumatic. Right Ear: External ear normal.      Left Ear: External ear normal.      Nose: Nose normal.      Mouth/Throat:      Mouth: Mucous membranes are moist.      Pharynx: Oropharynx is clear. Eyes:      Conjunctiva/sclera: Conjunctivae normal.      Pupils: Pupils are equal, round, and reactive to light. Neck:      Thyroid: No thyromegaly. Vascular: No JVD. Cardiovascular:      Rate and Rhythm: Normal rate and regular rhythm. Heart sounds: Normal heart sounds. Pulmonary:      Effort: Pulmonary effort is normal.      Breath sounds: Rales (few fine rales in the bases) present. No wheezing. Abdominal:      General: Bowel sounds are normal. There is no distension. Palpations: Abdomen is soft. Tenderness: There is no abdominal tenderness. Musculoskeletal:         General: No tenderness. Cervical back: Neck supple. No rigidity. No muscular tenderness. Right lower leg: No edema.  Left lower leg: No edema.   Skin:     Findings: No erythema or rash.   Neurological:      General: No focal deficit present.      Mental Status: She is alert and oriented to person, place, and time.   Psychiatric:         Behavior: Behavior normal.         Judgment: Judgment normal.       Lab Results   Component Value Date/Time     12/15/2022 04:13 PM    K 3.8 12/15/2022 04:13 PM    K 4.0 05/27/2021 04:45 AM     12/15/2022 04:13 PM    CO2 34 12/15/2022 04:13 PM    GLUCOSE 113 12/15/2022 04:13 PM    BUN 14 12/15/2022 04:13 PM    CREATININE 0.9 12/15/2022 04:13 PM    CALCIUM 9.5 12/15/2022 04:13 PM    PROT 6.9 12/15/2022 04:13 PM    LABALBU 4.1 12/15/2022 04:13 PM    BILITOT 0.3 12/15/2022 04:13 PM    ALT 28 12/15/2022 04:13 PM    AST 27 12/15/2022 04:13 PM       Hemoglobin A1C (%)   Date Value   06/08/2017 5.4     Microscopic Examination (no units)   Date Value   02/25/2017 Not Indicated     LDL Calculated (mg/dL)   Date Value   10/14/2019 134 (H)         Lab Results   Component Value Date/Time    WBC 7.1 09/12/2022 12:15 PM    NEUTROABS 5.1 09/12/2022 12:15 PM    HGB 12.7 09/12/2022 12:15 PM    HCT 39.5 09/12/2022 12:15 PM    MCV 93.8 09/12/2022 12:15 PM     09/12/2022 12:15 PM       Lab Results   Component Value Date    TSH 1.15 12/15/2022         ASSESSMENT/PLAN:     1. Idiopathic pulmonary fibrosis (HCC)  Continue current regimen and continue to follow-up with pulmonary team for further evaluation/process for lung transplant.  Proceed with referral for screening colonoscopy as part of the work-up for her lung transplant.    - External Referral To General Surgery    2. Pulmonary hypertension (HCC)  Continue current treatment and continue follow-up with pulmonology/cardiology clinic.  Continue oxygen and other inhalers/medications.  Discussed the importance of weight control and staying active.    - External Referral To General Surgery    3. Hypothyroidism, unspecified type  Last TSH is WNL. I  am going to check the TSH every few months. I am going to continue the current dose of Levothyroxine. I have advised her on the importance of taking the medication on a daily basis. Lab Results   Component Value Date    TSH 1.15 12/15/2022     4. Screen for colon cancer  Will proceed with referral for colonoscopy. - External Referral To General Surgery    Orders Placed This Encounter   Medications    hydrOXYzine HCl (ATARAX) 25 MG tablet     Sig: Take 1 tablet by mouth 2 times daily as needed for Itching or Anxiety     Dispense:  45 tablet     Refill:  1        I, Michelle Zimmerman MA am scribing for and in the presence of Nyla Al MD on this date of 12/19/22 at 12:36 PM    I, Dr. Nyla Al, personally performed the services described in the documentation as scribed by Michelle Zimmerman MA, in my presence and it is both accurate and complete.

## 2022-12-30 ENCOUNTER — TELEPHONE (OUTPATIENT)
Dept: SURGERY | Facility: CLINIC | Age: 57
End: 2022-12-30
Payer: MEDICARE

## 2022-12-30 NOTE — TELEPHONE ENCOUNTER
PRESCREENING FOR OPEN ACCESS SCHEDULING    Renetta Leija, 1965  [id number]    12/30/22    If, the patient answers yes to any of the following questions the provider will be informed prior to scheduling open access for approval and documented in the chart.    [x]  Yes  [] No    1. Have you ever had a colonoscopy in the past?      When:  6 years       Where:       Polyps or other:     []  Yes  [x] No    2. Family history of colon cancer?      Relation:       Age of onset:       Do you currently have any of the following?    []  Yes  [x] No  Rectal bleeding, if so, how long?     []  Yes  [x] No  Abdominal pain, if so, how long?    []  Yes  [x] No  Constipation, if so, how long?    [x]  Yes  [] No  Diarrhea, if so, how long? Nerves-few months    []  Yes  [x] No  Weight loss, is so, how much?    [] Yes  [x] No  Small caliber stool, if so, how long?      Have you ever had any of the following conditions?    [] Yes  [x] No  Heart attack?      When?       Last cardiac workup?     Blood thinners?    [x] Yes  [] No   Lung problems, asthma or COPD?Pulmonary fibrosis  [x] Yes  [] No  Oxygen required? 3-4 Liters sitting if walking 10-15 liters      [] Yes  [x] No  Stroke?     [] Yes  [x] No  Have you ever had a reaction to anesthesia?             Detail Level: Zone

## 2022-12-30 NOTE — TELEPHONE ENCOUNTER
I advised patient it would be best to see her in office to evaluate her prior to scheduling colonoscopy due to her oxygen dependancy. Sent to [place] to schedule.

## 2023-01-06 RX ORDER — TRAZODONE HYDROCHLORIDE 100 MG/1
100 TABLET ORAL NIGHTLY
COMMUNITY

## 2023-01-06 RX ORDER — SILDENAFIL CITRATE 20 MG/1
20 TABLET ORAL 3 TIMES DAILY
COMMUNITY

## 2023-01-06 RX ORDER — IPRATROPIUM BROMIDE AND ALBUTEROL SULFATE 2.5; .5 MG/3ML; MG/3ML
3 SOLUTION RESPIRATORY (INHALATION) EVERY 4 HOURS PRN
COMMUNITY

## 2023-01-06 RX ORDER — DEXLANSOPRAZOLE 60 MG/1
60 CAPSULE, DELAYED RELEASE ORAL DAILY
COMMUNITY
End: 2023-01-26 | Stop reason: ALTCHOICE

## 2023-01-06 RX ORDER — ALBUTEROL SULFATE 90 UG/1
2 AEROSOL, METERED RESPIRATORY (INHALATION) EVERY 4 HOURS PRN
COMMUNITY

## 2023-01-06 RX ORDER — PANTOPRAZOLE SODIUM 40 MG/1
40 TABLET, DELAYED RELEASE ORAL DAILY
COMMUNITY

## 2023-01-06 RX ORDER — DIGOXIN 125 MCG
125 TABLET ORAL
COMMUNITY

## 2023-01-06 RX ORDER — FLUTICASONE PROPIONATE 50 MCG
2 SPRAY, SUSPENSION (ML) NASAL DAILY
COMMUNITY

## 2023-01-10 ENCOUNTER — TELEPHONE (OUTPATIENT)
Dept: PRIMARY CARE CLINIC | Age: 58
End: 2023-01-10

## 2023-01-10 RX ORDER — DOXYCYCLINE HYCLATE 100 MG
100 TABLET ORAL 2 TIMES DAILY
Qty: 14 TABLET | Refills: 0 | Status: SHIPPED | OUTPATIENT
Start: 2023-01-10 | End: 2023-01-17

## 2023-01-10 NOTE — TELEPHONE ENCOUNTER
We can do the vaccine. They can order to the pharmacy and do with their. I can send antibiotics but this could be COVID, flu, virus, infection.

## 2023-01-10 NOTE — TELEPHONE ENCOUNTER
Pt called with c/o cough, congestion and thick green mucus x4 days, requesting antibx. Refused appt r/t cold weather, lung condition, and risk of exposure.

## 2023-01-16 ENCOUNTER — NURSE ONLY (OUTPATIENT)
Dept: PRIMARY CARE CLINIC | Age: 58
End: 2023-01-16
Payer: MEDICARE

## 2023-01-16 DIAGNOSIS — Z92.29 HAS RECEIVED FIRST DOSE OF HEPATITIS B VACCINE: Primary | ICD-10-CM

## 2023-01-16 DIAGNOSIS — J84.10 PULMONARY FIBROSIS (HCC): ICD-10-CM

## 2023-01-16 PROCEDURE — 90746 HEPB VACCINE 3 DOSE ADULT IM: CPT | Performed by: INTERNAL MEDICINE

## 2023-01-16 PROCEDURE — G0010 ADMIN HEPATITIS B VACCINE: HCPCS | Performed by: INTERNAL MEDICINE

## 2023-01-23 RX ORDER — LEVOTHYROXINE SODIUM 0.15 MG/1
150 TABLET ORAL DAILY
Qty: 30 TABLET | Refills: 2 | Status: SHIPPED | OUTPATIENT
Start: 2023-01-23

## 2023-01-26 ENCOUNTER — OFFICE VISIT (OUTPATIENT)
Dept: SURGERY | Facility: CLINIC | Age: 58
End: 2023-01-26
Payer: MEDICARE

## 2023-01-26 VITALS
SYSTOLIC BLOOD PRESSURE: 128 MMHG | OXYGEN SATURATION: 100 % | BODY MASS INDEX: 28.82 KG/M2 | WEIGHT: 173 LBS | HEART RATE: 98 BPM | HEIGHT: 65 IN | DIASTOLIC BLOOD PRESSURE: 72 MMHG | TEMPERATURE: 97 F

## 2023-01-26 DIAGNOSIS — Z12.11 COLON CANCER SCREENING: Primary | ICD-10-CM

## 2023-01-26 DIAGNOSIS — I27.0 PRIMARY PULMONARY HYPERTENSION: ICD-10-CM

## 2023-01-26 DIAGNOSIS — J96.21 ACUTE ON CHRONIC RESPIRATORY FAILURE WITH HYPOXIA: ICD-10-CM

## 2023-01-26 DIAGNOSIS — Z99.81 OXYGEN DEPENDENT: ICD-10-CM

## 2023-01-26 PROBLEM — I10 ESSENTIAL HYPERTENSION: Status: ACTIVE | Noted: 2022-01-25

## 2023-01-26 PROBLEM — B49 MYCOSIS: Status: ACTIVE | Noted: 2020-11-06

## 2023-01-26 PROBLEM — D80.3 IGG DEFICIENCY (HCC): Status: ACTIVE | Noted: 2017-10-28

## 2023-01-26 PROBLEM — R53.1 ASTHENIA: Status: ACTIVE | Noted: 2020-11-05

## 2023-01-26 PROBLEM — Z01.818 ENCOUNTER FOR PRE-TRANSPLANT EVALUATION FOR LUNG TRANSPLANT: Status: ACTIVE | Noted: 2018-10-26

## 2023-01-26 PROBLEM — R31.29 MICROSCOPIC HEMATURIA: Status: ACTIVE | Noted: 2021-06-05

## 2023-01-26 PROBLEM — E66.01 CLASS 3 SEVERE OBESITY DUE TO EXCESS CALORIES WITH SERIOUS COMORBIDITY IN ADULT: Status: ACTIVE | Noted: 2021-05-27

## 2023-01-26 PROBLEM — J20.9 ACUTE BRONCHITIS: Status: ACTIVE | Noted: 2019-04-05

## 2023-01-26 PROBLEM — G47.00 INSOMNIA: Status: ACTIVE | Noted: 2017-11-03

## 2023-01-26 PROBLEM — R00.0 SINUS TACHYCARDIA: Status: ACTIVE | Noted: 2018-05-23

## 2023-01-26 PROBLEM — I27.20 PULMONARY HYPERTENSION: Status: ACTIVE | Noted: 2021-05-27

## 2023-01-26 PROBLEM — F32.A DEPRESSION: Status: ACTIVE | Noted: 2017-10-28

## 2023-01-26 PROBLEM — R74.8 ELEVATED LIVER ENZYMES: Status: ACTIVE | Noted: 2023-01-26

## 2023-01-26 PROCEDURE — 99202 OFFICE O/P NEW SF 15 MIN: CPT | Performed by: SURGERY

## 2023-01-26 RX ORDER — DOXYCYCLINE HYCLATE 100 MG
100 TABLET ORAL 2 TIMES DAILY
COMMUNITY
Start: 2023-01-10

## 2023-01-26 RX ORDER — MONTELUKAST SODIUM 10 MG/1
10 TABLET ORAL DAILY
COMMUNITY
End: 2023-02-06

## 2023-01-26 RX ORDER — DIAZEPAM 2 MG/1
2 TABLET ORAL
COMMUNITY
End: 2023-01-26

## 2023-01-26 RX ORDER — POLYETHYLENE GLYCOL 3350 17 G/17G
POWDER, FOR SOLUTION ORAL
Qty: 238 G | Refills: 0 | Status: SHIPPED | OUTPATIENT
Start: 2023-01-26 | End: 2023-02-10 | Stop reason: HOSPADM

## 2023-01-26 RX ORDER — BISACODYL 5 MG
TABLET, DELAYED RELEASE (ENTERIC COATED) ORAL
Qty: 4 TABLET | Refills: 0 | Status: SHIPPED | OUTPATIENT
Start: 2023-01-26 | End: 2023-02-10 | Stop reason: HOSPADM

## 2023-01-26 RX ORDER — ALBUTEROL SULFATE 90 UG/1
AEROSOL, METERED RESPIRATORY (INHALATION)
COMMUNITY
Start: 2022-12-06 | End: 2023-01-26 | Stop reason: ALTCHOICE

## 2023-01-26 RX ORDER — ALPRAZOLAM 0.5 MG/1
1 TABLET ORAL AS NEEDED
COMMUNITY
Start: 2022-09-12 | End: 2023-01-26 | Stop reason: ALTCHOICE

## 2023-01-26 RX ORDER — GUAIFENESIN 600 MG/1
600 TABLET, EXTENDED RELEASE ORAL
COMMUNITY
Start: 2022-12-06 | End: 2023-02-06

## 2023-02-01 ENCOUNTER — PREP FOR SURGERY (OUTPATIENT)
Dept: OTHER | Facility: HOSPITAL | Age: 58
End: 2023-02-01
Payer: MEDICARE

## 2023-02-01 ENCOUNTER — OFFICE VISIT (OUTPATIENT)
Dept: OBSTETRICS AND GYNECOLOGY | Facility: CLINIC | Age: 58
End: 2023-02-01
Payer: MEDICARE

## 2023-02-01 VITALS
SYSTOLIC BLOOD PRESSURE: 122 MMHG | HEIGHT: 65 IN | BODY MASS INDEX: 29.16 KG/M2 | WEIGHT: 175 LBS | DIASTOLIC BLOOD PRESSURE: 70 MMHG

## 2023-02-01 DIAGNOSIS — Z90.710 SCREENING FOR MALIGNANT NEOPLASM OF VAGINA AFTER TOTAL HYSTERECTOMY: ICD-10-CM

## 2023-02-01 DIAGNOSIS — J84.112 CHRONIC IDIOPATHIC FIBROSING ALVEOLITIS: ICD-10-CM

## 2023-02-01 DIAGNOSIS — Z12.72 SCREENING FOR MALIGNANT NEOPLASM OF VAGINA AFTER TOTAL HYSTERECTOMY: ICD-10-CM

## 2023-02-01 DIAGNOSIS — Z01.419 WELL WOMAN EXAM WITH ROUTINE GYNECOLOGICAL EXAM: Primary | ICD-10-CM

## 2023-02-01 DIAGNOSIS — Z12.11 COLON CANCER SCREENING: Primary | ICD-10-CM

## 2023-02-01 DIAGNOSIS — N90.4 LICHEN SCLEROSUS OF FEMALE GENITALIA: ICD-10-CM

## 2023-02-01 DIAGNOSIS — I27.20 PULMONARY HYPERTENSION: ICD-10-CM

## 2023-02-01 PROCEDURE — G0101 CA SCREEN;PELVIC/BREAST EXAM: HCPCS | Performed by: OBSTETRICS & GYNECOLOGY

## 2023-02-01 RX ORDER — SODIUM CHLORIDE, SODIUM LACTATE, POTASSIUM CHLORIDE, CALCIUM CHLORIDE 600; 310; 30; 20 MG/100ML; MG/100ML; MG/100ML; MG/100ML
50 INJECTION, SOLUTION INTRAVENOUS CONTINUOUS
Status: CANCELLED | OUTPATIENT
Start: 2023-02-01

## 2023-02-01 RX ORDER — CLOBETASOL PROPIONATE 0.5 MG/G
OINTMENT TOPICAL
Qty: 60 G | Refills: 6 | Status: SHIPPED | OUTPATIENT
Start: 2023-02-01

## 2023-02-01 RX ORDER — SODIUM CHLORIDE 0.9 % (FLUSH) 0.9 %
10 SYRINGE (ML) INJECTION AS NEEDED
Status: CANCELLED | OUTPATIENT
Start: 2023-02-01

## 2023-02-01 RX ORDER — SODIUM CHLORIDE 0.9 % (FLUSH) 0.9 %
10 SYRINGE (ML) INJECTION EVERY 12 HOURS SCHEDULED
Status: CANCELLED | OUTPATIENT
Start: 2023-02-01

## 2023-02-02 PROBLEM — N90.4 LICHEN SCLEROSUS OF FEMALE GENITALIA: Status: ACTIVE | Noted: 2023-02-02

## 2023-02-02 NOTE — PROGRESS NOTES
"Annual Well Woman Visit    Subjective   Chief Complaint   Patient presents with   • Gynecologic Exam     Needs pap smear before getting double lung transplant, no complaints.     Renetta Leija is a 57 y.o. year old  presenting to be seen for an annual well woman visit.    This patient has chronic lung disease and requires a Pap smear before getting on the lung transplant list.  Previous hysterectomy + BSO roughly 18 years ago for cervical dysplasia and pelvic pain.  She reports only 6 weeks of estrogen therapy following that surgery.    She denies sexual dysfunction. She denies urinary complaints including incontinence.    She does have intense itching of the vulva that does not respond to yeast medications.    OB Hx: C/S x 2  Contraception: hysterectomy  Pap smear: not sure  Mammogram:   Colonoscopy: 6 years ago, scheduled for this month  DEXA Scan:     Past Medical History:   Diagnosis Date   • Anxiety and depression    • Arthritis    • At high risk for pneumonia     \"get pneumonia easily due to pulmonary fibrosis\"   • Body piercing     both ears   • Cataract, bilateral    • Constipation    • Disease of thyroid gland     hypothyroidism   • GERD (gastroesophageal reflux disease)    • Hypertension    • Hypothyroidism    • Oxygen dependent     on continuous O2 -3 liters NC titrates as needed with exertion   • Pulmonary fibrosis (HCC)    • Pulmonary hypertension (HCC)    • Wears dentures     upper plate only-asked not to wear adhesive dos   • Wears glasses      Past Surgical History:   Procedure Laterality Date   • APPENDECTOMY     • BREAST BIOPSY Left     benign   • CARDIAC CATHETERIZATION  2019    \"everything was clear\" per pt report   • CATARACT EXTRACTION W/ INTRAOCULAR LENS IMPLANT Right 2021    Procedure: CATARACT PHACO EXTRACTION WITH INTRAOCULAR LENS IMPLANT RIGHT;  Surgeon: Sohan Rojas MD;  Location: [facility];  Service: Ophthalmology;  Laterality: Right;   • CATARACT " EXTRACTION W/ INTRAOCULAR LENS IMPLANT Left 2021    Procedure: CATARACT PHACO EXTRACTION WITH INTRAOCULAR LENS IMPLANT LEFT;  Surgeon: Sohan Rojas MD;  Location: [facility];  Service: Ophthalmology;  Laterality: Left;   •  SECTION      x2   • COLONOSCOPY  2017   • HYSTERECTOMY  2005   • LAPAROSCOPIC CHOLECYSTECTOMY     • WISDOM TOOTH EXTRACTION       Family History   Problem Relation Age of Onset   • Prostate cancer Father    • Colon cancer Mother    • Birth defects Son      Social History     Tobacco Use   • Smoking status: Former     Packs/day: 1.00     Years: 30.00     Pack years: 30.00     Types: Cigarettes     Quit date: 2010     Years since quittin.1   • Smokeless tobacco: Never   Vaping Use   • Vaping Use: Never used   Substance Use Topics   • Alcohol use: Never   • Drug use: Never     (Not in a hospital admission)    Patient has no known allergies.  Current Outpatient Medications on File Prior to Visit   Medication Sig Dispense Refill   • albuterol sulfate  (90 Base) MCG/ACT inhaler Inhale 2 puffs Every 4 (Four) Hours As Needed for Wheezing.     • bisacodyl (Dulcolax) 5 MG EC tablet Take as directed 4 tablet 0   • digoxin (LANOXIN) 125 MCG tablet Take 125 mcg by mouth Daily.     • doxycycline (VIBRAMYICN) 100 MG tablet Take 100 mg by mouth 2 (Two) Times a Day. for 7 days     • fluticasone (FLONASE) 50 MCG/ACT nasal spray 2 sprays into the nostril(s) as directed by provider Daily.     • folic acid (FOLVITE) 1 MG tablet Take 1 mg by mouth Daily.     • furosemide (LASIX) 20 MG tablet Take 20 mg by mouth Daily As Needed.     • guaiFENesin (MUCINEX) 600 MG 12 hr tablet 600 mg.     • hydroCHLOROthiazide (HYDRODIURIL) 25 MG tablet Take 25 mg by mouth Daily.     • hydrOXYzine (ATARAX) 10 MG tablet Take 10 mg by mouth 2 (two) times a day.     • ipratropium-albuterol (DUO-NEB) 0.5-2.5 mg/3 ml nebulizer Take 3 mL by nebulization Every 4 (Four) Hours As Needed for Wheezing.     •  "levothyroxine (SYNTHROID, LEVOTHROID) 150 MCG tablet Take 150 mcg by mouth Daily.     • montelukast (SINGULAIR) 10 MG tablet Take 10 mg by mouth Daily.     • Nintedanib Esylate (Ofev) 150 MG capsule Take 150 mg by mouth 2 (two) times a day.     • pantoprazole (PROTONIX) 40 MG EC tablet Take 40 mg by mouth Daily.     • polyethylene glycol (MiraLax) 17 GM/SCOOP powder Use as directed. 238 g 0   • sildenafil (REVATIO) 20 MG tablet Take 20 mg by mouth 3 (Three) Times a Day.     • traZODone (DESYREL) 100 MG tablet Take 100 mg by mouth Every Night.     • venlafaxine (EFFEXOR) 75 MG tablet Take 225 mg by mouth Daily.     • Zinc Methionate capsule Take 1 capsule by mouth.       No current facility-administered medications on file prior to visit.     Social History    Tobacco Use      Smoking status: Former        Packs/day: 1.00        Years: 30.00        Pack years: 30        Types: Cigarettes        Quit date: 2010        Years since quittin.1      Smokeless tobacco: Never      Review of Systems  Pertinent items are noted in HPI, all other systems were reviewed and negative       Objective   /70   Ht 163.8 cm (64.5\")   Wt 79.4 kg (175 lb)   BMI 29.57 kg/m²     Physical Exam:  General Appearance: alert, pleasant, appears stated age, interactive and cooperative  Breasts: Examined in supine position  Symmetric without masses or skin dimpling  Nipples normal without inversion, lesions or discharge  There are no palpable axillary nodes  Fibrocystic changes are present both breasts without a discrete mass  Abdomen: no masses, no hepatomegaly, no splenomegaly, soft non-tender, no guarding and no rebound tenderness    Pelvis:  Pelvic: Clinical staff was present for exam  External genitalia: Lichenification and hypopigmentation diffusely across the vulva including the clitoral mancini  :  urethral meatus normal;  Vagina:  atrophic mucosal changes are present;  Cervix:  absent.  Uterus:  absent.  Adnexa:  absent, " bilateral.  Rectal:  digital rectal exam not performed; anus visually normal appearing.       Assessment   Annual well woman exam with age appropriate screening  Chronic lung disease requiring lung transplant  Previous hysterectomy + BSO  History of cervical dysplasia  Lichen sclerosis     Plan    No orders of the defined types were placed in this encounter.    Medications ordered: Clobetasol ointment nightly x 6 weeks, then 1-3 times weekly thereafter for symptom control    Procedures performed: Pap smear    - Mammogram: Not indicated   - Pap screening guidelines reviewed; pap smear collected today  - Yearly clinical breast and pelvic exams recommended regardless of pap recommendations  - Dexa scan: not indicated   - Colonoscopy: not indicated   - Healthy diet and exercise encouraged  - Calcium and Vitamin D requirements reviewed  - Contraception: N/A  - Screening: None  - We discussed the diagnosis of lichen sclerosis today.  Rx and instructions provided for clobetasol ointment as above.    Follow up for annual visits    Flako Serna MD  Obstetrics and Gynecology  [facility]

## 2023-02-06 ENCOUNTER — OFFICE VISIT (OUTPATIENT)
Dept: PRIMARY CARE CLINIC | Age: 58
End: 2023-02-06
Payer: MEDICARE

## 2023-02-06 VITALS — OXYGEN SATURATION: 94 % | TEMPERATURE: 98.8 F | HEART RATE: 88 BPM

## 2023-02-06 DIAGNOSIS — J01.40 ACUTE PANSINUSITIS, RECURRENCE NOT SPECIFIED: Primary | ICD-10-CM

## 2023-02-06 DIAGNOSIS — J06.9 UPPER RESPIRATORY TRACT INFECTION, UNSPECIFIED TYPE: ICD-10-CM

## 2023-02-06 PROCEDURE — G8427 DOCREV CUR MEDS BY ELIG CLIN: HCPCS | Performed by: NURSE PRACTITIONER

## 2023-02-06 PROCEDURE — G8417 CALC BMI ABV UP PARAM F/U: HCPCS | Performed by: NURSE PRACTITIONER

## 2023-02-06 PROCEDURE — 3017F COLORECTAL CA SCREEN DOC REV: CPT | Performed by: NURSE PRACTITIONER

## 2023-02-06 PROCEDURE — 99213 OFFICE O/P EST LOW 20 MIN: CPT | Performed by: NURSE PRACTITIONER

## 2023-02-06 PROCEDURE — 1036F TOBACCO NON-USER: CPT | Performed by: NURSE PRACTITIONER

## 2023-02-06 PROCEDURE — G8484 FLU IMMUNIZE NO ADMIN: HCPCS | Performed by: NURSE PRACTITIONER

## 2023-02-06 RX ORDER — PREDNISONE 10 MG/1
10 TABLET ORAL 2 TIMES DAILY
Qty: 10 TABLET | Refills: 0 | Status: SHIPPED | OUTPATIENT
Start: 2023-02-06 | End: 2023-02-11

## 2023-02-06 RX ORDER — DOXYCYCLINE HYCLATE 100 MG
100 TABLET ORAL 2 TIMES DAILY
Qty: 20 TABLET | Refills: 0 | Status: SHIPPED | OUTPATIENT
Start: 2023-02-06 | End: 2023-02-16

## 2023-02-06 ASSESSMENT — ENCOUNTER SYMPTOMS
SORE THROAT: 1
FACIAL SWELLING: 0
SINUS PAIN: 1
SINUS PRESSURE: 1
WHEEZING: 1
SHORTNESS OF BREATH: 0
TROUBLE SWALLOWING: 0
COUGH: 1

## 2023-02-06 ASSESSMENT — PATIENT HEALTH QUESTIONNAIRE - PHQ9: DEPRESSION UNABLE TO ASSESS: URGENT/EMERGENT SITUATION

## 2023-02-06 NOTE — PROGRESS NOTES
Chief Complaint   Patient presents with    Congestion    Cough     X 3 days     Neg for fever, patient thinks she has a sinus infection.

## 2023-02-06 NOTE — PROGRESS NOTES
SUBJECTIVE:    Patient ID: Angela Hathaway is a 61 y. o.female. Chief Complaint   Patient presents with    Congestion    Cough     X 3 days         HPI:    Patient presents to clinic with cough and congestion 3 to 4 days. History of COPD on continuous oxygen. Other signs and symptoms include productive cough thick yellow sputum, thick green nasal drainage, wheezing, sinus pressure and pain. No sick contacts. No fever, chills, body aches. Taking Mucinex at home with no significant relief. No relieving or worsening factors. No other treatments. Patient's medications, allergies, past medical, surgical, social and family histories were reviewed and updated as appropriate in electronic medical record. No outpatient medications have been marked as taking for the 23 encounter (Office Visit) with DAVIAN Chawla CNP. Review of Systems   Constitutional:  Positive for fatigue. Negative for chills, diaphoresis and fever. HENT:  Positive for congestion, postnasal drip, sinus pressure, sinus pain and sore throat. Negative for ear pain, facial swelling and trouble swallowing. Respiratory:  Positive for cough and wheezing. Negative for shortness of breath. Allergic/Immunologic: Positive for environmental allergies. Neurological: Negative. All other systems reviewed and are negative.     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     Hyperlipidemia     Hypertension     Hypothyroidism     Interstitial lung disease (HCC)     Lung fibrosis (HCC)     Lung nodules     bilateral lungs    Pulmonary hypertension ([address]      Past Surgical History:   Procedure Laterality Date    BREAST BIOPSY Right      SECTION      GALLBLADDER SURGERY      HYSTERECTOMY (CERVIX STATUS UNKNOWN)       Family History   Problem Relation Age of Onset    Cancer Mother         colon    Cancer Father         bladder    High Blood Pressure Father     Stroke Father     High Blood Pressure Sister       Social History     Tobacco Use   Smoking Status Former    Packs/day: 1.00    Years: 30.00    Pack years: 30.00    Types: Cigarettes    Quit date: 2007    Years since quittin.1   Smokeless Tobacco Never       OBJECTIVE:   Wt Readings from Last 3 Encounters:   22 171 lb 3.2 oz (77.7 kg)   12/15/22 172 lb (78 kg)   10/06/22 169 lb (76.7 kg)     BP Readings from Last 3 Encounters:   22 112/64   12/15/22 123/84   22 118/63       Pulse 88   Temp 98.8 °F (37.1 °C)   SpO2 94% Comment: on 2 liters     Physical Exam  Vitals and nursing note reviewed. Constitutional:       Appearance: She is well-developed. HENT:      Head: Normocephalic. Right Ear: External ear normal.      Left Ear: External ear normal.      Nose: Congestion present. Mouth/Throat:      Mouth: Mucous membranes are moist.      Pharynx: Oropharynx is clear. Posterior oropharyngeal erythema present. Eyes:      Conjunctiva/sclera: Conjunctivae normal.   Cardiovascular:      Rate and Rhythm: Normal rate and regular rhythm. Pulmonary:      Effort: Pulmonary effort is normal.      Breath sounds: Wheezing present. Comments: Faint scattered wheezing; congested cough. NAD. Musculoskeletal:      Cervical back: Normal range of motion and neck supple. Lymphadenopathy:      Cervical: No cervical adenopathy. Neurological:      Mental Status: She is alert and oriented to person, place, and time.    Psychiatric:         Mood and Affect: Mood normal.         Behavior: Behavior normal.       Lab Results   Component Value Date/Time     12/15/2022 04:13 PM    K 3.8 12/15/2022 04:13 PM    K 4.0 2021 04:45 AM     12/15/2022 04:13 PM    CO2 34 12/15/2022 04:13 PM    GLUCOSE 113 12/15/2022 04:13 PM    BUN 14 12/15/2022 04:13 PM    CREATININE 0.9 12/15/2022 04:13 PM    CALCIUM 9.5 12/15/2022 04:13 PM    PROT 6.9 12/15/2022 04:13 PM    LABALBU 4.1 12/15/2022 04:13 PM    BILITOT 0.3 12/15/2022 04:13 PM    ALT 28 12/15/2022 04:13 PM    AST 27 12/15/2022 04:13 PM       Hemoglobin A1C (%)   Date Value   06/08/2017 5.4     Microscopic Examination (no units)   Date Value   02/25/2017 Not Indicated     LDL Calculated (mg/dL)   Date Value   10/14/2019 134 (H)         Lab Results   Component Value Date/Time    WBC 7.1 09/12/2022 12:15 PM    NEUTROABS 5.1 09/12/2022 12:15 PM    HGB 12.7 09/12/2022 12:15 PM    HCT 39.5 09/12/2022 12:15 PM    MCV 93.8 09/12/2022 12:15 PM     09/12/2022 12:15 PM       Lab Results   Component Value Date    TSH 1.15 12/15/2022         ASSESSMENT/PLAN:     1. Acute pansinusitis, recurrence not specified  2. Upper respiratory tract infection, unspecified type  Take medications as prescribed. Continue home medications. Discussed symptom management. Return to clinic S&S worsen or no improvement noted. Patient verbalized understanding.         Orders Placed This Encounter   Medications    doxycycline hyclate (VIBRA-TABS) 100 MG tablet     Sig: Take 1 tablet by mouth 2 times daily for 10 days     Dispense:  20 tablet     Refill:  0    predniSONE (DELTASONE) 10 MG tablet     Sig: Take 1 tablet by mouth 2 times daily for 5 days     Dispense:  10 tablet     Refill:  0

## 2023-02-07 ENCOUNTER — TELEPHONE (OUTPATIENT)
Dept: SURGERY | Facility: CLINIC | Age: 58
End: 2023-02-07
Payer: MEDICARE

## 2023-02-08 LAB — REF LAB TEST METHOD: NORMAL

## 2023-02-10 ENCOUNTER — HOSPITAL ENCOUNTER (OUTPATIENT)
Facility: HOSPITAL | Age: 58
Setting detail: HOSPITAL OUTPATIENT SURGERY
Discharge: HOME | End: 2023-02-10
Attending: SURGERY | Admitting: SURGERY
Payer: MEDICARE

## 2023-02-10 ENCOUNTER — ANESTHESIA (OUTPATIENT)
Dept: GASTROENTEROLOGY | Facility: HOSPITAL | Age: 58
End: 2023-02-10
Payer: MEDICARE

## 2023-02-10 ENCOUNTER — ANESTHESIA EVENT (OUTPATIENT)
Dept: GASTROENTEROLOGY | Facility: HOSPITAL | Age: 58
End: 2023-02-10
Payer: MEDICARE

## 2023-02-10 VITALS
BODY MASS INDEX: 28.32 KG/M2 | SYSTOLIC BLOOD PRESSURE: 102 MMHG | WEIGHT: 170 LBS | TEMPERATURE: 97.4 F | OXYGEN SATURATION: 99 % | DIASTOLIC BLOOD PRESSURE: 69 MMHG | RESPIRATION RATE: 16 BRPM | HEIGHT: 65 IN | HEART RATE: 81 BPM

## 2023-02-10 DIAGNOSIS — Z12.11 COLON CANCER SCREENING: ICD-10-CM

## 2023-02-10 PROCEDURE — 25010000002 PROPOFOL 10 MG/ML EMULSION: Performed by: NURSE ANESTHETIST, CERTIFIED REGISTERED

## 2023-02-10 RX ORDER — SODIUM CHLORIDE 0.9 % (FLUSH) 0.9 %
10 SYRINGE (ML) INJECTION AS NEEDED
Status: DISCONTINUED | OUTPATIENT
Start: 2023-02-10 | End: 2023-02-10 | Stop reason: HOSPADM

## 2023-02-10 RX ORDER — SODIUM CHLORIDE, SODIUM LACTATE, POTASSIUM CHLORIDE, CALCIUM CHLORIDE 600; 310; 30; 20 MG/100ML; MG/100ML; MG/100ML; MG/100ML
50 INJECTION, SOLUTION INTRAVENOUS CONTINUOUS
Status: DISCONTINUED | OUTPATIENT
Start: 2023-02-10 | End: 2023-02-10 | Stop reason: HOSPADM

## 2023-02-10 RX ORDER — LIDOCAINE HYDROCHLORIDE 20 MG/ML
INJECTION, SOLUTION INTRAVENOUS AS NEEDED
Status: DISCONTINUED | OUTPATIENT
Start: 2023-02-10 | End: 2023-02-10 | Stop reason: SURG

## 2023-02-10 RX ORDER — ONDANSETRON 2 MG/ML
4 INJECTION INTRAMUSCULAR; INTRAVENOUS ONCE AS NEEDED
Status: CANCELLED | OUTPATIENT
Start: 2023-02-10 | End: 2023-02-10

## 2023-02-10 RX ORDER — PROPOFOL 10 MG/ML
VIAL (ML) INTRAVENOUS AS NEEDED
Status: DISCONTINUED | OUTPATIENT
Start: 2023-02-10 | End: 2023-02-10 | Stop reason: SURG

## 2023-02-10 RX ORDER — SODIUM CHLORIDE 0.9 % (FLUSH) 0.9 %
10 SYRINGE (ML) INJECTION EVERY 12 HOURS SCHEDULED
Status: DISCONTINUED | OUTPATIENT
Start: 2023-02-10 | End: 2023-02-10 | Stop reason: HOSPADM

## 2023-02-10 RX ADMIN — SODIUM CHLORIDE, POTASSIUM CHLORIDE, SODIUM LACTATE AND CALCIUM CHLORIDE 50 ML/HR: 600; 310; 30; 20 INJECTION, SOLUTION INTRAVENOUS at 11:01

## 2023-02-10 RX ADMIN — LIDOCAINE HYDROCHLORIDE 100 MG: 20 INJECTION, SOLUTION INTRAVENOUS at 11:09

## 2023-02-10 RX ADMIN — PROPOFOL 300 MG: 10 INJECTION, EMULSION INTRAVENOUS at 11:08

## 2023-02-10 NOTE — ANESTHESIA PREPROCEDURE EVALUATION
Anesthesia Evaluation     Patient summary reviewed and Nursing notes reviewed   NPO Solid Status: > 8 hours  NPO Liquid Status: > 8 hours           Airway   Mallampati: I  TM distance: >3 FB  Neck ROM: full  No difficulty expected  Dental    (+) upper dentures    Pulmonary - normal exam   (+) pneumonia resolved , a smoker Former, COPD, home oxygen (3LPM),     ROS comment: Hx of pulmonary fibrosis  Cardiovascular - normal exam    (+) hypertension, hyperlipidemia,       Neuro/Psych  (+) psychiatric history Anxiety and Depression,    GI/Hepatic/Renal/Endo    (+)  GERD,  thyroid problem hypothyroidism    Musculoskeletal (-) negative ROS    Abdominal    Substance History - negative use     OB/GYN negative ob/gyn ROS         Other                          Anesthesia Plan    ASA 3     MAC     intravenous induction     Anesthetic plan, risks, benefits, and alternatives have been provided, discussed and informed consent has been obtained with: patient.  Pre-procedure education provided  Plan discussed with CRNA.

## 2023-02-10 NOTE — ANESTHESIA POSTPROCEDURE EVALUATION
Patient: Renetta Leija    Procedure Summary     Date: 02/10/23 Room / Location: [facility] ENDOSCOPY 3 / [facility] ENDOSCOPY    Anesthesia Start: 1105 Anesthesia Stop: 1137    Procedure: COLONOSCOPY Diagnosis:       Colon cancer screening      (Colon cancer screening [Z12.11])    Surgeons: Aarti Mendieta MD Provider: Sohan Gorman CRNA    Anesthesia Type: MAC ASA Status: 3          Anesthesia Type: MAC    Vitals  Vitals Value Taken Time   /69 02/10/23 1209   Temp 97.4 °F (36.3 °C) 02/10/23 1209   Pulse 81 02/10/23 1209   Resp 16 02/10/23 1209   SpO2 99 % 02/10/23 1209           Post Anesthesia Care and Evaluation    Patient location during evaluation: PHASE II  Patient participation: complete - patient participated  Level of consciousness: awake  Pain score: 1  Pain management: adequate    Airway patency: patent  Anesthetic complications: No anesthetic complications  PONV Status: controlled  Cardiovascular status: acceptable and stable  Respiratory status: acceptable  Hydration status: acceptable

## 2023-02-20 ENCOUNTER — TELEPHONE (OUTPATIENT)
Dept: SURGERY | Facility: CLINIC | Age: 58
End: 2023-02-20
Payer: MEDICARE

## 2023-03-17 ENCOUNTER — TELEPHONE (OUTPATIENT)
Dept: PRIMARY CARE CLINIC | Age: 58
End: 2023-03-17

## 2023-03-17 NOTE — TELEPHONE ENCOUNTER
Pt called asking if you can write her letter stating that she is disabled and oxygen dependent so that her son can qualify to ride the [employer] bus

## 2023-03-21 ENCOUNTER — TELEPHONE (OUTPATIENT)
Dept: PRIMARY CARE CLINIC | Age: 58
End: 2023-03-21

## 2023-04-06 ENCOUNTER — TELEPHONE (OUTPATIENT)
Dept: OBSTETRICS AND GYNECOLOGY | Facility: CLINIC | Age: 58
End: 2023-04-06
Payer: MEDICARE

## 2023-04-06 NOTE — TELEPHONE ENCOUNTER
Caller:  Renetta Lloyd    Relationship: Self   Best call back number: [phone]    Requested Prescriptions: clobetasol (TEMOVATE) 0.05 % ointment       Requested Prescriptions      No prescriptions requested or ordered in this encounter        Pharmacy where request should be sent:     [id number] [address] [place] [address]  Last office visit with prescribing clinician: 2/1/2023   Last telemedicine visit with prescribing clinician: Visit date not found   Next office visit with prescribing clinician: Visit date not found     Additional details provided by patient:  PATIENT CALLED AND STATED INSURANCE WILL NOT PAY FOR MEDICATION ABOVE WITHOUT   PRE CERTIFICATION THROUGH INSURANCE. PATIENT CALLED PHARMACY AND INSURANCE COMPANY. NUMBER FOR PRE CERTIFICATION -[phone]. INSURANCE WILL ONLY PAY FOR HYDROCORTISONE CREAM PATIENT TRIED THAT FOR SEVERAL MONTHS. IT DID NOT HELP HER.    Does the patient have less than a 3 day supply:  [x] Yes  [] No    Would you like a call back once the refill request has been completed: [x] Yes [] No    If the office needs to give you a call back, can they leave a voicemail: [x] Yes [] No    Kenzie Mejias Rep   04/06/23 12:22 EDT

## 2023-04-17 ENCOUNTER — OFFICE VISIT (OUTPATIENT)
Dept: PRIMARY CARE CLINIC | Age: 58
End: 2023-04-17
Payer: MEDICARE

## 2023-04-17 VITALS
SYSTOLIC BLOOD PRESSURE: 109 MMHG | HEIGHT: 65 IN | HEART RATE: 110 BPM | TEMPERATURE: 97.7 F | DIASTOLIC BLOOD PRESSURE: 72 MMHG | BODY MASS INDEX: 28.06 KG/M2 | OXYGEN SATURATION: 96 % | WEIGHT: 168.4 LBS | RESPIRATION RATE: 16 BRPM

## 2023-04-17 DIAGNOSIS — I10 ESSENTIAL HYPERTENSION: Primary | ICD-10-CM

## 2023-04-17 DIAGNOSIS — F41.8 DEPRESSION WITH ANXIETY: ICD-10-CM

## 2023-04-17 DIAGNOSIS — I27.20 PULMONARY HYPERTENSION (HCC): ICD-10-CM

## 2023-04-17 DIAGNOSIS — E03.9 HYPOTHYROIDISM, UNSPECIFIED TYPE: ICD-10-CM

## 2023-04-17 DIAGNOSIS — R63.4 WEIGHT LOSS: ICD-10-CM

## 2023-04-17 DIAGNOSIS — D80.3 IGG DEFICIENCY (HCC): ICD-10-CM

## 2023-04-17 DIAGNOSIS — J84.10 PULMONARY FIBROSIS (HCC): ICD-10-CM

## 2023-04-17 DIAGNOSIS — J44.9 CHRONIC OBSTRUCTIVE PULMONARY DISEASE, UNSPECIFIED COPD TYPE (HCC): ICD-10-CM

## 2023-04-17 PROCEDURE — 3023F SPIROM DOC REV: CPT | Performed by: INTERNAL MEDICINE

## 2023-04-17 PROCEDURE — G8417 CALC BMI ABV UP PARAM F/U: HCPCS | Performed by: INTERNAL MEDICINE

## 2023-04-17 PROCEDURE — 3017F COLORECTAL CA SCREEN DOC REV: CPT | Performed by: INTERNAL MEDICINE

## 2023-04-17 PROCEDURE — 99213 OFFICE O/P EST LOW 20 MIN: CPT | Performed by: INTERNAL MEDICINE

## 2023-04-17 PROCEDURE — 1036F TOBACCO NON-USER: CPT | Performed by: INTERNAL MEDICINE

## 2023-04-17 PROCEDURE — 3074F SYST BP LT 130 MM HG: CPT | Performed by: INTERNAL MEDICINE

## 2023-04-17 PROCEDURE — 3078F DIAST BP <80 MM HG: CPT | Performed by: INTERNAL MEDICINE

## 2023-04-17 PROCEDURE — G8427 DOCREV CUR MEDS BY ELIG CLIN: HCPCS | Performed by: INTERNAL MEDICINE

## 2023-04-17 RX ORDER — PANTOPRAZOLE SODIUM 40 MG/1
40 TABLET, DELAYED RELEASE ORAL DAILY
Qty: 90 TABLET | Refills: 3 | Status: ON HOLD | OUTPATIENT
Start: 2023-04-17

## 2023-04-17 ASSESSMENT — PATIENT HEALTH QUESTIONNAIRE - PHQ9
5. POOR APPETITE OR OVEREATING: 3
1. LITTLE INTEREST OR PLEASURE IN DOING THINGS: 0
2. FEELING DOWN, DEPRESSED OR HOPELESS: 3
SUM OF ALL RESPONSES TO PHQ QUESTIONS 1-9: 12
6. FEELING BAD ABOUT YOURSELF - OR THAT YOU ARE A FAILURE OR HAVE LET YOURSELF OR YOUR FAMILY DOWN: 0
3. TROUBLE FALLING OR STAYING ASLEEP: 3
8. MOVING OR SPEAKING SO SLOWLY THAT OTHER PEOPLE COULD HAVE NOTICED. OR THE OPPOSITE, BEING SO FIGETY OR RESTLESS THAT YOU HAVE BEEN MOVING AROUND A LOT MORE THAN USUAL: 0
SUM OF ALL RESPONSES TO PHQ QUESTIONS 1-9: 12
7. TROUBLE CONCENTRATING ON THINGS, SUCH AS READING THE NEWSPAPER OR WATCHING TELEVISION: 0
SUM OF ALL RESPONSES TO PHQ9 QUESTIONS 1 & 2: 3
10. IF YOU CHECKED OFF ANY PROBLEMS, HOW DIFFICULT HAVE THESE PROBLEMS MADE IT FOR YOU TO DO YOUR WORK, TAKE CARE OF THINGS AT HOME, OR GET ALONG WITH OTHER PEOPLE: 1
4. FEELING TIRED OR HAVING LITTLE ENERGY: 3
SUM OF ALL RESPONSES TO PHQ QUESTIONS 1-9: 12
SUM OF ALL RESPONSES TO PHQ QUESTIONS 1-9: 12
9. THOUGHTS THAT YOU WOULD BE BETTER OFF DEAD, OR OF HURTING YOURSELF: 0

## 2023-04-17 ASSESSMENT — ENCOUNTER SYMPTOMS
ABDOMINAL PAIN: 0
EYE DISCHARGE: 0
COUGH: 1
VOMITING: 0
SORE THROAT: 0
SINUS PRESSURE: 0
SHORTNESS OF BREATH: 1
BACK PAIN: 1
NAUSEA: 0
WHEEZING: 1

## 2023-04-29 ENCOUNTER — HOSPITAL ENCOUNTER (INPATIENT)
Facility: HOSPITAL | Age: 58
LOS: 3 days | Discharge: HOME HEALTH | DRG: 196 | End: 2023-05-02
Attending: EMERGENCY MEDICINE | Admitting: INTERNAL MEDICINE
Payer: MEDICARE

## 2023-04-29 ENCOUNTER — APPOINTMENT (OUTPATIENT)
Dept: GENERAL RADIOLOGY | Facility: HOSPITAL | Age: 58
DRG: 196 | End: 2023-04-29
Payer: MEDICARE

## 2023-04-29 DIAGNOSIS — R09.02 HYPOXIA: ICD-10-CM

## 2023-04-29 DIAGNOSIS — J44.1 COPD EXACERBATION (HCC): ICD-10-CM

## 2023-04-29 DIAGNOSIS — J84.10 PULMONARY FIBROSIS (HCC): Primary | ICD-10-CM

## 2023-04-29 LAB
ALBUMIN SERPL-MCNC: 3.6 G/DL (ref 3.4–4.8)
ALBUMIN/GLOB SERPL: 1 {RATIO} (ref 0.8–2)
ALP SERPL-CCNC: 127 U/L (ref 25–100)
ALT SERPL-CCNC: 24 U/L (ref 4–36)
ANION GAP SERPL CALCULATED.3IONS-SCNC: 11 MMOL/L (ref 3–16)
AST SERPL-CCNC: 34 U/L (ref 8–33)
BASE EXCESS BLDA CALC-SCNC: 8.9 MMOL/L (ref -3–3)
BASOPHILS # BLD: 0 K/UL (ref 0–0.1)
BASOPHILS NFR BLD: 0.3 %
BILIRUB SERPL-MCNC: 0.3 MG/DL (ref 0.3–1.2)
BUN SERPL-MCNC: 9 MG/DL (ref 6–20)
CALCIUM SERPL-MCNC: 9.3 MG/DL (ref 8.5–10.5)
CHLORIDE SERPL-SCNC: 97 MMOL/L (ref 98–107)
CO2 BLDA-SCNC: 34.9 MMOL/L (ref 24–30)
CO2 SERPL-SCNC: 29 MMOL/L (ref 20–30)
CREAT SERPL-MCNC: 0.6 MG/DL (ref 0.4–1.2)
EOSINOPHIL # BLD: 0.1 K/UL (ref 0–0.4)
EOSINOPHIL NFR BLD: 1.5 %
ERYTHROCYTE [DISTWIDTH] IN BLOOD BY AUTOMATED COUNT: 13.6 % (ref 11–16)
FLUAV AG NPH QL: NEGATIVE
FLUBV AG NPH QL: NEGATIVE
GFR SERPLBLD CREATININE-BSD FMLA CKD-EPI: >60 ML/MIN/{1.73_M2}
GLOBULIN SER CALC-MCNC: 3.5 G/DL
GLUCOSE SERPL-MCNC: 90 MG/DL (ref 74–106)
HCO3 BLDA-SCNC: 33.5 MMOL/L (ref 22–26)
HCT VFR BLD AUTO: 38.9 % (ref 37–47)
HGB BLD-MCNC: 12.6 G/DL (ref 11.5–16.5)
IMM GRANULOCYTES # BLD: 0 K/UL
IMM GRANULOCYTES NFR BLD: 0.3 % (ref 0–5)
INHALED O2 FLOW RATE: 0.48 %
LYMPHOCYTES # BLD: 2 K/UL (ref 1.5–4)
LYMPHOCYTES NFR BLD: 21.4 %
MAGNESIUM SERPL-MCNC: 2.2 MG/DL (ref 1.7–2.4)
MCH RBC QN AUTO: 30.9 PG (ref 27–32)
MCHC RBC AUTO-ENTMCNC: 32.4 G/DL (ref 31–35)
MCV RBC AUTO: 95.3 FL (ref 80–100)
MONOCYTES # BLD: 0.5 K/UL (ref 0.2–0.8)
MONOCYTES NFR BLD: 5.6 %
NEUTROPHILS # BLD: 6.6 K/UL (ref 2–7.5)
NEUTS SEG NFR BLD: 70.9 %
NT-PROBNP SERPL-MCNC: 73 PG/ML (ref 0–1800)
O2 THERAPY: ABNORMAL
PCO2 BLDA: 45.8 MMHG (ref 35–45)
PH BLDA: 7.48 [PH] (ref 7.35–7.45)
PLATELET # BLD AUTO: 255 K/UL (ref 150–400)
PMV BLD AUTO: 10.2 FL (ref 6–10)
PO2 BLDA: 95.3 MMHG (ref 80–100)
POTASSIUM SERPL-SCNC: 3.8 MMOL/L (ref 3.4–5.1)
PROT SERPL-MCNC: 7.1 G/DL (ref 6.4–8.3)
RBC # BLD AUTO: 4.08 M/UL (ref 3.8–5.8)
SAO2 % BLDA: 97.5 %
SARS-COV-2 RDRP RESP QL NAA+PROBE: NOT DETECTED
SODIUM SERPL-SCNC: 137 MMOL/L (ref 136–145)
TROPONIN, HIGH SENSITIVITY: 7 NG/L (ref 0–14)
WBC # BLD AUTO: 9.3 K/UL (ref 4–11)

## 2023-04-29 PROCEDURE — 6370000000 HC RX 637 (ALT 250 FOR IP): Performed by: PHYSICIAN ASSISTANT

## 2023-04-29 PROCEDURE — 84484 ASSAY OF TROPONIN QUANT: CPT

## 2023-04-29 PROCEDURE — 36415 COLL VENOUS BLD VENIPUNCTURE: CPT

## 2023-04-29 PROCEDURE — 85025 COMPLETE CBC W/AUTO DIFF WBC: CPT

## 2023-04-29 PROCEDURE — 83735 ASSAY OF MAGNESIUM: CPT

## 2023-04-29 PROCEDURE — 87635 SARS-COV-2 COVID-19 AMP PRB: CPT

## 2023-04-29 PROCEDURE — 87804 INFLUENZA ASSAY W/OPTIC: CPT

## 2023-04-29 PROCEDURE — 36600 WITHDRAWAL OF ARTERIAL BLOOD: CPT

## 2023-04-29 PROCEDURE — 71045 X-RAY EXAM CHEST 1 VIEW: CPT

## 2023-04-29 PROCEDURE — 94640 AIRWAY INHALATION TREATMENT: CPT

## 2023-04-29 PROCEDURE — 94664 DEMO&/EVAL PT USE INHALER: CPT

## 2023-04-29 PROCEDURE — 6360000002 HC RX W HCPCS: Performed by: PHYSICIAN ASSISTANT

## 2023-04-29 PROCEDURE — 96375 TX/PRO/DX INJ NEW DRUG ADDON: CPT

## 2023-04-29 PROCEDURE — 2580000003 HC RX 258: Performed by: PHYSICIAN ASSISTANT

## 2023-04-29 PROCEDURE — 96365 THER/PROPH/DIAG IV INF INIT: CPT

## 2023-04-29 PROCEDURE — 99285 EMERGENCY DEPT VISIT HI MDM: CPT

## 2023-04-29 PROCEDURE — 93005 ELECTROCARDIOGRAM TRACING: CPT

## 2023-04-29 PROCEDURE — 1200000000 HC SEMI PRIVATE

## 2023-04-29 PROCEDURE — 80053 COMPREHEN METABOLIC PANEL: CPT

## 2023-04-29 PROCEDURE — 96372 THER/PROPH/DIAG INJ SC/IM: CPT

## 2023-04-29 PROCEDURE — 83880 ASSAY OF NATRIURETIC PEPTIDE: CPT

## 2023-04-29 PROCEDURE — 82803 BLOOD GASES ANY COMBINATION: CPT

## 2023-04-29 PROCEDURE — 94761 N-INVAS EAR/PLS OXIMETRY MLT: CPT

## 2023-04-29 RX ORDER — HYDROCODONE POLISTIREX AND CHLORPHENIRAMINE POLISTIREX 10; 8 MG/5ML; MG/5ML
5 SUSPENSION, EXTENDED RELEASE ORAL EVERY 12 HOURS PRN
Status: DISCONTINUED | OUTPATIENT
Start: 2023-04-29 | End: 2023-05-02 | Stop reason: HOSPADM

## 2023-04-29 RX ORDER — POLYETHYLENE GLYCOL 3350 17 G/17G
17 POWDER, FOR SOLUTION ORAL DAILY PRN
Status: DISCONTINUED | OUTPATIENT
Start: 2023-04-29 | End: 2023-05-02 | Stop reason: HOSPADM

## 2023-04-29 RX ORDER — ACETAMINOPHEN 650 MG/1
650 SUPPOSITORY RECTAL EVERY 6 HOURS PRN
Status: DISCONTINUED | OUTPATIENT
Start: 2023-04-29 | End: 2023-05-02 | Stop reason: HOSPADM

## 2023-04-29 RX ORDER — BENZONATATE 100 MG/1
100 CAPSULE ORAL 3 TIMES DAILY PRN
Status: DISCONTINUED | OUTPATIENT
Start: 2023-04-29 | End: 2023-05-02 | Stop reason: HOSPADM

## 2023-04-29 RX ORDER — FOLIC ACID 1 MG/1
1 TABLET ORAL DAILY
Status: DISCONTINUED | OUTPATIENT
Start: 2023-04-29 | End: 2023-05-02 | Stop reason: HOSPADM

## 2023-04-29 RX ORDER — HYDROXYZINE HYDROCHLORIDE 25 MG/1
25 TABLET, FILM COATED ORAL 2 TIMES DAILY PRN
Status: DISCONTINUED | OUTPATIENT
Start: 2023-04-29 | End: 2023-05-02 | Stop reason: HOSPADM

## 2023-04-29 RX ORDER — ENOXAPARIN SODIUM 100 MG/ML
40 INJECTION SUBCUTANEOUS DAILY
Status: DISCONTINUED | OUTPATIENT
Start: 2023-04-29 | End: 2023-05-02 | Stop reason: HOSPADM

## 2023-04-29 RX ORDER — ONDANSETRON 2 MG/ML
4 INJECTION INTRAMUSCULAR; INTRAVENOUS EVERY 6 HOURS PRN
Status: DISCONTINUED | OUTPATIENT
Start: 2023-04-29 | End: 2023-05-02 | Stop reason: HOSPADM

## 2023-04-29 RX ORDER — IPRATROPIUM BROMIDE AND ALBUTEROL SULFATE 2.5; .5 MG/3ML; MG/3ML
1 SOLUTION RESPIRATORY (INHALATION)
Status: DISCONTINUED | OUTPATIENT
Start: 2023-04-30 | End: 2023-05-02 | Stop reason: HOSPADM

## 2023-04-29 RX ORDER — HYDROCHLOROTHIAZIDE 25 MG/1
25 TABLET ORAL DAILY
Status: DISCONTINUED | OUTPATIENT
Start: 2023-04-29 | End: 2023-05-02 | Stop reason: HOSPADM

## 2023-04-29 RX ORDER — FUROSEMIDE 20 MG/1
20 TABLET ORAL 2 TIMES DAILY PRN
Status: DISCONTINUED | OUTPATIENT
Start: 2023-04-29 | End: 2023-05-02 | Stop reason: HOSPADM

## 2023-04-29 RX ORDER — ACETAMINOPHEN 325 MG/1
650 TABLET ORAL EVERY 6 HOURS PRN
Status: DISCONTINUED | OUTPATIENT
Start: 2023-04-29 | End: 2023-05-02 | Stop reason: HOSPADM

## 2023-04-29 RX ORDER — TRAZODONE HYDROCHLORIDE 50 MG/1
100 TABLET ORAL NIGHTLY
Status: DISCONTINUED | OUTPATIENT
Start: 2023-04-29 | End: 2023-05-02 | Stop reason: HOSPADM

## 2023-04-29 RX ORDER — IPRATROPIUM BROMIDE AND ALBUTEROL SULFATE 2.5; .5 MG/3ML; MG/3ML
1 SOLUTION RESPIRATORY (INHALATION) EVERY 4 HOURS
Status: DISCONTINUED | OUTPATIENT
Start: 2023-04-29 | End: 2023-04-29

## 2023-04-29 RX ORDER — PANTOPRAZOLE SODIUM 40 MG/1
40 TABLET, DELAYED RELEASE ORAL DAILY
Status: DISCONTINUED | OUTPATIENT
Start: 2023-04-29 | End: 2023-05-02 | Stop reason: HOSPADM

## 2023-04-29 RX ORDER — LEVOTHYROXINE SODIUM 0.07 MG/1
150 TABLET ORAL DAILY
Status: DISCONTINUED | OUTPATIENT
Start: 2023-04-30 | End: 2023-05-02 | Stop reason: HOSPADM

## 2023-04-29 RX ORDER — GUAIFENESIN 600 MG/1
600 TABLET, EXTENDED RELEASE ORAL 2 TIMES DAILY
Status: DISCONTINUED | OUTPATIENT
Start: 2023-04-29 | End: 2023-05-02 | Stop reason: HOSPADM

## 2023-04-29 RX ORDER — METHYLPREDNISOLONE SODIUM SUCCINATE 40 MG/ML
40 INJECTION, POWDER, LYOPHILIZED, FOR SOLUTION INTRAMUSCULAR; INTRAVENOUS 3 TIMES DAILY
Status: DISCONTINUED | OUTPATIENT
Start: 2023-04-29 | End: 2023-05-02 | Stop reason: HOSPADM

## 2023-04-29 RX ORDER — ONDANSETRON 4 MG/1
4 TABLET, ORALLY DISINTEGRATING ORAL EVERY 8 HOURS PRN
Status: DISCONTINUED | OUTPATIENT
Start: 2023-04-29 | End: 2023-05-02 | Stop reason: HOSPADM

## 2023-04-29 RX ORDER — DOXYCYCLINE 100 MG/1
100 CAPSULE ORAL EVERY 12 HOURS SCHEDULED
Status: DISCONTINUED | OUTPATIENT
Start: 2023-04-29 | End: 2023-05-02 | Stop reason: HOSPADM

## 2023-04-29 RX ORDER — DIGOXIN 125 MCG
125 TABLET ORAL DAILY
Status: DISCONTINUED | OUTPATIENT
Start: 2023-04-29 | End: 2023-05-02 | Stop reason: HOSPADM

## 2023-04-29 RX ORDER — BUDESONIDE 0.5 MG/2ML
0.5 INHALANT ORAL 2 TIMES DAILY
Status: DISCONTINUED | OUTPATIENT
Start: 2023-04-29 | End: 2023-05-02 | Stop reason: HOSPADM

## 2023-04-29 RX ORDER — SILDENAFIL CITRATE 20 MG/1
20 TABLET ORAL 3 TIMES DAILY
Status: DISCONTINUED | OUTPATIENT
Start: 2023-04-29 | End: 2023-05-02 | Stop reason: HOSPADM

## 2023-04-29 RX ADMIN — BUDESONIDE 500 MCG: 0.5 SUSPENSION RESPIRATORY (INHALATION) at 21:47

## 2023-04-29 RX ADMIN — CEFTRIAXONE 1000 MG: 1 INJECTION, POWDER, FOR SOLUTION INTRAMUSCULAR; INTRAVENOUS at 23:28

## 2023-04-29 RX ADMIN — PANTOPRAZOLE SODIUM 40 MG: 40 TABLET, DELAYED RELEASE ORAL at 23:08

## 2023-04-29 RX ADMIN — GUAIFENESIN 600 MG: 600 TABLET, EXTENDED RELEASE ORAL at 23:06

## 2023-04-29 RX ADMIN — TRAZODONE HYDROCHLORIDE 100 MG: 50 TABLET ORAL at 23:08

## 2023-04-29 RX ADMIN — FOLIC ACID 1 MG: 1 TABLET ORAL at 23:08

## 2023-04-29 RX ADMIN — SILDENAFIL 20 MG: 20 TABLET, FILM COATED ORAL at 23:07

## 2023-04-29 RX ADMIN — METHYLPREDNISOLONE SODIUM SUCCINATE 40 MG: 40 INJECTION, POWDER, FOR SOLUTION INTRAMUSCULAR; INTRAVENOUS at 23:11

## 2023-04-29 RX ADMIN — HYDROCHLOROTHIAZIDE 25 MG: 25 TABLET ORAL at 23:06

## 2023-04-29 RX ADMIN — IPRATROPIUM BROMIDE AND ALBUTEROL SULFATE 3 ML: .5; 3 SOLUTION RESPIRATORY (INHALATION) at 21:47

## 2023-04-29 RX ADMIN — DOXYCYCLINE 100 MG: 100 CAPSULE ORAL at 23:08

## 2023-04-29 ASSESSMENT — LIFESTYLE VARIABLES
HOW MANY STANDARD DRINKS CONTAINING ALCOHOL DO YOU HAVE ON A TYPICAL DAY: PATIENT DOES NOT DRINK
HOW OFTEN DO YOU HAVE A DRINK CONTAINING ALCOHOL: NEVER

## 2023-04-29 ASSESSMENT — PAIN SCALES - GENERAL: PAINLEVEL_OUTOF10: 0

## 2023-04-30 PROBLEM — R63.4 WEIGHT LOSS: Status: ACTIVE | Noted: 2023-04-30

## 2023-04-30 LAB
ALBUMIN SERPL-MCNC: 4 G/DL (ref 3.4–4.8)
ALBUMIN/GLOB SERPL: 1.1 {RATIO} (ref 0.8–2)
ALP SERPL-CCNC: 140 U/L (ref 25–100)
ALT SERPL-CCNC: 25 U/L (ref 4–36)
ANION GAP SERPL CALCULATED.3IONS-SCNC: 10 MMOL/L (ref 3–16)
AST SERPL-CCNC: 27 U/L (ref 8–33)
BASOPHILS # BLD: 0 K/UL (ref 0–0.1)
BASOPHILS NFR BLD: 0.2 %
BILIRUB SERPL-MCNC: 0.3 MG/DL (ref 0.3–1.2)
BUN SERPL-MCNC: 9 MG/DL (ref 6–20)
CALCIUM SERPL-MCNC: 9.6 MG/DL (ref 8.5–10.5)
CHLORIDE SERPL-SCNC: 98 MMOL/L (ref 98–107)
CO2 SERPL-SCNC: 30 MMOL/L (ref 20–30)
CREAT SERPL-MCNC: 0.7 MG/DL (ref 0.4–1.2)
EOSINOPHIL # BLD: 0 K/UL (ref 0–0.4)
EOSINOPHIL NFR BLD: 0.1 %
ERYTHROCYTE [DISTWIDTH] IN BLOOD BY AUTOMATED COUNT: 13.4 % (ref 11–16)
GFR SERPLBLD CREATININE-BSD FMLA CKD-EPI: >60 ML/MIN/{1.73_M2}
GLOBULIN SER CALC-MCNC: 3.7 G/DL
GLUCOSE SERPL-MCNC: 148 MG/DL (ref 74–106)
HCT VFR BLD AUTO: 42.1 % (ref 37–47)
HGB BLD-MCNC: 13.8 G/DL (ref 11.5–16.5)
IMM GRANULOCYTES # BLD: 0 K/UL
IMM GRANULOCYTES NFR BLD: 0.4 % (ref 0–5)
LYMPHOCYTES # BLD: 0.8 K/UL (ref 1.5–4)
LYMPHOCYTES NFR BLD: 9.7 %
MCH RBC QN AUTO: 31.1 PG (ref 27–32)
MCHC RBC AUTO-ENTMCNC: 32.8 G/DL (ref 31–35)
MCV RBC AUTO: 94.8 FL (ref 80–100)
MONOCYTES # BLD: 0.1 K/UL (ref 0.2–0.8)
MONOCYTES NFR BLD: 1.1 %
NEUTROPHILS # BLD: 7.3 K/UL (ref 2–7.5)
NEUTS SEG NFR BLD: 88.5 %
PLATELET # BLD AUTO: 270 K/UL (ref 150–400)
PMV BLD AUTO: 10.2 FL (ref 6–10)
POTASSIUM SERPL-SCNC: 4.3 MMOL/L (ref 3.4–5.1)
PROT SERPL-MCNC: 7.7 G/DL (ref 6.4–8.3)
RBC # BLD AUTO: 4.44 M/UL (ref 3.8–5.8)
S PYO AG THROAT QL: NEGATIVE
SODIUM SERPL-SCNC: 138 MMOL/L (ref 136–145)
WBC # BLD AUTO: 8.3 K/UL (ref 4–11)

## 2023-04-30 PROCEDURE — 94664 DEMO&/EVAL PT USE INHALER: CPT

## 2023-04-30 PROCEDURE — 96366 THER/PROPH/DIAG IV INF ADDON: CPT

## 2023-04-30 PROCEDURE — 94669 MECHANICAL CHEST WALL OSCILL: CPT

## 2023-04-30 PROCEDURE — 94761 N-INVAS EAR/PLS OXIMETRY MLT: CPT

## 2023-04-30 PROCEDURE — 94640 AIRWAY INHALATION TREATMENT: CPT

## 2023-04-30 PROCEDURE — 6360000002 HC RX W HCPCS: Performed by: PHYSICIAN ASSISTANT

## 2023-04-30 PROCEDURE — 87880 STREP A ASSAY W/OPTIC: CPT

## 2023-04-30 PROCEDURE — 6370000000 HC RX 637 (ALT 250 FOR IP): Performed by: PHYSICIAN ASSISTANT

## 2023-04-30 PROCEDURE — 85025 COMPLETE CBC W/AUTO DIFF WBC: CPT

## 2023-04-30 PROCEDURE — 99222 1ST HOSP IP/OBS MODERATE 55: CPT | Performed by: INTERNAL MEDICINE

## 2023-04-30 PROCEDURE — 87070 CULTURE OTHR SPECIMN AEROBIC: CPT

## 2023-04-30 PROCEDURE — 2580000003 HC RX 258: Performed by: PHYSICIAN ASSISTANT

## 2023-04-30 PROCEDURE — 2700000000 HC OXYGEN THERAPY PER DAY

## 2023-04-30 PROCEDURE — 96376 TX/PRO/DX INJ SAME DRUG ADON: CPT

## 2023-04-30 PROCEDURE — 6370000000 HC RX 637 (ALT 250 FOR IP): Performed by: INTERNAL MEDICINE

## 2023-04-30 PROCEDURE — 36415 COLL VENOUS BLD VENIPUNCTURE: CPT

## 2023-04-30 PROCEDURE — 87205 SMEAR GRAM STAIN: CPT

## 2023-04-30 PROCEDURE — 80053 COMPREHEN METABOLIC PANEL: CPT

## 2023-04-30 PROCEDURE — 1200000000 HC SEMI PRIVATE

## 2023-04-30 RX ADMIN — DOXYCYCLINE 100 MG: 100 CAPSULE ORAL at 20:20

## 2023-04-30 RX ADMIN — IPRATROPIUM BROMIDE AND ALBUTEROL SULFATE 3 ML: .5; 2.5 SOLUTION RESPIRATORY (INHALATION) at 08:56

## 2023-04-30 RX ADMIN — VENLAFAXINE HYDROCHLORIDE 225 MG: 150 CAPSULE, EXTENDED RELEASE ORAL at 08:32

## 2023-04-30 RX ADMIN — SILDENAFIL 20 MG: 20 TABLET, FILM COATED ORAL at 14:58

## 2023-04-30 RX ADMIN — GUAIFENESIN 600 MG: 600 TABLET, EXTENDED RELEASE ORAL at 20:20

## 2023-04-30 RX ADMIN — SILDENAFIL 20 MG: 20 TABLET, FILM COATED ORAL at 08:32

## 2023-04-30 RX ADMIN — BENZONATATE 100 MG: 100 CAPSULE ORAL at 22:32

## 2023-04-30 RX ADMIN — METHYLPREDNISOLONE SODIUM SUCCINATE 40 MG: 40 INJECTION, POWDER, FOR SOLUTION INTRAMUSCULAR; INTRAVENOUS at 08:32

## 2023-04-30 RX ADMIN — SILDENAFIL 20 MG: 20 TABLET, FILM COATED ORAL at 20:20

## 2023-04-30 RX ADMIN — LEVOTHYROXINE SODIUM 150 MCG: 0.07 TABLET ORAL at 05:41

## 2023-04-30 RX ADMIN — PANTOPRAZOLE SODIUM 40 MG: 40 TABLET, DELAYED RELEASE ORAL at 08:32

## 2023-04-30 RX ADMIN — Medication 5 ML: at 10:44

## 2023-04-30 RX ADMIN — HYDROXYZINE HYDROCHLORIDE 25 MG: 25 TABLET, FILM COATED ORAL at 15:09

## 2023-04-30 RX ADMIN — IPRATROPIUM BROMIDE AND ALBUTEROL SULFATE 3 ML: .5; 2.5 SOLUTION RESPIRATORY (INHALATION) at 05:20

## 2023-04-30 RX ADMIN — Medication 5 ML: at 23:28

## 2023-04-30 RX ADMIN — METHYLPREDNISOLONE SODIUM SUCCINATE 40 MG: 40 INJECTION, POWDER, FOR SOLUTION INTRAMUSCULAR; INTRAVENOUS at 15:10

## 2023-04-30 RX ADMIN — IPRATROPIUM BROMIDE AND ALBUTEROL SULFATE 3 ML: .5; 2.5 SOLUTION RESPIRATORY (INHALATION) at 12:50

## 2023-04-30 RX ADMIN — CEFTRIAXONE 1000 MG: 1 INJECTION, POWDER, FOR SOLUTION INTRAMUSCULAR; INTRAVENOUS at 20:29

## 2023-04-30 RX ADMIN — IPRATROPIUM BROMIDE AND ALBUTEROL SULFATE 3 ML: .5; 2.5 SOLUTION RESPIRATORY (INHALATION) at 16:33

## 2023-04-30 RX ADMIN — TRAZODONE HYDROCHLORIDE 100 MG: 50 TABLET ORAL at 20:20

## 2023-04-30 RX ADMIN — DIGOXIN 125 MCG: 125 TABLET ORAL at 08:32

## 2023-04-30 RX ADMIN — METHYLPREDNISOLONE SODIUM SUCCINATE 40 MG: 40 INJECTION, POWDER, FOR SOLUTION INTRAMUSCULAR; INTRAVENOUS at 20:20

## 2023-04-30 RX ADMIN — DOXYCYCLINE 100 MG: 100 CAPSULE ORAL at 08:32

## 2023-04-30 RX ADMIN — FOLIC ACID 1 MG: 1 TABLET ORAL at 08:32

## 2023-04-30 RX ADMIN — GUAIFENESIN 600 MG: 600 TABLET, EXTENDED RELEASE ORAL at 08:32

## 2023-04-30 RX ADMIN — BUDESONIDE 500 MCG: 0.5 SUSPENSION RESPIRATORY (INHALATION) at 05:20

## 2023-04-30 RX ADMIN — BUDESONIDE 500 MCG: 0.5 SUSPENSION RESPIRATORY (INHALATION) at 16:33

## 2023-04-30 RX ADMIN — HYDROCHLOROTHIAZIDE 25 MG: 25 TABLET ORAL at 08:33

## 2023-04-30 RX ADMIN — BENZONATATE 100 MG: 100 CAPSULE ORAL at 15:10

## 2023-05-01 PROCEDURE — 94761 N-INVAS EAR/PLS OXIMETRY MLT: CPT

## 2023-05-01 PROCEDURE — 6370000000 HC RX 637 (ALT 250 FOR IP): Performed by: INTERNAL MEDICINE

## 2023-05-01 PROCEDURE — 96376 TX/PRO/DX INJ SAME DRUG ADON: CPT

## 2023-05-01 PROCEDURE — 97116 GAIT TRAINING THERAPY: CPT

## 2023-05-01 PROCEDURE — 2580000003 HC RX 258: Performed by: PHYSICIAN ASSISTANT

## 2023-05-01 PROCEDURE — 94640 AIRWAY INHALATION TREATMENT: CPT

## 2023-05-01 PROCEDURE — 97161 PT EVAL LOW COMPLEX 20 MIN: CPT

## 2023-05-01 PROCEDURE — 6370000000 HC RX 637 (ALT 250 FOR IP): Performed by: PHYSICIAN ASSISTANT

## 2023-05-01 PROCEDURE — 6360000002 HC RX W HCPCS: Performed by: PHYSICIAN ASSISTANT

## 2023-05-01 PROCEDURE — 97530 THERAPEUTIC ACTIVITIES: CPT

## 2023-05-01 PROCEDURE — 96366 THER/PROPH/DIAG IV INF ADDON: CPT

## 2023-05-01 PROCEDURE — 1200000000 HC SEMI PRIVATE

## 2023-05-01 PROCEDURE — 2700000000 HC OXYGEN THERAPY PER DAY

## 2023-05-01 PROCEDURE — 99231 SBSQ HOSP IP/OBS SF/LOW 25: CPT | Performed by: INTERNAL MEDICINE

## 2023-05-01 PROCEDURE — 97166 OT EVAL MOD COMPLEX 45 MIN: CPT

## 2023-05-01 PROCEDURE — 94669 MECHANICAL CHEST WALL OSCILL: CPT

## 2023-05-01 RX ORDER — CLOBETASOL PROPIONATE 0.5 MG/G
OINTMENT TOPICAL NIGHTLY
COMMUNITY

## 2023-05-01 RX ADMIN — FOLIC ACID 1 MG: 1 TABLET ORAL at 09:04

## 2023-05-01 RX ADMIN — GUAIFENESIN 600 MG: 600 TABLET, EXTENDED RELEASE ORAL at 21:36

## 2023-05-01 RX ADMIN — VENLAFAXINE HYDROCHLORIDE 225 MG: 150 CAPSULE, EXTENDED RELEASE ORAL at 09:04

## 2023-05-01 RX ADMIN — BUDESONIDE 500 MCG: 0.5 SUSPENSION RESPIRATORY (INHALATION) at 17:58

## 2023-05-01 RX ADMIN — DIGOXIN 125 MCG: 125 TABLET ORAL at 09:05

## 2023-05-01 RX ADMIN — METHYLPREDNISOLONE SODIUM SUCCINATE 40 MG: 40 INJECTION, POWDER, FOR SOLUTION INTRAMUSCULAR; INTRAVENOUS at 16:20

## 2023-05-01 RX ADMIN — TRAZODONE HYDROCHLORIDE 100 MG: 50 TABLET ORAL at 21:37

## 2023-05-01 RX ADMIN — SILDENAFIL 20 MG: 20 TABLET, FILM COATED ORAL at 09:04

## 2023-05-01 RX ADMIN — DOXYCYCLINE 100 MG: 100 CAPSULE ORAL at 21:37

## 2023-05-01 RX ADMIN — METHYLPREDNISOLONE SODIUM SUCCINATE 40 MG: 40 INJECTION, POWDER, FOR SOLUTION INTRAMUSCULAR; INTRAVENOUS at 09:05

## 2023-05-01 RX ADMIN — LEVOTHYROXINE SODIUM 150 MCG: 0.07 TABLET ORAL at 06:23

## 2023-05-01 RX ADMIN — Medication 5 ML: at 21:39

## 2023-05-01 RX ADMIN — HYDROCHLOROTHIAZIDE 25 MG: 25 TABLET ORAL at 09:04

## 2023-05-01 RX ADMIN — GUAIFENESIN 600 MG: 600 TABLET, EXTENDED RELEASE ORAL at 09:04

## 2023-05-01 RX ADMIN — SILDENAFIL 20 MG: 20 TABLET, FILM COATED ORAL at 12:34

## 2023-05-01 RX ADMIN — ENOXAPARIN SODIUM 40 MG: 100 INJECTION SUBCUTANEOUS at 21:39

## 2023-05-01 RX ADMIN — BENZONATATE 100 MG: 100 CAPSULE ORAL at 21:39

## 2023-05-01 RX ADMIN — IPRATROPIUM BROMIDE AND ALBUTEROL SULFATE 3 ML: .5; 2.5 SOLUTION RESPIRATORY (INHALATION) at 13:25

## 2023-05-01 RX ADMIN — IPRATROPIUM BROMIDE AND ALBUTEROL SULFATE 3 ML: .5; 2.5 SOLUTION RESPIRATORY (INHALATION) at 10:10

## 2023-05-01 RX ADMIN — CEFTRIAXONE 1000 MG: 1 INJECTION, POWDER, FOR SOLUTION INTRAMUSCULAR; INTRAVENOUS at 21:41

## 2023-05-01 RX ADMIN — DOXYCYCLINE 100 MG: 100 CAPSULE ORAL at 09:04

## 2023-05-01 RX ADMIN — BENZONATATE 100 MG: 100 CAPSULE ORAL at 12:34

## 2023-05-01 RX ADMIN — SILDENAFIL 20 MG: 20 TABLET, FILM COATED ORAL at 21:37

## 2023-05-01 RX ADMIN — METHYLPREDNISOLONE SODIUM SUCCINATE 40 MG: 40 INJECTION, POWDER, FOR SOLUTION INTRAMUSCULAR; INTRAVENOUS at 21:39

## 2023-05-01 RX ADMIN — PANTOPRAZOLE SODIUM 40 MG: 40 TABLET, DELAYED RELEASE ORAL at 09:04

## 2023-05-01 RX ADMIN — IPRATROPIUM BROMIDE AND ALBUTEROL SULFATE 3 ML: .5; 2.5 SOLUTION RESPIRATORY (INHALATION) at 17:58

## 2023-05-01 RX ADMIN — Medication 5 ML: at 12:34

## 2023-05-01 NOTE — FLOWSHEET NOTE
05/01/23 0800   Assessment   Charting Type Shift assessment   Psychosocial   Psychosocial (WDL) WDL   Neurological   Neuro (WDL) WDL   Level of Consciousness 0   [place] Coma Scale   Eye Opening 4   Best Verbal Response 5   Best Motor Response 6   [place] Coma Scale Score 15   HEENT (Head, Ears, Eyes, Nose, & Throat)   HEENT (WDL) X   Right Eye Glasses   Left Eye Glasses   Teeth Dentures upper   Respiratory   Respiratory (WDL) X   Respiratory Interventions Cough & deep breathe;H. O.B. elevated   Respiratory Pattern Regular  (tachypnea with exertion)   Respiratory Depth Normal   Respiratory Quality/Effort Dyspnea with exertion   L Breath Sounds Clear;Diminished   R Breath Sounds Clear;Diminished   Cough/Sputum   Cough Non-productive   Frequency Occasional   Sputum Amount None   Cardiac   Cardiac (WDL) X   Cardiac Monitor   Telemetry Box Number    Alarm Audible Yes   Telemetry Monitor Alarm Parameters    Gastrointestinal   Abdominal (WDL) WDL   RUQ Bowel Sounds Active   LUQ Bowel Sounds Active   RLQ Bowel Sounds Active   LLQ Bowel Sounds Active   Genitourinary   Genitourinary (WDL) WDL   Suprapubic Tenderness No   Peripheral Vascular   Peripheral Vascular (WDL) WDL   Edema None   Skin Integumentary    Skin Integumentary (WDL) X   Skin Integrity Ecchymosis   Location scattered   Care Plan - Skin/Tissue Integrity Goals   Skin Integrity Remains Intact Monitor for areas of redness and/or skin breakdown;Assess vascular access sites hourly   Musculoskeletal   Musculoskeletal (WDL) WDL   Care Plan - Discharge Planning Goals   Discharge to home or other facility with appropriate resources Identify barriers to discharge with patient and caregiver

## 2023-05-01 NOTE — CARDIO/PULMONARY
Patient requested to hold breathing tx until after breakfast.  She stated she has been awake all night and is tired. Patient is aware to call if she would like a tx prior to her next scheduled one. RADHA Jimenez notified.

## 2023-05-01 NOTE — ACP (ADVANCE CARE PLANNING)
Advance Care Planning     General Advance Care Planning (ACP) Conversation    Date of Conversation: 5/1/2023  Conducted with: Patient with Decision Making Capacity    Healthcare Decision Maker:    Primary Decision Maker: Derrell Garciar - [place] - [phone]  Click here to complete Healthcare Decision Makers including selection of the Healthcare Decision Maker Relationship (ie \"Primary\"). Today we documented Decision Maker(s) consistent with Legal Next of Kin hierarchy.     Content/Action Overview:  Has NO ACP documents/care preferences - information provided, considering goals and options  Reviewed DNR/DNI and patient elects Full Code (Attempt Resuscitation)  artificial nutrition, ventilation preferences, and resuscitation preferences      Length of Voluntary ACP Conversation in minutes:  <16 minutes (Non-Billable)    Allison Watson

## 2023-05-01 NOTE — PLAN OF CARE
Problem: Discharge Planning  Goal: Discharge to home or other facility with appropriate resources  5/1/2023 1033 by Bo Mccauley RN  Outcome: Progressing  Flowsheets (Taken 5/1/2023 0800)  Discharge to home or other facility with appropriate resources: Identify barriers to discharge with patient and caregiver  5/1/2023 0119 by Blanca Montgomery RN  Outcome: Progressing     Problem: ABCDS Injury Assessment  Goal: Absence of physical injury  5/1/2023 1033 by Bo Mccauley RN  Outcome: Progressing  5/1/2023 0119 by Blanca Montgomery RN  Outcome: Progressing     Problem: Skin/Tissue Integrity  Goal: Absence of new skin breakdown  Description: 1. Monitor for areas of redness and/or skin breakdown  2. Assess vascular access sites hourly  3. Every 4-6 hours minimum:  Change oxygen saturation probe site  4. Every 4-6 hours:  If on nasal continuous positive airway pressure, respiratory therapy assess nares and determine need for appliance change or resting period.   Outcome: Progressing     Problem: Safety - Adult  Goal: Free from fall injury  Outcome: Progressing Subjective   History of Present Illness: Mary Boyle is a 75 y.o. female is here today for follow-up of NPH. She was last seen 3/5/21. She is s/p  Shunt 11/19/20. Today Ms. Boyle reports she is doing well.  She is with her daughter.  They both report that she is walking better than before the shunt was placed.  They report that there has been some improvement in her cognition.  She reports occasional tenderness associated with the valve.  She describes the valve as a knot.  I explained that the valve and tubing is palpable under the skin and should be left alone if possible.      History of Present Illness    The following portions of the patient's history were reviewed and updated as appropriate: allergies, past family history, past medical history, past social history, past surgical history and problem list.    Past Medical History:   Diagnosis Date   • Arthritis    • Asthma    • Chest pain    • Depression    • Dizziness 11/11/2020    Went to ER with dizziness and generalized weakness--found to have hydrocephalus.    • Ganglion cyst     right leg   • GERD (gastroesophageal reflux disease)    • Gout    • Hiatal hernia    • Hypertension    • Hypokalemia    • Mild mitral regurgitation    • Normal pressure hydrocephalus (CMS/HCC)    • Personality disorder (CMS/HCC)     PER NOTE IN EPIC    • PONV (postoperative nausea and vomiting)    • Recovering alcoholic (CMS/HCC)     SOBER SINCE 2003   • Thyroid nodule    • Tricuspid regurgitation    • Vitamin D deficiency         Past Surgical History:   Procedure Laterality Date   • APPENDECTOMY     • BREAST LUMPECTOMY     • CHOLECYSTECTOMY     • GANGLION CYST EXCISION     • HERNIA REPAIR     • HYSTERECTOMY     •  SHUNT INSERTION N/A 11/19/2020    Procedure: Right sided VENTRICULAR PERITONEAL SHUNT INSERTION STERIOTACTIC;  Surgeon: Xavi Silverio MD;  Location: [facility];  Service: Neurosurgery;  Laterality: N/A;          Current Outpatient Medications:   •   acetaminophen (TYLENOL) 325 MG tablet, Take 2 tablets by mouth Every 4 (Four) Hours As Needed for Mild Pain . (Patient taking differently: Take 325 mg by mouth Every 4 (Four) Hours As Needed for Mild Pain .), Disp:  , Rfl:   •  albuterol (VENTOLIN HFA) 108 (90 BASE) MCG/ACT inhaler, 2 puffs Every 4 (Four) Hours As Needed., Disp: , Rfl:   •  allopurinol (ZYLOPRIM) 100 MG tablet, Take 1 tablet by mouth Daily., Disp: 90 tablet, Rfl: 3  •  amlodipine-olmesartan (ROXANNA) 5-20 MG per tablet, Take 1 tablet by mouth Daily., Disp: 30 tablet, Rfl: 11  •  Ascorbic Acid (Vitamin C) 500 MG chewable tablet, Chew Daily., Disp: , Rfl:   •  Cholecalciferol (Vitamin D-3) 25 MCG (1000 UT) capsule, Take 1,000 Units by mouth Daily., Disp: , Rfl:   •  esomeprazole (nexIUM) 20 MG capsule, Take 20 mg by mouth Every Morning Before Breakfast., Disp: , Rfl:   •  fexofenadine (ALLEGRA) 60 MG tablet, Take 60 mg by mouth Daily., Disp: , Rfl:   •  furosemide (LASIX) 20 MG tablet, Take 20 mg by mouth Daily., Disp: , Rfl:   •  hydrALAZINE (APRESOLINE) 50 MG tablet, Take 50 mg by mouth 3 (Three) Times a Day., Disp: , Rfl:   •  ipratropium-albuterol (DUO-NEB) 0.5-2.5 mg/3 ml nebulizer, , Disp: , Rfl:   •  MAGNESIUM PO, Take 20 mg by mouth Daily., Disp: , Rfl:   •  metoprolol succinate XL (TOPROL-XL) 25 MG 24 hr tablet, Take 1 tablet by mouth Daily., Disp: 30 tablet, Rfl: 11  •  Probiotic Product (HEALTHY COLON PO), Take 1 tablet/day by mouth., Disp: , Rfl:      Social History     Socioeconomic History   • Marital status:      Spouse name: Not on file   • Number of children: Not on file   • Years of education: Not on file   • Highest education level: Not on file   Tobacco Use   • Smoking status: Former Smoker     Packs/day: 2.00     Years: 20.00     Pack years: 40.00     Types: Cigarettes     Quit date:      Years since quittin.3   • Smokeless tobacco: Never Used   Vaping Use   • Vaping Use: Never used   Substance and Sexual Activity   •  "Alcohol use: No     Comment: \"15 years sobriety\"   • Drug use: No   • Sexual activity: Never     Birth control/protection: Surgical        Family History   Problem Relation Age of Onset   • Heart attack Other    • Heart disease Other    • Hypertension Father    • Stroke Father    • Malig Hyperthermia Neg Hx         Review of Systems   Constitutional: Negative for chills and fever.   Eyes: Negative for visual disturbance.   Genitourinary: Negative for difficulty urinating and enuresis.   Musculoskeletal: Positive for gait problem (Instability; uses walker to assist with ambulation).   Neurological: Positive for dizziness. Negative for speech difficulty, light-headedness and headaches.   Psychiatric/Behavioral: Positive for decreased concentration. Negative for confusion.       Objective     Vitals:    21 1509   BP: 130/52   Pulse: (!) 41   SpO2: 98%   Weight: 55.8 kg (123 lb)   Height: 149.9 cm (59\")     Body mass index is 24.84 kg/m².      Physical Exam  Neurologic Exam  Awake, alert, oriented x3  Pupils equal round reactive to light  Extraocular muscles intact  Face symmetric  Speech is fluent and clear  No pronator drift  Motor exam  Bilateral deltoids 5/5, bilateral biceps 5/5, bilateral triceps 5/5, bilateral wrist extension 5/5 bilateral hand  5/5  Bilateral hip flexion 5/5, bilateral knee extension 5/5, bilateral DF/PF 5/5  No clonus  No Augusta's reflex  Able to walk without rolling walker across the office without difficulty, uses a rolling walker to walk longer distances  Able to detect  light touch in all 4 extremities  Shunt valve depresses and refills easily    Assessment/Plan   Independent Review of Radiographic Studies:     No new imaging studies    Medical Decision Makin-year-old female s/p  shunt for NPH on 2020  -She is recovered very well from the shunt procedure.  Her daughter is with her today and they indicate that she has had significant improvement in her gait " stability as well as cognition.  I checked the shunt setting today and it is still at 2.0 and the valve depresses and refills easily.  I have recommended that if she ever has an MRI she should follow-up with me to reevaluate the shunt.  Explained that the shunt settings could be changed by exposure to strong magnets and this should be avoided.  She has a tendency to palpate her shunt valve and shunt tubing.  I have recommended that she avoid touching the shunt tubing and valve if possible as this can lead to irritation of the site.   -Overall she appears to be doing very well and I will plan to follow her up in 1 year with a repeat CT head to evaluate for any changes    Diagnoses and all orders for this visit:    1. S/P  shunt (Primary)  -     CT Head Without Contrast; Future    2. NPH (normal pressure hydrocephalus) (CMS/HCC)  -     CT Head Without Contrast; Future      Return in about 1 year (around 5/14/2022).

## 2023-05-02 VITALS
RESPIRATION RATE: 20 BRPM | SYSTOLIC BLOOD PRESSURE: 107 MMHG | HEIGHT: 65 IN | DIASTOLIC BLOOD PRESSURE: 63 MMHG | OXYGEN SATURATION: 98 % | TEMPERATURE: 97.9 F | HEART RATE: 69 BPM | WEIGHT: 168.19 LBS | BODY MASS INDEX: 28.02 KG/M2

## 2023-05-02 LAB
ALBUMIN SERPL-MCNC: 3.6 G/DL (ref 3.4–4.8)
ALBUMIN/GLOB SERPL: 1.1 {RATIO} (ref 0.8–2)
ALP SERPL-CCNC: 118 U/L (ref 25–100)
ALT SERPL-CCNC: 21 U/L (ref 4–36)
ANION GAP SERPL CALCULATED.3IONS-SCNC: 7 MMOL/L (ref 3–16)
AST SERPL-CCNC: 15 U/L (ref 8–33)
BACTERIA SPEC RESP CULT: NORMAL
BILIRUB SERPL-MCNC: <0.2 MG/DL (ref 0.3–1.2)
BUN SERPL-MCNC: 17 MG/DL (ref 6–20)
CALCIUM SERPL-MCNC: 9.2 MG/DL (ref 8.5–10.5)
CHLORIDE SERPL-SCNC: 98 MMOL/L (ref 98–107)
CO2 SERPL-SCNC: 32 MMOL/L (ref 20–30)
CREAT SERPL-MCNC: 0.6 MG/DL (ref 0.4–1.2)
ERYTHROCYTE [DISTWIDTH] IN BLOOD BY AUTOMATED COUNT: 13.3 % (ref 11–16)
GFR SERPLBLD CREATININE-BSD FMLA CKD-EPI: >60 ML/MIN/{1.73_M2}
GLOBULIN SER CALC-MCNC: 3.2 G/DL
GLUCOSE SERPL-MCNC: 150 MG/DL (ref 74–106)
GRAM STN SPEC: NORMAL
HCT VFR BLD AUTO: 40 % (ref 37–47)
HGB BLD-MCNC: 12.8 G/DL (ref 11.5–16.5)
MCH RBC QN AUTO: 30.4 PG (ref 27–32)
MCHC RBC AUTO-ENTMCNC: 32 G/DL (ref 31–35)
MCV RBC AUTO: 95 FL (ref 80–100)
PLATELET # BLD AUTO: 289 K/UL (ref 150–400)
PMV BLD AUTO: 10.6 FL (ref 6–10)
POTASSIUM SERPL-SCNC: 4 MMOL/L (ref 3.4–5.1)
PROT SERPL-MCNC: 6.8 G/DL (ref 6.4–8.3)
RBC # BLD AUTO: 4.21 M/UL (ref 3.8–5.8)
SODIUM SERPL-SCNC: 137 MMOL/L (ref 136–145)
WBC # BLD AUTO: 15.3 K/UL (ref 4–11)

## 2023-05-02 PROCEDURE — 6360000002 HC RX W HCPCS: Performed by: PHYSICIAN ASSISTANT

## 2023-05-02 PROCEDURE — 6370000000 HC RX 637 (ALT 250 FOR IP): Performed by: INTERNAL MEDICINE

## 2023-05-02 PROCEDURE — 85027 COMPLETE CBC AUTOMATED: CPT

## 2023-05-02 PROCEDURE — 99238 HOSP IP/OBS DSCHRG MGMT 30/<: CPT | Performed by: INTERNAL MEDICINE

## 2023-05-02 PROCEDURE — 94761 N-INVAS EAR/PLS OXIMETRY MLT: CPT

## 2023-05-02 PROCEDURE — 36415 COLL VENOUS BLD VENIPUNCTURE: CPT

## 2023-05-02 PROCEDURE — 94640 AIRWAY INHALATION TREATMENT: CPT

## 2023-05-02 PROCEDURE — 2700000000 HC OXYGEN THERAPY PER DAY

## 2023-05-02 PROCEDURE — 6370000000 HC RX 637 (ALT 250 FOR IP): Performed by: PHYSICIAN ASSISTANT

## 2023-05-02 PROCEDURE — 80053 COMPREHEN METABOLIC PANEL: CPT

## 2023-05-02 PROCEDURE — 94669 MECHANICAL CHEST WALL OSCILL: CPT

## 2023-05-02 RX ORDER — ALBUTEROL SULFATE 90 UG/1
2 AEROSOL, METERED RESPIRATORY (INHALATION) EVERY 6 HOURS PRN
Qty: 1 EACH | Refills: 1 | Status: SHIPPED | OUTPATIENT
Start: 2023-05-02

## 2023-05-02 RX ORDER — HYDROCODONE POLISTIREX AND CHLORPHENIRAMINE POLISTIREX 10; 8 MG/5ML; MG/5ML
5 SUSPENSION, EXTENDED RELEASE ORAL EVERY 12 HOURS PRN
Qty: 50 ML | Refills: 0 | Status: SHIPPED | OUTPATIENT
Start: 2023-05-02 | End: 2023-05-07

## 2023-05-02 RX ORDER — DOXYCYCLINE 100 MG/1
100 CAPSULE ORAL EVERY 12 HOURS SCHEDULED
Qty: 8 CAPSULE | Refills: 0 | Status: SHIPPED | OUTPATIENT
Start: 2023-05-02 | End: 2023-05-06

## 2023-05-02 RX ORDER — PREDNISONE 20 MG/1
TABLET ORAL
Qty: 20 TABLET | Refills: 0 | Status: SHIPPED | OUTPATIENT
Start: 2023-05-02 | End: 2023-05-11

## 2023-05-02 RX ORDER — BUDESONIDE 0.5 MG/2ML
0.5 INHALANT ORAL 2 TIMES DAILY
Qty: 60 EACH | Refills: 3 | Status: SHIPPED | OUTPATIENT
Start: 2023-05-02

## 2023-05-02 RX ORDER — IPRATROPIUM BROMIDE AND ALBUTEROL SULFATE 2.5; .5 MG/3ML; MG/3ML
1 SOLUTION RESPIRATORY (INHALATION) EVERY 4 HOURS
Qty: 360 ML | Refills: 1 | Status: SHIPPED | OUTPATIENT
Start: 2023-05-02

## 2023-05-02 RX ORDER — GUAIFENESIN 600 MG/1
600 TABLET, EXTENDED RELEASE ORAL 2 TIMES DAILY
Qty: 14 TABLET | Refills: 0 | Status: SHIPPED | OUTPATIENT
Start: 2023-05-02 | End: 2023-05-09

## 2023-05-02 RX ORDER — HYDROCODONE POLISTIREX AND CHLORPHENIRAMINE POLISTIREX 10; 8 MG/5ML; MG/5ML
5 SUSPENSION, EXTENDED RELEASE ORAL EVERY 12 HOURS PRN
Qty: 50 ML | Refills: 0 | Status: SHIPPED | OUTPATIENT
Start: 2023-05-02 | End: 2023-05-02 | Stop reason: SDUPTHER

## 2023-05-02 RX ADMIN — IPRATROPIUM BROMIDE AND ALBUTEROL SULFATE 3 ML: .5; 2.5 SOLUTION RESPIRATORY (INHALATION) at 12:17

## 2023-05-02 RX ADMIN — DIGOXIN 125 MCG: 125 TABLET ORAL at 08:51

## 2023-05-02 RX ADMIN — IPRATROPIUM BROMIDE AND ALBUTEROL SULFATE 3 ML: .5; 2.5 SOLUTION RESPIRATORY (INHALATION) at 05:18

## 2023-05-02 RX ADMIN — GUAIFENESIN 600 MG: 600 TABLET, EXTENDED RELEASE ORAL at 08:51

## 2023-05-02 RX ADMIN — BUDESONIDE 500 MCG: 0.5 SUSPENSION RESPIRATORY (INHALATION) at 05:18

## 2023-05-02 RX ADMIN — SILDENAFIL 20 MG: 20 TABLET, FILM COATED ORAL at 08:51

## 2023-05-02 RX ADMIN — FOLIC ACID 1 MG: 1 TABLET ORAL at 08:50

## 2023-05-02 RX ADMIN — METHYLPREDNISOLONE SODIUM SUCCINATE 40 MG: 40 INJECTION, POWDER, FOR SOLUTION INTRAMUSCULAR; INTRAVENOUS at 08:50

## 2023-05-02 RX ADMIN — PANTOPRAZOLE SODIUM 40 MG: 40 TABLET, DELAYED RELEASE ORAL at 08:50

## 2023-05-02 RX ADMIN — DOXYCYCLINE 100 MG: 100 CAPSULE ORAL at 08:51

## 2023-05-02 RX ADMIN — BENZONATATE 100 MG: 100 CAPSULE ORAL at 06:20

## 2023-05-02 RX ADMIN — LEVOTHYROXINE SODIUM 150 MCG: 0.07 TABLET ORAL at 06:20

## 2023-05-02 RX ADMIN — VENLAFAXINE HYDROCHLORIDE 225 MG: 150 CAPSULE, EXTENDED RELEASE ORAL at 08:50

## 2023-05-02 ASSESSMENT — PAIN SCALES - WONG BAKER
WONGBAKER_NUMERICALRESPONSE: 0

## 2023-05-02 NOTE — FLOWSHEET NOTE
05/02/23 0844   Assessment   Charting Type Shift assessment   Psychosocial   Psychosocial (WDL) WDL   Neurological   Neuro (WDL) WDL   Level of Consciousness 0   [place] Coma Scale   Eye Opening 4   Best Verbal Response 5   Best Motor Response 6   [place] Coma Scale Score 15   HEENT (Head, Ears, Eyes, Nose, & Throat)   HEENT (WDL) X   Right Eye Glasses   Left Eye Glasses   Teeth Dentures upper   Respiratory   Respiratory (WDL) X   Respiratory Pattern Regular  (tachypnea with exertion)   Respiratory Depth Normal   Respiratory Quality/Effort Dyspnea with exertion   L Breath Sounds Clear;Diminished   R Breath Sounds Fine Crackles;Clear   Breath Sounds   Right Upper Lobe Clear   Right Middle Lobe Clear   Right Lower Lobe Diminished   Left Upper Lobe Clear   Left Lower Lobe Crackles   Cough/Sputum   Sputum How Obtained Spontaneous cough   Cough Non-productive;Strong   Sputum Amount None   Cardiac   Cardiac (WDL) X  (Tachycardia at times)   Cardiac Monitor   Telemetry Box Number    Alarm Audible Yes   Telemetry Monitor Alarm Parameters    Gastrointestinal   Abdominal (WDL) WDL   RUQ Bowel Sounds Active   LUQ Bowel Sounds Active   RLQ Bowel Sounds Active   LLQ Bowel Sounds Active   Genitourinary   Genitourinary (WDL) WDL   Suprapubic Tenderness No   Peripheral Vascular   Peripheral Vascular (WDL) WDL   Edema None   Skin Integumentary    Skin Integumentary (WDL) X   Skin Integrity Ecchymosis   Location scattered   Musculoskeletal   Musculoskeletal (WDL) WDL

## 2023-05-02 NOTE — DISCHARGE SUMMARY
AM    CL 98 05/02/2023 05:09 AM    CO2 32 05/02/2023 05:09 AM    BUN 17 05/02/2023 05:09 AM    CREATININE 0.6 05/02/2023 05:09 AM         Discharge Medications:     Current Discharge Medication List             Details   budesonide (PULMICORT) 0.5 MG/2ML nebulizer suspension Take 2 mLs by nebulization in the morning and 2 mLs in the evening. Qty: 60 each, Refills: 3      predniSONE (DELTASONE) 20 MG tablet Take 2 tablets by mouth daily for 3 days, THEN 1 tablet daily for 3 days, THEN 0.5 tablets daily for 3 days. Qty: 20 tablet, Refills: 0      guaiFENesin (MUCINEX) 600 MG extended release tablet Take 1 tablet by mouth 2 times daily for 7 days  Qty: 14 tablet, Refills: 0      HYDROcodone-chlorpheniramine (TUSSIONEX) 10-8 MG/5ML SUER Take 5 mLs by mouth every 12 hours as needed (cough) for up to 5 days. Max Daily Amount: 10 mLs  Qty: 50 mL, Refills: 0    Comments: Reduce doses taken as pain becomes manageable  Associated Diagnoses: COPD exacerbation (HCC)      nystatin (MYCOSTATIN) 751832 UNIT/ML suspension Take 5 mLs by mouth 4 times daily as needed (sore mouth)  Qty: 100 mL, Refills: 0      doxycycline monohydrate (MONODOX) 100 MG capsule Take 1 capsule by mouth every 12 hours for 8 doses  Qty: 8 capsule, Refills: 0                Details   ipratropium-albuterol (DUONEB) 0.5-2.5 (3) MG/3ML SOLN nebulizer solution Inhale 3 mLs into the lungs every 4 hours  Qty: 360 mL, Refills: 1      albuterol sulfate HFA (PROAIR HFA) 108 (90 Base) MCG/ACT inhaler Inhale 2 puffs into the lungs every 6 hours as needed for Wheezing  Qty: 1 each, Refills: 1                Details   clobetasol (TEMOVATE) 0.05 % ointment Apply topically nightly Apply dime sized amount to affected area nightly for 6 weeks.  Started 4/13/2023      pantoprazole (PROTONIX) 40 MG tablet Take 1 tablet by mouth daily  Qty: 90 tablet, Refills: 3      folic acid (FOLVITE) 1 MG tablet TAKE 1 TABLET BY MOUTH DAILY  Qty: 90 tablet, Refills: 1

## 2023-05-02 NOTE — PROGRESS NOTES
CLINICAL PHARMACY NOTE: MEDS TO BEDS    Total # of Prescriptions Filled: 4   The following medications were delivered to the patient:  Discharge Medication List as of 5/2/2023 12:22 PM        START taking these medications    Details   budesonide (PULMICORT) 0.5 MG/2ML nebulizer suspension Take 2 mLs by nebulization in the morning and 2 mLs in the evening., Disp-60 each, R-3Normal      predniSONE (DELTASONE) 20 MG tablet Take 2 tablets by mouth daily for 3 days, THEN 1 tablet daily for 3 days, THEN 0.5 tablets daily for 3 days. , Disp-20 tablet, R-0Normal      guaiFENesin (MUCINEX) 600 MG extended release tablet Take 1 tablet by mouth 2 times daily for 7 days, Disp-14 tablet, R-0Normal      nystatin (MYCOSTATIN) 342266 UNIT/ML suspension Take 5 mLs by mouth 4 times daily as needed (sore mouth), Oral, 4 TIMES DAILY PRN Starting Tue 5/2/2023, Until Sun 5/7/2023 at 2359, For 5 days, Disp-100 mL, R-0, Normal      doxycycline monohydrate (MONODOX) 100 MG capsule Take 1 capsule by mouth every 12 hours for 8 doses, Disp-8 capsule, R-0Normal         Ventolin HFA 108mcg/act aers Inhale 2 puffs into the lungs every 6 hours as needed for wheezing    Additional Documentation:  Budesonide nebs (no inventory), Ipratropium-albuterol nebs (no inventory), Guaifenesin 600mg tablets (OTC not covered) and remaining refill for Ventolin Inhaler were transferred to [facility] as patient requested. All other medications were delivered to patient's bedside and signed for by patient.
Medication Reconciliation completed with fill history from [facility] in [place], [address] [place] [address], and [facility]. I confirmed all medications with patient. There were a few prn medications that had not been filled in several months but patient said that she does have some at home and uses them when she needs them. Those include hydroxyzine 25 mg, albuterol inhaler, duo nebs, flonase, and furosemide 20 mg.   -added Clobetasol 0.05% ointment.
Offered spiritual care to patient. Told pt to reach out if they needed anything further.
PIV and telemetry removed. Pt discharged home. Verbalized understanding of discharge instructions. Awaiting home med delivery and is aware to  1 medication at pharmacy.
Pt was in the bathroom washing her hands when I came in and she was on 10lpm nc per her pulmonologist, she desats when she moves about her room but quickly recovers and goes back to 5lpm nc. I witnessed the patient desat to 74% but quickly recovered when she got back into bed to 97%, and was then placed back on 5lpm nc. This need to have high oxygen flow is well documented in her chart.  Duyen Qureshi RRT
Devices: Call light within reach, Left in bed     Restrictions  Restrictions/Precautions  Restrictions/Precautions: Modified Diet, General Precautions  Required Braces or Orthoses?: No     Subjective   Pain: No c/o pain. General  Chart Reviewed: Yes  Patient assessed for rehabilitation services?: Yes  Family / Caregiver Present: Yes (son)  Referring Practitioner: OPAL Carter  Follows Commands: Within Functional Limits         Social/Functional History  Social/Functional History  Lives With: Family (disabled son, adult daughter)  Type of Home: House  Home Layout: One level  Home Access: Stairs to enter without rails  Entrance Stairs - Number of Steps: 1  Bathroom Shower/Tub: Tub/Shower unit  Bathroom Toilet: Standard  Bathroom Equipment: Shower chair  Bathroom Accessibility: Not accessible  Home Equipment: Oxygen, Wheelchair-manual (3-4L resting, 8L with activity, 10L with bathing and walking. ... Pt has two concentrators combined which allows for 15L)  Has the patient had two or more falls in the past year or any fall with injury in the past year?: No  ADL Assistance: Independent  Homemaking Assistance: Needs assistance (daughter does cooking secondary to having gas cooking.  Pt able to complete cleaning.)  Ambulation Assistance: Independent  Transfer Assistance: Independent  Active : Yes  Vision/Hearing  Vision  Vision: Impaired  Vision Exceptions: Wears glasses at all times  Hearing  Hearing: Within functional limits    Cognition   Orientation  Overall Orientation Status: Within Functional Limits  Cognition  Overall Cognitive Status: WFL     Objective   Heart Rate: 100  Heart Rate Source: Monitor  BP: 91/61  BP Location: Left upper arm  MAP (Calculated): 71  Resp: 18  SpO2: 96 %  O2 Device: Nasal cannula  Comment: 5 LPM at rest; 10 LPM with ambulation     Observation/Palpation  Observation: 5L 02, lying in bed, son present in room, pleasant and cooperative  Gross Assessment  AROM: Within functional
Restrictions  Restrictions/Precautions  Restrictions/Precautions: Modified Diet, General Precautions  Required Braces or Orthoses?: No    Subjective   General  Chart Reviewed: Yes  Patient assessed for rehabilitation services?: Yes  Family / Caregiver Present: No  Referring Practitioner: Kaleta Lesches, PA-C  Diagnosis: Acute on chronic respiratory failure with hypoxia. Subjective  Subjective: I don't have enough breath to do much. Social/Functional History  Social/Functional History  Lives With: Family (disabled son, adult daughter)  Type of Home: House  Home Layout: One level  Home Access: Stairs to enter without rails  Entrance Stairs - Number of Steps: 1  Bathroom Shower/Tub: Tub/Shower unit  Bathroom Toilet: Standard  Bathroom Equipment: Shower chair  Bathroom Accessibility: Not accessible  Home Equipment: Oxygen, Wheelchair-manual (3-4L resting, 8L with activity, 10L with bathing and walking. ... Pt has two concentrators combined which allows for 15L)  Has the patient had two or more falls in the past year or any fall with injury in the past year?: No  ADL Assistance: Independent  Homemaking Assistance: Needs assistance (daughter does cooking secondary to having gas cooking. Pt able to complete cleaning.)  Ambulation Assistance: Independent  Transfer Assistance: Independent  Active : Yes       Objective   Heart Rate: 100  Heart Rate Source: Monitor  BP: 91/61  BP Location: Left upper arm  MAP (Calculated): 71  Resp: 18  SpO2: 96 %  O2 Device: Nasal cannula          Observation/Palpation  Observation: 5L 02, lying in bed, son present in room, pleasant and cooperative     Bed Mobility Training  Bed Mobility Training: No  Balance  Sitting: Intact  Standing: Intact  Transfer Training  Transfer Training: No  Gait  Overall Level of Assistance: Independent  Distance (ft): 100 Feet  Assistive Device:  (10L 02 sats decreased to 83%. )        ADL  UE Bathing: Setup  LE Bathing: Setup  UE Dressing:
Well developed, well nourished, female sitting on edge of bed playing cards in no acute distress. Eyes:  conjunctiva normal, EOMI. HENT:  Atraumatic, external ears normal, external nose/nares normal, oropharynx moist, no pharyngeal exudates. Neck:  Supple. No JVD or thyromegaly. Respiratory:  No respiratory distress, normal breath sounds, no wheezing. Fine bibasilar inspiratory crackles. Cardiovascular:  Tachycardic, normal rate, normal rhythm, no murmurs, no gallops, no rubs. GI:  Soft, nondistended, normal bowel sounds, nontender, no organomegaly, no mass. :  No costovertebral angle tenderness. Musculoskeletal:  No edema, no tenderness, no obvious deformities. Patient is moving all extremities. Integument:  Well hydrated, no rash. Finger clubbing noted. Lymphatic:  No cervical or axillary lymphadenopathy noted. Neurologic:  Alert & oriented x 3,  no focal deficits noted. Strength is equal throughout. Psychiatric:  Speech and behavior appropriate. Results:     Lab Results   Component Value Date    WBC 8.3 04/30/2023    HGB 13.8 04/30/2023    HCT 42.1 04/30/2023    MCV 94.8 04/30/2023     04/30/2023       Lab Results   Component Value Date/Time     04/30/2023 05:24 AM    K 4.3 04/30/2023 05:24 AM    CL 98 04/30/2023 05:24 AM    CO2 30 04/30/2023 05:24 AM    BUN 9 04/30/2023 05:24 AM    CREATININE 0.7 04/30/2023 05:24 AM    GLUCOSE 148 04/30/2023 05:24 AM    CALCIUM 9.6 04/30/2023 05:24 AM      Patient was seen and examined by Dr. Marry Jimenez. Assessment and plan was formulated entirely by Dr. Marry Jimenez. Electronically signed by OPAL Marin on 5/1/2023 at 9:48 AM      Assessment and Plan:     Patient was seen and examined with OPAL Marin. Agree with note as above. Assessment and plan was done by me as below.     Active Hospital Problems    Diagnosis Date Noted    Weight loss [R63.4]  Related to diet since patient got motivated about weight loss to be on the transplant list.

## 2023-05-02 NOTE — PLAN OF CARE
Problem: Discharge Planning  Goal: Discharge to home or other facility with appropriate resources  Outcome: Completed     Problem: ABCDS Injury Assessment  Goal: Absence of physical injury  Outcome: Completed     Problem: Skin/Tissue Integrity  Goal: Absence of new skin breakdown  Description: 1. Monitor for areas of redness and/or skin breakdown  2. Assess vascular access sites hourly  3. Every 4-6 hours minimum:  Change oxygen saturation probe site  4. Every 4-6 hours:  If on nasal continuous positive airway pressure, respiratory therapy assess nares and determine need for appliance change or resting period.   5/2/2023 1243 by Krys Santiago RN  Outcome: Completed  5/2/2023 1000 by Krys Santiago RN  Outcome: Progressing     Problem: Safety - Adult  Goal: Free from fall injury  Outcome: Completed     Problem: Pain  Goal: Verbalizes/displays adequate comfort level or baseline comfort level  Outcome: Completed

## 2023-05-02 NOTE — CARE COORDINATION
CM rounded at bsd. No DC needs noted at this time. PA is taking care of medication refills.   Will reassess PRN

## 2023-05-03 PROBLEM — J84.112 IDIOPATHIC PULMONARY FIBROSIS (HCC): Status: ACTIVE | Noted: 2023-05-03

## 2023-05-04 ENCOUNTER — CARE COORDINATION (OUTPATIENT)
Dept: CARE COORDINATION | Age: 58
End: 2023-05-04

## 2023-05-04 NOTE — CARE COORDINATION
Care Transitions Initial Follow Up Call    Call within 2 business days of discharge: Yes     Patient: Gutierrez Caldera Patient : 1965 MRN: [id number]    Last Discharge  Street       Date Complaint Diagnosis Description Type Department Provider    23 Shortness of Breath Pulmonary fibrosis ([facility] [address] . .. ED to Hosp-Admission (Discharged) (ADMITTED) [facility] Zachary Delgado MD; Easton Zuluaga. .. RARS: Readmission Risk Score: 10.1       Spoke with: Attempting HFU, unsuccessful. Message left with contact information.      Discharge department/facility: [facility]    Non-face-to-face services provided:  Scheduled appointment with PCP-Veronica  Obtained and reviewed discharge summary and/or continuity of care documents    Follow Up  Future Appointments   Date Time Provider Anastacio Fisher   2023 12:00 PM Zachary Delgado, [facility]   2023  9:45 AM [facility]       Dalia Hagan RN

## 2023-05-05 NOTE — CARE COORDINATION
Care Transitions Initial Follow Up Call    Call within 2 business days of discharge: Yes     Patient: Nickolas Campbell Patient : 1965 MRN: [id number]    Last Discharge  [address]       Date Complaint Diagnosis Description Type Department Provider    23 Shortness of Breath Pulmonary fibrosis ([facility] [address] . .. ED to Hosp-Admission (Discharged) (ADMITTED) [facility] MS Cathryn Cowart MD; Jared Ricketts,. .. RARS: Readmission Risk Score: 10.1       Spoke with: Attempting hfu call, unsuccessful. Message left with contact information.      Discharge department/facility: [facility]    Non-face-to-face services provided:  Scheduled appointment with PCP-Veronica  Obtained and reviewed discharge summary and/or continuity of care documents    Follow Up  Future Appointments   Date Time Provider Anastacio Fisher   2023 12:00 PM Cathryn Cowart, [facility]   2023  9:45 AM Cathryn Cowart, [facility]       Alta Hernandez RN

## 2023-05-08 ENCOUNTER — OFFICE VISIT (OUTPATIENT)
Dept: PRIMARY CARE CLINIC | Age: 58
End: 2023-05-08

## 2023-05-08 VITALS
RESPIRATION RATE: 18 BRPM | DIASTOLIC BLOOD PRESSURE: 78 MMHG | SYSTOLIC BLOOD PRESSURE: 122 MMHG | HEART RATE: 101 BPM | WEIGHT: 170.2 LBS | BODY MASS INDEX: 28.32 KG/M2 | OXYGEN SATURATION: 93 %

## 2023-05-08 DIAGNOSIS — Z09 HOSPITAL DISCHARGE FOLLOW-UP: ICD-10-CM

## 2023-05-08 DIAGNOSIS — I27.20 PULMONARY HYPERTENSION (HCC): ICD-10-CM

## 2023-05-08 DIAGNOSIS — I10 ESSENTIAL HYPERTENSION: ICD-10-CM

## 2023-05-08 DIAGNOSIS — J84.112 IDIOPATHIC PULMONARY FIBROSIS (HCC): Primary | ICD-10-CM

## 2023-05-08 DIAGNOSIS — E03.9 HYPOTHYROIDISM, UNSPECIFIED TYPE: ICD-10-CM

## 2023-05-08 RX ORDER — LEVOTHYROXINE SODIUM 0.15 MG/1
150 TABLET ORAL DAILY
Qty: 30 TABLET | Refills: 2 | Status: SHIPPED | OUTPATIENT
Start: 2023-05-08

## 2023-05-08 RX ORDER — LEVOTHYROXINE SODIUM 0.15 MG/1
150 TABLET ORAL DAILY
Qty: 30 TABLET | Refills: 2 | OUTPATIENT
Start: 2023-05-08

## 2023-05-08 RX ORDER — HYDROXYZINE HYDROCHLORIDE 25 MG/1
25 TABLET, FILM COATED ORAL 2 TIMES DAILY PRN
Qty: 45 TABLET | Refills: 1 | Status: SHIPPED | OUTPATIENT
Start: 2023-05-08

## 2023-05-08 SDOH — ECONOMIC STABILITY: INCOME INSECURITY: HOW HARD IS IT FOR YOU TO PAY FOR THE VERY BASICS LIKE FOOD, HOUSING, MEDICAL CARE, AND HEATING?: NOT VERY HARD

## 2023-05-08 SDOH — ECONOMIC STABILITY: FOOD INSECURITY: WITHIN THE PAST 12 MONTHS, THE FOOD YOU BOUGHT JUST DIDN'T LAST AND YOU DIDN'T HAVE MONEY TO GET MORE.: NEVER TRUE

## 2023-05-08 SDOH — ECONOMIC STABILITY: HOUSING INSECURITY
IN THE LAST 12 MONTHS, WAS THERE A TIME WHEN YOU DID NOT HAVE A STEADY PLACE TO SLEEP OR SLEPT IN A SHELTER (INCLUDING NOW)?: NO

## 2023-05-08 SDOH — ECONOMIC STABILITY: FOOD INSECURITY: WITHIN THE PAST 12 MONTHS, YOU WORRIED THAT YOUR FOOD WOULD RUN OUT BEFORE YOU GOT MONEY TO BUY MORE.: NEVER TRUE

## 2023-05-08 ASSESSMENT — ENCOUNTER SYMPTOMS
WHEEZING: 1
ABDOMINAL PAIN: 0
NAUSEA: 0
BACK PAIN: 1
COUGH: 1
SHORTNESS OF BREATH: 1
SINUS PRESSURE: 0
EYE DISCHARGE: 0
SORE THROAT: 0
VOMITING: 0

## 2023-05-08 NOTE — PROGRESS NOTES
Post-Discharge Transitional Care Management Services or Hospital Follow Up      Micky Boss   YOB: 1965    Date of Office Visit:  5/8/2023  Date of Hospital Admission: 4/29/23  Date of Hospital Discharge: 5/2/23  Readmission Risk Score(high >=14%. Medium >=10%):Readmission Risk Score: 10.1      Care management risk score Rising risk (score 2-5) and Complex Care (Scores >=6): No Risk Score On File     Non face to face  following discharge, date last encounter closed (first attempt may have been earlier):   05/04/2023 05/04/2023    Call initiated 2 business days of discharge: Yes     Patient Active Problem List   Diagnosis    Hypothyroidism    Hyperlipidemia    Interstitial lung disease ([address]    Gastroesophageal reflux disease    COPD with acute exacerbation ([address]    Essential hypertension    Acute on chronic respiratory failure with hypoxia (HCC)    IgG deficiency ([address]    Depression    Insomnia    Sinus tachycardia    COPD exacerbation (HCC)    Generalized weakness    Yeast infection    Acute exacerbation of idiopathic pulmonary fibrosis ([address]    Pulmonary hypertension (HCC)    Class 3 severe obesity due to excess calories with serious comorbidity in adult ([address]    Weight loss    Idiopathic pulmonary fibrosis ([address]       No Known Allergies    Medications listed as ordered at the time of discharge from hospital     Medication List            Accurate as of May 8, 2023 12:03 PM. If you have any questions, ask your nurse or doctor. CONTINUE taking these medications      albuterol sulfate  (90 Base) MCG/ACT inhaler  Commonly known as: ProAir HFA  Inhale 2 puffs into the lungs every 6 hours as needed for Wheezing     budesonide 0.5 MG/2ML nebulizer suspension  Commonly known as: PULMICORT  Take 2 mLs by nebulization in the morning and 2 mLs in the evening.      clobetasol 0.05 % ointment  Commonly known as: TEMOVATE     digoxin 125 MCG tablet  Commonly known as: LANOXIN

## 2023-05-09 NOTE — ADT AUTH CERT
Pulmonary Disease GRG - Care Day 3 (5/1/2023) by Laura Jules RN       Review Entered Review Status   5/5/2023 0924 Completed      Criteria Review      Care Day: 3 Care Date: 5/1/2023 Level of Care: Inpatient Floor    Guideline Day 3    Level Of Care    (X) * Activity level acceptable    5/5/2023 9:24 AM EDT by Staci Cook      Up with assist.    (X) * Isolation not needed, or status acceptable    5/5/2023 9:24 AM EDT by Staci Cook      n/a    Clinical Status    ( ) * Respiratory status acceptable    ( ) * Right heart function normal or acceptable for next level of care    (X) * Temperature status acceptable    5/5/2023 9:24 AM EDT by Staci Cook      temp=97.7.    ( ) * No infection, or status acceptable    ( ) * Stable chest findings    (X) * Pain and nausea absent or adequately managed    5/5/2023 9:24 AM EDT by Staci Cook      no c/o    ( ) * General Discharge Criteria met    Interventions    (X) * No chest tube, or status acceptable    5/5/2023 9:24 AM EDT by Staci Cook      no CT    (X) * Intake acceptable    5/5/2023 9:24 AM EDT by Staci Cook      regular diet.     ( ) * No inpatient interventions needed       Definitions for Care Day 3    Activity level acceptable    (X) Activity level acceptable, as indicated by  1 or more  of the following :       (X) Activity level acceptable for next level of care       Isolation not needed, or status acceptable    (X) Isolation not needed, or status acceptable, as indicated by  1 or more  of the following      (1) (2) (3):       (X) Isolation not needed, or performable at next level of care       Temperature status acceptable    (X) Temperature status acceptable, as indicated by  1 or more  of the following  (1) (2) (3):       (X) Temperature less than 100.5 degrees F (38.1 degrees C) (oral) and greater than 96.8 degrees       F (36 degrees C)  (rectal)       Pain and nausea absent or adequately managed    (X) Pain and

## 2023-05-22 ENCOUNTER — OFFICE VISIT (OUTPATIENT)
Dept: PRIMARY CARE CLINIC | Age: 58
End: 2023-05-22
Payer: MEDICARE

## 2023-05-22 ENCOUNTER — TELEPHONE (OUTPATIENT)
Dept: PRIMARY CARE CLINIC | Age: 58
End: 2023-05-22

## 2023-05-22 VITALS — OXYGEN SATURATION: 90 % | HEART RATE: 63 BPM | TEMPERATURE: 97.6 F

## 2023-05-22 DIAGNOSIS — J84.112 IDIOPATHIC PULMONARY FIBROSIS (HCC): ICD-10-CM

## 2023-05-22 DIAGNOSIS — J20.9 ACUTE BRONCHITIS, UNSPECIFIED ORGANISM: Primary | ICD-10-CM

## 2023-05-22 PROCEDURE — 1111F DSCHRG MED/CURRENT MED MERGE: CPT | Performed by: INTERNAL MEDICINE

## 2023-05-22 PROCEDURE — G8417 CALC BMI ABV UP PARAM F/U: HCPCS | Performed by: INTERNAL MEDICINE

## 2023-05-22 PROCEDURE — 99213 OFFICE O/P EST LOW 20 MIN: CPT | Performed by: INTERNAL MEDICINE

## 2023-05-22 PROCEDURE — G8427 DOCREV CUR MEDS BY ELIG CLIN: HCPCS | Performed by: INTERNAL MEDICINE

## 2023-05-22 PROCEDURE — 1036F TOBACCO NON-USER: CPT | Performed by: INTERNAL MEDICINE

## 2023-05-22 PROCEDURE — 3017F COLORECTAL CA SCREEN DOC REV: CPT | Performed by: INTERNAL MEDICINE

## 2023-05-22 RX ORDER — AZITHROMYCIN 500 MG/1
500 TABLET, FILM COATED ORAL DAILY
Qty: 1 PACKET | Refills: 0 | Status: SHIPPED | OUTPATIENT
Start: 2023-05-22 | End: 2023-05-25

## 2023-05-22 RX ORDER — PREDNISONE 10 MG/1
30 TABLET ORAL DAILY
Qty: 9 TABLET | Refills: 0 | Status: SHIPPED | OUTPATIENT
Start: 2023-05-22 | End: 2023-05-25

## 2023-05-22 RX ORDER — BUDESONIDE 0.25 MG/2ML
250 INHALANT ORAL 2 TIMES DAILY PRN
Qty: 60 EACH | Refills: 3 | Status: SHIPPED | OUTPATIENT
Start: 2023-05-22

## 2023-05-22 ASSESSMENT — ENCOUNTER SYMPTOMS
SINUS PRESSURE: 0
WHEEZING: 1
COUGH: 1
ABDOMINAL PAIN: 0
SHORTNESS OF BREATH: 1
BACK PAIN: 0
EYE DISCHARGE: 0
VOMITING: 0
SORE THROAT: 0
NAUSEA: 0

## 2023-05-22 NOTE — TELEPHONE ENCOUNTER
Pharmacy called to report drug interaction between Azithromycin and Digoxin \"risk of increasing concentration of Digoxin\"

## 2023-05-25 ENCOUNTER — TELEPHONE (OUTPATIENT)
Dept: PRIMARY CARE CLINIC | Age: 58
End: 2023-05-25

## 2023-05-25 RX ORDER — CEFDINIR 300 MG/1
300 CAPSULE ORAL 2 TIMES DAILY
Qty: 14 CAPSULE | Refills: 0 | Status: SHIPPED | OUTPATIENT
Start: 2023-05-25 | End: 2023-06-01

## 2023-05-25 NOTE — TELEPHONE ENCOUNTER
Pt called stating that the antibx you put her on Monday did not help, \"coughing so hard loses breath\" Asking for something different to treat so she doesn't end up back in hospital

## 2023-07-05 ENCOUNTER — TELEPHONE (OUTPATIENT)
Dept: PRIMARY CARE CLINIC | Age: 58
End: 2023-07-05

## 2023-07-05 NOTE — TELEPHONE ENCOUNTER
Pt needs a referral for pulmonary rehab at [facility] so she can start the wellness program for transplant.

## 2023-08-01 ENCOUNTER — TELEPHONE (OUTPATIENT)
Dept: PRIMARY CARE CLINIC | Age: 58
End: 2023-08-01

## 2023-08-01 NOTE — TELEPHONE ENCOUNTER
Pt called stating that she has cough and congestion (she sounds terrible) Daughter is leaving Sunday and she cant chance being put in the hospital because she will have to take care of son \"nasal drainage is greenish but what she is coughing up is still clearish\" States she thinks if she can start antibx now she should be okay.  1952 [address]

## 2023-08-03 NOTE — TELEPHONE ENCOUNTER
Try to call patient yesterday and today with no answer. Can you try to reach and check on patient please.

## 2023-08-10 RX ORDER — LEVOTHYROXINE SODIUM 0.15 MG/1
150 TABLET ORAL DAILY
Qty: 30 TABLET | Refills: 2 | Status: SHIPPED | OUTPATIENT
Start: 2023-08-10

## 2023-08-20 ENCOUNTER — HOSPITAL ENCOUNTER (INPATIENT)
Facility: HOSPITAL | Age: 58
LOS: 3 days | Discharge: ANOTHER HOSPITAL | End: 2023-08-24
Attending: FAMILY MEDICINE | Admitting: INTERNAL MEDICINE
Payer: MEDICARE

## 2023-08-20 ENCOUNTER — APPOINTMENT (OUTPATIENT)
Dept: GENERAL RADIOLOGY | Facility: HOSPITAL | Age: 58
End: 2023-08-20
Payer: MEDICARE

## 2023-08-20 DIAGNOSIS — J84.10 INTERSTITIAL PULMONARY FIBROSIS (HCC): ICD-10-CM

## 2023-08-20 DIAGNOSIS — R06.03 ACUTE RESPIRATORY DISTRESS: ICD-10-CM

## 2023-08-20 DIAGNOSIS — R09.02 HYPOXIA: Primary | ICD-10-CM

## 2023-08-20 LAB
ALBUMIN SERPL-MCNC: 3.9 G/DL (ref 3.4–4.8)
ALBUMIN/GLOB SERPL: 1.1 {RATIO} (ref 0.8–2)
ALP SERPL-CCNC: 140 U/L (ref 25–100)
ALT SERPL-CCNC: 34 U/L (ref 4–36)
ANION GAP SERPL CALCULATED.3IONS-SCNC: 13 MMOL/L (ref 3–16)
AST SERPL-CCNC: 30 U/L (ref 8–33)
BASE EXCESS BLDA CALC-SCNC: 4.9 MMOL/L (ref -3–3)
BASOPHILS # BLD: 0 K/UL (ref 0–0.1)
BASOPHILS NFR BLD: 0.2 %
BILIRUB SERPL-MCNC: <0.2 MG/DL (ref 0.3–1.2)
BUN SERPL-MCNC: 17 MG/DL (ref 6–20)
CALCIUM SERPL-MCNC: 9.4 MG/DL (ref 8.5–10.5)
CHLORIDE SERPL-SCNC: 99 MMOL/L (ref 98–107)
CO2 BLDA-SCNC: 31.1 MMOL/L (ref 24–30)
CO2 SERPL-SCNC: 28 MMOL/L (ref 20–30)
CREAT SERPL-MCNC: 0.7 MG/DL (ref 0.4–1.2)
EOSINOPHIL # BLD: 0 K/UL (ref 0–0.4)
EOSINOPHIL NFR BLD: 0.2 %
ERYTHROCYTE [DISTWIDTH] IN BLOOD BY AUTOMATED COUNT: 13.8 % (ref 11–16)
FLUAV AG NPH QL: NEGATIVE
FLUBV AG NPH QL: NEGATIVE
GFR SERPLBLD CREATININE-BSD FMLA CKD-EPI: >60 ML/MIN/{1.73_M2}
GLOBULIN SER CALC-MCNC: 3.4 G/DL
GLUCOSE SERPL-MCNC: 110 MG/DL (ref 74–106)
HCO3 BLDA-SCNC: 29.7 MMOL/L (ref 22–26)
HCT VFR BLD AUTO: 37.8 % (ref 37–47)
HGB BLD-MCNC: 12.2 G/DL (ref 11.5–16.5)
IMM GRANULOCYTES # BLD: 0 K/UL
IMM GRANULOCYTES NFR BLD: 0.2 % (ref 0–5)
INHALED O2 FLOW RATE: 0.6 %
LYMPHOCYTES # BLD: 2.1 K/UL (ref 1.5–4)
LYMPHOCYTES NFR BLD: 20.1 %
MCH RBC QN AUTO: 30 PG (ref 27–32)
MCHC RBC AUTO-ENTMCNC: 32.3 G/DL (ref 31–35)
MCV RBC AUTO: 93.1 FL (ref 80–100)
MONOCYTES # BLD: 0.7 K/UL (ref 0.2–0.8)
MONOCYTES NFR BLD: 6.5 %
NEUTROPHILS # BLD: 7.6 K/UL (ref 2–7.5)
NEUTS SEG NFR BLD: 72.8 %
O2 THERAPY: ABNORMAL
PCO2 BLDA: 45.2 MMHG (ref 35–45)
PH BLDA: 7.44 [PH] (ref 7.35–7.45)
PLATELET # BLD AUTO: 256 K/UL (ref 150–400)
PMV BLD AUTO: 10.1 FL (ref 6–10)
PO2 BLDA: 93.5 MMHG (ref 80–100)
POTASSIUM SERPL-SCNC: 3.7 MMOL/L (ref 3.4–5.1)
PROT SERPL-MCNC: 7.3 G/DL (ref 6.4–8.3)
RBC # BLD AUTO: 4.06 M/UL (ref 3.8–5.8)
SAO2 % BLDA: 97.4 %
SARS-COV-2 RDRP RESP QL NAA+PROBE: NOT DETECTED
SODIUM SERPL-SCNC: 140 MMOL/L (ref 136–145)
WBC # BLD AUTO: 10.5 K/UL (ref 4–11)

## 2023-08-20 PROCEDURE — 36415 COLL VENOUS BLD VENIPUNCTURE: CPT

## 2023-08-20 PROCEDURE — 82803 BLOOD GASES ANY COMBINATION: CPT

## 2023-08-20 PROCEDURE — 6360000002 HC RX W HCPCS: Performed by: FAMILY MEDICINE

## 2023-08-20 PROCEDURE — 85025 COMPLETE CBC W/AUTO DIFF WBC: CPT

## 2023-08-20 PROCEDURE — 87804 INFLUENZA ASSAY W/OPTIC: CPT

## 2023-08-20 PROCEDURE — 36600 WITHDRAWAL OF ARTERIAL BLOOD: CPT

## 2023-08-20 PROCEDURE — 87635 SARS-COV-2 COVID-19 AMP PRB: CPT

## 2023-08-20 PROCEDURE — 71045 X-RAY EXAM CHEST 1 VIEW: CPT

## 2023-08-20 PROCEDURE — 80053 COMPREHEN METABOLIC PANEL: CPT

## 2023-08-20 PROCEDURE — 96375 TX/PRO/DX INJ NEW DRUG ADDON: CPT

## 2023-08-20 PROCEDURE — 99285 EMERGENCY DEPT VISIT HI MDM: CPT

## 2023-08-20 PROCEDURE — 93005 ELECTROCARDIOGRAM TRACING: CPT

## 2023-08-20 RX ORDER — BACTERIOSTATIC WATER 1 ML/ML
INJECTION, SOLUTION INTRAMUSCULAR; INTRAVENOUS; SUBCUTANEOUS
Status: DISCONTINUED
Start: 2023-08-20 | End: 2023-08-21

## 2023-08-20 RX ORDER — METHYLPREDNISOLONE SODIUM SUCCINATE 125 MG/2ML
125 INJECTION, POWDER, LYOPHILIZED, FOR SOLUTION INTRAMUSCULAR; INTRAVENOUS ONCE
Status: COMPLETED | OUTPATIENT
Start: 2023-08-20 | End: 2023-08-20

## 2023-08-20 RX ADMIN — METHYLPREDNISOLONE SODIUM SUCCINATE 125 MG: 125 INJECTION INTRAMUSCULAR; INTRAVENOUS at 23:07

## 2023-08-20 ASSESSMENT — PAIN DESCRIPTION - LOCATION: LOCATION: CHEST

## 2023-08-20 ASSESSMENT — PAIN - FUNCTIONAL ASSESSMENT: PAIN_FUNCTIONAL_ASSESSMENT: 0-10

## 2023-08-20 ASSESSMENT — PAIN SCALES - GENERAL: PAINLEVEL_OUTOF10: 8

## 2023-08-21 ENCOUNTER — APPOINTMENT (OUTPATIENT)
Dept: CT IMAGING | Facility: HOSPITAL | Age: 58
End: 2023-08-21
Payer: MEDICARE

## 2023-08-21 PROBLEM — J96.90 RESPIRATORY FAILURE (HCC): Status: RESOLVED | Noted: 2023-08-21 | Resolved: 2023-08-21

## 2023-08-21 PROBLEM — E66.813 CLASS 3 SEVERE OBESITY DUE TO EXCESS CALORIES WITH SERIOUS COMORBIDITY IN ADULT: Status: RESOLVED | Noted: 2021-05-27 | Resolved: 2023-08-21

## 2023-08-21 PROBLEM — E66.01 CLASS 3 SEVERE OBESITY DUE TO EXCESS CALORIES WITH SERIOUS COMORBIDITY IN ADULT (HCC): Status: RESOLVED | Noted: 2021-05-27 | Resolved: 2023-08-21

## 2023-08-21 PROBLEM — J96.90 RESPIRATORY FAILURE (HCC): Status: ACTIVE | Noted: 2023-08-21

## 2023-08-21 PROCEDURE — 71275 CT ANGIOGRAPHY CHEST: CPT

## 2023-08-21 PROCEDURE — 94640 AIRWAY INHALATION TREATMENT: CPT

## 2023-08-21 PROCEDURE — 6360000004 HC RX CONTRAST MEDICATION: Performed by: INTERNAL MEDICINE

## 2023-08-21 PROCEDURE — 6360000002 HC RX W HCPCS: Performed by: FAMILY MEDICINE

## 2023-08-21 PROCEDURE — 6370000000 HC RX 637 (ALT 250 FOR IP): Performed by: PHYSICIAN ASSISTANT

## 2023-08-21 PROCEDURE — 99222 1ST HOSP IP/OBS MODERATE 55: CPT | Performed by: INTERNAL MEDICINE

## 2023-08-21 PROCEDURE — 2700000000 HC OXYGEN THERAPY PER DAY

## 2023-08-21 PROCEDURE — 2580000003 HC RX 258: Performed by: INTERNAL MEDICINE

## 2023-08-21 PROCEDURE — 94761 N-INVAS EAR/PLS OXIMETRY MLT: CPT

## 2023-08-21 PROCEDURE — 6360000002 HC RX W HCPCS: Performed by: INTERNAL MEDICINE

## 2023-08-21 PROCEDURE — 2580000003 HC RX 258: Performed by: FAMILY MEDICINE

## 2023-08-21 PROCEDURE — 6370000000 HC RX 637 (ALT 250 FOR IP): Performed by: INTERNAL MEDICINE

## 2023-08-21 PROCEDURE — 96365 THER/PROPH/DIAG IV INF INIT: CPT

## 2023-08-21 PROCEDURE — 1200000000 HC SEMI PRIVATE

## 2023-08-21 PROCEDURE — 2500000003 HC RX 250 WO HCPCS: Performed by: PHYSICIAN ASSISTANT

## 2023-08-21 RX ORDER — FLUTICASONE PROPIONATE 50 MCG
1 SPRAY, SUSPENSION (ML) NASAL DAILY
Status: DISCONTINUED | OUTPATIENT
Start: 2023-08-21 | End: 2023-08-24 | Stop reason: HOSPADM

## 2023-08-21 RX ORDER — SILDENAFIL CITRATE 20 MG/1
20 TABLET ORAL 3 TIMES DAILY
Status: DISCONTINUED | OUTPATIENT
Start: 2023-08-21 | End: 2023-08-21

## 2023-08-21 RX ORDER — IPRATROPIUM BROMIDE AND ALBUTEROL SULFATE 2.5; .5 MG/3ML; MG/3ML
1 SOLUTION RESPIRATORY (INHALATION) EVERY 4 HOURS
Status: DISCONTINUED | OUTPATIENT
Start: 2023-08-21 | End: 2023-08-21

## 2023-08-21 RX ORDER — LEVOTHYROXINE SODIUM 0.07 MG/1
150 TABLET ORAL DAILY
Status: DISCONTINUED | OUTPATIENT
Start: 2023-08-21 | End: 2023-08-23

## 2023-08-21 RX ORDER — TRAZODONE HYDROCHLORIDE 50 MG/1
100 TABLET ORAL NIGHTLY
Status: DISCONTINUED | OUTPATIENT
Start: 2023-08-21 | End: 2023-08-24 | Stop reason: HOSPADM

## 2023-08-21 RX ORDER — ONDANSETRON 4 MG/1
4 TABLET, ORALLY DISINTEGRATING ORAL EVERY 8 HOURS PRN
Status: DISCONTINUED | OUTPATIENT
Start: 2023-08-21 | End: 2023-08-24 | Stop reason: HOSPADM

## 2023-08-21 RX ORDER — FUROSEMIDE 20 MG/1
20 TABLET ORAL 2 TIMES DAILY PRN
Status: DISCONTINUED | OUTPATIENT
Start: 2023-08-21 | End: 2023-08-24 | Stop reason: HOSPADM

## 2023-08-21 RX ORDER — MECOBALAMIN 5000 MCG
5 TABLET,DISINTEGRATING ORAL NIGHTLY
Status: DISCONTINUED | OUTPATIENT
Start: 2023-08-21 | End: 2023-08-24 | Stop reason: HOSPADM

## 2023-08-21 RX ORDER — GUAIFENESIN/DEXTROMETHORPHAN 100-10MG/5
5 SYRUP ORAL EVERY 4 HOURS PRN
Status: DISCONTINUED | OUTPATIENT
Start: 2023-08-21 | End: 2023-08-23

## 2023-08-21 RX ORDER — ALBUTEROL SULFATE 90 UG/1
2 AEROSOL, METERED RESPIRATORY (INHALATION) EVERY 6 HOURS PRN
Status: DISCONTINUED | OUTPATIENT
Start: 2023-08-21 | End: 2023-08-21

## 2023-08-21 RX ORDER — BUDESONIDE 0.5 MG/2ML
0.5 INHALANT ORAL
Status: DISCONTINUED | OUTPATIENT
Start: 2023-08-21 | End: 2023-08-24 | Stop reason: HOSPADM

## 2023-08-21 RX ORDER — PREDNISONE 20 MG/1
40 TABLET ORAL DAILY
Status: DISCONTINUED | OUTPATIENT
Start: 2023-08-23 | End: 2023-08-24 | Stop reason: HOSPADM

## 2023-08-21 RX ORDER — ENOXAPARIN SODIUM 100 MG/ML
40 INJECTION SUBCUTANEOUS DAILY
Status: DISCONTINUED | OUTPATIENT
Start: 2023-08-21 | End: 2023-08-24 | Stop reason: HOSPADM

## 2023-08-21 RX ORDER — METHYLPREDNISOLONE SODIUM SUCCINATE 40 MG/ML
40 INJECTION, POWDER, LYOPHILIZED, FOR SOLUTION INTRAMUSCULAR; INTRAVENOUS EVERY 6 HOURS
Status: COMPLETED | OUTPATIENT
Start: 2023-08-21 | End: 2023-08-23

## 2023-08-21 RX ORDER — DIGOXIN 125 MCG
125 TABLET ORAL DAILY
Status: DISCONTINUED | OUTPATIENT
Start: 2023-08-21 | End: 2023-08-24 | Stop reason: HOSPADM

## 2023-08-21 RX ORDER — ONDANSETRON 2 MG/ML
4 INJECTION INTRAMUSCULAR; INTRAVENOUS EVERY 6 HOURS PRN
Status: DISCONTINUED | OUTPATIENT
Start: 2023-08-21 | End: 2023-08-24 | Stop reason: HOSPADM

## 2023-08-21 RX ORDER — POLYETHYLENE GLYCOL 3350 17 G/17G
17 POWDER, FOR SOLUTION ORAL DAILY PRN
Status: DISCONTINUED | OUTPATIENT
Start: 2023-08-21 | End: 2023-08-24 | Stop reason: HOSPADM

## 2023-08-21 RX ORDER — SODIUM CHLORIDE 0.9 % (FLUSH) 0.9 %
5-40 SYRINGE (ML) INJECTION EVERY 12 HOURS SCHEDULED
Status: DISCONTINUED | OUTPATIENT
Start: 2023-08-21 | End: 2023-08-24 | Stop reason: HOSPADM

## 2023-08-21 RX ORDER — PANTOPRAZOLE SODIUM 40 MG/1
40 TABLET, DELAYED RELEASE ORAL DAILY
Status: DISCONTINUED | OUTPATIENT
Start: 2023-08-21 | End: 2023-08-24 | Stop reason: HOSPADM

## 2023-08-21 RX ORDER — METOPROLOL TARTRATE 5 MG/5ML
5 INJECTION INTRAVENOUS ONCE
Status: COMPLETED | OUTPATIENT
Start: 2023-08-21 | End: 2023-08-21

## 2023-08-21 RX ORDER — ACETAMINOPHEN 325 MG/1
650 TABLET ORAL EVERY 6 HOURS PRN
Status: DISCONTINUED | OUTPATIENT
Start: 2023-08-21 | End: 2023-08-24 | Stop reason: HOSPADM

## 2023-08-21 RX ORDER — DOXYCYCLINE 100 MG/1
100 CAPSULE ORAL EVERY 12 HOURS SCHEDULED
Status: DISCONTINUED | OUTPATIENT
Start: 2023-08-21 | End: 2023-08-24 | Stop reason: HOSPADM

## 2023-08-21 RX ORDER — ALBUTEROL SULFATE 2.5 MG/3ML
2.5 SOLUTION RESPIRATORY (INHALATION) EVERY 6 HOURS PRN
Status: DISCONTINUED | OUTPATIENT
Start: 2023-08-21 | End: 2023-08-22 | Stop reason: ALTCHOICE

## 2023-08-21 RX ORDER — HYDROCHLOROTHIAZIDE 25 MG/1
25 TABLET ORAL DAILY
Status: DISCONTINUED | OUTPATIENT
Start: 2023-08-21 | End: 2023-08-24 | Stop reason: HOSPADM

## 2023-08-21 RX ORDER — SODIUM CHLORIDE 0.9 % (FLUSH) 0.9 %
5-40 SYRINGE (ML) INJECTION PRN
Status: DISCONTINUED | OUTPATIENT
Start: 2023-08-21 | End: 2023-08-24 | Stop reason: HOSPADM

## 2023-08-21 RX ORDER — HYDROXYZINE HYDROCHLORIDE 25 MG/1
25 TABLET, FILM COATED ORAL 2 TIMES DAILY PRN
Status: DISCONTINUED | OUTPATIENT
Start: 2023-08-21 | End: 2023-08-24 | Stop reason: HOSPADM

## 2023-08-21 RX ORDER — LEVALBUTEROL INHALATION SOLUTION 1.25 MG/3ML
1.25 SOLUTION RESPIRATORY (INHALATION) EVERY 8 HOURS PRN
Status: DISCONTINUED | OUTPATIENT
Start: 2023-08-21 | End: 2023-08-24 | Stop reason: HOSPADM

## 2023-08-21 RX ORDER — BUDESONIDE 0.25 MG/2ML
250 INHALANT ORAL 2 TIMES DAILY PRN
Status: DISCONTINUED | OUTPATIENT
Start: 2023-08-21 | End: 2023-08-21

## 2023-08-21 RX ORDER — FOLIC ACID 1 MG/1
1 TABLET ORAL DAILY
Status: DISCONTINUED | OUTPATIENT
Start: 2023-08-21 | End: 2023-08-24 | Stop reason: HOSPADM

## 2023-08-21 RX ORDER — SODIUM CHLORIDE 9 MG/ML
INJECTION, SOLUTION INTRAVENOUS PRN
Status: DISCONTINUED | OUTPATIENT
Start: 2023-08-21 | End: 2023-08-24 | Stop reason: HOSPADM

## 2023-08-21 RX ORDER — ACETAMINOPHEN 650 MG/1
650 SUPPOSITORY RECTAL EVERY 6 HOURS PRN
Status: DISCONTINUED | OUTPATIENT
Start: 2023-08-21 | End: 2023-08-24 | Stop reason: HOSPADM

## 2023-08-21 RX ADMIN — GUAIFENESIN SYRUP AND DEXTROMETHORPHAN 5 ML: 100; 10 SYRUP ORAL at 23:02

## 2023-08-21 RX ADMIN — VENLAFAXINE HYDROCHLORIDE 225 MG: 150 CAPSULE, EXTENDED RELEASE ORAL at 08:33

## 2023-08-21 RX ADMIN — SILDENAFIL 20 MG: 20 TABLET, FILM COATED ORAL at 08:34

## 2023-08-21 RX ADMIN — Medication 5 MG: at 22:41

## 2023-08-21 RX ADMIN — METHYLPREDNISOLONE SODIUM SUCCINATE 40 MG: 40 INJECTION INTRAMUSCULAR; INTRAVENOUS at 22:42

## 2023-08-21 RX ADMIN — METOPROLOL TARTRATE 5 MG: 1 INJECTION, SOLUTION INTRAVENOUS at 19:33

## 2023-08-21 RX ADMIN — METOPROLOL TARTRATE 25 MG: 25 TABLET, FILM COATED ORAL at 22:42

## 2023-08-21 RX ADMIN — IPRATROPIUM BROMIDE AND ALBUTEROL SULFATE 1 DOSE: .5; 3 SOLUTION RESPIRATORY (INHALATION) at 13:11

## 2023-08-21 RX ADMIN — HYDROCHLOROTHIAZIDE 25 MG: 25 TABLET ORAL at 08:34

## 2023-08-21 RX ADMIN — PANTOPRAZOLE SODIUM 40 MG: 40 TABLET, DELAYED RELEASE ORAL at 08:34

## 2023-08-21 RX ADMIN — DIGOXIN 125 MCG: 125 TABLET ORAL at 08:34

## 2023-08-21 RX ADMIN — FLUTICASONE PROPIONATE 1 SPRAY: 50 SPRAY, METERED NASAL at 08:33

## 2023-08-21 RX ADMIN — METHYLPREDNISOLONE SODIUM SUCCINATE 40 MG: 40 INJECTION INTRAMUSCULAR; INTRAVENOUS at 11:23

## 2023-08-21 RX ADMIN — LEVOTHYROXINE SODIUM 150 MCG: 0.07 TABLET ORAL at 06:00

## 2023-08-21 RX ADMIN — METHYLPREDNISOLONE SODIUM SUCCINATE 40 MG: 40 INJECTION INTRAMUSCULAR; INTRAVENOUS at 17:10

## 2023-08-21 RX ADMIN — FOLIC ACID 1 MG: 1 TABLET ORAL at 08:34

## 2023-08-21 RX ADMIN — SILDENAFIL 20 MG: 20 TABLET, FILM COATED ORAL at 14:53

## 2023-08-21 RX ADMIN — CEFTRIAXONE 1000 MG: 1 INJECTION, POWDER, FOR SOLUTION INTRAMUSCULAR; INTRAVENOUS at 00:45

## 2023-08-21 RX ADMIN — METHYLPREDNISOLONE SODIUM SUCCINATE 40 MG: 40 INJECTION INTRAMUSCULAR; INTRAVENOUS at 06:00

## 2023-08-21 RX ADMIN — ENOXAPARIN SODIUM 40 MG: 100 INJECTION SUBCUTANEOUS at 08:34

## 2023-08-21 RX ADMIN — DOXYCYCLINE 100 MG: 100 CAPSULE ORAL at 08:34

## 2023-08-21 RX ADMIN — BUDESONIDE 500 MCG: 0.5 INHALANT RESPIRATORY (INHALATION) at 17:13

## 2023-08-21 RX ADMIN — IOPAMIDOL 100 ML: 755 INJECTION, SOLUTION INTRAVENOUS at 01:56

## 2023-08-21 RX ADMIN — HYDROXYZINE HYDROCHLORIDE 25 MG: 25 TABLET, FILM COATED ORAL at 22:41

## 2023-08-21 RX ADMIN — TRAZODONE HYDROCHLORIDE 100 MG: 50 TABLET ORAL at 22:41

## 2023-08-21 RX ADMIN — SODIUM CHLORIDE, PRESERVATIVE FREE 10 ML: 5 INJECTION INTRAVENOUS at 22:47

## 2023-08-21 RX ADMIN — IPRATROPIUM BROMIDE AND ALBUTEROL SULFATE 1 DOSE: .5; 3 SOLUTION RESPIRATORY (INHALATION) at 17:10

## 2023-08-21 RX ADMIN — DOXYCYCLINE 100 MG: 100 CAPSULE ORAL at 22:42

## 2023-08-21 SDOH — ECONOMIC STABILITY: INCOME INSECURITY: IN THE PAST 12 MONTHS, HAS THE ELECTRIC, GAS, OIL, OR WATER COMPANY THREATENED TO SHUT OFF SERVICE IN YOUR HOME?: NO

## 2023-08-21 SDOH — ECONOMIC STABILITY: INCOME INSECURITY: HOW HARD IS IT FOR YOU TO PAY FOR THE VERY BASICS LIKE FOOD, HOUSING, MEDICAL CARE, AND HEATING?: NOT VERY HARD

## 2023-08-21 SDOH — ECONOMIC STABILITY: FOOD INSECURITY: WITHIN THE PAST 12 MONTHS, YOU WORRIED THAT YOUR FOOD WOULD RUN OUT BEFORE YOU GOT MONEY TO BUY MORE.: NEVER TRUE

## 2023-08-21 ASSESSMENT — PATIENT HEALTH QUESTIONNAIRE - PHQ9
SUM OF ALL RESPONSES TO PHQ9 QUESTIONS 1 & 2: 4
SUM OF ALL RESPONSES TO PHQ QUESTIONS 1-9: 4
SUM OF ALL RESPONSES TO PHQ QUESTIONS 1-9: 4
2. FEELING DOWN, DEPRESSED OR HOPELESS: 3
1. LITTLE INTEREST OR PLEASURE IN DOING THINGS: 1
SUM OF ALL RESPONSES TO PHQ QUESTIONS 1-9: 4
SUM OF ALL RESPONSES TO PHQ QUESTIONS 1-9: 4

## 2023-08-21 ASSESSMENT — ENCOUNTER SYMPTOMS
SHORTNESS OF BREATH: 1
COUGH: 1

## 2023-08-21 ASSESSMENT — PAIN SCALES - GENERAL: PAINLEVEL_OUTOF10: 0

## 2023-08-21 NOTE — CARE COORDINATION
Pt states she feels as if she is unable to participate in therapy services. Pt states she would prefer we come by on Wednesday to check back on her.

## 2023-08-21 NOTE — PROGRESS NOTES
Patient provided with resource list, also placed consult to Wynema Kawasaki for SDOH question answers.

## 2023-08-21 NOTE — ACP (ADVANCE CARE PLANNING)
Advance Care Planning     General Advance Care Planning (ACP) Conversation    Date of Conversation: 8/20/2023  Conducted with: Patient with Decision Making Capacity    Healthcare Decision Maker:    Primary Decision Maker: Jennifer Dangelo - [place] - [phone]  Click here to complete Healthcare Decision Makers including selection of the Healthcare Decision Maker Relationship (ie \"Primary\"). Today we documented Decision Maker(s) consistent with Legal Next of Kin hierarchy.     Content/Action Overview:  Patient elects full code  Reviewed DNR/DNI and patient elects Full Code (Attempt Resuscitation)  resuscitation preferences          An Lawton RN

## 2023-08-21 NOTE — ED TRIAGE NOTES
Pt on transplant list, double lung, in [place]. Pt O2 sat 51% in car on 15L . Last 4 days gradually getting worse. Pt at rest is on 6L, therapy gave them a nonrebreather for rehab.

## 2023-08-21 NOTE — PROGRESS NOTES
Advised Lyndon Benavidez of Patient request for electric WC. Stated that she would put in note to address this at patient follow up appointment after d/c.

## 2023-08-21 NOTE — ED PROVIDER NOTES
[address]         Pt Name: Jaden Márquez  MRN: [id number]  [address] [facility] [address] 1965  Date of evaluation: 8/20/2023  Provider: Mark Mata DO  PCP: Leena Capps MD  Note Started: 9:47 PM EDT 8/20/23    CHIEF COMPLAINT       Chief Complaint   Patient presents with    Respiratory Distress       HISTORY OF PRESENT ILLNESS: 1 or more Elements     History from : Patient    Limitations to history : None    Jaden Márquez is a 60 y.o. female who presents to ED for shortness of breath and respiratory distress. Started about 4-5 days ago. Progressively worsened. Pt with history of interstitial pulmonary fibrosis. Been going on for about 10 years. Diagnosed about 5 years ago. Currently Ofev 150 mg BID about 5 years. Pulmonology is at [facility], currently on transplant list at [facility]. Normally wears 6 L oxygen at rest. Walking she has to be on 15 L or non rebreather. Patient says that the last time she had any type of flareup was back in May. She says she was admitted at that time. She says she does not really want to go to [facility] and she is worried that with her significant history and being on transplant list they usually want to send her there. She denies any current fever chills or sweats, her cough is mild but nonproductive. She has no pleuritic pain or any precordial chest pain. No abdominal pain. Patient says she has significant limited mobility at home due to her shortness of breath. She also says that whenever she coughs she desats and has shortness of breath. Patient came in [facility] by family and she was placed on nonrebreather when she got here. She said that she had an O2 sat they got down to 51% in the car on 15 L. She usually wears a pulse oximetry around her neck to keep at all times. Nursing Notes were all reviewed and agreed with or any disagreements were addressed in the HPI. REVIEW OF SYSTEMS :      Review of Systems   Constitutional:  Positive for activity change. Depression, 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, Hyperlipidemia, Hypertension, Hypothyroidism, Idiopathic pulmonary fibrosis ([address]), Interstitial lung disease ([address]), Lung fibrosis ([address]), Lung nodules, and Pulmonary hypertension ([address]). Records Reviewed : Other Reviewed  recent pulmonology office visits and testing    Disposition Considerations (include 1 Tests not done, Shared Decision Making, Pt Expectation of Test or Tx.): Considered and discussed transfer to       I am the Primary Clinician of Record. FINAL IMPRESSION      1. Hypoxia    2. Acute respiratory distress    3. Interstitial pulmonary fibrosis ([address])          DISPOSITION/PLAN     DISPOSITION Decision To Admit 08/21/2023 07:04:04 AM      PATIENT REFERRED TO:  No follow-up provider specified.     DISCHARGE MEDICATIONS:  Current Discharge Medication List          DISCONTINUED MEDICATIONS:  Current Discharge Medication List                 (Please note that portions of this note were completed with a voice recognition program.  Efforts were made to edit the dictations but occasionally words are mis-transcribed.)    Verner Finney, DO (electronically signed)            Verner Finney, DO  08/21/23 [id number]

## 2023-08-21 NOTE — CARE COORDINATION
Patient declines participation with PT stating she knows she is desatting with minimal activity and is not able to increase mobility at this time. PT will follow up with patient tomorrow and proceed with eval as tolerated and appropriate.

## 2023-08-21 NOTE — H&P
History and Physical    Patient:  Natasha Guerrero    CHIEF COMPLAINT:    Respiratory distress    HISTORY OF PRESENT ILLNESS:   The patient is a 60 y.o. female with PMH of chronic respiratory failure on 6 L NC at rest and 10-15 L NC or NRB with exertion, advanced idiopathic pulmonary fibrosis active on [facility] transplant list, anxiety, chronic back pain, depression, HLD, HTN, hypothyroidism and pulmonary hypertension who presented to the emergency department complaining of SOA and respiratory distress. Patient noted onset of progressive worsening respiratory and fatigue status 4-5 days PTA. She denied any fever, chills, sweats, pleuritic pain or chest pain. She also reported a mild nonproductive cough. Patient arrived to the ER by POV with her family. She reported her O2 sat got down to 51% while in the car on 15 L. She wears a pulse oximetry around her neck at all times. Vitals upon arrival: Blood pressure 116/85, heart rate 131, respiratory rate 26, oxygen saturation 95% on nonrebreather mask. Labs: WBC 10.5, hemoglobin 12.2, hematocrit 37.8, platelets 748, sodium 140, potassium 3.7, chloride 99, CO2 28, BUN 17, creatinine 0.7, anion gap 13, glucose 110, alkaline phos 140, ALT 34, AST 30, bilirubin less than 0.2. COVID and flu negative. ABG: pH 7.436, PCO2 45.2, PO2 93.5, O2 sat 97.4. Chest x-ray with stable exam.  CTA chest with no PE. Findings consistent with history of known interstitial lung disease. Pulmonary artery enlargement is likely secondary to chronic pulmonary disease but may also be seen in pulmonary hypertension. While in the emergency department, after receiving IV Solu-Medrol, Rocephin and bronchodilators, O2 was able to be titrated down to 8 L with stable O2 sats. Patient requested to stay at this facility if at all possible. CODE STATUS: Full code. She was then admitted here for IPF flare.     Past Medical History:      Diagnosis Date    AC (acromioclavicular) joint bone spurs

## 2023-08-21 NOTE — CASE COMMUNICATION
Advance Care Planning     General Advance Care Planning (ACP) Conversation    Date of Conversation: 8/20/2023  Conducted with: Patient with Decision Making Capacity    Healthcare Decision Maker:    Primary Decision Maker: Oneil Gitelman - Child - [phone]  Click here to complete Healthcare Decision Makers including selection of the Healthcare Decision Maker Relationship (ie \"Primary\"). Today we documented Decision Maker(s) consistent with Legal Next of Kin hierarchy.     Content/Action Overview:  Patient elects to be full code  Reviewed DNR/DNI and patient elects Full Code (Attempt Resuscitation)  resuscitation preferences      Renetta Jefferson RN

## 2023-08-21 NOTE — CARE COORDINATION
Case Management Assessment  Initial Evaluation    Date/Time of Evaluation: 8/21/2023 12:43 PM  Assessment Completed by: Renetta Jefferson RN    If patient is discharged prior to next notation, then this note serves as note for discharge by case management. Patient Name: Vesta Wilcox                   YOB: 1965  Diagnosis: Respiratory failure [facility]) [J96.90]                   Date / Time: 8/20/2023  9:29 PM    Patient Admission Status: Inpatient   Readmission Risk (Low < 19, Mod (19-27), High > 27): Readmission Risk Score: 13    Current PCP: Estelle Hahn MD  PCP verified by CM? Yes (Dr. Rin Alvarado)    Chart Reviewed: Yes      History Provided by: Patient  Patient Orientation: Alert and Oriented    Patient Cognition: Alert    Hospitalization in the last 30 days (Readmission):  No    If yes, Readmission Assessment in CM Navigator will be completed. Advance Directives:      Code Status: Full Code   Patient's Primary Decision Maker is:      Primary Decision MakerOneil Gitelman - Child - [phone]    Discharge Planning:    Patient lives with: Children Type of Home: House  Primary Care Giver: Self  Patient Support Systems include: Children   Current Financial resources: Medicaid, Food [place]  Current community resources:  none  Current services prior to admission: Oxygen Therapy            Current DME:  Oxygen, manual wheelchair, shower chair. Type of Home Care services:  None    ADLS  Prior functional level: Independent in ADLs/IADLs  Current functional level: Independent in ADLs/IADLs      Family can provide assistance at DC: Yes  Would you like Case Management to discuss the discharge plan with any other family members/significant others, and if so, who? Yes (daughter Oneil Gitelman)  Plans to Return to Present Housing: Yes  Other Identified Issues/Barriers to RETURNING to current housing: endurance. Potential Assistance needed at discharge:  Other (Comment), Durable Medical Equipment

## 2023-08-21 NOTE — PROGRESS NOTES
Completed medication reconciliation with help of fill hx from [facility] and by speaking with the patient. Changes made to home med list are below:    Removed Budesonide 0.5 mg. Fill history shows most recent prescription is for 0.25 mg and patient verified  Patient states she is no longer taking sildenafil. Marked as not taking.     Will [address] Attila Reyes - PharmD

## 2023-08-22 ENCOUNTER — OUTSIDE FACILITY SERVICE (OUTPATIENT)
Dept: CARDIOLOGY | Facility: CLINIC | Age: 58
End: 2023-08-22
Payer: MEDICARE

## 2023-08-22 LAB
ANION GAP SERPL CALCULATED.3IONS-SCNC: 11 MMOL/L (ref 3–16)
BASOPHILS # BLD: 0 K/UL (ref 0–0.1)
BASOPHILS NFR BLD: 0.1 %
BUN SERPL-MCNC: 16 MG/DL (ref 6–20)
CALCIUM SERPL-MCNC: 9.8 MG/DL (ref 8.5–10.5)
CHLORIDE SERPL-SCNC: 101 MMOL/L (ref 98–107)
CO2 SERPL-SCNC: 30 MMOL/L (ref 20–30)
CREAT SERPL-MCNC: 0.6 MG/DL (ref 0.4–1.2)
DIGOXIN SERPL-MCNC: 1.1 NG/ML (ref 0.8–2)
EOSINOPHIL # BLD: 0 K/UL (ref 0–0.4)
EOSINOPHIL NFR BLD: 0 %
ERYTHROCYTE [DISTWIDTH] IN BLOOD BY AUTOMATED COUNT: 14 % (ref 11–16)
GFR SERPLBLD CREATININE-BSD FMLA CKD-EPI: >60 ML/MIN/{1.73_M2}
GLUCOSE SERPL-MCNC: 147 MG/DL (ref 74–106)
HCT VFR BLD AUTO: 37.8 % (ref 37–47)
HGB BLD-MCNC: 12.1 G/DL (ref 11.5–16.5)
IMM GRANULOCYTES # BLD: 0.2 K/UL
IMM GRANULOCYTES NFR BLD: 0.9 % (ref 0–5)
LV EF: 58 %
LVEF MODALITY: NORMAL
LYMPHOCYTES # BLD: 1.2 K/UL (ref 1.5–4)
LYMPHOCYTES NFR BLD: 6.5 %
MCH RBC QN AUTO: 30.1 PG (ref 27–32)
MCHC RBC AUTO-ENTMCNC: 32 G/DL (ref 31–35)
MCV RBC AUTO: 94 FL (ref 80–100)
MONOCYTES # BLD: 0.8 K/UL (ref 0.2–0.8)
MONOCYTES NFR BLD: 4.5 %
NEUTROPHILS # BLD: 16.1 K/UL (ref 2–7.5)
NEUTS SEG NFR BLD: 88 %
PLATELET # BLD AUTO: 271 K/UL (ref 150–400)
PMV BLD AUTO: 10.6 FL (ref 6–10)
POTASSIUM SERPL-SCNC: 4.1 MMOL/L (ref 3.4–5.1)
RBC # BLD AUTO: 4.02 M/UL (ref 3.8–5.8)
SODIUM SERPL-SCNC: 142 MMOL/L (ref 136–145)
WBC # BLD AUTO: 18.3 K/UL (ref 4–11)

## 2023-08-22 PROCEDURE — 94761 N-INVAS EAR/PLS OXIMETRY MLT: CPT

## 2023-08-22 PROCEDURE — 1200000000 HC SEMI PRIVATE

## 2023-08-22 PROCEDURE — 6370000000 HC RX 637 (ALT 250 FOR IP): Performed by: INTERNAL MEDICINE

## 2023-08-22 PROCEDURE — 80048 BASIC METABOLIC PNL TOTAL CA: CPT

## 2023-08-22 PROCEDURE — 94640 AIRWAY INHALATION TREATMENT: CPT

## 2023-08-22 PROCEDURE — 80162 ASSAY OF DIGOXIN TOTAL: CPT

## 2023-08-22 PROCEDURE — 6360000002 HC RX W HCPCS: Performed by: PHYSICIAN ASSISTANT

## 2023-08-22 PROCEDURE — 2580000003 HC RX 258: Performed by: PHYSICIAN ASSISTANT

## 2023-08-22 PROCEDURE — 93306 TTE W/DOPPLER COMPLETE: CPT

## 2023-08-22 PROCEDURE — 2580000003 HC RX 258: Performed by: INTERNAL MEDICINE

## 2023-08-22 PROCEDURE — 6360000002 HC RX W HCPCS: Performed by: INTERNAL MEDICINE

## 2023-08-22 PROCEDURE — 85025 COMPLETE CBC W/AUTO DIFF WBC: CPT

## 2023-08-22 PROCEDURE — 2700000000 HC OXYGEN THERAPY PER DAY

## 2023-08-22 PROCEDURE — 36415 COLL VENOUS BLD VENIPUNCTURE: CPT

## 2023-08-22 PROCEDURE — 6370000000 HC RX 637 (ALT 250 FOR IP): Performed by: PHYSICIAN ASSISTANT

## 2023-08-22 PROCEDURE — 99231 SBSQ HOSP IP/OBS SF/LOW 25: CPT | Performed by: INTERNAL MEDICINE

## 2023-08-22 RX ORDER — WATER 1000 ML/1000ML
INJECTION, SOLUTION INTRAVENOUS
Status: DISCONTINUED
Start: 2023-08-22 | End: 2023-08-22

## 2023-08-22 RX ADMIN — VENLAFAXINE HYDROCHLORIDE 225 MG: 150 CAPSULE, EXTENDED RELEASE ORAL at 08:35

## 2023-08-22 RX ADMIN — LEVOTHYROXINE SODIUM 150 MCG: 0.07 TABLET ORAL at 06:06

## 2023-08-22 RX ADMIN — SODIUM CHLORIDE, PRESERVATIVE FREE 10 ML: 5 INJECTION INTRAVENOUS at 22:03

## 2023-08-22 RX ADMIN — ENOXAPARIN SODIUM 40 MG: 100 INJECTION SUBCUTANEOUS at 08:36

## 2023-08-22 RX ADMIN — CEFTRIAXONE 1000 MG: 1 INJECTION, POWDER, FOR SOLUTION INTRAMUSCULAR; INTRAVENOUS at 00:32

## 2023-08-22 RX ADMIN — DIGOXIN 125 MCG: 125 TABLET ORAL at 08:35

## 2023-08-22 RX ADMIN — HYDROXYZINE HYDROCHLORIDE 25 MG: 25 TABLET, FILM COATED ORAL at 21:56

## 2023-08-22 RX ADMIN — TRAZODONE HYDROCHLORIDE 100 MG: 50 TABLET ORAL at 21:57

## 2023-08-22 RX ADMIN — FOLIC ACID 1 MG: 1 TABLET ORAL at 08:36

## 2023-08-22 RX ADMIN — BUDESONIDE 500 MCG: 0.5 INHALANT RESPIRATORY (INHALATION) at 16:52

## 2023-08-22 RX ADMIN — METHYLPREDNISOLONE SODIUM SUCCINATE 40 MG: 40 INJECTION INTRAMUSCULAR; INTRAVENOUS at 17:25

## 2023-08-22 RX ADMIN — SODIUM CHLORIDE, PRESERVATIVE FREE 10 ML: 5 INJECTION INTRAVENOUS at 08:36

## 2023-08-22 RX ADMIN — METHYLPREDNISOLONE SODIUM SUCCINATE 40 MG: 40 INJECTION INTRAMUSCULAR; INTRAVENOUS at 06:06

## 2023-08-22 RX ADMIN — DOXYCYCLINE 100 MG: 100 CAPSULE ORAL at 08:35

## 2023-08-22 RX ADMIN — PANTOPRAZOLE SODIUM 40 MG: 40 TABLET, DELAYED RELEASE ORAL at 08:35

## 2023-08-22 RX ADMIN — FLUTICASONE PROPIONATE 1 SPRAY: 50 SPRAY, METERED NASAL at 08:36

## 2023-08-22 RX ADMIN — METOPROLOL TARTRATE 25 MG: 25 TABLET, FILM COATED ORAL at 08:36

## 2023-08-22 RX ADMIN — LEVALBUTEROL 1.25 MG: 1.25 SOLUTION RESPIRATORY (INHALATION) at 16:52

## 2023-08-22 RX ADMIN — DOXYCYCLINE 100 MG: 100 CAPSULE ORAL at 21:56

## 2023-08-22 RX ADMIN — METHYLPREDNISOLONE SODIUM SUCCINATE 40 MG: 40 INJECTION INTRAMUSCULAR; INTRAVENOUS at 11:15

## 2023-08-22 RX ADMIN — GUAIFENESIN SYRUP AND DEXTROMETHORPHAN 5 ML: 100; 10 SYRUP ORAL at 21:57

## 2023-08-22 RX ADMIN — Medication 5 MG: at 21:56

## 2023-08-22 RX ADMIN — METOPROLOL TARTRATE 25 MG: 25 TABLET, FILM COATED ORAL at 21:59

## 2023-08-22 RX ADMIN — HYDROCHLOROTHIAZIDE 25 MG: 25 TABLET ORAL at 08:35

## 2023-08-22 RX ADMIN — GUAIFENESIN SYRUP AND DEXTROMETHORPHAN 5 ML: 100; 10 SYRUP ORAL at 06:06

## 2023-08-22 RX ADMIN — GUAIFENESIN SYRUP AND DEXTROMETHORPHAN 5 ML: 100; 10 SYRUP ORAL at 12:37

## 2023-08-22 NOTE — PROGRESS NOTES
Progress Note      Subjective:   Chief complaint:   Respiratory distress    Interval History:   Patient seen and examined this morning. She reports feeling much better. She is able to take a good deep breath today. Oxygen saturation stable on 8.5 L nasal cannula. Metoprolol started last evening secondary to sinus tachycardia with better control of heart rate. Patient also reports symptomatic improvement with improved heart rate control. She is still not feeling well enough to participate with therapy. Also reports getting much relief with addition of cough suppressant last night. Review of systems:   Constitutional:  Denies fever or chills. Positive for fatigue. Eyes:  Denies change in visual acuity or discharge. HENT:  Denies nasal congestion or sore throat. Respiratory: Positive for cough, SOA and AYALA   cardiovascular:  Denies chest pain, palpitation or swelling in LEs. GI:  Denies abdominal pain, nausea, vomiting, bloody stools or diarrhea. :  Denies dysuria or frequency. Musculoskeletal:  Denies back pain or joint pain. Integument:  Denies rash or itching. Neurologic:  Denies headache, focal weakness or sensory changes. Endocrine:  Denies polyuria or polydipsia. Lymphatic:  Denies swollen glands or night sweats. Psychiatric:  Denies depression or anxiety. Past medical history, surgical history, family history and social history reviewed and unchanged compared to H&P earlier this admission.     Medications:   Scheduled Meds:   sterile water        cefTRIAXone (ROCEPHIN) IV  1,000 mg IntraVENous Q24H    digoxin  125 mcg Oral Daily    fluticasone  1 spray Each Nostril Daily    folic acid  1 mg Oral Daily    hydroCHLOROthiazide  25 mg Oral Daily    levothyroxine  150 mcg Oral Daily    Nintedanib Esylate  150 mg Oral BID    pantoprazole  40 mg Oral Daily    traZODone  100 mg Oral Nightly    venlafaxine  225 mg Oral Daily    sodium chloride flush  5-40 mL IntraVENous 2 times per day 08/22/2023 04:43 AM    CALCIUM 9.8 08/22/2023 04:43 AM    LABGLOM >60 08/22/2023 04:43 AM      Additional Notes:  -Added metoprolol on 8/21 secondary to sinus tachycardia. Heart rate much improved. Patient also reports feeling better with better control of heart rate.  -Echo ordered    Patient was seen and examined by Dr. Michelle Saldaña. Assessment and plan was formulated entirely by Dr. Michelle Saldaña. Electronically signed by OPAL Waters on 8/22/2023 at 11:22 AM    Assessment and Plan:     Patient was seen and examined with OPAL Waters. Agree with note as above. Assessment and plan was done by me as below. Active Hospital Problems    Diagnosis Date Noted    Acute exacerbation of idiopathic pulmonary fibrosis ([address]) [J84.112]  Slowly improving with steroids, antibiotics, neb treatment and oxygen. Monitor oxygen requirement and try to wean as able. Sinus tachycardia [R00.0]  Improved with adding beta-blocker. 05/23/2018    Acute on chronic respiratory failure with hypoxia (HCC) [J96.21]  Slowly improving with steroids, antibiotics, neb treatment and oxygen. Monitor oxygen requirement and try to wean as able. 10/28/2017    Essential hypertension [I10]  Stable on current regimen. Continue to monitor. Gastroesophageal reflux disease [K21.9]  GI prophylaxis. Discussed lifestyle modification. 05/14/2015    Hypothyroidism [E03.9]  Continue current dose of levothyroxine. TSH with a.m. labs.    04/15/2011           Electronically signed by Ben Su MD on 8/22/2023 at 10:11 PM

## 2023-08-22 NOTE — PROGRESS NOTES
PT O2 sat notd to drop 82%. Pt sitting in bed and changing gown when O2 dropped. Pt O2 sat 94% now. Pt stated she had no SOA with low O2 sat.

## 2023-08-22 NOTE — PLAN OF CARE
Problem: Discharge Planning  Goal: Discharge to home or other facility with appropriate resources  8/22/2023 0142 by Willy Islas RN  Outcome: Progressing  8/21/2023 1333 by Jose Ulrich RN  Outcome: Progressing     Problem: Pain  Goal: Verbalizes/displays adequate comfort level or baseline comfort level  8/21/2023 1333 by Jose Ulrich RN  Outcome: Progressing     Problem: Safety - Adult  Goal: Free from fall injury  Outcome: Progressing     Problem: ABCDS Injury Assessment  Goal: Absence of physical injury  8/21/2023 1333 by Jose Ulrich RN  Outcome: Progressing

## 2023-08-22 NOTE — CARE COORDINATION
Patient declines participation with PT eval this date stating she feels worse today than she did yesterday. PT will follow up with patient tomorrow and proceed with eval as tolerated and apppropriate.

## 2023-08-22 NOTE — CARE COORDINATION
Pt reports she is continuing to not feel well enough to tolerate therapy evaluation at this time. Will re attempt tomorrow.

## 2023-08-23 LAB
ALBUMIN SERPL-MCNC: 3.9 G/DL (ref 3.4–4.8)
ALBUMIN/GLOB SERPL: 1.3 {RATIO} (ref 0.8–2)
ALP SERPL-CCNC: 126 U/L (ref 25–100)
ALT SERPL-CCNC: 38 U/L (ref 4–36)
ANION GAP SERPL CALCULATED.3IONS-SCNC: 8 MMOL/L (ref 3–16)
AST SERPL-CCNC: 21 U/L (ref 8–33)
BILIRUB SERPL-MCNC: <0.2 MG/DL (ref 0.3–1.2)
BUN SERPL-MCNC: 23 MG/DL (ref 6–20)
CALCIUM SERPL-MCNC: 9.4 MG/DL (ref 8.5–10.5)
CHLORIDE SERPL-SCNC: 102 MMOL/L (ref 98–107)
CO2 SERPL-SCNC: 30 MMOL/L (ref 20–30)
CREAT SERPL-MCNC: <0.5 MG/DL (ref 0.4–1.2)
ERYTHROCYTE [DISTWIDTH] IN BLOOD BY AUTOMATED COUNT: 14 % (ref 11–16)
GFR SERPLBLD CREATININE-BSD FMLA CKD-EPI: >60 ML/MIN/{1.73_M2}
GLOBULIN SER CALC-MCNC: 3 G/DL
GLUCOSE SERPL-MCNC: 148 MG/DL (ref 74–106)
HCT VFR BLD AUTO: 38.9 % (ref 37–47)
HGB BLD-MCNC: 12.6 G/DL (ref 11.5–16.5)
MCH RBC QN AUTO: 30.4 PG (ref 27–32)
MCHC RBC AUTO-ENTMCNC: 32.4 G/DL (ref 31–35)
MCV RBC AUTO: 93.7 FL (ref 80–100)
PLATELET # BLD AUTO: 289 K/UL (ref 150–400)
PMV BLD AUTO: 10.5 FL (ref 6–10)
POTASSIUM SERPL-SCNC: 4.4 MMOL/L (ref 3.4–5.1)
PROCALCITONIN SERPL IA-MCNC: 0.06 NG/ML (ref 0–0.15)
PROT SERPL-MCNC: 6.9 G/DL (ref 6.4–8.3)
RBC # BLD AUTO: 4.15 M/UL (ref 3.8–5.8)
SODIUM SERPL-SCNC: 140 MMOL/L (ref 136–145)
TSH SERPL DL<=0.005 MIU/L-ACNC: 0.03 UIU/ML (ref 0.27–4.2)
WBC # BLD AUTO: 16.8 K/UL (ref 4–11)

## 2023-08-23 PROCEDURE — 97162 PT EVAL MOD COMPLEX 30 MIN: CPT

## 2023-08-23 PROCEDURE — 2700000000 HC OXYGEN THERAPY PER DAY

## 2023-08-23 PROCEDURE — 80053 COMPREHEN METABOLIC PANEL: CPT

## 2023-08-23 PROCEDURE — 6360000002 HC RX W HCPCS: Performed by: INTERNAL MEDICINE

## 2023-08-23 PROCEDURE — 6370000000 HC RX 637 (ALT 250 FOR IP): Performed by: INTERNAL MEDICINE

## 2023-08-23 PROCEDURE — 36415 COLL VENOUS BLD VENIPUNCTURE: CPT

## 2023-08-23 PROCEDURE — 94640 AIRWAY INHALATION TREATMENT: CPT

## 2023-08-23 PROCEDURE — 97116 GAIT TRAINING THERAPY: CPT

## 2023-08-23 PROCEDURE — 97166 OT EVAL MOD COMPLEX 45 MIN: CPT

## 2023-08-23 PROCEDURE — 2580000003 HC RX 258: Performed by: PHYSICIAN ASSISTANT

## 2023-08-23 PROCEDURE — 85027 COMPLETE CBC AUTOMATED: CPT

## 2023-08-23 PROCEDURE — 84443 ASSAY THYROID STIM HORMONE: CPT

## 2023-08-23 PROCEDURE — 2580000003 HC RX 258: Performed by: INTERNAL MEDICINE

## 2023-08-23 PROCEDURE — 84145 PROCALCITONIN (PCT): CPT

## 2023-08-23 PROCEDURE — 99231 SBSQ HOSP IP/OBS SF/LOW 25: CPT | Performed by: INTERNAL MEDICINE

## 2023-08-23 PROCEDURE — 6370000000 HC RX 637 (ALT 250 FOR IP): Performed by: PHYSICIAN ASSISTANT

## 2023-08-23 PROCEDURE — 6360000002 HC RX W HCPCS: Performed by: PHYSICIAN ASSISTANT

## 2023-08-23 PROCEDURE — 97535 SELF CARE MNGMENT TRAINING: CPT

## 2023-08-23 PROCEDURE — 94761 N-INVAS EAR/PLS OXIMETRY MLT: CPT

## 2023-08-23 PROCEDURE — 1200000000 HC SEMI PRIVATE

## 2023-08-23 RX ORDER — LEVOTHYROXINE SODIUM 0.12 MG/1
125 TABLET ORAL DAILY
Status: DISCONTINUED | OUTPATIENT
Start: 2023-08-24 | End: 2023-08-24 | Stop reason: HOSPADM

## 2023-08-23 RX ORDER — GUAIFENESIN/DEXTROMETHORPHAN 100-10MG/5
10 SYRUP ORAL EVERY 4 HOURS PRN
Status: DISCONTINUED | OUTPATIENT
Start: 2023-08-23 | End: 2023-08-24 | Stop reason: HOSPADM

## 2023-08-23 RX ADMIN — TRAZODONE HYDROCHLORIDE 100 MG: 50 TABLET ORAL at 21:48

## 2023-08-23 RX ADMIN — PANTOPRAZOLE SODIUM 40 MG: 40 TABLET, DELAYED RELEASE ORAL at 09:23

## 2023-08-23 RX ADMIN — CEFTRIAXONE 1000 MG: 1 INJECTION, POWDER, FOR SOLUTION INTRAMUSCULAR; INTRAVENOUS at 00:42

## 2023-08-23 RX ADMIN — BUDESONIDE 500 MCG: 0.5 INHALANT RESPIRATORY (INHALATION) at 16:53

## 2023-08-23 RX ADMIN — VENLAFAXINE HYDROCHLORIDE 225 MG: 150 CAPSULE, EXTENDED RELEASE ORAL at 09:22

## 2023-08-23 RX ADMIN — METOPROLOL TARTRATE 25 MG: 25 TABLET, FILM COATED ORAL at 09:24

## 2023-08-23 RX ADMIN — METOPROLOL TARTRATE 25 MG: 25 TABLET, FILM COATED ORAL at 21:50

## 2023-08-23 RX ADMIN — LEVOTHYROXINE SODIUM 150 MCG: 0.07 TABLET ORAL at 06:16

## 2023-08-23 RX ADMIN — PREDNISONE 40 MG: 20 TABLET ORAL at 09:24

## 2023-08-23 RX ADMIN — HYDROCHLOROTHIAZIDE 25 MG: 25 TABLET ORAL at 09:24

## 2023-08-23 RX ADMIN — DIGOXIN 125 MCG: 125 TABLET ORAL at 09:23

## 2023-08-23 RX ADMIN — METHYLPREDNISOLONE SODIUM SUCCINATE 40 MG: 40 INJECTION INTRAMUSCULAR; INTRAVENOUS at 00:38

## 2023-08-23 RX ADMIN — SODIUM CHLORIDE, PRESERVATIVE FREE 10 ML: 5 INJECTION INTRAVENOUS at 21:53

## 2023-08-23 RX ADMIN — DOXYCYCLINE 100 MG: 100 CAPSULE ORAL at 21:51

## 2023-08-23 RX ADMIN — GUAIFENESIN SYRUP AND DEXTROMETHORPHAN 10 ML: 100; 10 SYRUP ORAL at 15:25

## 2023-08-23 RX ADMIN — DOXYCYCLINE 100 MG: 100 CAPSULE ORAL at 09:24

## 2023-08-23 RX ADMIN — FLUTICASONE PROPIONATE 1 SPRAY: 50 SPRAY, METERED NASAL at 09:24

## 2023-08-23 RX ADMIN — ENOXAPARIN SODIUM 40 MG: 100 INJECTION SUBCUTANEOUS at 09:22

## 2023-08-23 RX ADMIN — Medication 5 MG: at 21:51

## 2023-08-23 RX ADMIN — SODIUM CHLORIDE, PRESERVATIVE FREE 10 ML: 5 INJECTION INTRAVENOUS at 09:27

## 2023-08-23 RX ADMIN — FOLIC ACID 1 MG: 1 TABLET ORAL at 09:24

## 2023-08-23 RX ADMIN — GUAIFENESIN SYRUP AND DEXTROMETHORPHAN 10 ML: 100; 10 SYRUP ORAL at 21:51

## 2023-08-23 NOTE — PROGRESS NOTES
[facility] called for update on patient. Requested progress notes, xray and labs faxed to [phone]. Faxed and confirmation received.

## 2023-08-23 NOTE — PROGRESS NOTES
Occupational Therapy  Facility/Department: [facility]  Occupational Therapy Initial Assessment    Name: Anshu Parmar  : 1965  MRN: [id number]  Date of Service: 2023    Discharge Recommendations:  Continue to assess pending progress    Patient Diagnosis(es): The primary encounter diagnosis was Hypoxia. Diagnoses of Acute respiratory distress and Interstitial pulmonary fibrosis (HCC) were also pertinent to this visit. 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 Past Surgical History:  has a past surgical history that includes  section; Gallbladder surgery; Hysterectomy; and Breast biopsy (Right). Assessment   Performance deficits / Impairments: Decreased functional mobility ; Decreased strength;Decreased endurance;Decreased high-level IADLs;Decreased balance;Decreased ADL status  Assessment: This 60year old female was referred to OT services upon admission following onset of acute on chronic respiratory failure with hypoxia. Pt presents in bed on 8L 02. Pt is pleasant and cooperative and willing to participate in therapy services today as she can tolerate. Pt sat upright on EOB without assistance. Pt come to stand from EOB with SBA. Pt placed on portable tank on 15L on NRB and ambulated to bathroom with SBA. Pt completed toileting tasks with MOD I. Pt stood at sink and washed hands with SBA. Pt returned to EOB hand had brief seated rest break. Pt donned housecoat with SBA seated on EOB. Pt come to stand SBA and ambulated 200 feet with RW on 15L 02 on NRB. Pt had few standing rest breaks. 02 sats maintained 91-98% with activity.  Pt assistance  Homemaking Responsibilities: Yes  Ambulation Assistance: Needs assistance (Independent for [facility] [email or web address] distances; needs a motorized w/c for longer distances)  Transfer Assistance: Independent  Active : Yes  Mode of Transportation: [facility]  Occupation: On disability       Objective   Pulse: 62  Heart Rate Source: Monitor  BP: 124/77  BP Location: Left upper arm  MAP (Calculated): 93  Respirations: 18  SpO2: 100 %  O2 Device: Nasal cannula  Comment: 8 LPM          Observation/Palpation  Observation: Patient received lying in bed on 8 LPM O2 per n/c. NAD. Pleasant and cooperative. Motivated to increase activity as she is able to. Safety Devices  Type of Devices: Call light within reach; Left in chair  Gait  Overall Level of Assistance: Stand-by assistance  Distance (ft): 200 Feet (15L with NRB mask, standing rest breaks occasionally as needed)  Assistive Device: Gait belt;Walker, rolling     AROM: Within functional limits  PROM: Within functional limits  Strength: Generally decreased, functional  Coordination: Within functional limits  Tone: Normal  Sensation: Intact  ADL  UE Bathing Skilled Clinical Factors: max assist to wash hair in sink.   UE Dressing: Stand by assistance  Toileting: Modified independent   Toileting Skilled Clinical Factors: on 15L with NRB     Activity Tolerance  Activity Tolerance: Patient tolerated evaluation without incident;Patient limited by endurance;Treatment limited secondary to medical complications  Bed mobility  Rolling to Left: Modified independent  Supine to Sit: Modified independent  Scooting: Modified independent  Transfers  Stand Pivot Transfers: Stand by assistance  Sit to stand: Stand by assistance  Stand to sit: Stand by assistance  Vision  Vision: Impaired  Vision Exceptions: Wears glasses at all times  Hearing  Hearing: Within functional limits  Cognition  Overall Cognitive Status: WFL  Orientation  Overall Orientation Status: Within Functional Limits   Goals  Short Term

## 2023-08-23 NOTE — PROGRESS NOTES
Physical Therapy  Facility/Department: [facility]  Physical Therapy Initial Assessment    Name: Anshu Parmar  : 1965  MRN: [id number]  Date of Service: 2023    Discharge Recommendations:  Home with assist PRN, Continue to assess pending progress          Patient Diagnosis(es): The primary encounter diagnosis was Hypoxia. Diagnoses of Acute respiratory distress and Interstitial pulmonary fibrosis (HCC) were also pertinent to this visit. 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 Past Surgical History:  has a past surgical history that includes  section; Gallbladder surgery; Hysterectomy; and Breast biopsy (Right). Assessment   Body Structures, Functions, Activity Limitations Requiring Skilled Therapeutic Intervention: Decreased tolerance to work activity; Decreased balance;Decreased endurance;Decreased strength  Assessment: PT eval completed on this patient admitted to hospital with primary diagnosis of acute on chronic respiratory failure with hypoxia. She is received lying in bed on 8 LPM O2 per n/c and satting at 92%. NAD. States she is very tired but needs to participate with PT so she will be strong enough for a lung transplant. Very motivated to do what she is able to do. Patient transferred to 15 LPM O2 via non-rebreather mask with activity. She is able to perform bed mobility with mod I. Sit to stand, transfers and able to ambulate 200' with RW and SBA-CGA. O2 sats maintain >90% with activity on 15 LPM but she requires several standing rest breaks to complete ambulation d/t reported dyspnea and fatigue.  Slow pace noted with good awareness and use of energy conservation techniques. Patient returrned to her room and requests assistance to sit at the bathroom sink and wash her hair. OT assisted patient with this task. Patient then requested a recliner chair for her room so she could sit up and stay OOB. Patient left sitting up in recliner chair on 8 LPM O2 per n/c and satting at 94%. She presents with deficits in functional mobility but is expected to benefit from continued skilled PT intervention to improve her overall functional status prior to D/c. Therapy Prognosis: Good  Decision Making: Medium Complexity  Requires PT Follow-Up: Yes  Activity Tolerance  Activity Tolerance: Patient tolerated evaluation without incident;Patient limited by endurance;Treatment limited secondary to medical complications     Plan   Physcial Therapy Plan  General Plan: 3-5 times per week  Days Per Week: 5 Days  Current Treatment Recommendations: Strengthening, Balance training, Functional mobility training, Transfer training, Gait training, Endurance training, Safety education & training, Home exercise program, Therapeutic activities  Safety Devices  Type of Devices: Call light within reach, Left in chair     Restrictions  Restrictions/Precautions  Restrictions/Precautions: Fall Risk, General Precautions  Required Braces or Orthoses?: No     Subjective   Pain: No c/o pain.   General  Chart Reviewed: Yes  Patient assessed for rehabilitation services?: Yes  Follows Commands: Within Functional Limits         Social/Functional History  Social/Functional History  Lives With: Family  Type of Home: House  Home Layout: One level  Home Access: Stairs to enter with rails (1 DUY)  Bathroom Shower/Tub: Tub/Shower unit  Bathroom Toilet: Standard  Bathroom Equipment: Shower chair  Bathroom Accessibility: Not accessible  Home Equipment: Oxygen, Wheelchair-manual (6 LPM at rest; up to 10-15 LPM with activity)  Has the patient had two or more falls in the past year or any

## 2023-08-23 NOTE — PROGRESS NOTES
Progress Note      Subjective:   Chief complaint:   Respiration distress    Interval History:   Pt seen and examined this morning. States today is not a good day. Complains of generalized fatigue and weakness. States her sats continue to drop with minimal activity. Requesting to remain here in swing bed. Review of systems:   Constitutional:  Denies fever or chills. Positive for fatigue and generalized weakness. Eyes:  Denies change in visual acuity or discharge. HENT:  Denies nasal congestion or sore throat. Respiratory: Positive for cough, SOA and AYALA   cardiovascular:  Denies chest pain, palpitation or swelling in LEs. GI:  Denies abdominal pain, nausea, vomiting, bloody stools or diarrhea. :  Denies dysuria or frequency. Musculoskeletal:  Denies back pain or joint pain. Integument:  Denies rash or itching. Neurologic:  Denies headache, focal weakness or sensory changes. Endocrine:  Denies polyuria or polydipsia. Lymphatic:  Denies swollen glands or night sweats. Psychiatric:  Denies depression or anxiety. Past medical history, surgical history, family history and social history reviewed and unchanged compared to H&P earlier this admission.     Medications:   Scheduled Meds:   [START ON 8/24/2023] levothyroxine  125 mcg Oral Daily    cefTRIAXone (ROCEPHIN) IV  1,000 mg IntraVENous Q24H    digoxin  125 mcg Oral Daily    fluticasone  1 spray Each Nostril Daily    folic acid  1 mg Oral Daily    hydroCHLOROthiazide  25 mg Oral Daily    Nintedanib Esylate  150 mg Oral BID    pantoprazole  40 mg Oral Daily    traZODone  100 mg Oral Nightly    venlafaxine  225 mg Oral Daily    sodium chloride flush  5-40 mL IntraVENous 2 times per day    enoxaparin  40 mg SubCUTAneous Daily    predniSONE  40 mg Oral Daily    doxycycline monohydrate  100 mg Oral 2 times per day    budesonide  0.5 mg Nebulization BID RT    melatonin  5 mg Oral Nightly    metoprolol tartrate  25 mg Oral BID     Continuous

## 2023-08-23 NOTE — FLOWSHEET NOTE
08/23/23 0934   Assessment   Charting Type Shift assessment   Psychosocial   Psychosocial (WDL) WDL   Neurological   Neuro (WDL) WDL   Level of Consciousness 0   McAndrews Coma Scale   Eye Opening 4   Best Verbal Response 5   Best Motor Response 6   McAndrews Coma Scale Score 15   HEENT (Head, Ears, Eyes, Nose, & Throat)   HEENT (WDL) X   Right Eye Glasses   Left Eye Glasses   Teeth Dentures upper   Respiratory   Respiratory (WDL) X   Respiratory Interventions Cough & deep breathe;H. O.B. elevated   Respiratory Pattern Irregular   Respiratory Depth Normal   Respiratory Quality/Effort Labored   L Breath Sounds Diminished;Clear   R Breath Sounds Diminished;Clear   Level of Activity/Mobility 2   Breath Sounds   Right Upper Lobe Diminished   Right Middle Lobe Diminished   Right Lower Lobe Diminished   Left Upper Lobe Clear   Left Lower Lobe Diminished   Cough/Sputum   Cough Strong   Sputum Amount None   Cardiac   Cardiac (WDL) X   Cardiac Monitor   Cardiac/Telemetry Monitor On Portable telemetry pack applied   Alarm Audible Yes   Telemetry Box Number [id number]   Telemetry Monitor Alarm Parameters    Gastrointestinal   Abdominal (WDL) WDL   Last BM (including prior to admit) 08/21/23   RUQ Bowel Sounds Active   LUQ Bowel Sounds Active   RLQ Bowel Sounds Active   LLQ Bowel Sounds Active   Peripheral Vascular   Peripheral Vascular (WDL) WDL   Skin Integumentary    Skin Integumentary (WDL) WDL   Care Plan - Skin/Tissue Integrity Goals   Skin Integrity Remains Intact Monitor for areas of redness and/or skin breakdown   Musculoskeletal   Musculoskeletal (WDL) WDL   Care Plan - Discharge Planning Goals   Discharge to home or other facility with appropriate resources Identify barriers to discharge with patient and caregiver; Identify discharge learning needs (meds, wound care, etc)     Pt is alert and oriented. Pt is resting in bed and appears to have no acute distress. Pt currently on 8 L NC.  Pt currently on telemetry

## 2023-08-23 NOTE — PLAN OF CARE
Problem: Discharge Planning  Goal: Discharge to home or other facility with appropriate resources  8/23/2023 0937 by Usman Dee RN  Outcome: Progressing  Flowsheets (Taken 8/23/2023 9938)  Discharge to home or other facility with appropriate resources:   Identify barriers to discharge with patient and caregiver   Identify discharge learning needs (meds, wound care, etc)  8/23/2023 0220 by Flora Honeycutt RN  Outcome: Progressing     Problem: Pain  Goal: Verbalizes/displays adequate comfort level or baseline comfort level  8/23/2023 0937 by Usman Dee RN  Outcome: Progressing  8/23/2023 0220 by Flora Honeycutt RN  Outcome: Progressing     Problem: Safety - Adult  Goal: Free from fall injury  Outcome: Progressing     Problem: ABCDS Injury Assessment  Goal: Absence of physical injury  Outcome: Progressing     Problem: Skin/Tissue Integrity  Goal: Absence of new skin breakdown  Description: 1. Monitor for areas of redness and/or skin breakdown  2. Assess vascular access sites hourly  3. Every 4-6 hours minimum:  Change oxygen saturation probe site  4. Every 4-6 hours:  If on nasal continuous positive airway pressure, respiratory therapy assess nares and determine need for appliance change or resting period.   Outcome: Progressing

## 2023-08-24 VITALS
SYSTOLIC BLOOD PRESSURE: 114 MMHG | WEIGHT: 175.38 LBS | HEIGHT: 65 IN | DIASTOLIC BLOOD PRESSURE: 74 MMHG | TEMPERATURE: 98.2 F | RESPIRATION RATE: 20 BRPM | OXYGEN SATURATION: 98 % | HEART RATE: 65 BPM | BODY MASS INDEX: 29.22 KG/M2

## 2023-08-24 LAB
ABO + RH BLD: NORMAL
ALBUMIN SERPL-MCNC: 3.7 G/DL (ref 3.4–4.8)
ALBUMIN/GLOB SERPL: 1.2 {RATIO} (ref 0.8–2)
ALP SERPL-CCNC: 125 U/L (ref 25–100)
ALT SERPL-CCNC: 41 U/L (ref 4–36)
ANION GAP SERPL CALCULATED.3IONS-SCNC: 9 MMOL/L (ref 3–16)
APTT BLD: 27.1 SEC (ref 22.1–34.9)
AST SERPL-CCNC: 25 U/L (ref 8–33)
BILIRUB SERPL-MCNC: <0.2 MG/DL (ref 0.3–1.2)
BLD GP AB SCN SERPL QL: NORMAL
BUN SERPL-MCNC: 27 MG/DL (ref 6–20)
CALCIUM SERPL-MCNC: 9.2 MG/DL (ref 8.5–10.5)
CHLORIDE SERPL-SCNC: 100 MMOL/L (ref 98–107)
CO2 SERPL-SCNC: 31 MMOL/L (ref 20–30)
CREAT SERPL-MCNC: 0.7 MG/DL (ref 0.4–1.2)
ERYTHROCYTE [DISTWIDTH] IN BLOOD BY AUTOMATED COUNT: 13.9 % (ref 11–16)
GFR SERPLBLD CREATININE-BSD FMLA CKD-EPI: >60 ML/MIN/{1.73_M2}
GLOBULIN SER CALC-MCNC: 3 G/DL
GLUCOSE SERPL-MCNC: 91 MG/DL (ref 74–106)
HCT VFR BLD AUTO: 40.7 % (ref 37–47)
HGB BLD-MCNC: 13.1 G/DL (ref 11.5–16.5)
INR PPP: 1.02 (ref 0.87–1.11)
MCH RBC QN AUTO: 30 PG (ref 27–32)
MCHC RBC AUTO-ENTMCNC: 32.2 G/DL (ref 31–35)
MCV RBC AUTO: 93.1 FL (ref 80–100)
PLATELET # BLD AUTO: 291 K/UL (ref 150–400)
PMV BLD AUTO: 9.9 FL (ref 6–10)
POTASSIUM SERPL-SCNC: 4.2 MMOL/L (ref 3.4–5.1)
PROT SERPL-MCNC: 6.7 G/DL (ref 6.4–8.3)
PROTHROMBIN TIME: 13.2 SEC (ref 11.8–14.2)
RBC # BLD AUTO: 4.37 M/UL (ref 3.8–5.8)
SODIUM SERPL-SCNC: 140 MMOL/L (ref 136–145)
WBC # BLD AUTO: 16.5 K/UL (ref 4–11)

## 2023-08-24 PROCEDURE — 86901 BLOOD TYPING SEROLOGIC RH(D): CPT

## 2023-08-24 PROCEDURE — 85027 COMPLETE CBC AUTOMATED: CPT

## 2023-08-24 PROCEDURE — 2580000003 HC RX 258: Performed by: INTERNAL MEDICINE

## 2023-08-24 PROCEDURE — 6360000002 HC RX W HCPCS: Performed by: PHYSICIAN ASSISTANT

## 2023-08-24 PROCEDURE — 36415 COLL VENOUS BLD VENIPUNCTURE: CPT

## 2023-08-24 PROCEDURE — 6370000000 HC RX 637 (ALT 250 FOR IP): Performed by: INTERNAL MEDICINE

## 2023-08-24 PROCEDURE — 99238 HOSP IP/OBS DSCHRG MGMT 30/<: CPT | Performed by: INTERNAL MEDICINE

## 2023-08-24 PROCEDURE — 86900 BLOOD TYPING SEROLOGIC ABO: CPT

## 2023-08-24 PROCEDURE — 85610 PROTHROMBIN TIME: CPT

## 2023-08-24 PROCEDURE — 94761 N-INVAS EAR/PLS OXIMETRY MLT: CPT

## 2023-08-24 PROCEDURE — 6360000002 HC RX W HCPCS: Performed by: INTERNAL MEDICINE

## 2023-08-24 PROCEDURE — 80053 COMPREHEN METABOLIC PANEL: CPT

## 2023-08-24 PROCEDURE — 6370000000 HC RX 637 (ALT 250 FOR IP): Performed by: PHYSICIAN ASSISTANT

## 2023-08-24 PROCEDURE — 85730 THROMBOPLASTIN TIME PARTIAL: CPT

## 2023-08-24 PROCEDURE — 2580000003 HC RX 258: Performed by: PHYSICIAN ASSISTANT

## 2023-08-24 PROCEDURE — 86850 RBC ANTIBODY SCREEN: CPT

## 2023-08-24 RX ORDER — LEVOTHYROXINE SODIUM 0.12 MG/1
125 TABLET ORAL DAILY
Qty: 30 TABLET | Refills: 3 | Status: SHIPPED | OUTPATIENT
Start: 2023-08-25

## 2023-08-24 RX ADMIN — DOXYCYCLINE 100 MG: 100 CAPSULE ORAL at 08:22

## 2023-08-24 RX ADMIN — FOLIC ACID 1 MG: 1 TABLET ORAL at 08:22

## 2023-08-24 RX ADMIN — HYDROCHLOROTHIAZIDE 25 MG: 25 TABLET ORAL at 08:23

## 2023-08-24 RX ADMIN — DIGOXIN 125 MCG: 125 TABLET ORAL at 08:22

## 2023-08-24 RX ADMIN — FLUTICASONE PROPIONATE 1 SPRAY: 50 SPRAY, METERED NASAL at 08:23

## 2023-08-24 RX ADMIN — METOPROLOL TARTRATE 25 MG: 25 TABLET, FILM COATED ORAL at 08:22

## 2023-08-24 RX ADMIN — LEVOTHYROXINE SODIUM 125 MCG: 125 TABLET ORAL at 06:09

## 2023-08-24 RX ADMIN — VENLAFAXINE HYDROCHLORIDE 225 MG: 150 CAPSULE, EXTENDED RELEASE ORAL at 08:22

## 2023-08-24 RX ADMIN — ENOXAPARIN SODIUM 40 MG: 100 INJECTION SUBCUTANEOUS at 08:23

## 2023-08-24 RX ADMIN — SODIUM CHLORIDE, PRESERVATIVE FREE 10 ML: 5 INJECTION INTRAVENOUS at 08:24

## 2023-08-24 RX ADMIN — PANTOPRAZOLE SODIUM 40 MG: 40 TABLET, DELAYED RELEASE ORAL at 08:23

## 2023-08-24 RX ADMIN — CEFTRIAXONE 1000 MG: 1 INJECTION, POWDER, FOR SOLUTION INTRAMUSCULAR; INTRAVENOUS at 01:52

## 2023-08-24 RX ADMIN — PREDNISONE 40 MG: 20 TABLET ORAL at 08:22

## 2023-08-24 ASSESSMENT — PAIN SCALES - GENERAL: PAINLEVEL_OUTOF10: 0

## 2023-08-24 NOTE — FLOWSHEET NOTE
08/24/23 0830   Assessment   Charting Type Shift assessment   Psychosocial   Psychosocial (WDL) WDL   Neurological   Neuro (WDL) WDL   Level of Consciousness 0   Watson Coma Scale   Eye Opening 4   Best Verbal Response 5   Best Motor Response 6   Watson Coma Scale Score 15   HEENT (Head, Ears, Eyes, Nose, & Throat)   Right Eye Glasses   Left Eye Glasses   Teeth Dentures upper   Respiratory   Respiratory (WDL) X   Respiratory Interventions Cough & deep breathe;H. O.B. elevated   Respiratory Pattern Irregular   Respiratory Depth Normal   Respiratory Quality/Effort Labored   L Breath Sounds Diminished;Clear   R Breath Sounds Diminished;Clear;Fine Crackles   Breath Sounds   Right Upper Lobe Clear   Right Middle Lobe Diminished   Right Lower Lobe Diminished;Fine crackles   Left Upper Lobe Clear   Left Lower Lobe Diminished   Cardiac   Cardiac (WDL) WDL   Cardiac Monitor   Cardiac/Telemetry Monitor On Portable telemetry pack applied   Alarm Audible Yes   Alarms Set Yes   Telemetry Box Number [id number]   Telemetry Monitor Alarm Parameters    Gastrointestinal   Abdominal (WDL) WDL   Last BM (including prior to admit) 08/23/23   RUQ Bowel Sounds Active   LUQ Bowel Sounds Active   RLQ Bowel Sounds Active   LLQ Bowel Sounds Active   Peripheral Vascular   Peripheral Vascular (WDL) WDL   Skin Integumentary    Skin Integumentary (WDL) WDL   Care Plan - Skin/Tissue Integrity Goals   Skin Integrity Remains Intact Monitor for areas of redness and/or skin breakdown   Musculoskeletal   Musculoskeletal (WDL) WDL   Care Plan - Discharge Planning Goals   Discharge to home or other facility with appropriate resources Identify barriers to discharge with patient and caregiver; Identify discharge learning needs (meds, wound care, etc)     Pt is alert and oriented. Pt is resting in bed and appears to have no acute distress. Pt currently on 8 L high flow NC. Pt currently on telemetry monitoring.  Pt's lung sounds are clear in upper lobes, diminished in lower lobes with fine crackles in the right lower lobe. Pt encouraged to cough and deep breathe. Pt call bell and bedside table within reach.

## 2023-08-24 NOTE — PROGRESS NOTES
Pt to be transported by flight to [facility] for lung transport. Flight being arranged at present by Nella Chauhan.

## 2023-08-24 NOTE — DISCHARGE SUMMARY
Discharge Summary      Patient ID: Josette Kolb      Patient's PCP: Romy Woods MD    Admit Date: 8/20/2023     Discharge Date:   8/24/2023    Admitting Provider: Evelyn Archer MD    Discharging Provider: OPAL Rush     Reason for this admission:   Acute on chronic hypoxic respiratory failure  Acute exacerbation of idiopathic pulmonary fibrosis    Discharge Diagnoses: Active Hospital Problems    Diagnosis Date Noted    Acute exacerbation of idiopathic pulmonary fibrosis (HCC) [J84.112]     Sinus tachycardia [R00.0] 05/23/2018    Acute on chronic respiratory failure with hypoxia (HCC) [J96.21] 10/28/2017    Essential hypertension [I10]     Gastroesophageal reflux disease [K21.9] 05/14/2015    Hypothyroidism [E03.9] 04/15/2011       Procedures:  CTA PULMONARY W CONTRAST   Final Result   1. No pulmonary embolism. 2.  Findings consistent with known history of interstitial lung disease. Pulmonary artery enlargement is likely secondary to chronic pulmonary disease but may also be seen in pulmonary hypertension. Electronically signed by Brigida Perez      XR CHEST PORTABLE   Final Result   Stable exam            Consults:   IP CONSULT TO SOCIAL WORK  PT OT  Case Management      Briefly:   67-year-old female with PMH of chronic respiratory failure on 6 LNC at rest and 10-15 L NC or NRB with exertion, advanced idiopathic pulmonary fibrosis active on [facility] transplant list, anxiety, chronic back pain, depression, HLD, HTN, hypothyroidism and pulmonary hypertension admitted for acute on chronic hypoxic respiratory failure secondary to acute exacerbation of idiopathic pulmonary fibrosis. Hospital Course:       Active Hospital Problems    Diagnosis Date Noted    Acute exacerbation of idiopathic pulmonary fibrosis (HCC) [J84.112]     Sinus tachycardia [R00.0] 05/23/2018    Acute on chronic respiratory failure with hypoxia (HCC) [J96.21] 10/28/2017    Essential hypertension [I10]     Gastroesophageal

## 2023-08-24 NOTE — PROGRESS NOTES
CLINICAL PHARMACY NOTE: MEDS TO BEDS    Total # of Prescriptions Filled: 2   The following medications were delivered to the patient:  Current Discharge Medication List        START taking these medications    Details   metoprolol tartrate (LOPRESSOR) 25 MG tablet Take 1 tablet by mouth 2 times daily  Qty: 60 tablet, Refills: 3         Levothyroxine 125mcg tablet  Take 1 tablet once daily Qty: 30 tablets Refill: 0      Additional Documentation:  Medications were given to Anatoly Machuca RN,  to deliver to patient.

## 2023-08-24 NOTE — PROGRESS NOTES
Pt left by [address] [place] [address] EMS at 1020am. Meds to beds, belongings and home meds were given to patient's daughter.

## 2023-08-24 NOTE — FLOWSHEET NOTE
Called [facility] at this time for possible transport. They have declined at this time due to weather. They will call back at 11 with an update.

## 2023-08-24 NOTE — PLAN OF CARE
Problem: Discharge Planning  Goal: Discharge to home or other facility with appropriate resources  8/24/2023 0835 by Goldy Jimenez RN  Outcome: Progressing  Flowsheets (Taken 8/24/2023 0830)  Discharge to home or other facility with appropriate resources:   Identify barriers to discharge with patient and caregiver   Identify discharge learning needs (meds, wound care, etc)  8/24/2023 0351 by Jack Soler RN  Outcome: Progressing     Problem: Pain  Goal: Verbalizes/displays adequate comfort level or baseline comfort level  8/24/2023 0835 by Goldy Jimenez RN  Outcome: Progressing  8/24/2023 0351 by Jack Soler RN  Outcome: Progressing     Problem: Safety - Adult  Goal: Free from fall injury  8/24/2023 0835 by Goldy Jimenez RN  Outcome: Progressing  8/24/2023 0351 by Jack Soler RN  Outcome: Progressing     Problem: ABCDS Injury Assessment  Goal: Absence of physical injury  Outcome: Progressing  Flowsheets (Taken 8/24/2023 0833)  Absence of Physical Injury: Implement safety measures based on patient assessment     Problem: Skin/Tissue Integrity  Goal: Absence of new skin breakdown  Description: 1. Monitor for areas of redness and/or skin breakdown  2. Assess vascular access sites hourly  3. Every 4-6 hours minimum:  Change oxygen saturation probe site  4. Every 4-6 hours:  If on nasal continuous positive airway pressure, respiratory therapy assess nares and determine need for appliance change or resting period.   Outcome: Progressing

## 2023-08-24 NOTE — PROGRESS NOTES
Spoke with [facility]. Discussed plan of care with provider regarding transport and [facility] will transport patient to [place].

## 2023-10-02 ENCOUNTER — HOSPITAL ENCOUNTER (OUTPATIENT)
Dept: NURSING | Facility: HOSPITAL | Age: 58
Setting detail: INFUSION SERIES
Discharge: HOME | End: 2023-10-04
Payer: MEDICARE

## 2023-10-02 DIAGNOSIS — T86.810 LUNG TRANSPLANT REJECTION (HCC): ICD-10-CM

## 2023-10-02 DIAGNOSIS — T86.810 LUNG TRANSPLANT REJECTION (HCC): Primary | ICD-10-CM

## 2023-10-02 PROCEDURE — 2580000003 HC RX 258: Performed by: INTERNAL MEDICINE

## 2023-10-02 PROCEDURE — 96365 THER/PROPH/DIAG IV INF INIT: CPT

## 2023-10-02 PROCEDURE — 6360000002 HC RX W HCPCS: Performed by: INTERNAL MEDICINE

## 2023-10-02 RX ORDER — ACETAMINOPHEN 325 MG/1
650 TABLET ORAL
Status: CANCELLED | OUTPATIENT
Start: 2023-10-02

## 2023-10-02 RX ORDER — EPINEPHRINE 1 MG/ML
0.3 INJECTION, SOLUTION INTRAMUSCULAR; SUBCUTANEOUS PRN
Status: CANCELLED | OUTPATIENT
Start: 2023-10-03

## 2023-10-02 RX ORDER — SODIUM CHLORIDE 9 MG/ML
5-250 INJECTION, SOLUTION INTRAVENOUS PRN
Status: CANCELLED | OUTPATIENT
Start: 2023-10-02

## 2023-10-02 RX ORDER — SODIUM CHLORIDE 9 MG/ML
5-250 INJECTION, SOLUTION INTRAVENOUS PRN
Status: CANCELLED | OUTPATIENT
Start: 2023-10-03

## 2023-10-02 RX ORDER — SODIUM CHLORIDE 0.9 % (FLUSH) 0.9 %
5-40 SYRINGE (ML) INJECTION PRN
Status: CANCELLED | OUTPATIENT
Start: 2023-10-02

## 2023-10-02 RX ORDER — SODIUM CHLORIDE 0.9 % (FLUSH) 0.9 %
5-40 SYRINGE (ML) INJECTION PRN
Status: CANCELLED | OUTPATIENT
Start: 2023-10-03

## 2023-10-02 RX ORDER — ONDANSETRON 2 MG/ML
8 INJECTION INTRAMUSCULAR; INTRAVENOUS
Status: CANCELLED | OUTPATIENT
Start: 2023-10-02

## 2023-10-02 RX ORDER — SODIUM CHLORIDE 9 MG/ML
INJECTION, SOLUTION INTRAVENOUS CONTINUOUS
Status: CANCELLED | OUTPATIENT
Start: 2023-10-03

## 2023-10-02 RX ORDER — DIPHENHYDRAMINE HYDROCHLORIDE 50 MG/ML
50 INJECTION INTRAMUSCULAR; INTRAVENOUS
Status: CANCELLED | OUTPATIENT
Start: 2023-10-03

## 2023-10-02 RX ORDER — SODIUM CHLORIDE 9 MG/ML
INJECTION, SOLUTION INTRAVENOUS CONTINUOUS
Status: CANCELLED | OUTPATIENT
Start: 2023-10-02

## 2023-10-02 RX ORDER — EPINEPHRINE 1 MG/ML
0.3 INJECTION, SOLUTION INTRAMUSCULAR; SUBCUTANEOUS PRN
Status: CANCELLED | OUTPATIENT
Start: 2023-10-02

## 2023-10-02 RX ORDER — ONDANSETRON 2 MG/ML
8 INJECTION INTRAMUSCULAR; INTRAVENOUS
Status: CANCELLED | OUTPATIENT
Start: 2023-10-03

## 2023-10-02 RX ORDER — ALBUTEROL SULFATE 90 UG/1
4 AEROSOL, METERED RESPIRATORY (INHALATION) PRN
Status: CANCELLED | OUTPATIENT
Start: 2023-10-02

## 2023-10-02 RX ORDER — ALBUTEROL SULFATE 90 UG/1
4 AEROSOL, METERED RESPIRATORY (INHALATION) PRN
Status: CANCELLED | OUTPATIENT
Start: 2023-10-03

## 2023-10-02 RX ORDER — DIPHENHYDRAMINE HYDROCHLORIDE 50 MG/ML
50 INJECTION INTRAMUSCULAR; INTRAVENOUS
Status: CANCELLED | OUTPATIENT
Start: 2023-10-02

## 2023-10-02 RX ORDER — ACETAMINOPHEN 325 MG/1
650 TABLET ORAL
Status: CANCELLED | OUTPATIENT
Start: 2023-10-03

## 2023-10-02 RX ADMIN — SODIUM CHLORIDE 1000 MG: 900 INJECTION INTRAVENOUS at 17:37

## 2023-10-02 NOTE — PROGRESS NOTES
Infusion complete. Patient tolerated well. IV left in place, infusions next two days. Approved by Getachew Stewart RN.

## 2023-10-03 ENCOUNTER — HOSPITAL ENCOUNTER (OUTPATIENT)
Dept: NURSING | Facility: HOSPITAL | Age: 58
Setting detail: INFUSION SERIES
Discharge: HOME | End: 2023-10-03
Payer: MEDICARE

## 2023-10-03 VITALS
DIASTOLIC BLOOD PRESSURE: 97 MMHG | TEMPERATURE: 97.5 F | RESPIRATION RATE: 18 BRPM | OXYGEN SATURATION: 76 % | HEART RATE: 100 BPM | SYSTOLIC BLOOD PRESSURE: 146 MMHG

## 2023-10-03 DIAGNOSIS — T86.810 LUNG TRANSPLANT REJECTION (HCC): Primary | ICD-10-CM

## 2023-10-03 PROCEDURE — 96374 THER/PROPH/DIAG INJ IV PUSH: CPT

## 2023-10-03 PROCEDURE — 6360000002 HC RX W HCPCS: Performed by: INTERNAL MEDICINE

## 2023-10-03 PROCEDURE — 2580000003 HC RX 258: Performed by: INTERNAL MEDICINE

## 2023-10-03 PROCEDURE — 96365 THER/PROPH/DIAG IV INF INIT: CPT

## 2023-10-03 RX ORDER — DIPHENHYDRAMINE HYDROCHLORIDE 50 MG/ML
50 INJECTION INTRAMUSCULAR; INTRAVENOUS
OUTPATIENT
Start: 2023-10-04

## 2023-10-03 RX ORDER — SODIUM CHLORIDE 0.9 % (FLUSH) 0.9 %
5-40 SYRINGE (ML) INJECTION PRN
Status: CANCELLED | OUTPATIENT
Start: 2023-10-04

## 2023-10-03 RX ORDER — SODIUM CHLORIDE 9 MG/ML
5-250 INJECTION, SOLUTION INTRAVENOUS PRN
Status: CANCELLED | OUTPATIENT
Start: 2023-10-04

## 2023-10-03 RX ORDER — ALBUTEROL SULFATE 90 UG/1
4 AEROSOL, METERED RESPIRATORY (INHALATION) PRN
OUTPATIENT
Start: 2023-10-04

## 2023-10-03 RX ORDER — ACETAMINOPHEN 325 MG/1
650 TABLET ORAL
OUTPATIENT
Start: 2023-10-04

## 2023-10-03 RX ORDER — SODIUM CHLORIDE 9 MG/ML
INJECTION, SOLUTION INTRAVENOUS CONTINUOUS
OUTPATIENT
Start: 2023-10-04

## 2023-10-03 RX ORDER — EPINEPHRINE 1 MG/ML
0.3 INJECTION, SOLUTION INTRAMUSCULAR; SUBCUTANEOUS PRN
OUTPATIENT
Start: 2023-10-04

## 2023-10-03 RX ORDER — ONDANSETRON 2 MG/ML
8 INJECTION INTRAMUSCULAR; INTRAVENOUS
OUTPATIENT
Start: 2023-10-04

## 2023-10-03 RX ADMIN — METHYLPREDNISOLONE SODIUM SUCCINATE 1000 MG: 1 INJECTION, POWDER, LYOPHILIZED, FOR SOLUTION INTRAMUSCULAR; INTRAVENOUS at 17:29

## 2023-10-04 VITALS
DIASTOLIC BLOOD PRESSURE: 86 MMHG | TEMPERATURE: 98.4 F | HEART RATE: 101 BPM | OXYGEN SATURATION: 96 % | RESPIRATION RATE: 20 BRPM | SYSTOLIC BLOOD PRESSURE: 128 MMHG

## 2023-10-04 DIAGNOSIS — T86.810 LUNG TRANSPLANT REJECTION (HCC): Primary | ICD-10-CM

## 2023-10-04 PROCEDURE — 96365 THER/PROPH/DIAG IV INF INIT: CPT

## 2023-10-04 PROCEDURE — 6360000002 HC RX W HCPCS: Performed by: INTERNAL MEDICINE

## 2023-10-04 PROCEDURE — 2580000003 HC RX 258: Performed by: INTERNAL MEDICINE

## 2023-10-04 RX ORDER — SODIUM CHLORIDE 0.9 % (FLUSH) 0.9 %
5-40 SYRINGE (ML) INJECTION PRN
OUTPATIENT
Start: 2023-10-04

## 2023-10-04 RX ORDER — DIPHENHYDRAMINE HYDROCHLORIDE 50 MG/ML
50 INJECTION INTRAMUSCULAR; INTRAVENOUS
OUTPATIENT
Start: 2023-10-04

## 2023-10-04 RX ORDER — ACETAMINOPHEN 325 MG/1
650 TABLET ORAL
OUTPATIENT
Start: 2023-10-04

## 2023-10-04 RX ORDER — SODIUM CHLORIDE 9 MG/ML
INJECTION, SOLUTION INTRAVENOUS CONTINUOUS
OUTPATIENT
Start: 2023-10-04

## 2023-10-04 RX ORDER — SODIUM CHLORIDE 9 MG/ML
5-250 INJECTION, SOLUTION INTRAVENOUS PRN
OUTPATIENT
Start: 2023-10-04

## 2023-10-04 RX ORDER — ONDANSETRON 2 MG/ML
8 INJECTION INTRAMUSCULAR; INTRAVENOUS
OUTPATIENT
Start: 2023-10-04

## 2023-10-04 RX ORDER — EPINEPHRINE 1 MG/ML
0.3 INJECTION, SOLUTION INTRAMUSCULAR; SUBCUTANEOUS PRN
OUTPATIENT
Start: 2023-10-04

## 2023-10-04 RX ORDER — SODIUM CHLORIDE 0.9 % (FLUSH) 0.9 %
5-40 SYRINGE (ML) INJECTION PRN
Status: DISCONTINUED | OUTPATIENT
Start: 2023-10-04 | End: 2023-10-05 | Stop reason: HOSPADM

## 2023-10-04 RX ORDER — SODIUM CHLORIDE 9 MG/ML
5-250 INJECTION, SOLUTION INTRAVENOUS PRN
Status: DISCONTINUED | OUTPATIENT
Start: 2023-10-04 | End: 2023-10-05 | Stop reason: HOSPADM

## 2023-10-04 RX ORDER — ALBUTEROL SULFATE 90 UG/1
4 AEROSOL, METERED RESPIRATORY (INHALATION) PRN
OUTPATIENT
Start: 2023-10-04

## 2023-10-04 RX ADMIN — SODIUM CHLORIDE 1000 MG: 900 INJECTION INTRAVENOUS at 18:05

## 2023-10-09 DIAGNOSIS — I10 ESSENTIAL HYPERTENSION: ICD-10-CM

## 2023-10-09 DIAGNOSIS — F41.8 DEPRESSION WITH ANXIETY: ICD-10-CM

## 2023-10-10 RX ORDER — FOLIC ACID 1 MG/1
1 TABLET ORAL DAILY
Qty: 90 TABLET | Refills: 1 | Status: SHIPPED | OUTPATIENT
Start: 2023-10-10

## 2023-10-10 RX ORDER — VENLAFAXINE HYDROCHLORIDE 75 MG/1
CAPSULE, EXTENDED RELEASE ORAL
Qty: 270 CAPSULE | Refills: 3 | Status: SHIPPED | OUTPATIENT
Start: 2023-10-10

## 2023-10-10 RX ORDER — HYDROCHLOROTHIAZIDE 25 MG/1
TABLET ORAL
Qty: 90 TABLET | Refills: 1 | Status: SHIPPED | OUTPATIENT
Start: 2023-10-10

## 2023-11-09 DIAGNOSIS — N90.4 LICHEN SCLEROSUS OF FEMALE GENITALIA: ICD-10-CM

## 2023-11-09 RX ORDER — CLOBETASOL PROPIONATE 0.5 MG/G
OINTMENT TOPICAL
Qty: 60 G | Refills: 6 | Status: SHIPPED | OUTPATIENT
Start: 2023-11-09

## 2023-11-30 ENCOUNTER — HOSPITAL ENCOUNTER (OUTPATIENT)
Facility: HOSPITAL | Age: 58
Discharge: HOME | End: 2023-11-30
Payer: MEDICARE

## 2023-11-30 LAB
ALBUMIN SERPL-MCNC: 4.4 G/DL (ref 3.4–4.8)
ALBUMIN/GLOB SERPL: 2.3 {RATIO} (ref 0.8–2)
ALP SERPL-CCNC: 71 U/L (ref 25–100)
ALT SERPL-CCNC: 19 U/L (ref 4–36)
ANION GAP SERPL CALCULATED.3IONS-SCNC: 11 MMOL/L (ref 3–16)
AST SERPL-CCNC: 21 U/L (ref 8–33)
BASOPHILS # BLD: 0 K/UL (ref 0–0.1)
BASOPHILS NFR BLD: 0.6 %
BILIRUB SERPL-MCNC: 0.3 MG/DL (ref 0.3–1.2)
BUN SERPL-MCNC: 27 MG/DL (ref 6–20)
CALCIUM SERPL-MCNC: 9.6 MG/DL (ref 8.5–10.5)
CHLORIDE SERPL-SCNC: 98 MMOL/L (ref 98–107)
CHOLEST SERPL-MCNC: 230 MG/DL (ref 0–200)
CK SERPL-CCNC: 20 U/L (ref 26–174)
CO2 SERPL-SCNC: 30 MMOL/L (ref 20–30)
CREAT SERPL-MCNC: 0.9 MG/DL (ref 0.4–1.2)
EOSINOPHIL # BLD: 0 K/UL (ref 0–0.4)
EOSINOPHIL NFR BLD: 0.3 %
ERYTHROCYTE [DISTWIDTH] IN BLOOD BY AUTOMATED COUNT: 15.9 % (ref 11–16)
GFR SERPLBLD CREATININE-BSD FMLA CKD-EPI: >60 ML/MIN/{1.73_M2}
GLOBULIN SER CALC-MCNC: 1.9 G/DL
GLUCOSE SERPL-MCNC: 116 MG/DL (ref 74–106)
HBA1C MFR BLD: 5.1 %
HCT VFR BLD AUTO: 39.1 % (ref 37–47)
HDLC SERPL-MCNC: 78 MG/DL (ref 40–60)
HGB BLD-MCNC: 12.9 G/DL (ref 11.5–16.5)
IMM GRANULOCYTES # BLD: 0.4 K/UL
IMM GRANULOCYTES NFR BLD: 10.3 % (ref 0–5)
LDLC SERPL CALC-MCNC: 115 MG/DL
LYMPHOCYTES # BLD: 1.2 K/UL (ref 1.5–4)
LYMPHOCYTES NFR BLD: 35.9 %
MAGNESIUM SERPL-MCNC: 2 MG/DL (ref 1.7–2.4)
MCH RBC QN AUTO: 34.8 PG (ref 27–32)
MCHC RBC AUTO-ENTMCNC: 33 G/DL (ref 31–35)
MCV RBC AUTO: 105.4 FL (ref 80–100)
MONOCYTES # BLD: 0.1 K/UL (ref 0.2–0.8)
MONOCYTES NFR BLD: 2.6 %
NEUTROPHILS # BLD: 1.7 K/UL (ref 2–7.5)
NEUTS SEG NFR BLD: 50.3 %
PHOSPHATE SERPL-MCNC: 3.2 MG/DL (ref 2.5–4.5)
PLATELET # BLD AUTO: 281 K/UL (ref 150–400)
PMV BLD AUTO: 8.9 FL (ref 6–10)
POTASSIUM SERPL-SCNC: 4.4 MMOL/L (ref 3.4–5.1)
PROT SERPL-MCNC: 6.3 G/DL (ref 6.4–8.3)
RBC # BLD AUTO: 3.71 M/UL (ref 3.8–5.8)
SODIUM SERPL-SCNC: 139 MMOL/L (ref 136–145)
TRIGL SERPL-MCNC: 185 MG/DL (ref 0–249)
URATE SERPL-MCNC: 3.3 MG/DL (ref 2.5–7.1)
VLDLC SERPL CALC-MCNC: 37 MG/DL
WBC # BLD AUTO: 3.4 K/UL (ref 4–11)

## 2023-11-30 PROCEDURE — 83735 ASSAY OF MAGNESIUM: CPT

## 2023-11-30 PROCEDURE — 82550 ASSAY OF CK (CPK): CPT

## 2023-11-30 PROCEDURE — 80061 LIPID PANEL: CPT

## 2023-11-30 PROCEDURE — 36415 COLL VENOUS BLD VENIPUNCTURE: CPT

## 2023-11-30 PROCEDURE — 85025 COMPLETE CBC W/AUTO DIFF WBC: CPT

## 2023-11-30 PROCEDURE — 80053 COMPREHEN METABOLIC PANEL: CPT

## 2023-11-30 PROCEDURE — 84100 ASSAY OF PHOSPHORUS: CPT

## 2023-11-30 PROCEDURE — 84550 ASSAY OF BLOOD/URIC ACID: CPT

## 2023-11-30 PROCEDURE — 80197 ASSAY OF TACROLIMUS: CPT

## 2023-11-30 PROCEDURE — 83036 HEMOGLOBIN GLYCOSYLATED A1C: CPT

## 2023-12-01 LAB — TACROLIMUS BLOOD: 11.7 NG/ML (ref 5–20)

## 2023-12-02 LAB
CMV DNA SERPL NAA+PROBE-ACNC: NOT DETECTED IU/ML
CMV DNA SERPL NAA+PROBE-LOG IU: NOT DETECTED LOG IU/ML
CMV DNA SERPL QL NAA+PROBE: NOT DETECTED

## 2023-12-12 NOTE — PROGRESS NOTES
SUBJECTIVE:    Patient ID: Amrit Christy is a 47 y.o. female. Chief Complaint   Patient presents with    Wheezing    Shortness of Breath    Cough     brown/green production    Congestion     x 3 days         HPI: Patient has not been feeling well for the past 5 days. She admits to sore throat, congestion, wheezing, worsening SOA and productive cough. She denies sinus pressure and non productive cough. Patient has tried OTC medication for this issue. She tried tessalon perles, cough syrup, and cough syrup with codiene without response. She does COPD. She has been using nebs 2 times a day. She states she has been taking levaquin for 5 days. She has been wearing her oxygen on 4L. Patient's medications, allergies, past medical, surgical, social and family histories were reviewed and updated as appropriate. Review of Systems   Constitutional: Negative for chills and fever. HENT: Positive for congestion and sore throat. Negative for postnasal drip and sinus pressure. Respiratory: Positive for cough, shortness of breath and wheezing. Cardiovascular: Negative for chest pain and palpitations. AYALA   Gastrointestinal: Negative for abdominal pain, nausea and vomiting. Musculoskeletal: Positive for arthralgias. Negative for back pain. Neurological: Negative for numbness and headaches. OBJECTIVE:  /86 (Site: Right Arm, Position: Sitting)   Pulse 136   Temp 97 °F (36.1 °C) (Temporal)   Resp 20   Ht 5' 5\" (1.651 m)   Wt 227 lb (103 kg)   SpO2 96% Comment: 3L NC oxygen  BMI 37.77 kg/m²      Wt Readings from Last 2 Encounters:   11/03/17 232 lb 6.4 oz (105.4 kg)   10/30/17 228 lb (103.4 kg)     BP Readings from Last 2 Encounters:   11/03/17 129/80   10/31/17 112/61      Pulse Readings from Last 2 Encounters:   11/03/17 113   10/31/17 88     Body mass index is 37.77 kg/m². @HAPJBSB9(6)@    Physical Exam   Constitutional: She is oriented to person, place, and time.  She appears well-developed and well-nourished. HENT:   Head: Normocephalic and atraumatic. Right Ear: External ear normal.   Left Ear: External ear normal.   Mouth/Throat: Oropharynx is clear and moist.   Eyes: Conjunctivae and EOM are normal.   Neck: Neck supple. Cardiovascular: Normal rate, regular rhythm and normal heart sounds. Exam reveals no gallop and no friction rub. No murmur heard. Pulmonary/Chest: She is in respiratory distress. She has wheezes. She has rhonchi. Abdominal: Soft. Bowel sounds are normal. She exhibits no distension. There is no tenderness. Lymphadenopathy:     She has no cervical adenopathy. Neurological: She is alert and oriented to person, place, and time. Skin: Skin is warm and dry.        Results in Past 30 Days  Result Component Current Result Ref Range Previous Result Ref Range   Alb 3.9 (10/28/2017) 3.4 - 4.8 g/dL Not in Time Range    Albumin/Globulin Ratio 1.3 (10/28/2017) 0.8 - 2.0 Not in Time Range    Alkaline Phosphatase 136 (H) (10/28/2017) 25 - 100 U/L Not in Time Range    ALT 31 (10/28/2017) 4 - 36 U/L Not in Time Range    AST 14 (10/28/2017) 8 - 33 U/L Not in Time Range    BUN 24 (H) (11/2/2017) 6 - 20 mg/dL 24 (H) (11/1/2017) 6 - 20 mg/dL   Calcium 9.3 (11/2/2017) 8.5 - 10.5 mg/dL 8.5 (11/1/2017) 8.5 - 10.5 mg/dL   Chloride 99 (11/2/2017) 98 - 107 mmol/L 100 (11/1/2017) 98 - 107 mmol/L   CO2 35 (H) (11/2/2017) 20 - 30 mmol/L 33 (H) (11/1/2017) 20 - 30 mmol/L   CREATININE 0.6 (11/2/2017) 0.4 - 1.2 mg/dL 0.6 (11/1/2017) 0.4 - 1.2 mg/dL   GFR African American >59 (11/2/2017) >59 >59 (11/1/2017) >59   GFR Non- >60 (11/2/2017) >59 >60 (11/1/2017) >59   Globulin 3.1 (10/28/2017) g/dL Not in Time Range    Glucose 133 (H) (11/2/2017) 74 - 106 mg/dL 155 (H) (11/1/2017) 74 - 106 mg/dL   Potassium 4.5 (11/2/2017) 3.4 - 5.1 mmol/L 3.9 (11/1/2017) 3.4 - 5.1 mmol/L   Sodium 141 (11/2/2017) 136 - 145 mmol/L 141 (11/1/2017) 136 - 145 mmol/L   Total Bilirubin activity

## 2023-12-26 ENCOUNTER — HOSPITAL ENCOUNTER (OUTPATIENT)
Facility: HOSPITAL | Age: 58
Discharge: HOME | End: 2023-12-26
Payer: MEDICARE

## 2023-12-26 LAB
ANION GAP SERPL CALCULATED.3IONS-SCNC: 7 MMOL/L (ref 3–16)
BUN SERPL-MCNC: 17 MG/DL (ref 6–20)
CALCIUM SERPL-MCNC: 10 MG/DL (ref 8.5–10.5)
CHLORIDE SERPL-SCNC: 100 MMOL/L (ref 98–107)
CO2 SERPL-SCNC: 33 MMOL/L (ref 20–30)
CREAT SERPL-MCNC: 1 MG/DL (ref 0.4–1.2)
CREAT UR-MCNC: 174.6 MG/DL (ref 28–259)
GFR SERPLBLD CREATININE-BSD FMLA CKD-EPI: >60 ML/MIN/{1.73_M2}
GLUCOSE SERPL-MCNC: 100 MG/DL (ref 74–106)
POTASSIUM SERPL-SCNC: 4.1 MMOL/L (ref 3.4–5.1)
PROT UR-MCNC: 24 MG/DL
PROT/CREAT UR-RTO: 0.1 MG/DL
SODIUM SERPL-SCNC: 140 MMOL/L (ref 136–145)

## 2023-12-26 PROCEDURE — 80048 BASIC METABOLIC PNL TOTAL CA: CPT

## 2023-12-26 PROCEDURE — 36415 COLL VENOUS BLD VENIPUNCTURE: CPT

## 2023-12-26 PROCEDURE — 82570 ASSAY OF URINE CREATININE: CPT

## 2023-12-26 PROCEDURE — 84156 ASSAY OF PROTEIN URINE: CPT

## 2023-12-26 PROCEDURE — 80197 ASSAY OF TACROLIMUS: CPT

## 2023-12-27 LAB — TACROLIMUS BLOOD: 3.6 NG/ML (ref 5–20)

## 2024-03-16 ENCOUNTER — HOSPITAL ENCOUNTER (OUTPATIENT)
Facility: HOSPITAL | Age: 59
Discharge: HOME | End: 2024-03-16
Payer: MEDICARE

## 2024-03-16 LAB
ALBUMIN SERPL-MCNC: 4 G/DL (ref 3.4–4.8)
ALBUMIN/GLOB SERPL: 1.7 {RATIO} (ref 0.8–2)
ALP SERPL-CCNC: 83 U/L (ref 25–100)
ALT SERPL-CCNC: 12 U/L (ref 4–36)
ANION GAP SERPL CALCULATED.3IONS-SCNC: 10 MMOL/L (ref 3–16)
AST SERPL-CCNC: 17 U/L (ref 8–33)
BASOPHILS # BLD: 0.1 K/UL (ref 0–0.1)
BASOPHILS NFR BLD: 1.9 %
BILIRUB SERPL-MCNC: 0.3 MG/DL (ref 0.3–1.2)
BUN SERPL-MCNC: 15 MG/DL (ref 6–20)
CALCIUM SERPL-MCNC: 9.5 MG/DL (ref 8.5–10.5)
CHLORIDE SERPL-SCNC: 102 MMOL/L (ref 98–107)
CHOLEST SERPL-MCNC: 210 MG/DL (ref 0–200)
CK SERPL-CCNC: 26 U/L (ref 26–174)
CO2 SERPL-SCNC: 30 MMOL/L (ref 20–30)
CREAT SERPL-MCNC: 1.2 MG/DL (ref 0.4–1.2)
EOSINOPHIL # BLD: 0 K/UL (ref 0–0.4)
EOSINOPHIL NFR BLD: 0.4 %
ERYTHROCYTE [DISTWIDTH] IN BLOOD BY AUTOMATED COUNT: 12.5 % (ref 11–16)
GFR SERPLBLD CREATININE-BSD FMLA CKD-EPI: 52 ML/MIN/{1.73_M2}
GLOBULIN SER CALC-MCNC: 2.4 G/DL
GLUCOSE SERPL-MCNC: 93 MG/DL (ref 74–106)
HBA1C MFR BLD: 5.1 %
HCT VFR BLD AUTO: 38 % (ref 37–47)
HDLC SERPL-MCNC: 71 MG/DL (ref 40–60)
HGB BLD-MCNC: 12.5 G/DL (ref 11.5–16.5)
IMM GRANULOCYTES # BLD: 0.3 K/UL
IMM GRANULOCYTES NFR BLD: 13.2 % (ref 0–5)
LDLC SERPL CALC-MCNC: 118 MG/DL
LYMPHOCYTES # BLD: 0.9 K/UL (ref 1.5–4)
LYMPHOCYTES NFR BLD: 33.7 %
MAGNESIUM SERPL-MCNC: 1.8 MG/DL (ref 1.7–2.4)
MCH RBC QN AUTO: 35.1 PG (ref 27–32)
MCHC RBC AUTO-ENTMCNC: 32.9 G/DL (ref 31–35)
MCV RBC AUTO: 106.7 FL (ref 80–100)
MONOCYTES # BLD: 0.3 K/UL (ref 0.2–0.8)
MONOCYTES NFR BLD: 9.7 %
NEUTROPHILS # BLD: 1.1 K/UL (ref 2–7.5)
NEUTS SEG NFR BLD: 41.1 %
PHOSPHATE SERPL-MCNC: 3.1 MG/DL (ref 2.5–4.5)
PLATELET # BLD AUTO: 229 K/UL (ref 150–400)
PMV BLD AUTO: 9.4 FL (ref 6–10)
POTASSIUM SERPL-SCNC: 3.6 MMOL/L (ref 3.4–5.1)
PROT SERPL-MCNC: 6.4 G/DL (ref 6.4–8.3)
RBC # BLD AUTO: 3.56 M/UL (ref 3.8–5.8)
SODIUM SERPL-SCNC: 142 MMOL/L (ref 136–145)
TRIGL SERPL-MCNC: 107 MG/DL (ref 0–249)
TSH SERPL DL<=0.005 MIU/L-ACNC: 2.45 UIU/ML (ref 0.27–4.2)
URATE SERPL-MCNC: 5.4 MG/DL (ref 2.5–7.1)
VLDLC SERPL CALC-MCNC: 21 MG/DL
WBC # BLD AUTO: 2.6 K/UL (ref 4–11)

## 2024-03-16 PROCEDURE — 84550 ASSAY OF BLOOD/URIC ACID: CPT

## 2024-03-16 PROCEDURE — 36415 COLL VENOUS BLD VENIPUNCTURE: CPT

## 2024-03-16 PROCEDURE — 82550 ASSAY OF CK (CPK): CPT

## 2024-03-16 PROCEDURE — 84443 ASSAY THYROID STIM HORMONE: CPT

## 2024-03-16 PROCEDURE — 83036 HEMOGLOBIN GLYCOSYLATED A1C: CPT

## 2024-03-16 PROCEDURE — 84100 ASSAY OF PHOSPHORUS: CPT

## 2024-03-16 PROCEDURE — 80061 LIPID PANEL: CPT

## 2024-03-16 PROCEDURE — 83735 ASSAY OF MAGNESIUM: CPT

## 2024-03-16 PROCEDURE — 85025 COMPLETE CBC W/AUTO DIFF WBC: CPT

## 2024-03-16 PROCEDURE — 80197 ASSAY OF TACROLIMUS: CPT

## 2024-03-16 PROCEDURE — 80053 COMPREHEN METABOLIC PANEL: CPT

## 2024-03-17 LAB — TACROLIMUS BLOOD: 9.4 NG/ML (ref 5–20)

## 2024-03-18 ENCOUNTER — TRANSCRIBE ORDERS (OUTPATIENT)
Dept: ADMINISTRATIVE | Facility: HOSPITAL | Age: 59
End: 2024-03-18
Payer: MEDICARE

## 2024-03-18 DIAGNOSIS — R19.7 DIARRHEA, UNSPECIFIED TYPE: Primary | ICD-10-CM

## 2024-04-06 DIAGNOSIS — I10 ESSENTIAL HYPERTENSION: ICD-10-CM

## 2024-04-09 RX ORDER — FOLIC ACID 1 MG/1
1 TABLET ORAL DAILY
Qty: 90 TABLET | Refills: 1 | Status: SHIPPED | OUTPATIENT
Start: 2024-04-09

## 2024-04-09 RX ORDER — HYDROCHLOROTHIAZIDE 25 MG/1
TABLET ORAL
Qty: 90 TABLET | Refills: 1 | Status: SHIPPED | OUTPATIENT
Start: 2024-04-09

## 2024-04-13 ENCOUNTER — HOSPITAL ENCOUNTER (OUTPATIENT)
Facility: HOSPITAL | Age: 59
Discharge: HOME | End: 2024-04-13
Payer: MEDICARE

## 2024-04-13 LAB
ALBUMIN SERPL-MCNC: 4.1 G/DL (ref 3.4–4.8)
ALBUMIN/GLOB SERPL: 1.8 {RATIO} (ref 0.8–2)
ALP SERPL-CCNC: 98 U/L (ref 25–100)
ALT SERPL-CCNC: 11 U/L (ref 4–36)
ANION GAP SERPL CALCULATED.3IONS-SCNC: 10 MMOL/L (ref 3–16)
AST SERPL-CCNC: 15 U/L (ref 8–33)
BASOPHILS # BLD: 0 K/UL (ref 0–0.1)
BASOPHILS NFR BLD: 0.4 %
BILIRUB SERPL-MCNC: <0.2 MG/DL (ref 0.3–1.2)
BUN SERPL-MCNC: 22 MG/DL (ref 6–20)
CALCIUM SERPL-MCNC: 9.6 MG/DL (ref 8.5–10.5)
CHLORIDE SERPL-SCNC: 101 MMOL/L (ref 98–107)
CHOLEST SERPL-MCNC: 191 MG/DL (ref 0–200)
CK SERPL-CCNC: 29 U/L (ref 26–174)
CO2 SERPL-SCNC: 29 MMOL/L (ref 20–30)
CREAT SERPL-MCNC: 1 MG/DL (ref 0.4–1.2)
EOSINOPHIL # BLD: 0.1 K/UL (ref 0–0.4)
EOSINOPHIL NFR BLD: 0.7 %
ERYTHROCYTE [DISTWIDTH] IN BLOOD BY AUTOMATED COUNT: 12.4 % (ref 11–16)
GFR SERPLBLD CREATININE-BSD FMLA CKD-EPI: 65 ML/MIN/{1.73_M2}
GLOBULIN SER CALC-MCNC: 2.3 G/DL
GLUCOSE SERPL-MCNC: 84 MG/DL (ref 74–106)
HCT VFR BLD AUTO: 34.7 % (ref 37–47)
HDLC SERPL-MCNC: 61 MG/DL (ref 40–60)
HGB BLD-MCNC: 11.2 G/DL (ref 11.5–16.5)
IMM GRANULOCYTES # BLD: 0.5 K/UL
IMM GRANULOCYTES NFR BLD: 7.1 % (ref 0–5)
LDLC SERPL CALC-MCNC: 102 MG/DL
LYMPHOCYTES # BLD: 1.2 K/UL (ref 1.5–4)
LYMPHOCYTES NFR BLD: 17.8 %
MAGNESIUM SERPL-MCNC: 1.7 MG/DL (ref 1.7–2.4)
MCH RBC QN AUTO: 33.6 PG (ref 27–32)
MCHC RBC AUTO-ENTMCNC: 32.3 G/DL (ref 31–35)
MCV RBC AUTO: 104.2 FL (ref 80–100)
MONOCYTES # BLD: 0.5 K/UL (ref 0.2–0.8)
MONOCYTES NFR BLD: 7.1 %
NEUTROPHILS # BLD: 4.6 K/UL (ref 2–7.5)
NEUTS SEG NFR BLD: 66.9 %
PHOSPHATE SERPL-MCNC: 3.9 MG/DL (ref 2.5–4.5)
PLATELET # BLD AUTO: 182 K/UL (ref 150–400)
PMV BLD AUTO: 10.1 FL (ref 6–10)
POTASSIUM SERPL-SCNC: 3.8 MMOL/L (ref 3.4–5.1)
PROT SERPL-MCNC: 6.4 G/DL (ref 6.4–8.3)
RBC # BLD AUTO: 3.33 M/UL (ref 3.8–5.8)
SODIUM SERPL-SCNC: 140 MMOL/L (ref 136–145)
TRIGL SERPL-MCNC: 138 MG/DL (ref 0–249)
TSH SERPL DL<=0.005 MIU/L-ACNC: 0.29 UIU/ML (ref 0.27–4.2)
URATE SERPL-MCNC: 4.9 MG/DL (ref 2.5–7.1)
VLDLC SERPL CALC-MCNC: 28 MG/DL
WBC # BLD AUTO: 6.9 K/UL (ref 4–11)

## 2024-04-13 PROCEDURE — 36415 COLL VENOUS BLD VENIPUNCTURE: CPT

## 2024-04-13 PROCEDURE — 80053 COMPREHEN METABOLIC PANEL: CPT

## 2024-04-13 PROCEDURE — 80197 ASSAY OF TACROLIMUS: CPT

## 2024-04-13 PROCEDURE — 85025 COMPLETE CBC W/AUTO DIFF WBC: CPT

## 2024-04-13 PROCEDURE — 84550 ASSAY OF BLOOD/URIC ACID: CPT

## 2024-04-13 PROCEDURE — 84100 ASSAY OF PHOSPHORUS: CPT

## 2024-04-13 PROCEDURE — 83735 ASSAY OF MAGNESIUM: CPT

## 2024-04-13 PROCEDURE — 82550 ASSAY OF CK (CPK): CPT

## 2024-04-13 PROCEDURE — 84443 ASSAY THYROID STIM HORMONE: CPT

## 2024-04-13 PROCEDURE — 80061 LIPID PANEL: CPT

## 2024-04-14 LAB — TACROLIMUS BLOOD: 12.1 NG/ML (ref 5–20)

## 2024-07-15 DIAGNOSIS — T86.810: ICD-10-CM

## 2024-07-15 DIAGNOSIS — Z94.2 STATUS POST LUNG TRANSPLANTATION (HCC): Primary | ICD-10-CM

## 2024-07-15 RX ORDER — SODIUM CHLORIDE 9 MG/ML
5-250 INJECTION, SOLUTION INTRAVENOUS PRN
Status: CANCELLED | OUTPATIENT
Start: 2024-07-16

## 2024-07-15 RX ORDER — ONDANSETRON 2 MG/ML
8 INJECTION INTRAMUSCULAR; INTRAVENOUS
Status: CANCELLED | OUTPATIENT
Start: 2024-07-16

## 2024-07-15 RX ORDER — EPINEPHRINE 1 MG/ML
0.3 INJECTION, SOLUTION INTRAMUSCULAR; SUBCUTANEOUS PRN
Status: CANCELLED | OUTPATIENT
Start: 2024-07-16

## 2024-07-15 RX ORDER — SODIUM CHLORIDE 0.9 % (FLUSH) 0.9 %
5-40 SYRINGE (ML) INJECTION PRN
Status: CANCELLED | OUTPATIENT
Start: 2024-07-16

## 2024-07-15 RX ORDER — ALBUTEROL SULFATE 90 UG/1
4 AEROSOL, METERED RESPIRATORY (INHALATION) PRN
Status: CANCELLED | OUTPATIENT
Start: 2024-07-16

## 2024-07-15 RX ORDER — DIPHENHYDRAMINE HYDROCHLORIDE 50 MG/ML
50 INJECTION INTRAMUSCULAR; INTRAVENOUS
Status: CANCELLED | OUTPATIENT
Start: 2024-07-16

## 2024-07-15 RX ORDER — ACETAMINOPHEN 325 MG/1
650 TABLET ORAL
Status: CANCELLED | OUTPATIENT
Start: 2024-07-16

## 2024-07-15 RX ORDER — SODIUM CHLORIDE 9 MG/ML
INJECTION, SOLUTION INTRAVENOUS CONTINUOUS
Status: CANCELLED | OUTPATIENT
Start: 2024-07-16

## 2024-07-16 ENCOUNTER — HOSPITAL ENCOUNTER (OUTPATIENT)
Dept: NURSING | Facility: HOSPITAL | Age: 59
Setting detail: INFUSION SERIES
Discharge: HOME | End: 2024-07-18
Payer: MEDICARE

## 2024-07-16 VITALS
TEMPERATURE: 97.7 F | SYSTOLIC BLOOD PRESSURE: 116 MMHG | RESPIRATION RATE: 18 BRPM | DIASTOLIC BLOOD PRESSURE: 68 MMHG | HEART RATE: 81 BPM | OXYGEN SATURATION: 94 %

## 2024-07-16 DIAGNOSIS — Z94.2 STATUS POST LUNG TRANSPLANTATION (HCC): ICD-10-CM

## 2024-07-16 DIAGNOSIS — T86.810: Primary | ICD-10-CM

## 2024-07-16 PROCEDURE — 96365 THER/PROPH/DIAG IV INF INIT: CPT | Performed by: NURSE PRACTITIONER

## 2024-07-16 PROCEDURE — 6360000002 HC RX W HCPCS: Performed by: INTERNAL MEDICINE

## 2024-07-16 PROCEDURE — 96367 TX/PROPH/DG ADDL SEQ IV INF: CPT | Performed by: NURSE PRACTITIONER

## 2024-07-16 PROCEDURE — 2580000003 HC RX 258: Performed by: INTERNAL MEDICINE

## 2024-07-16 RX ORDER — ALBUTEROL SULFATE 90 UG/1
4 AEROSOL, METERED RESPIRATORY (INHALATION) PRN
Status: CANCELLED | OUTPATIENT
Start: 2024-07-17

## 2024-07-16 RX ORDER — DIPHENHYDRAMINE HYDROCHLORIDE 50 MG/ML
50 INJECTION INTRAMUSCULAR; INTRAVENOUS
Status: CANCELLED | OUTPATIENT
Start: 2024-07-17

## 2024-07-16 RX ORDER — SODIUM CHLORIDE 0.9 % (FLUSH) 0.9 %
5-40 SYRINGE (ML) INJECTION PRN
Status: CANCELLED | OUTPATIENT
Start: 2024-07-17

## 2024-07-16 RX ORDER — SODIUM CHLORIDE 0.9 % (FLUSH) 0.9 %
5-40 SYRINGE (ML) INJECTION PRN
Status: ACTIVE | OUTPATIENT
Start: 2024-07-16 | End: 2024-07-17

## 2024-07-16 RX ORDER — SODIUM CHLORIDE 9 MG/ML
INJECTION, SOLUTION INTRAVENOUS CONTINUOUS
Status: CANCELLED | OUTPATIENT
Start: 2024-07-17

## 2024-07-16 RX ORDER — EPINEPHRINE 1 MG/ML
0.3 INJECTION, SOLUTION INTRAMUSCULAR; SUBCUTANEOUS PRN
Status: CANCELLED | OUTPATIENT
Start: 2024-07-17

## 2024-07-16 RX ORDER — ACETAMINOPHEN 325 MG/1
650 TABLET ORAL
Status: CANCELLED | OUTPATIENT
Start: 2024-07-17

## 2024-07-16 RX ORDER — SODIUM CHLORIDE 9 MG/ML
5-250 INJECTION, SOLUTION INTRAVENOUS PRN
Status: CANCELLED | OUTPATIENT
Start: 2024-07-17

## 2024-07-16 RX ORDER — ONDANSETRON 2 MG/ML
8 INJECTION INTRAMUSCULAR; INTRAVENOUS
Status: CANCELLED | OUTPATIENT
Start: 2024-07-17

## 2024-07-16 RX ADMIN — SODIUM CHLORIDE 1000 MG: 9 INJECTION, SOLUTION INTRAVENOUS at 13:46

## 2024-07-17 ENCOUNTER — HOSPITAL ENCOUNTER (OUTPATIENT)
Dept: NURSING | Facility: HOSPITAL | Age: 59
Setting detail: INFUSION SERIES
Discharge: HOME | End: 2024-07-19
Payer: MEDICARE

## 2024-07-17 DIAGNOSIS — T86.810: Primary | ICD-10-CM

## 2024-07-17 DIAGNOSIS — Z94.2 STATUS POST LUNG TRANSPLANTATION (HCC): ICD-10-CM

## 2024-07-17 PROCEDURE — 6360000002 HC RX W HCPCS: Performed by: INTERNAL MEDICINE

## 2024-07-17 PROCEDURE — 96365 THER/PROPH/DIAG IV INF INIT: CPT

## 2024-07-17 PROCEDURE — 96366 THER/PROPH/DIAG IV INF ADDON: CPT | Performed by: NURSE PRACTITIONER

## 2024-07-17 PROCEDURE — 2580000003 HC RX 258: Performed by: INTERNAL MEDICINE

## 2024-07-17 PROCEDURE — 96367 TX/PROPH/DG ADDL SEQ IV INF: CPT | Performed by: NURSE PRACTITIONER

## 2024-07-17 RX ORDER — EPINEPHRINE 1 MG/ML
0.3 INJECTION, SOLUTION INTRAMUSCULAR; SUBCUTANEOUS PRN
OUTPATIENT
Start: 2024-07-18

## 2024-07-17 RX ORDER — DIPHENHYDRAMINE HYDROCHLORIDE 50 MG/ML
50 INJECTION INTRAMUSCULAR; INTRAVENOUS
OUTPATIENT
Start: 2024-07-18

## 2024-07-17 RX ORDER — ALBUTEROL SULFATE 90 UG/1
4 AEROSOL, METERED RESPIRATORY (INHALATION) PRN
OUTPATIENT
Start: 2024-07-18

## 2024-07-17 RX ORDER — ONDANSETRON 2 MG/ML
8 INJECTION INTRAMUSCULAR; INTRAVENOUS
OUTPATIENT
Start: 2024-07-18

## 2024-07-17 RX ORDER — ACETAMINOPHEN 325 MG/1
650 TABLET ORAL
OUTPATIENT
Start: 2024-07-18

## 2024-07-17 RX ORDER — SODIUM CHLORIDE 0.9 % (FLUSH) 0.9 %
5-40 SYRINGE (ML) INJECTION PRN
Status: ACTIVE | OUTPATIENT
Start: 2024-07-17 | End: 2024-07-18

## 2024-07-17 RX ORDER — SODIUM CHLORIDE 0.9 % (FLUSH) 0.9 %
5-40 SYRINGE (ML) INJECTION PRN
Status: CANCELLED | OUTPATIENT
Start: 2024-07-18

## 2024-07-17 RX ORDER — SODIUM CHLORIDE 9 MG/ML
INJECTION, SOLUTION INTRAVENOUS CONTINUOUS
OUTPATIENT
Start: 2024-07-18

## 2024-07-17 RX ORDER — SODIUM CHLORIDE 9 MG/ML
5-250 INJECTION, SOLUTION INTRAVENOUS PRN
OUTPATIENT
Start: 2024-07-18

## 2024-07-17 RX ADMIN — SODIUM CHLORIDE, PRESERVATIVE FREE 10 ML: 5 INJECTION INTRAVENOUS at 13:54

## 2024-07-17 RX ADMIN — SODIUM CHLORIDE 1000 MG: 9 INJECTION, SOLUTION INTRAVENOUS at 13:59

## 2024-07-17 NOTE — FLOWSHEET NOTE
Infusion complete. IV flushed and secured with stockingette. Pt aware of appointment tomorrow for infusion. Declined 20 min wait time. Pt verb understanding to return to ER if she has any difficulty breathing.

## 2024-07-18 ENCOUNTER — HOSPITAL ENCOUNTER (OUTPATIENT)
Dept: NURSING | Facility: HOSPITAL | Age: 59
Setting detail: INFUSION SERIES
Discharge: HOME | End: 2024-07-20
Payer: MEDICARE

## 2024-07-18 VITALS
RESPIRATION RATE: 16 BRPM | TEMPERATURE: 97.9 F | SYSTOLIC BLOOD PRESSURE: 141 MMHG | OXYGEN SATURATION: 97 % | DIASTOLIC BLOOD PRESSURE: 90 MMHG | HEART RATE: 89 BPM

## 2024-07-18 DIAGNOSIS — Z94.2 STATUS POST LUNG TRANSPLANTATION (HCC): ICD-10-CM

## 2024-07-18 DIAGNOSIS — T86.810: Primary | ICD-10-CM

## 2024-07-18 PROCEDURE — 2580000003 HC RX 258: Performed by: INTERNAL MEDICINE

## 2024-07-18 PROCEDURE — 6360000002 HC RX W HCPCS: Performed by: INTERNAL MEDICINE

## 2024-07-18 PROCEDURE — 96365 THER/PROPH/DIAG IV INF INIT: CPT

## 2024-07-18 RX ORDER — ALBUTEROL SULFATE 90 UG/1
4 AEROSOL, METERED RESPIRATORY (INHALATION) PRN
OUTPATIENT
Start: 2024-07-18

## 2024-07-18 RX ORDER — SODIUM CHLORIDE 9 MG/ML
INJECTION, SOLUTION INTRAVENOUS CONTINUOUS
OUTPATIENT
Start: 2024-07-18

## 2024-07-18 RX ORDER — ONDANSETRON 2 MG/ML
8 INJECTION INTRAMUSCULAR; INTRAVENOUS
OUTPATIENT
Start: 2024-07-18

## 2024-07-18 RX ORDER — SODIUM CHLORIDE 0.9 % (FLUSH) 0.9 %
5-40 SYRINGE (ML) INJECTION PRN
OUTPATIENT
Start: 2024-07-18

## 2024-07-18 RX ORDER — ACETAMINOPHEN 325 MG/1
650 TABLET ORAL
OUTPATIENT
Start: 2024-07-18

## 2024-07-18 RX ORDER — EPINEPHRINE 1 MG/ML
0.3 INJECTION, SOLUTION INTRAMUSCULAR; SUBCUTANEOUS PRN
OUTPATIENT
Start: 2024-07-18

## 2024-07-18 RX ORDER — SODIUM CHLORIDE 9 MG/ML
5-250 INJECTION, SOLUTION INTRAVENOUS PRN
OUTPATIENT
Start: 2024-07-18

## 2024-07-18 RX ORDER — SODIUM CHLORIDE 0.9 % (FLUSH) 0.9 %
5-40 SYRINGE (ML) INJECTION PRN
Status: ACTIVE | OUTPATIENT
Start: 2024-07-18 | End: 2024-07-19

## 2024-07-18 RX ORDER — DIPHENHYDRAMINE HYDROCHLORIDE 50 MG/ML
50 INJECTION INTRAMUSCULAR; INTRAVENOUS
OUTPATIENT
Start: 2024-07-18

## 2024-07-18 RX ADMIN — SODIUM CHLORIDE 1000 MG: 9 INJECTION, SOLUTION INTRAVENOUS at 13:21

## 2024-08-13 ENCOUNTER — HOSPITAL ENCOUNTER (OUTPATIENT)
Facility: HOSPITAL | Age: 59
Discharge: HOME | End: 2024-08-13
Payer: MEDICARE

## 2024-08-13 LAB
ALBUMIN SERPL-MCNC: 4.1 G/DL (ref 3.4–4.8)
ALBUMIN/GLOB SERPL: 2 {RATIO} (ref 0.8–2)
ALP SERPL-CCNC: 126 U/L (ref 25–100)
ALT SERPL-CCNC: 17 U/L (ref 4–36)
ANION GAP SERPL CALCULATED.3IONS-SCNC: 10 MMOL/L (ref 3–16)
AST SERPL-CCNC: 21 U/L (ref 8–33)
BASOPHILS # BLD: 0 K/UL (ref 0–0.1)
BASOPHILS NFR BLD: 0.6 %
BILIRUB SERPL-MCNC: <0.2 MG/DL (ref 0.3–1.2)
BUN SERPL-MCNC: 16 MG/DL (ref 6–20)
CALCIUM SERPL-MCNC: 9 MG/DL (ref 8.5–10.5)
CHLORIDE SERPL-SCNC: 104 MMOL/L (ref 98–107)
CHOLEST SERPL-MCNC: 207 MG/DL (ref 0–200)
CK SERPL-CCNC: 39 U/L (ref 26–174)
CO2 SERPL-SCNC: 28 MMOL/L (ref 20–30)
CREAT SERPL-MCNC: 1.3 MG/DL (ref 0.4–1.2)
EOSINOPHIL # BLD: 0 K/UL (ref 0–0.4)
EOSINOPHIL NFR BLD: 0.3 %
ERYTHROCYTE [DISTWIDTH] IN BLOOD BY AUTOMATED COUNT: 13.8 % (ref 11–16)
GFR SERPLBLD CREATININE-BSD FMLA CKD-EPI: 47 ML/MIN/{1.73_M2}
GLOBULIN SER CALC-MCNC: 2.1 G/DL
GLUCOSE SERPL-MCNC: 91 MG/DL (ref 74–106)
HBA1C MFR BLD: 5.6 %
HCT VFR BLD AUTO: 36 % (ref 37–47)
HDLC SERPL-MCNC: 85 MG/DL (ref 40–60)
HGB BLD-MCNC: 11.5 G/DL (ref 11.5–16.5)
IMM GRANULOCYTES # BLD: 0.3 K/UL
IMM GRANULOCYTES NFR BLD: 8.3 % (ref 0–5)
LDLC SERPL CALC-MCNC: 106 MG/DL
LYMPHOCYTES # BLD: 1 K/UL (ref 1.5–4)
LYMPHOCYTES NFR BLD: 29.7 %
MAGNESIUM SERPL-MCNC: 1.9 MG/DL (ref 1.7–2.4)
MCH RBC QN AUTO: 33 PG (ref 27–32)
MCHC RBC AUTO-ENTMCNC: 31.9 G/DL (ref 31–35)
MCV RBC AUTO: 103.2 FL (ref 80–100)
MONOCYTES # BLD: 0.1 K/UL (ref 0.2–0.8)
MONOCYTES NFR BLD: 4.2 %
NEUTROPHILS # BLD: 1.9 K/UL (ref 2–7.5)
NEUTS SEG NFR BLD: 56.9 %
PHOSPHATE SERPL-MCNC: 3.7 MG/DL (ref 2.5–4.5)
PLATELET # BLD AUTO: 191 K/UL (ref 150–400)
PMV BLD AUTO: 10.7 FL (ref 6–10)
POTASSIUM SERPL-SCNC: 4.4 MMOL/L (ref 3.4–5.1)
PROT SERPL-MCNC: 6.2 G/DL (ref 6.4–8.3)
RBC # BLD AUTO: 3.49 M/UL (ref 3.8–5.8)
SODIUM SERPL-SCNC: 142 MMOL/L (ref 136–145)
TRIGL SERPL-MCNC: 80 MG/DL (ref 0–249)
TSH SERPL DL<=0.005 MIU/L-ACNC: 0.82 UIU/ML (ref 0.27–4.2)
URATE SERPL-MCNC: 5.3 MG/DL (ref 2.5–7.1)
VLDLC SERPL CALC-MCNC: 16 MG/DL
WBC # BLD AUTO: 3.4 K/UL (ref 4–11)

## 2024-08-13 PROCEDURE — 82550 ASSAY OF CK (CPK): CPT

## 2024-08-13 PROCEDURE — 80061 LIPID PANEL: CPT

## 2024-08-13 PROCEDURE — 80053 COMPREHEN METABOLIC PANEL: CPT

## 2024-08-13 PROCEDURE — 85025 COMPLETE CBC W/AUTO DIFF WBC: CPT

## 2024-08-13 PROCEDURE — 83735 ASSAY OF MAGNESIUM: CPT

## 2024-08-13 PROCEDURE — 36415 COLL VENOUS BLD VENIPUNCTURE: CPT

## 2024-08-13 PROCEDURE — 84443 ASSAY THYROID STIM HORMONE: CPT

## 2024-08-13 PROCEDURE — 84100 ASSAY OF PHOSPHORUS: CPT

## 2024-08-13 PROCEDURE — 84550 ASSAY OF BLOOD/URIC ACID: CPT

## 2024-08-13 PROCEDURE — 83036 HEMOGLOBIN GLYCOSYLATED A1C: CPT

## 2024-08-19 LAB — TACROLIMUS BLOOD: 11.4 NG/ML (ref 5–20)

## 2024-09-03 ENCOUNTER — HOSPITAL ENCOUNTER (OUTPATIENT)
Facility: HOSPITAL | Age: 59
Discharge: HOME | End: 2024-09-03
Payer: MEDICARE

## 2024-09-03 LAB
ANION GAP SERPL CALCULATED.3IONS-SCNC: 9 MMOL/L (ref 3–16)
BUN SERPL-MCNC: 19 MG/DL (ref 6–20)
CALCIUM SERPL-MCNC: 10.8 MG/DL (ref 8.5–10.5)
CHLORIDE SERPL-SCNC: 96 MMOL/L (ref 98–107)
CO2 SERPL-SCNC: 33 MMOL/L (ref 20–30)
CREAT SERPL-MCNC: 1.8 MG/DL (ref 0.4–1.2)
GFR SERPLBLD CREATININE-BSD FMLA CKD-EPI: 32 ML/MIN/{1.73_M2}
GLUCOSE SERPL-MCNC: 108 MG/DL (ref 74–106)
POTASSIUM SERPL-SCNC: 3.5 MMOL/L (ref 3.4–5.1)
SODIUM SERPL-SCNC: 138 MMOL/L (ref 136–145)

## 2024-09-03 PROCEDURE — 80197 ASSAY OF TACROLIMUS: CPT

## 2024-09-03 PROCEDURE — 80048 BASIC METABOLIC PNL TOTAL CA: CPT

## 2024-09-03 PROCEDURE — 36415 COLL VENOUS BLD VENIPUNCTURE: CPT

## 2024-09-04 LAB — TACROLIMUS BLOOD: 20.6 NG/ML (ref 5–20)

## 2024-09-11 ENCOUNTER — HOSPITAL ENCOUNTER (OUTPATIENT)
Facility: HOSPITAL | Age: 59
Discharge: HOME | End: 2024-09-11
Payer: MEDICARE

## 2024-09-11 LAB
ALBUMIN SERPL-MCNC: 4.2 G/DL (ref 3.4–4.8)
ALBUMIN/GLOB SERPL: 1.8 {RATIO} (ref 0.8–2)
ALP SERPL-CCNC: 128 U/L (ref 25–100)
ALT SERPL-CCNC: 11 U/L (ref 4–36)
ANION GAP SERPL CALCULATED.3IONS-SCNC: 11 MMOL/L (ref 3–16)
AST SERPL-CCNC: 19 U/L (ref 8–33)
BASOPHILS # BLD: 0 K/UL (ref 0–0.1)
BASOPHILS NFR BLD: 0.6 %
BILIRUB SERPL-MCNC: 0.3 MG/DL (ref 0.3–1.2)
BUN SERPL-MCNC: 16 MG/DL (ref 6–20)
CALCIUM SERPL-MCNC: 9.4 MG/DL (ref 8.5–10.5)
CHLORIDE SERPL-SCNC: 101 MMOL/L (ref 98–107)
CHOLEST SERPL-MCNC: 188 MG/DL (ref 0–200)
CK SERPL-CCNC: 37 U/L (ref 26–174)
CO2 SERPL-SCNC: 27 MMOL/L (ref 20–30)
CREAT SERPL-MCNC: 1.4 MG/DL (ref 0.4–1.2)
EOSINOPHIL # BLD: 0 K/UL (ref 0–0.4)
EOSINOPHIL NFR BLD: 0.2 %
ERYTHROCYTE [DISTWIDTH] IN BLOOD BY AUTOMATED COUNT: 14.2 % (ref 11–16)
GFR SERPLBLD CREATININE-BSD FMLA CKD-EPI: 43 ML/MIN/{1.73_M2}
GLOBULIN SER CALC-MCNC: 2.3 G/DL
GLUCOSE SERPL-MCNC: 123 MG/DL (ref 74–106)
HCT VFR BLD AUTO: 36.5 % (ref 37–47)
HDLC SERPL-MCNC: 62 MG/DL (ref 40–60)
HGB BLD-MCNC: 11.7 G/DL (ref 11.5–16.5)
IMM GRANULOCYTES # BLD: 0.3 K/UL
IMM GRANULOCYTES NFR BLD: 5.7 % (ref 0–5)
LDLC SERPL CALC-MCNC: 103 MG/DL
LYMPHOCYTES # BLD: 0.5 K/UL (ref 1.5–4)
LYMPHOCYTES NFR BLD: 9.2 %
MAGNESIUM SERPL-MCNC: 1.9 MG/DL (ref 1.7–2.4)
MCH RBC QN AUTO: 33.5 PG (ref 27–32)
MCHC RBC AUTO-ENTMCNC: 32.1 G/DL (ref 31–35)
MCV RBC AUTO: 104.6 FL (ref 80–100)
MONOCYTES # BLD: 0.5 K/UL (ref 0.2–0.8)
MONOCYTES NFR BLD: 10 %
NEUTROPHILS # BLD: 3.6 K/UL (ref 2–7.5)
NEUTS SEG NFR BLD: 74.3 %
PHOSPHATE SERPL-MCNC: 2.7 MG/DL (ref 2.5–4.5)
PLATELET # BLD AUTO: 199 K/UL (ref 150–400)
PMV BLD AUTO: 10.8 FL (ref 6–10)
POTASSIUM SERPL-SCNC: 4.3 MMOL/L (ref 3.4–5.1)
PROT SERPL-MCNC: 6.5 G/DL (ref 6.4–8.3)
RBC # BLD AUTO: 3.49 M/UL (ref 3.8–5.8)
SODIUM SERPL-SCNC: 139 MMOL/L (ref 136–145)
TRIGL SERPL-MCNC: 114 MG/DL (ref 0–249)
TSH SERPL DL<=0.005 MIU/L-ACNC: 2.7 UIU/ML (ref 0.27–4.2)
URATE SERPL-MCNC: 6.3 MG/DL (ref 2.5–7.1)
VLDLC SERPL CALC-MCNC: 23 MG/DL
WBC # BLD AUTO: 4.9 K/UL (ref 4–11)

## 2024-09-11 PROCEDURE — 84100 ASSAY OF PHOSPHORUS: CPT

## 2024-09-11 PROCEDURE — 84443 ASSAY THYROID STIM HORMONE: CPT

## 2024-09-11 PROCEDURE — 84550 ASSAY OF BLOOD/URIC ACID: CPT

## 2024-09-11 PROCEDURE — 83735 ASSAY OF MAGNESIUM: CPT

## 2024-09-11 PROCEDURE — 80197 ASSAY OF TACROLIMUS: CPT

## 2024-09-11 PROCEDURE — 80053 COMPREHEN METABOLIC PANEL: CPT

## 2024-09-11 PROCEDURE — 85025 COMPLETE CBC W/AUTO DIFF WBC: CPT

## 2024-09-11 PROCEDURE — 80061 LIPID PANEL: CPT

## 2024-09-11 PROCEDURE — 82550 ASSAY OF CK (CPK): CPT

## 2024-09-11 PROCEDURE — 36415 COLL VENOUS BLD VENIPUNCTURE: CPT

## 2024-09-12 LAB — TACROLIMUS BLOOD: 9.3 NG/ML (ref 5–20)

## 2024-10-09 ENCOUNTER — HOSPITAL ENCOUNTER (OUTPATIENT)
Facility: HOSPITAL | Age: 59
Discharge: HOME | End: 2024-10-09
Payer: MEDICARE

## 2024-10-09 LAB
ALBUMIN SERPL-MCNC: 4.1 G/DL (ref 3.4–4.8)
ALBUMIN/GLOB SERPL: 2 {RATIO} (ref 0.8–2)
ALP SERPL-CCNC: 149 U/L (ref 25–100)
ALT SERPL-CCNC: 12 U/L (ref 4–36)
ANION GAP SERPL CALCULATED.3IONS-SCNC: 10 MMOL/L (ref 3–16)
AST SERPL-CCNC: 20 U/L (ref 8–33)
BASOPHILS # BLD: 0 K/UL (ref 0–0.1)
BASOPHILS NFR BLD: 0.6 %
BILIRUB SERPL-MCNC: 0.3 MG/DL (ref 0.3–1.2)
BUN SERPL-MCNC: 13 MG/DL (ref 6–20)
CALCIUM SERPL-MCNC: 9.7 MG/DL (ref 8.5–10.5)
CHLORIDE SERPL-SCNC: 102 MMOL/L (ref 98–107)
CHOLEST SERPL-MCNC: 172 MG/DL (ref 0–200)
CK SERPL-CCNC: 37 U/L (ref 26–174)
CO2 SERPL-SCNC: 31 MMOL/L (ref 20–30)
CREAT SERPL-MCNC: 1.1 MG/DL (ref 0.4–1.2)
EOSINOPHIL # BLD: 0 K/UL (ref 0–0.4)
EOSINOPHIL NFR BLD: 0.3 %
ERYTHROCYTE [DISTWIDTH] IN BLOOD BY AUTOMATED COUNT: 13.5 % (ref 11–16)
GFR SERPLBLD CREATININE-BSD FMLA CKD-EPI: 57 ML/MIN/{1.73_M2}
GLOBULIN SER CALC-MCNC: 2.1 G/DL
GLUCOSE SERPL-MCNC: 158 MG/DL (ref 74–106)
HBA1C MFR BLD: 5.3 %
HCT VFR BLD AUTO: 35.3 % (ref 37–47)
HDLC SERPL-MCNC: 67 MG/DL (ref 40–60)
HGB BLD-MCNC: 11.1 G/DL (ref 11.5–16.5)
IMM GRANULOCYTES # BLD: 0.1 K/UL
IMM GRANULOCYTES NFR BLD: 1.8 % (ref 0–5)
LDLC SERPL CALC-MCNC: 82 MG/DL
LYMPHOCYTES # BLD: 0.8 K/UL (ref 1.5–4)
LYMPHOCYTES NFR BLD: 11.2 %
MAGNESIUM SERPL-MCNC: 1.8 MG/DL (ref 1.7–2.4)
MCH RBC QN AUTO: 32.9 PG (ref 27–32)
MCHC RBC AUTO-ENTMCNC: 31.4 G/DL (ref 31–35)
MCV RBC AUTO: 104.7 FL (ref 80–100)
MONOCYTES # BLD: 0.5 K/UL (ref 0.2–0.8)
MONOCYTES NFR BLD: 6.4 %
NEUTROPHILS # BLD: 5.7 K/UL (ref 2–7.5)
NEUTS SEG NFR BLD: 79.7 %
PHOSPHATE SERPL-MCNC: 3.2 MG/DL (ref 2.5–4.5)
PLATELET # BLD AUTO: 192 K/UL (ref 150–400)
PMV BLD AUTO: 10.5 FL (ref 6–10)
POTASSIUM SERPL-SCNC: 3.8 MMOL/L (ref 3.4–5.1)
PROT SERPL-MCNC: 6.2 G/DL (ref 6.4–8.3)
RBC # BLD AUTO: 3.37 M/UL (ref 3.8–5.8)
SODIUM SERPL-SCNC: 143 MMOL/L (ref 136–145)
TRIGL SERPL-MCNC: 116 MG/DL (ref 0–249)
TSH SERPL DL<=0.005 MIU/L-ACNC: 3.26 UIU/ML (ref 0.27–4.2)
URATE SERPL-MCNC: 6.4 MG/DL (ref 2.5–7.1)
VLDLC SERPL CALC-MCNC: 23 MG/DL
WBC # BLD AUTO: 7.1 K/UL (ref 4–11)

## 2024-10-09 PROCEDURE — 84443 ASSAY THYROID STIM HORMONE: CPT

## 2024-10-09 PROCEDURE — 83036 HEMOGLOBIN GLYCOSYLATED A1C: CPT

## 2024-10-09 PROCEDURE — 80061 LIPID PANEL: CPT

## 2024-10-09 PROCEDURE — 83735 ASSAY OF MAGNESIUM: CPT

## 2024-10-09 PROCEDURE — 80053 COMPREHEN METABOLIC PANEL: CPT

## 2024-10-09 PROCEDURE — 80197 ASSAY OF TACROLIMUS: CPT

## 2024-10-09 PROCEDURE — 82550 ASSAY OF CK (CPK): CPT

## 2024-10-09 PROCEDURE — 36415 COLL VENOUS BLD VENIPUNCTURE: CPT

## 2024-10-09 PROCEDURE — 84100 ASSAY OF PHOSPHORUS: CPT

## 2024-10-09 PROCEDURE — 84550 ASSAY OF BLOOD/URIC ACID: CPT

## 2024-10-09 PROCEDURE — 85025 COMPLETE CBC W/AUTO DIFF WBC: CPT

## 2024-10-11 LAB — TACROLIMUS BLOOD: 7.4 NG/ML (ref 5–20)

## 2024-11-09 DIAGNOSIS — F41.8 DEPRESSION WITH ANXIETY: ICD-10-CM

## 2024-11-12 ENCOUNTER — TELEPHONE (OUTPATIENT)
Dept: PRIMARY CARE CLINIC | Age: 59
End: 2024-11-12

## 2024-11-12 RX ORDER — VENLAFAXINE HYDROCHLORIDE 75 MG/1
CAPSULE, EXTENDED RELEASE ORAL
Qty: 270 CAPSULE | Refills: 0 | Status: SHIPPED | OUTPATIENT
Start: 2024-11-12

## 2024-11-12 NOTE — TELEPHONE ENCOUNTER
Placed outbound call to patient in attempt to schedule follow up appointment. No answer and voicemail is full.

## 2024-12-04 ENCOUNTER — HOSPITAL ENCOUNTER (OUTPATIENT)
Facility: HOSPITAL | Age: 59
Discharge: HOME | End: 2024-12-04
Payer: MEDICARE

## 2024-12-04 LAB
ALBUMIN SERPL-MCNC: 4.5 G/DL (ref 3.4–4.8)
ALBUMIN/GLOB SERPL: 2.3 {RATIO} (ref 0.8–2)
ALP SERPL-CCNC: 194 U/L (ref 25–100)
ALT SERPL-CCNC: 14 U/L (ref 4–36)
ANION GAP SERPL CALCULATED.3IONS-SCNC: 12 MMOL/L (ref 3–16)
AST SERPL-CCNC: 23 U/L (ref 8–33)
BASOPHILS # BLD: 0 K/UL (ref 0–0.1)
BASOPHILS NFR BLD: 0.4 %
BILIRUB SERPL-MCNC: 0.3 MG/DL (ref 0.3–1.2)
BUN SERPL-MCNC: 21 MG/DL (ref 6–20)
CALCIUM SERPL-MCNC: 9.6 MG/DL (ref 8.5–10.5)
CHLORIDE SERPL-SCNC: 100 MMOL/L (ref 98–107)
CHOLEST SERPL-MCNC: 207 MG/DL (ref 0–200)
CK SERPL-CCNC: 38 U/L (ref 26–174)
CO2 SERPL-SCNC: 26 MMOL/L (ref 20–30)
CREAT SERPL-MCNC: 1.2 MG/DL (ref 0.4–1.2)
EOSINOPHIL # BLD: 0.1 K/UL (ref 0–0.4)
EOSINOPHIL NFR BLD: 1.2 %
ERYTHROCYTE [DISTWIDTH] IN BLOOD BY AUTOMATED COUNT: 12.7 % (ref 11–16)
GFR SERPLBLD CREATININE-BSD FMLA CKD-EPI: 52 ML/MIN/{1.73_M2}
GLOBULIN SER CALC-MCNC: 2 G/DL
GLUCOSE SERPL-MCNC: 104 MG/DL (ref 74–106)
HCT VFR BLD AUTO: 37 % (ref 37–47)
HDLC SERPL-MCNC: 74 MG/DL (ref 40–60)
HGB BLD-MCNC: 11.8 G/DL (ref 11.5–16.5)
IMM GRANULOCYTES # BLD: 0.1 K/UL
IMM GRANULOCYTES NFR BLD: 1.6 % (ref 0–5)
LDLC SERPL CALC-MCNC: 110 MG/DL
LYMPHOCYTES # BLD: 0.8 K/UL (ref 1.5–4)
LYMPHOCYTES NFR BLD: 16.5 %
MAGNESIUM SERPL-MCNC: 1.7 MG/DL (ref 1.7–2.4)
MCH RBC QN AUTO: 31.7 PG (ref 27–32)
MCHC RBC AUTO-ENTMCNC: 31.9 G/DL (ref 31–35)
MCV RBC AUTO: 99.5 FL (ref 80–100)
MONOCYTES # BLD: 0.5 K/UL (ref 0.2–0.8)
MONOCYTES NFR BLD: 9 %
NEUTROPHILS # BLD: 3.6 K/UL (ref 2–7.5)
NEUTS SEG NFR BLD: 71.3 %
PHOSPHATE SERPL-MCNC: 3.7 MG/DL (ref 2.5–4.5)
PLATELET # BLD AUTO: 178 K/UL (ref 150–400)
PMV BLD AUTO: 10.1 FL (ref 6–10)
POTASSIUM SERPL-SCNC: 3.8 MMOL/L (ref 3.4–5.1)
PROT SERPL-MCNC: 6.5 G/DL (ref 6.4–8.3)
RBC # BLD AUTO: 3.72 M/UL (ref 3.8–5.8)
SODIUM SERPL-SCNC: 138 MMOL/L (ref 136–145)
TRIGL SERPL-MCNC: 113 MG/DL (ref 0–249)
TSH SERPL DL<=0.005 MIU/L-ACNC: 4.88 UIU/ML (ref 0.27–4.2)
URATE SERPL-MCNC: 5.5 MG/DL (ref 2.5–7.1)
VLDLC SERPL CALC-MCNC: 23 MG/DL
WBC # BLD AUTO: 5.1 K/UL (ref 4–11)

## 2024-12-04 PROCEDURE — 80197 ASSAY OF TACROLIMUS: CPT

## 2024-12-04 PROCEDURE — 84550 ASSAY OF BLOOD/URIC ACID: CPT

## 2024-12-04 PROCEDURE — 82550 ASSAY OF CK (CPK): CPT

## 2024-12-04 PROCEDURE — 83735 ASSAY OF MAGNESIUM: CPT

## 2024-12-04 PROCEDURE — 85025 COMPLETE CBC W/AUTO DIFF WBC: CPT

## 2024-12-04 PROCEDURE — 84100 ASSAY OF PHOSPHORUS: CPT

## 2024-12-04 PROCEDURE — 36415 COLL VENOUS BLD VENIPUNCTURE: CPT

## 2024-12-04 PROCEDURE — 80053 COMPREHEN METABOLIC PANEL: CPT

## 2024-12-04 PROCEDURE — 80061 LIPID PANEL: CPT

## 2024-12-04 PROCEDURE — 84443 ASSAY THYROID STIM HORMONE: CPT

## 2024-12-05 LAB — TACROLIMUS BLOOD: 11.8 NG/ML (ref 5–20)

## 2024-12-06 LAB
CMV DNA SERPL NAA+PROBE-ACNC: ABNORMAL IU/ML
CMV DNA SERPL NAA+PROBE-LOG IU: ABNORMAL LOG IU/ML
CMV DNA SERPL QL NAA+PROBE: DETECTED

## 2024-12-10 ENCOUNTER — HOSPITAL ENCOUNTER (OUTPATIENT)
Facility: HOSPITAL | Age: 59
Discharge: HOME | End: 2024-12-12
Payer: MEDICARE

## 2024-12-10 DIAGNOSIS — Z94.89 TRANSPLANT RECIPIENT: ICD-10-CM

## 2024-12-10 PROCEDURE — 93306 TTE W/DOPPLER COMPLETE: CPT

## 2024-12-11 LAB
ECHO AO ASC DIAM: 2.8 CM
ECHO AO ROOT DIAM: 3.1 CM
ECHO AV AREA VTI: 2.7 CM2
ECHO AV MEAN GRADIENT: 4 MMHG
ECHO AV PEAK GRADIENT: 7 MMHG
ECHO AV PEAK VELOCITY: 1.4 M/S
ECHO AV VELOCITY RATIO: 0.79
ECHO AV VTI: 26.5 CM
ECHO EST RA PRESSURE: 3 MMHG
ECHO LA AREA 4C: 15.8 CM2
ECHO LA DIAMETER: 3.9 CM
ECHO LA MINOR AXIS: 3.9 CM
ECHO LA TO AORTIC ROOT RATIO: 1.26
ECHO LA VOL BP: 38 ML (ref 22–52)
ECHO LV E' LATERAL VELOCITY: 17.2 CM/S
ECHO LV E' SEPTAL VELOCITY: 14.5 CM/S
ECHO LV EF PHYSICIAN: 60 %
ECHO LV FRACTIONAL SHORTENING: 26 % (ref 28–44)
ECHO LV INTERNAL DIMENSION DIASTOLIC: 5 CM (ref 3.9–5.3)
ECHO LV INTERNAL DIMENSION SYSTOLIC: 3.7 CM
ECHO LV ISOVOLUMETRIC RELAXATION TIME (IVRT): 81 MS
ECHO LV IVSD: 1 CM (ref 0.6–0.9)
ECHO LV IVSS: 1.2 CM
ECHO LV MASS 2D: 194.4 G (ref 67–162)
ECHO LV POSTERIOR WALL DIASTOLIC: 1.1 CM (ref 0.6–0.9)
ECHO LV POSTERIOR WALL SYSTOLIC: 1.1 CM
ECHO LV RELATIVE WALL THICKNESS RATIO: 0.44
ECHO LVOT AREA: 2.8 CM2
ECHO LVOT AV VTI INDEX: 0.94
ECHO LVOT DIAM: 1.9 CM
ECHO LVOT PEAK GRADIENT: 5 MMHG
ECHO LVOT PEAK VELOCITY: 1.1 M/S
ECHO LVOT SV: 70.3 ML
ECHO LVOT VTI: 24.8 CM
ECHO MV A VELOCITY: 0.6 M/S
ECHO MV AREA PHT: 4.4 CM2
ECHO MV E DECELERATION TIME (DT): 156 MS
ECHO MV E VELOCITY: 1.2 M/S
ECHO MV E/A RATIO: 2
ECHO MV E/E' LATERAL: 6.98
ECHO MV E/E' RATIO (AVERAGED): 7.63
ECHO MV E/E' SEPTAL: 8.28
ECHO MV MEAN GRADIENT: 4 MMHG
ECHO MV PEAK GRADIENT: 4 MMHG
ECHO MV PRESSURE HALF TIME (PHT): 50 MS
ECHO MV REGURGITANT PEAK GRADIENT: 18 MMHG
ECHO MV REGURGITANT PEAK VELOCITY: 2.1 M/S
ECHO PULMONARY ARTERY SYSTOLIC PRESSURE (PASP): 13 MMHG
ECHO PV MAX VELOCITY: 0.7 M/S
ECHO PV PEAK GRADIENT: 2 MMHG
ECHO RA AREA 4C: 13.9 CM2
ECHO RIGHT VENTRICULAR SYSTOLIC PRESSURE (RVSP): 13 MMHG
ECHO RV INTERNAL DIMENSION: 2.5 CM
ECHO TV REGURGITANT MAX VELOCITY: 1.6 M/S
ECHO TV REGURGITANT PEAK GRADIENT: 10 MMHG

## 2024-12-18 ENCOUNTER — HOSPITAL ENCOUNTER (OUTPATIENT)
Facility: HOSPITAL | Age: 59
Discharge: HOME | End: 2024-12-18
Payer: MEDICARE

## 2024-12-18 ENCOUNTER — HOSPITAL ENCOUNTER (OUTPATIENT)
Dept: GENERAL RADIOLOGY | Facility: HOSPITAL | Age: 59
Discharge: HOME | End: 2024-12-18
Payer: MEDICARE

## 2024-12-18 DIAGNOSIS — R06.02 SOB (SHORTNESS OF BREATH): ICD-10-CM

## 2024-12-18 DIAGNOSIS — R05.1 ACUTE COUGH: ICD-10-CM

## 2024-12-18 DIAGNOSIS — Z94.2 STATUS POST LUNG TRANSPLANTATION (HCC): ICD-10-CM

## 2024-12-18 LAB
ORGANISM: ABNORMAL
REPORT: NORMAL
RESP PATH DNA+RNA PNL NPH NAA+NON-PROBE: ABNORMAL

## 2024-12-18 PROCEDURE — 0202U NFCT DS 22 TRGT SARS-COV-2: CPT

## 2024-12-18 PROCEDURE — 36415 COLL VENOUS BLD VENIPUNCTURE: CPT

## 2024-12-18 PROCEDURE — 71046 X-RAY EXAM CHEST 2 VIEWS: CPT

## 2024-12-27 ENCOUNTER — HOSPITAL ENCOUNTER (OUTPATIENT)
Facility: HOSPITAL | Age: 59
Discharge: HOME | End: 2024-12-27
Payer: MEDICARE

## 2024-12-27 LAB
ALBUMIN SERPL-MCNC: 4.7 G/DL (ref 3.4–4.8)
ALBUMIN/GLOB SERPL: 1.5 {RATIO} (ref 0.8–2)
ALP SERPL-CCNC: 226 U/L (ref 25–100)
ALT SERPL-CCNC: 12 U/L (ref 4–36)
ANION GAP SERPL CALCULATED.3IONS-SCNC: 16 MMOL/L (ref 3–16)
AST SERPL-CCNC: 28 U/L (ref 8–33)
BILIRUB SERPL-MCNC: 0.4 MG/DL (ref 0.3–1.2)
BUN SERPL-MCNC: 23 MG/DL (ref 6–20)
CALCIUM SERPL-MCNC: 10.4 MG/DL (ref 8.5–10.5)
CHLORIDE SERPL-SCNC: 99 MMOL/L (ref 98–107)
CO2 SERPL-SCNC: 26 MMOL/L (ref 20–30)
CREAT SERPL-MCNC: 1.2 MG/DL (ref 0.4–1.2)
GFR SERPLBLD CREATININE-BSD FMLA CKD-EPI: 52 ML/MIN/{1.73_M2}
GLOBULIN SER CALC-MCNC: 3.1 G/DL
GLUCOSE SERPL-MCNC: 136 MG/DL (ref 74–106)
POTASSIUM SERPL-SCNC: 4 MMOL/L (ref 3.4–5.1)
PROT SERPL-MCNC: 7.8 G/DL (ref 6.4–8.3)
SODIUM SERPL-SCNC: 141 MMOL/L (ref 136–145)

## 2024-12-27 PROCEDURE — 80197 ASSAY OF TACROLIMUS: CPT

## 2024-12-27 PROCEDURE — 36415 COLL VENOUS BLD VENIPUNCTURE: CPT

## 2024-12-27 PROCEDURE — 80053 COMPREHEN METABOLIC PANEL: CPT

## 2024-12-29 LAB — TACROLIMUS BLOOD: 13.6 NG/ML (ref 5–20)

## 2025-01-09 ENCOUNTER — HOSPITAL ENCOUNTER (OUTPATIENT)
Facility: HOSPITAL | Age: 60
Discharge: HOME | End: 2025-01-09
Payer: MEDICARE

## 2025-01-09 LAB
ALBUMIN SERPL-MCNC: 4.3 G/DL (ref 3.4–4.8)
ALBUMIN/GLOB SERPL: 2 {RATIO} (ref 0.8–2)
ALP SERPL-CCNC: 212 U/L (ref 25–100)
ALT SERPL-CCNC: 17 U/L (ref 4–36)
ANION GAP SERPL CALCULATED.3IONS-SCNC: 12 MMOL/L (ref 3–16)
AST SERPL-CCNC: 23 U/L (ref 8–33)
BASOPHILS # BLD: 0 K/UL (ref 0–0.1)
BASOPHILS NFR BLD: 0.4 %
BILIRUB SERPL-MCNC: 0.3 MG/DL (ref 0.3–1.2)
BUN SERPL-MCNC: 18 MG/DL (ref 6–20)
CALCIUM SERPL-MCNC: 9.3 MG/DL (ref 8.5–10.5)
CHLORIDE SERPL-SCNC: 100 MMOL/L (ref 98–107)
CHOLEST SERPL-MCNC: 192 MG/DL (ref 0–200)
CK SERPL-CCNC: 40 U/L (ref 26–174)
CO2 SERPL-SCNC: 27 MMOL/L (ref 20–30)
CREAT SERPL-MCNC: 1.3 MG/DL (ref 0.4–1.2)
EOSINOPHIL # BLD: 0 K/UL (ref 0–0.4)
EOSINOPHIL NFR BLD: 0.2 %
ERYTHROCYTE [DISTWIDTH] IN BLOOD BY AUTOMATED COUNT: 13.1 % (ref 11–16)
GFR SERPLBLD CREATININE-BSD FMLA CKD-EPI: 47 ML/MIN/{1.73_M2}
GLOBULIN SER CALC-MCNC: 2.2 G/DL
GLUCOSE SERPL-MCNC: 139 MG/DL (ref 74–106)
HBA1C MFR BLD: 5.7 %
HCT VFR BLD AUTO: 36.6 % (ref 37–47)
HDLC SERPL-MCNC: 83 MG/DL (ref 40–60)
HGB BLD-MCNC: 11.6 G/DL (ref 11.5–16.5)
IMM GRANULOCYTES # BLD: 0.4 K/UL
IMM GRANULOCYTES NFR BLD: 7.6 % (ref 0–5)
LDLC SERPL CALC-MCNC: 90 MG/DL
LYMPHOCYTES # BLD: 0.6 K/UL (ref 1.5–4)
LYMPHOCYTES NFR BLD: 12.7 %
MAGNESIUM SERPL-MCNC: 1.9 MG/DL (ref 1.7–2.4)
MCH RBC QN AUTO: 30.9 PG (ref 27–32)
MCHC RBC AUTO-ENTMCNC: 31.7 G/DL (ref 31–35)
MCV RBC AUTO: 97.6 FL (ref 80–100)
MONOCYTES # BLD: 0.4 K/UL (ref 0.2–0.8)
MONOCYTES NFR BLD: 7.4 %
NEUTROPHILS # BLD: 3.6 K/UL (ref 2–7.5)
NEUTS SEG NFR BLD: 71.7 %
PHOSPHATE SERPL-MCNC: 4.1 MG/DL (ref 2.5–4.5)
PLATELET # BLD AUTO: 186 K/UL (ref 150–400)
PMV BLD AUTO: 10.7 FL (ref 6–10)
POTASSIUM SERPL-SCNC: 4.3 MMOL/L (ref 3.4–5.1)
PROT SERPL-MCNC: 6.5 G/DL (ref 6.4–8.3)
RBC # BLD AUTO: 3.75 M/UL (ref 3.8–5.8)
SODIUM SERPL-SCNC: 139 MMOL/L (ref 136–145)
TRIGL SERPL-MCNC: 97 MG/DL (ref 0–249)
TSH SERPL DL<=0.005 MIU/L-ACNC: 0.95 UIU/ML (ref 0.27–4.2)
URATE SERPL-MCNC: 6.3 MG/DL (ref 2.5–7.1)
VLDLC SERPL CALC-MCNC: 19 MG/DL
WBC # BLD AUTO: 5 K/UL (ref 4–11)

## 2025-01-09 PROCEDURE — 85025 COMPLETE CBC W/AUTO DIFF WBC: CPT

## 2025-01-09 PROCEDURE — 84443 ASSAY THYROID STIM HORMONE: CPT

## 2025-01-09 PROCEDURE — 80197 ASSAY OF TACROLIMUS: CPT

## 2025-01-09 PROCEDURE — 83036 HEMOGLOBIN GLYCOSYLATED A1C: CPT

## 2025-01-09 PROCEDURE — 82550 ASSAY OF CK (CPK): CPT

## 2025-01-09 PROCEDURE — 80053 COMPREHEN METABOLIC PANEL: CPT

## 2025-01-09 PROCEDURE — 84550 ASSAY OF BLOOD/URIC ACID: CPT

## 2025-01-09 PROCEDURE — 80061 LIPID PANEL: CPT

## 2025-01-09 PROCEDURE — 36415 COLL VENOUS BLD VENIPUNCTURE: CPT

## 2025-01-09 PROCEDURE — 83735 ASSAY OF MAGNESIUM: CPT

## 2025-01-09 PROCEDURE — 84100 ASSAY OF PHOSPHORUS: CPT

## 2025-01-10 LAB — TACROLIMUS BLOOD: 11.4 NG/ML (ref 5–20)

## 2025-02-07 DIAGNOSIS — F41.8 DEPRESSION WITH ANXIETY: ICD-10-CM

## 2025-02-07 RX ORDER — VENLAFAXINE HYDROCHLORIDE 75 MG/1
CAPSULE, EXTENDED RELEASE ORAL
Qty: 270 CAPSULE | Refills: 0 | OUTPATIENT
Start: 2025-02-07

## 2025-03-20 RX ORDER — ONDANSETRON 2 MG/ML
8 INJECTION INTRAMUSCULAR; INTRAVENOUS
OUTPATIENT
Start: 2025-03-20

## 2025-03-20 RX ORDER — DIPHENHYDRAMINE HYDROCHLORIDE 50 MG/ML
50 INJECTION, SOLUTION INTRAMUSCULAR; INTRAVENOUS
OUTPATIENT
Start: 2025-03-20

## 2025-03-20 RX ORDER — EPINEPHRINE 1 MG/ML
0.3 INJECTION, SOLUTION INTRAMUSCULAR; SUBCUTANEOUS PRN
OUTPATIENT
Start: 2025-03-20

## 2025-03-20 RX ORDER — SODIUM CHLORIDE 9 MG/ML
INJECTION, SOLUTION INTRAVENOUS CONTINUOUS
OUTPATIENT
Start: 2025-03-20

## 2025-03-20 RX ORDER — HYDROCORTISONE SODIUM SUCCINATE 100 MG/2ML
100 INJECTION INTRAMUSCULAR; INTRAVENOUS
OUTPATIENT
Start: 2025-03-20

## 2025-03-20 RX ORDER — ACETAMINOPHEN 325 MG/1
650 TABLET ORAL
OUTPATIENT
Start: 2025-03-20

## 2025-03-20 RX ORDER — SODIUM CHLORIDE 9 MG/ML
5-250 INJECTION, SOLUTION INTRAVENOUS PRN
OUTPATIENT
Start: 2025-03-20

## 2025-03-20 RX ORDER — ALBUTEROL SULFATE 0.83 MG/ML
2.5 SOLUTION RESPIRATORY (INHALATION)
OUTPATIENT
Start: 2025-03-20

## 2025-03-20 RX ORDER — SODIUM CHLORIDE 0.9 % (FLUSH) 0.9 %
5-40 SYRINGE (ML) INJECTION PRN
OUTPATIENT
Start: 2025-03-20

## 2025-03-24 ENCOUNTER — HOSPITAL ENCOUNTER (OUTPATIENT)
Dept: NURSING | Facility: HOSPITAL | Age: 60
Setting detail: INFUSION SERIES
Discharge: HOME | End: 2025-03-24
Payer: MEDICARE

## 2025-03-24 VITALS
HEART RATE: 74 BPM | DIASTOLIC BLOOD PRESSURE: 74 MMHG | RESPIRATION RATE: 20 BRPM | SYSTOLIC BLOOD PRESSURE: 132 MMHG | TEMPERATURE: 98 F | OXYGEN SATURATION: 94 %

## 2025-03-24 DIAGNOSIS — Z94.2 STATUS POST LUNG TRANSPLANTATION (HCC): ICD-10-CM

## 2025-03-24 DIAGNOSIS — T86.810: Primary | ICD-10-CM

## 2025-03-24 PROCEDURE — 2580000003 HC RX 258: Performed by: INTERNAL MEDICINE

## 2025-03-24 PROCEDURE — 96365 THER/PROPH/DIAG IV INF INIT: CPT

## 2025-03-24 PROCEDURE — 6360000002 HC RX W HCPCS: Performed by: INTERNAL MEDICINE

## 2025-03-24 RX ORDER — ALBUTEROL SULFATE 0.83 MG/ML
2.5 SOLUTION RESPIRATORY (INHALATION)
Status: CANCELLED | OUTPATIENT
Start: 2025-03-25

## 2025-03-24 RX ORDER — ACETAMINOPHEN 325 MG/1
650 TABLET ORAL
Status: CANCELLED | OUTPATIENT
Start: 2025-03-25

## 2025-03-24 RX ORDER — SODIUM CHLORIDE 0.9 % (FLUSH) 0.9 %
5-40 SYRINGE (ML) INJECTION PRN
Status: DISCONTINUED | OUTPATIENT
Start: 2025-03-24 | End: 2025-03-25 | Stop reason: HOSPADM

## 2025-03-24 RX ORDER — SODIUM CHLORIDE 0.9 % (FLUSH) 0.9 %
5-40 SYRINGE (ML) INJECTION PRN
Status: CANCELLED | OUTPATIENT
Start: 2025-03-25

## 2025-03-24 RX ORDER — SODIUM CHLORIDE 9 MG/ML
5-250 INJECTION, SOLUTION INTRAVENOUS PRN
Status: CANCELLED | OUTPATIENT
Start: 2025-03-25

## 2025-03-24 RX ORDER — HYDROCORTISONE SODIUM SUCCINATE 100 MG/2ML
100 INJECTION INTRAMUSCULAR; INTRAVENOUS
Status: CANCELLED | OUTPATIENT
Start: 2025-03-25

## 2025-03-24 RX ORDER — EPINEPHRINE 1 MG/ML
0.3 INJECTION, SOLUTION INTRAMUSCULAR; SUBCUTANEOUS PRN
Status: CANCELLED | OUTPATIENT
Start: 2025-03-25

## 2025-03-24 RX ORDER — ONDANSETRON 2 MG/ML
8 INJECTION INTRAMUSCULAR; INTRAVENOUS
Status: CANCELLED | OUTPATIENT
Start: 2025-03-25

## 2025-03-24 RX ORDER — SODIUM CHLORIDE 9 MG/ML
5-250 INJECTION, SOLUTION INTRAVENOUS PRN
Status: DISCONTINUED | OUTPATIENT
Start: 2025-03-24 | End: 2025-03-25 | Stop reason: HOSPADM

## 2025-03-24 RX ORDER — DIPHENHYDRAMINE HYDROCHLORIDE 50 MG/ML
50 INJECTION, SOLUTION INTRAMUSCULAR; INTRAVENOUS
Status: CANCELLED | OUTPATIENT
Start: 2025-03-25

## 2025-03-24 RX ORDER — SODIUM CHLORIDE 9 MG/ML
INJECTION, SOLUTION INTRAVENOUS CONTINUOUS
Status: CANCELLED | OUTPATIENT
Start: 2025-03-25

## 2025-03-24 RX ADMIN — METHYLPREDNISOLONE SODIUM SUCCINATE 1000 MG: 500 INJECTION INTRAMUSCULAR; INTRAVENOUS at 12:25

## 2025-03-25 ENCOUNTER — HOSPITAL ENCOUNTER (OUTPATIENT)
Dept: NURSING | Facility: HOSPITAL | Age: 60
Setting detail: INFUSION SERIES
Discharge: HOME | End: 2025-03-27
Payer: MEDICARE

## 2025-03-25 DIAGNOSIS — T86.810: Primary | ICD-10-CM

## 2025-03-25 DIAGNOSIS — Z94.2 STATUS POST LUNG TRANSPLANTATION (HCC): ICD-10-CM

## 2025-03-25 PROCEDURE — 2580000003 HC RX 258: Performed by: INTERNAL MEDICINE

## 2025-03-25 PROCEDURE — 96365 THER/PROPH/DIAG IV INF INIT: CPT | Performed by: NURSE PRACTITIONER

## 2025-03-25 PROCEDURE — 6360000002 HC RX W HCPCS: Performed by: INTERNAL MEDICINE

## 2025-03-25 PROCEDURE — 96374 THER/PROPH/DIAG INJ IV PUSH: CPT | Performed by: NURSE PRACTITIONER

## 2025-03-25 RX ORDER — ALBUTEROL SULFATE 0.83 MG/ML
2.5 SOLUTION RESPIRATORY (INHALATION)
OUTPATIENT
Start: 2025-03-26

## 2025-03-25 RX ORDER — SODIUM CHLORIDE 0.9 % (FLUSH) 0.9 %
5-40 SYRINGE (ML) INJECTION PRN
Status: ACTIVE | OUTPATIENT
Start: 2025-03-25 | End: 2025-03-26

## 2025-03-25 RX ORDER — HYDROCORTISONE SODIUM SUCCINATE 100 MG/2ML
100 INJECTION INTRAMUSCULAR; INTRAVENOUS
OUTPATIENT
Start: 2025-03-26

## 2025-03-25 RX ORDER — SODIUM CHLORIDE 0.9 % (FLUSH) 0.9 %
5-40 SYRINGE (ML) INJECTION PRN
Status: CANCELLED | OUTPATIENT
Start: 2025-03-26

## 2025-03-25 RX ORDER — DIPHENHYDRAMINE HYDROCHLORIDE 50 MG/ML
50 INJECTION, SOLUTION INTRAMUSCULAR; INTRAVENOUS
OUTPATIENT
Start: 2025-03-26

## 2025-03-25 RX ORDER — SODIUM CHLORIDE 9 MG/ML
5-250 INJECTION, SOLUTION INTRAVENOUS PRN
Status: CANCELLED | OUTPATIENT
Start: 2025-03-26

## 2025-03-25 RX ORDER — ACETAMINOPHEN 325 MG/1
650 TABLET ORAL
OUTPATIENT
Start: 2025-03-26

## 2025-03-25 RX ORDER — SODIUM CHLORIDE 9 MG/ML
5-250 INJECTION, SOLUTION INTRAVENOUS PRN
Status: ACTIVE | OUTPATIENT
Start: 2025-03-25 | End: 2025-03-26

## 2025-03-25 RX ORDER — SODIUM CHLORIDE 9 MG/ML
INJECTION, SOLUTION INTRAVENOUS CONTINUOUS
OUTPATIENT
Start: 2025-03-26

## 2025-03-25 RX ORDER — EPINEPHRINE 1 MG/ML
0.3 INJECTION, SOLUTION INTRAMUSCULAR; SUBCUTANEOUS PRN
OUTPATIENT
Start: 2025-03-26

## 2025-03-25 RX ORDER — ONDANSETRON 2 MG/ML
8 INJECTION INTRAMUSCULAR; INTRAVENOUS
OUTPATIENT
Start: 2025-03-26

## 2025-03-25 RX ADMIN — METHYLPREDNISOLONE SODIUM SUCCINATE 1000 MG: 500 INJECTION INTRAMUSCULAR; INTRAVENOUS at 16:51

## 2025-03-26 ENCOUNTER — HOSPITAL ENCOUNTER (OUTPATIENT)
Dept: NURSING | Facility: HOSPITAL | Age: 60
Setting detail: INFUSION SERIES
Discharge: HOME | End: 2025-03-26

## 2025-03-26 DIAGNOSIS — T86.810: Primary | ICD-10-CM

## 2025-03-26 DIAGNOSIS — Z94.2 STATUS POST LUNG TRANSPLANTATION (HCC): ICD-10-CM

## 2025-03-26 RX ORDER — SODIUM CHLORIDE 9 MG/ML
5-250 INJECTION, SOLUTION INTRAVENOUS PRN
OUTPATIENT
Start: 2025-03-26

## 2025-03-26 RX ORDER — SODIUM CHLORIDE 0.9 % (FLUSH) 0.9 %
5-40 SYRINGE (ML) INJECTION PRN
Status: DISCONTINUED | OUTPATIENT
Start: 2025-03-26 | End: 2025-03-27 | Stop reason: HOSPADM

## 2025-03-26 RX ORDER — SODIUM CHLORIDE 9 MG/ML
5-250 INJECTION, SOLUTION INTRAVENOUS PRN
Status: DISCONTINUED | OUTPATIENT
Start: 2025-03-26 | End: 2025-03-27 | Stop reason: HOSPADM

## 2025-03-26 RX ORDER — SODIUM CHLORIDE 9 MG/ML
INJECTION, SOLUTION INTRAVENOUS CONTINUOUS
OUTPATIENT
Start: 2025-03-26

## 2025-03-26 RX ORDER — DIPHENHYDRAMINE HYDROCHLORIDE 50 MG/ML
50 INJECTION, SOLUTION INTRAMUSCULAR; INTRAVENOUS
OUTPATIENT
Start: 2025-03-26

## 2025-03-26 RX ORDER — ALBUTEROL SULFATE 0.83 MG/ML
2.5 SOLUTION RESPIRATORY (INHALATION)
OUTPATIENT
Start: 2025-03-26

## 2025-03-26 RX ORDER — HYDROCORTISONE SODIUM SUCCINATE 100 MG/2ML
100 INJECTION INTRAMUSCULAR; INTRAVENOUS
OUTPATIENT
Start: 2025-03-26

## 2025-03-26 RX ORDER — EPINEPHRINE 1 MG/ML
0.3 INJECTION, SOLUTION INTRAMUSCULAR; SUBCUTANEOUS PRN
OUTPATIENT
Start: 2025-03-26

## 2025-03-26 RX ORDER — ACETAMINOPHEN 325 MG/1
650 TABLET ORAL
OUTPATIENT
Start: 2025-03-26

## 2025-03-26 RX ORDER — ONDANSETRON 2 MG/ML
8 INJECTION INTRAMUSCULAR; INTRAVENOUS
OUTPATIENT
Start: 2025-03-26

## 2025-03-26 RX ORDER — SODIUM CHLORIDE 0.9 % (FLUSH) 0.9 %
5-40 SYRINGE (ML) INJECTION PRN
OUTPATIENT
Start: 2025-03-26

## 2025-03-27 ENCOUNTER — HOSPITAL ENCOUNTER (OUTPATIENT)
Dept: NURSING | Facility: HOSPITAL | Age: 60
Setting detail: INFUSION SERIES
Discharge: HOME | End: 2025-03-29
Payer: MEDICARE

## 2025-03-27 VITALS
DIASTOLIC BLOOD PRESSURE: 90 MMHG | OXYGEN SATURATION: 94 % | TEMPERATURE: 98.1 F | SYSTOLIC BLOOD PRESSURE: 138 MMHG | HEART RATE: 88 BPM | RESPIRATION RATE: 18 BRPM

## 2025-03-27 DIAGNOSIS — Z94.2 STATUS POST LUNG TRANSPLANTATION (HCC): ICD-10-CM

## 2025-03-27 DIAGNOSIS — T86.810: ICD-10-CM

## 2025-03-27 PROCEDURE — 96365 THER/PROPH/DIAG IV INF INIT: CPT | Performed by: NURSE PRACTITIONER

## 2025-03-27 PROCEDURE — 2580000003 HC RX 258: Performed by: INTERNAL MEDICINE

## 2025-03-27 PROCEDURE — 6360000002 HC RX W HCPCS: Performed by: INTERNAL MEDICINE

## 2025-03-27 RX ORDER — SODIUM CHLORIDE 0.9 % (FLUSH) 0.9 %
5-40 SYRINGE (ML) INJECTION PRN
Status: ACTIVE | OUTPATIENT
Start: 2025-03-27 | End: 2025-03-28

## 2025-03-27 RX ORDER — SODIUM CHLORIDE 9 MG/ML
5-250 INJECTION, SOLUTION INTRAVENOUS PRN
Status: ACTIVE | OUTPATIENT
Start: 2025-03-27 | End: 2025-03-28

## 2025-03-27 RX ADMIN — SODIUM CHLORIDE 1000 MG: 9 INJECTION, SOLUTION INTRAVENOUS at 12:21

## 2025-03-31 ENCOUNTER — APPOINTMENT (OUTPATIENT)
Dept: GENERAL RADIOLOGY | Facility: HOSPITAL | Age: 60
End: 2025-03-31
Attending: EMERGENCY MEDICINE
Payer: MEDICARE

## 2025-03-31 ENCOUNTER — HOSPITAL ENCOUNTER (EMERGENCY)
Facility: HOSPITAL | Age: 60
Discharge: ANOTHER HOSPITAL | End: 2025-03-31
Attending: EMERGENCY MEDICINE
Payer: MEDICARE

## 2025-03-31 VITALS
RESPIRATION RATE: 19 BRPM | HEIGHT: 65 IN | SYSTOLIC BLOOD PRESSURE: 87 MMHG | OXYGEN SATURATION: 96 % | DIASTOLIC BLOOD PRESSURE: 52 MMHG | TEMPERATURE: 98.5 F | BODY MASS INDEX: 32.09 KG/M2 | WEIGHT: 192.6 LBS | HEART RATE: 77 BPM

## 2025-03-31 DIAGNOSIS — I50.9 ACUTE CONGESTIVE HEART FAILURE, UNSPECIFIED HEART FAILURE TYPE (HCC): ICD-10-CM

## 2025-03-31 DIAGNOSIS — I20.0 UNSTABLE ANGINA (HCC): Primary | ICD-10-CM

## 2025-03-31 LAB
ALBUMIN SERPL-MCNC: 4 G/DL (ref 3.4–4.8)
ALBUMIN/GLOB SERPL: 1.7 {RATIO} (ref 0.8–2)
ALP SERPL-CCNC: 175 U/L (ref 25–100)
ALT SERPL-CCNC: 22 U/L (ref 4–36)
ANION GAP SERPL CALCULATED.3IONS-SCNC: 11 MMOL/L (ref 3–16)
AST SERPL-CCNC: 23 U/L (ref 8–33)
BASOPHILS # BLD: 0 K/UL (ref 0–0.1)
BASOPHILS NFR BLD: 0.1 %
BILIRUB SERPL-MCNC: 0.6 MG/DL (ref 0.3–1.2)
BUN SERPL-MCNC: 21 MG/DL (ref 6–20)
CALCIUM SERPL-MCNC: 9.1 MG/DL (ref 8.3–10.6)
CHLORIDE SERPL-SCNC: 101 MMOL/L (ref 98–107)
CO2 SERPL-SCNC: 27 MMOL/L (ref 20–30)
CREAT SERPL-MCNC: 1.3 MG/DL (ref 0.4–1.2)
EOSINOPHIL # BLD: 0.1 K/UL (ref 0–0.4)
EOSINOPHIL NFR BLD: 1 %
ERYTHROCYTE [DISTWIDTH] IN BLOOD BY AUTOMATED COUNT: 13.6 % (ref 11–16)
GFR SERPLBLD CREATININE-BSD FMLA CKD-EPI: 47 ML/MIN/{1.73_M2}
GLOBULIN SER CALC-MCNC: 2.4 G/DL
GLUCOSE SERPL-MCNC: 126 MG/DL (ref 74–106)
HCT VFR BLD AUTO: 34.6 % (ref 37–47)
HGB BLD-MCNC: 10.7 G/DL (ref 11.5–16.5)
IMM GRANULOCYTES # BLD: 0.2 K/UL
IMM GRANULOCYTES NFR BLD: 2.1 % (ref 0–5)
INR PPP: 1.15 (ref 0.86–1.14)
LYMPHOCYTES # BLD: 0.4 K/UL (ref 1.5–4)
LYMPHOCYTES NFR BLD: 5.3 %
MCH RBC QN AUTO: 29.5 PG (ref 27–32)
MCHC RBC AUTO-ENTMCNC: 30.9 G/DL (ref 31–35)
MCV RBC AUTO: 95.3 FL (ref 80–100)
MONOCYTES # BLD: 0.7 K/UL (ref 0.2–0.8)
MONOCYTES NFR BLD: 8.7 %
NEUTROPHILS # BLD: 6.7 K/UL (ref 2–7.5)
NEUTS SEG NFR BLD: 82.8 %
NT-PROBNP SERPL-MCNC: 1241 PG/ML (ref 0–1800)
PLATELET # BLD AUTO: 182 K/UL (ref 150–400)
PMV BLD AUTO: 11.2 FL (ref 6–10)
POTASSIUM SERPL-SCNC: 4.3 MMOL/L (ref 3.4–5.1)
PROT SERPL-MCNC: 6.4 G/DL (ref 6.4–8.3)
PROTHROMBIN TIME: 14.9 SEC (ref 12–14.1)
RBC # BLD AUTO: 3.63 M/UL (ref 3.8–5.8)
SODIUM SERPL-SCNC: 139 MMOL/L (ref 136–145)
TROPONIN, HIGH SENSITIVITY: 18 NG/L (ref 0–14)
TROPONIN, HIGH SENSITIVITY: 20 NG/L (ref 0–14)
WBC # BLD AUTO: 8.1 K/UL (ref 4–11)

## 2025-03-31 PROCEDURE — 51702 INSERT TEMP BLADDER CATH: CPT

## 2025-03-31 PROCEDURE — 83880 ASSAY OF NATRIURETIC PEPTIDE: CPT

## 2025-03-31 PROCEDURE — 96374 THER/PROPH/DIAG INJ IV PUSH: CPT

## 2025-03-31 PROCEDURE — 80053 COMPREHEN METABOLIC PANEL: CPT

## 2025-03-31 PROCEDURE — 85610 PROTHROMBIN TIME: CPT

## 2025-03-31 PROCEDURE — 6360000002 HC RX W HCPCS: Performed by: EMERGENCY MEDICINE

## 2025-03-31 PROCEDURE — 99285 EMERGENCY DEPT VISIT HI MDM: CPT

## 2025-03-31 PROCEDURE — 6370000000 HC RX 637 (ALT 250 FOR IP): Performed by: EMERGENCY MEDICINE

## 2025-03-31 PROCEDURE — 36415 COLL VENOUS BLD VENIPUNCTURE: CPT

## 2025-03-31 PROCEDURE — 85025 COMPLETE CBC W/AUTO DIFF WBC: CPT

## 2025-03-31 PROCEDURE — 93005 ELECTROCARDIOGRAM TRACING: CPT

## 2025-03-31 PROCEDURE — 71045 X-RAY EXAM CHEST 1 VIEW: CPT

## 2025-03-31 PROCEDURE — 84484 ASSAY OF TROPONIN QUANT: CPT

## 2025-03-31 RX ORDER — CLOPIDOGREL BISULFATE 75 MG/1
75 TABLET ORAL ONCE
Status: COMPLETED | OUTPATIENT
Start: 2025-03-31 | End: 2025-03-31

## 2025-03-31 RX ORDER — FUROSEMIDE 10 MG/ML
40 INJECTION INTRAMUSCULAR; INTRAVENOUS ONCE
Status: COMPLETED | OUTPATIENT
Start: 2025-03-31 | End: 2025-03-31

## 2025-03-31 RX ADMIN — NITROGLYCERIN 0.5 INCH: 20 OINTMENT TOPICAL at 15:28

## 2025-03-31 RX ADMIN — CLOPIDOGREL BISULFATE 75 MG: 75 TABLET ORAL at 16:05

## 2025-03-31 RX ADMIN — FUROSEMIDE 40 MG: 10 INJECTION, SOLUTION INTRAMUSCULAR; INTRAVENOUS at 17:12

## 2025-03-31 ASSESSMENT — PAIN SCALES - GENERAL
PAINLEVEL_OUTOF10: 3
PAINLEVEL_OUTOF10: 3
PAINLEVEL_OUTOF10: 7

## 2025-03-31 ASSESSMENT — PAIN DESCRIPTION - LOCATION
LOCATION: CHEST
LOCATION: CHEST

## 2025-03-31 ASSESSMENT — LIFESTYLE VARIABLES
HOW OFTEN DO YOU HAVE A DRINK CONTAINING ALCOHOL: NEVER
HOW MANY STANDARD DRINKS CONTAINING ALCOHOL DO YOU HAVE ON A TYPICAL DAY: PATIENT DOES NOT DRINK

## 2025-03-31 ASSESSMENT — PAIN DESCRIPTION - DESCRIPTORS
DESCRIPTORS: TIGHTNESS
DESCRIPTORS: TIGHTNESS;ACHING

## 2025-03-31 NOTE — ED NOTES
[facility] called back to say that both aircraft have declined flight due to weather. She will check with PHI and call us back. EMMA, RRT

## 2025-03-31 NOTE — ED NOTES
Spoke with [facility], aware that air transport has declined, Emily EMT-P supervisor, advised there is a pending transport then will be able to transport pt.

## 2025-03-31 NOTE — ED TRIAGE NOTES
Pt Alert and Oriented x 4 .   Pt reports that she was admitted to [facility] on 3/37, from ER for CP and SOA. Had cardiac stents x 2 placed during admission.   Hx Bilateral Lung transplant in 2023.    Reports yesterday on the way home form hospital pt had episode of SOA and nausea but related to anxiety. But she was tired and went to bed as soon as she got home.     Reports woke up this morning with worsening SOA and AYALA, upper chest tightness with exertion.

## 2025-03-31 NOTE — ED NOTES
[facility] called back to say that [facility] declined as well. [employer] [facility] will call back to check weather in around 3 hours. DW, RRT

## 2025-03-31 NOTE — ED PROVIDER NOTES
EMERGENCY DEPARTMENT ENCOUNTER     [facility] EMERGENCY DEPARTMENT     Pt Name: Mitzi Higuera   MRN: [id number]   Birthdate 1965   Date of evaluation: 3/31/2025   Provider: KULDIP PHELPS MD   PCP: Janis Daily APRN - CNP   Note Started: 4:34 PM EDT 3/31/25     CHIEF COMPLAINT     Chief Complaint   Patient presents with    Shortness of Breath        HISTORY OF PRESENT ILLNESS:  History from : Patient   Limitations to history : None     Mitzi Higuera is a 59 y.o. female who presents with chest pain and shortness of breath.  The patient was released from the hospital yesterday after having unstable angina and having PCI of 2 vessels at [facility].  The patient had a bilateral lung transplant in  and follows there for her care.  Yesterday upon release she has been feeling short of breath with exertion chest pressure with exertion even some more minor chest pressure at rest.  The pain is similar just not as severe as just before her PCI.    Nursing Notes were all reviewed and agreed with or any disagreements were addressed in the HPI.     ROS: Positives and Pertinent negatives as per HPI.    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    Past surgical history:  has a past surgical history that includes  section; Gallbladder surgery; Hysterectomy; Breast biopsy (Right); Lung transplant, double (Bilateral); Cholecystectomy; and Coronary angioplasty with stent (2025).      PHYSICAL EXAM:  ED Triage Vitals   BP Systolic BP Percentile Diastolic BP Percentile Temp Temp Source Pulse

## 2025-03-31 NOTE — ED NOTES
[facility] called at this time, talked with Sheryl, to call [facility] for hospitalist and that Dr Hensley cardiologist has accepted patient after speaking with Dr Bryson.

## 2025-04-01 NOTE — ED NOTES
Pt resting, no distress. Pt drinking milkshake family brought in. Pt updated on wait time. Pt states understanding and is very cooperative

## 2025-04-01 NOTE — ED NOTES
Ems arrived to transport pt. Belongings placed in the ambulance. Pt flaco moving from stretcher to gurney with some soa during exertion. Pt recovered quickly and is talking and laughing with the staff. Pt out of facility

## 2025-04-18 ENCOUNTER — HOSPITAL ENCOUNTER (OUTPATIENT)
Facility: HOSPITAL | Age: 60
Discharge: HOME | End: 2025-04-18
Payer: MEDICARE

## 2025-04-18 LAB
ALBUMIN SERPL-MCNC: 3.7 G/DL (ref 3.4–4.8)
ALBUMIN/GLOB SERPL: 1.7 {RATIO} (ref 0.8–2)
ALP SERPL-CCNC: 180 U/L (ref 25–100)
ALT SERPL-CCNC: 19 U/L (ref 4–36)
ANION GAP SERPL CALCULATED.3IONS-SCNC: 8 MMOL/L (ref 3–16)
AST SERPL-CCNC: 24 U/L (ref 8–33)
BASOPHILS # BLD: 0 K/UL (ref 0–0.1)
BASOPHILS NFR BLD: 0.4 %
BILIRUB SERPL-MCNC: 0.4 MG/DL (ref 0.3–1.2)
BUN SERPL-MCNC: 23 MG/DL (ref 6–20)
CALCIUM SERPL-MCNC: 9.5 MG/DL (ref 8.3–10.6)
CHLORIDE SERPL-SCNC: 101 MMOL/L (ref 98–107)
CHOLEST SERPL-MCNC: 103 MG/DL (ref 0–200)
CK SERPL-CCNC: 26 U/L (ref 26–174)
CO2 SERPL-SCNC: 33 MMOL/L (ref 20–30)
CREAT SERPL-MCNC: 1.2 MG/DL (ref 0.4–1.2)
EOSINOPHIL # BLD: 0.1 K/UL (ref 0–0.4)
EOSINOPHIL NFR BLD: 2.9 %
ERYTHROCYTE [DISTWIDTH] IN BLOOD BY AUTOMATED COUNT: 15.9 % (ref 11–16)
GFR SERPLBLD CREATININE-BSD FMLA CKD-EPI: 52 ML/MIN/{1.73_M2}
GLOBULIN SER CALC-MCNC: 2.2 G/DL
GLUCOSE SERPL-MCNC: 95 MG/DL (ref 74–106)
HBA1C MFR BLD: 5.6 %
HCT VFR BLD AUTO: 29.7 % (ref 37–47)
HDLC SERPL-MCNC: 49 MG/DL (ref 40–60)
HGB BLD-MCNC: 9.3 G/DL (ref 11.5–16.5)
IMM GRANULOCYTES # BLD: 0 K/UL
IMM GRANULOCYTES NFR BLD: 1.1 % (ref 0–5)
LDLC SERPL CALC-MCNC: 37 MG/DL
LYMPHOCYTES # BLD: 0.6 K/UL (ref 1.5–4)
LYMPHOCYTES NFR BLD: 20.4 %
MAGNESIUM SERPL-MCNC: 2.2 MG/DL (ref 1.7–2.4)
MCH RBC QN AUTO: 30.4 PG (ref 27–32)
MCHC RBC AUTO-ENTMCNC: 31.3 G/DL (ref 31–35)
MCV RBC AUTO: 97.1 FL (ref 80–100)
MONOCYTES # BLD: 0.4 K/UL (ref 0.2–0.8)
MONOCYTES NFR BLD: 14.3 %
NEUTROPHILS # BLD: 1.7 K/UL (ref 2–7.5)
NEUTS SEG NFR BLD: 60.9 %
PLATELET # BLD AUTO: 234 K/UL (ref 150–400)
PMV BLD AUTO: 10.6 FL (ref 6–10)
POTASSIUM SERPL-SCNC: 4 MMOL/L (ref 3.4–5.1)
PROT SERPL-MCNC: 5.9 G/DL (ref 6.4–8.3)
RBC # BLD AUTO: 3.06 M/UL (ref 3.8–5.8)
SODIUM SERPL-SCNC: 142 MMOL/L (ref 136–145)
TRIGL SERPL-MCNC: 87 MG/DL (ref 0–249)
TSH SERPL DL<=0.005 MIU/L-ACNC: 0.16 UIU/ML (ref 0.27–4.2)
URATE SERPL-MCNC: 8.4 MG/DL (ref 2.5–7.1)
VLDLC SERPL CALC-MCNC: 17 MG/DL
WBC # BLD AUTO: 2.8 K/UL (ref 4–11)

## 2025-04-18 PROCEDURE — 83036 HEMOGLOBIN GLYCOSYLATED A1C: CPT

## 2025-04-18 PROCEDURE — 83735 ASSAY OF MAGNESIUM: CPT

## 2025-04-18 PROCEDURE — 80197 ASSAY OF TACROLIMUS: CPT

## 2025-04-18 PROCEDURE — 84550 ASSAY OF BLOOD/URIC ACID: CPT

## 2025-04-18 PROCEDURE — 82550 ASSAY OF CK (CPK): CPT

## 2025-04-18 PROCEDURE — 80061 LIPID PANEL: CPT

## 2025-04-18 PROCEDURE — 80053 COMPREHEN METABOLIC PANEL: CPT

## 2025-04-18 PROCEDURE — 36415 COLL VENOUS BLD VENIPUNCTURE: CPT

## 2025-04-18 PROCEDURE — 84443 ASSAY THYROID STIM HORMONE: CPT

## 2025-04-18 PROCEDURE — 85025 COMPLETE CBC W/AUTO DIFF WBC: CPT

## 2025-04-19 LAB — TACROLIMUS BLOOD: 6.6 NG/ML (ref 5–20)

## 2025-04-22 LAB
CMV DNA SERPL NAA+PROBE-ACNC: 46 IU/ML
CMV DNA SERPL NAA+PROBE-LOG IU: 1.66 LOG IU/ML
CMV DNA SERPL QL NAA+PROBE: DETECTED

## 2025-05-02 ENCOUNTER — APPOINTMENT (OUTPATIENT)
Dept: GENERAL RADIOLOGY | Facility: HOSPITAL | Age: 60
End: 2025-05-02
Attending: EMERGENCY MEDICINE
Payer: MEDICARE

## 2025-05-02 ENCOUNTER — APPOINTMENT (OUTPATIENT)
Dept: CT IMAGING | Facility: HOSPITAL | Age: 60
End: 2025-05-02
Attending: EMERGENCY MEDICINE
Payer: MEDICARE

## 2025-05-02 ENCOUNTER — HOSPITAL ENCOUNTER (EMERGENCY)
Facility: HOSPITAL | Age: 60
Discharge: ANOTHER HOSPITAL | End: 2025-05-02
Attending: EMERGENCY MEDICINE
Payer: MEDICARE

## 2025-05-02 VITALS
TEMPERATURE: 99.6 F | RESPIRATION RATE: 20 BRPM | WEIGHT: 192 LBS | DIASTOLIC BLOOD PRESSURE: 64 MMHG | HEIGHT: 65 IN | SYSTOLIC BLOOD PRESSURE: 112 MMHG | HEART RATE: 111 BPM | OXYGEN SATURATION: 90 % | BODY MASS INDEX: 31.99 KG/M2

## 2025-05-02 DIAGNOSIS — I48.92 ATRIAL FLUTTER, UNSPECIFIED TYPE (HCC): ICD-10-CM

## 2025-05-02 DIAGNOSIS — K52.9 COLITIS: ICD-10-CM

## 2025-05-02 DIAGNOSIS — A41.9 SEPSIS, DUE TO UNSPECIFIED ORGANISM, UNSPECIFIED WHETHER ACUTE ORGAN DYSFUNCTION PRESENT (HCC): Primary | ICD-10-CM

## 2025-05-02 DIAGNOSIS — N30.00 ACUTE CYSTITIS WITHOUT HEMATURIA: ICD-10-CM

## 2025-05-02 LAB
ALBUMIN SERPL-MCNC: 3.5 G/DL (ref 3.4–4.8)
ALBUMIN/GLOB SERPL: 1.5 {RATIO} (ref 0.8–2)
ALP SERPL-CCNC: 194 U/L (ref 25–100)
ALT SERPL-CCNC: 11 U/L (ref 4–36)
ANION GAP SERPL CALCULATED.3IONS-SCNC: 12 MMOL/L (ref 3–16)
AST SERPL-CCNC: 17 U/L (ref 8–33)
BACTERIA URNS QL MICRO: ABNORMAL /HPF
BASOPHILS # BLD: 0 K/UL (ref 0–0.1)
BASOPHILS NFR BLD: 0.3 %
BILIRUB SERPL-MCNC: 0.4 MG/DL (ref 0.3–1.2)
BILIRUB UR QL STRIP.AUTO: NEGATIVE
BUN SERPL-MCNC: 15 MG/DL (ref 6–20)
CALCIUM SERPL-MCNC: 8.9 MG/DL (ref 8.3–10.6)
CHLORIDE SERPL-SCNC: 100 MMOL/L (ref 98–107)
CLARITY UR: CLEAR
CO2 SERPL-SCNC: 26 MMOL/L (ref 20–30)
COLOR UR: YELLOW
CREAT SERPL-MCNC: 1.5 MG/DL (ref 0.4–1.2)
EOSINOPHIL # BLD: 0 K/UL (ref 0–0.4)
EOSINOPHIL NFR BLD: 0.3 %
EPI CELLS #/AREA URNS HPF: ABNORMAL /HPF (ref 0–5)
ERYTHROCYTE [DISTWIDTH] IN BLOOD BY AUTOMATED COUNT: 16 % (ref 11–16)
GFR SERPLBLD CREATININE-BSD FMLA CKD-EPI: 39 ML/MIN/{1.73_M2}
GLOBULIN SER CALC-MCNC: 2.4 G/DL
GLUCOSE SERPL-MCNC: 181 MG/DL (ref 74–106)
GLUCOSE UR STRIP.AUTO-MCNC: NEGATIVE MG/DL
HCT VFR BLD AUTO: 29.7 % (ref 37–47)
HGB BLD-MCNC: 8.9 G/DL (ref 11.5–16.5)
HGB UR QL STRIP.AUTO: ABNORMAL
IMM GRANULOCYTES # BLD: 0.1 K/UL
IMM GRANULOCYTES NFR BLD: 2.6 % (ref 0–5)
INR PPP: 1.22 (ref 0.86–1.14)
KETONES UR STRIP.AUTO-MCNC: NEGATIVE MG/DL
LACTATE BLDV-SCNC: 1.8 MMOL/L (ref 0.4–1.9)
LEUKOCYTE ESTERASE UR QL STRIP.AUTO: ABNORMAL
LYMPHOCYTES # BLD: 0.2 K/UL (ref 1.5–4)
LYMPHOCYTES NFR BLD: 7.8 %
MAGNESIUM SERPL-MCNC: 1.9 MG/DL (ref 1.7–2.4)
MCH RBC QN AUTO: 30.3 PG (ref 27–32)
MCHC RBC AUTO-ENTMCNC: 30 G/DL (ref 31–35)
MCV RBC AUTO: 101 FL (ref 80–100)
MONOCYTES # BLD: 0.2 K/UL (ref 0.2–0.8)
MONOCYTES NFR BLD: 7.5 %
NEUTROPHILS # BLD: 2.5 K/UL (ref 2–7.5)
NEUTS SEG NFR BLD: 81.5 %
NITRITE UR QL STRIP.AUTO: NEGATIVE
PH UR STRIP.AUTO: 7 [PH] (ref 5–8)
PLATELET # BLD AUTO: 191 K/UL (ref 150–400)
PMV BLD AUTO: 10.7 FL (ref 6–10)
POTASSIUM SERPL-SCNC: 3.7 MMOL/L (ref 3.4–5.1)
PROCALCITONIN SERPL IA-MCNC: 0.15 NG/ML (ref 0–0.15)
PROT SERPL-MCNC: 5.9 G/DL (ref 6.4–8.3)
PROT UR STRIP.AUTO-MCNC: NEGATIVE MG/DL
PROTHROMBIN TIME: 15.6 SEC (ref 12–14.1)
RBC # BLD AUTO: 2.94 M/UL (ref 3.8–5.8)
RBC #/AREA URNS HPF: ABNORMAL /HPF (ref 0–4)
SARS-COV-2 RDRP RESP QL NAA+PROBE: NOT DETECTED
SODIUM SERPL-SCNC: 138 MMOL/L (ref 136–145)
SP GR UR STRIP.AUTO: 1.02 (ref 1–1.03)
TROPONIN, HIGH SENSITIVITY: 14 NG/L (ref 0–14)
TROPONIN, HIGH SENSITIVITY: 16 NG/L (ref 0–14)
TSH SERPL DL<=0.005 MIU/L-ACNC: 0.21 UIU/ML (ref 0.27–4.2)
UA COMPLETE W REFLEX CULTURE PNL UR: YES
UA DIPSTICK W REFLEX MICRO PNL UR: YES
URN SPEC COLLECT METH UR: ABNORMAL
UROBILINOGEN UR STRIP-ACNC: 0.2 E.U./DL
WBC # BLD AUTO: 3.1 K/UL (ref 4–11)
WBC #/AREA URNS HPF: ABNORMAL /HPF (ref 0–5)

## 2025-05-02 PROCEDURE — 80053 COMPREHEN METABOLIC PANEL: CPT

## 2025-05-02 PROCEDURE — 84443 ASSAY THYROID STIM HORMONE: CPT

## 2025-05-02 PROCEDURE — 87040 BLOOD CULTURE FOR BACTERIA: CPT

## 2025-05-02 PROCEDURE — 87186 SC STD MICRODIL/AGAR DIL: CPT

## 2025-05-02 PROCEDURE — 81001 URINALYSIS AUTO W/SCOPE: CPT

## 2025-05-02 PROCEDURE — 85610 PROTHROMBIN TIME: CPT

## 2025-05-02 PROCEDURE — 87086 URINE CULTURE/COLONY COUNT: CPT

## 2025-05-02 PROCEDURE — 96375 TX/PRO/DX INJ NEW DRUG ADDON: CPT

## 2025-05-02 PROCEDURE — 6360000004 HC RX CONTRAST MEDICATION: Performed by: EMERGENCY MEDICINE

## 2025-05-02 PROCEDURE — 2580000003 HC RX 258: Performed by: EMERGENCY MEDICINE

## 2025-05-02 PROCEDURE — 87088 URINE BACTERIA CULTURE: CPT

## 2025-05-02 PROCEDURE — 6360000002 HC RX W HCPCS: Performed by: EMERGENCY MEDICINE

## 2025-05-02 PROCEDURE — 87635 SARS-COV-2 COVID-19 AMP PRB: CPT

## 2025-05-02 PROCEDURE — 85025 COMPLETE CBC W/AUTO DIFF WBC: CPT

## 2025-05-02 PROCEDURE — 83735 ASSAY OF MAGNESIUM: CPT

## 2025-05-02 PROCEDURE — 74177 CT ABD & PELVIS W/CONTRAST: CPT

## 2025-05-02 PROCEDURE — 96365 THER/PROPH/DIAG IV INF INIT: CPT

## 2025-05-02 PROCEDURE — 99285 EMERGENCY DEPT VISIT HI MDM: CPT

## 2025-05-02 PROCEDURE — 71045 X-RAY EXAM CHEST 1 VIEW: CPT

## 2025-05-02 PROCEDURE — 84145 PROCALCITONIN (PCT): CPT

## 2025-05-02 PROCEDURE — 83605 ASSAY OF LACTIC ACID: CPT

## 2025-05-02 PROCEDURE — 36415 COLL VENOUS BLD VENIPUNCTURE: CPT

## 2025-05-02 PROCEDURE — 84484 ASSAY OF TROPONIN QUANT: CPT

## 2025-05-02 PROCEDURE — 2580000003 HC RX 258

## 2025-05-02 RX ORDER — 0.9 % SODIUM CHLORIDE 0.9 %
500 INTRAVENOUS SOLUTION INTRAVENOUS ONCE
Status: COMPLETED | OUTPATIENT
Start: 2025-05-02 | End: 2025-05-02

## 2025-05-02 RX ORDER — SODIUM CHLORIDE 9 MG/ML
INJECTION, SOLUTION INTRAVENOUS CONTINUOUS
Status: DISCONTINUED | OUTPATIENT
Start: 2025-05-02 | End: 2025-05-02

## 2025-05-02 RX ORDER — MORPHINE SULFATE 4 MG/ML
4 INJECTION, SOLUTION INTRAMUSCULAR; INTRAVENOUS
Status: DISCONTINUED | OUTPATIENT
Start: 2025-05-02 | End: 2025-05-03 | Stop reason: HOSPADM

## 2025-05-02 RX ORDER — SODIUM CHLORIDE 9 MG/ML
INJECTION, SOLUTION INTRAVENOUS ONCE
Status: COMPLETED | OUTPATIENT
Start: 2025-05-02 | End: 2025-05-02

## 2025-05-02 RX ORDER — ONDANSETRON 2 MG/ML
4 INJECTION INTRAMUSCULAR; INTRAVENOUS EVERY 30 MIN PRN
Status: DISCONTINUED | OUTPATIENT
Start: 2025-05-02 | End: 2025-05-03 | Stop reason: HOSPADM

## 2025-05-02 RX ORDER — SODIUM CHLORIDE 9 MG/ML
INJECTION, SOLUTION INTRAVENOUS
Status: COMPLETED
Start: 2025-05-02 | End: 2025-05-02

## 2025-05-02 RX ORDER — IOPAMIDOL 755 MG/ML
100 INJECTION, SOLUTION INTRAVASCULAR
Status: COMPLETED | OUTPATIENT
Start: 2025-05-02 | End: 2025-05-02

## 2025-05-02 RX ADMIN — Medication 500 ML: at 23:15

## 2025-05-02 RX ADMIN — SODIUM CHLORIDE: 0.9 INJECTION, SOLUTION INTRAVENOUS at 18:26

## 2025-05-02 RX ADMIN — SODIUM CHLORIDE 500 ML: 9 INJECTION, SOLUTION INTRAVENOUS at 23:15

## 2025-05-02 RX ADMIN — IOPAMIDOL 100 ML: 755 INJECTION, SOLUTION INTRAVENOUS at 18:38

## 2025-05-02 RX ADMIN — ONDANSETRON 4 MG: 2 INJECTION, SOLUTION INTRAMUSCULAR; INTRAVENOUS at 20:36

## 2025-05-02 RX ADMIN — PIPERACILLIN AND TAZOBACTAM 3375 MG: 3; .375 INJECTION, POWDER, LYOPHILIZED, FOR SOLUTION INTRAVENOUS at 20:02

## 2025-05-02 RX ADMIN — MORPHINE SULFATE 4 MG: 4 INJECTION, SOLUTION INTRAMUSCULAR; INTRAVENOUS at 20:36

## 2025-05-02 ASSESSMENT — PAIN SCALES - GENERAL: PAINLEVEL_OUTOF10: 9

## 2025-05-02 ASSESSMENT — PAIN DESCRIPTION - LOCATION: LOCATION: BACK;FLANK

## 2025-05-02 ASSESSMENT — PAIN DESCRIPTION - DESCRIPTORS: DESCRIPTORS: STABBING

## 2025-05-02 ASSESSMENT — PAIN DESCRIPTION - ORIENTATION: ORIENTATION: LEFT

## 2025-05-02 NOTE — ED TRIAGE NOTES
Pt reports sharp 9/10 Left back/flank pain into LLQ x 1 weeks, worsening every day, today pain was worse, contact transplant nurse who referred pt to ER.   Reports with deep breath, has pain from mid back around both sides.     Pt reports dark colored urine, denies dysuria.   +nausea -vomiting  -fever/chills  +constipations, reports only small hard BMs x 1 week, reports good BM x3 since last night after taking OTC laxatives. normally has chronic diarrhea.  +weakness, fatigue, poor appetite, x3-4 days.

## 2025-05-02 NOTE — ED PROVIDER NOTES
[facility]  EMERGENCY DEPARTMENT ENCOUNTER        Pt Name: Mitzi Higuera  MRN: [id number]  Birthdate 1965  Date of evaluation: 5/2/2025  Provider: Radha Davalos MD  PCP: Janis Daily APRN - CNP  Note Started: 5:17 PM EDT 5/2/25    CHIEF COMPLAINT       No chief complaint on file.      HISTORY OF PRESENT ILLNESS: 1 or more Elements     History from : Patient    Limitations to history : None    Mitzi Higuera is a 59 y.o. female who presents to the emergency department with chief complaint of having lower abdominal pain and discomfort on the left lower quadrant for the last 1 week and worsening every day.  The pain has been worse today.  Patient has history of bilateral lung transplant done at [facility].  The patient contacted the transplant nurse who referred the patient to the emergency department.  Patient states the pain is worse with deep inspiration and it radiates to the back on both sides.  Denies having fever but feels cold chills.  Complains of having dark urine but denies having dysuria.  Complains of nausea but denies vomiting.  Complains of feeling fatigue and weak.  Has not had good appetite for the last 3 to 4 days.  Patient has history of chronic constipation.  Took some over-the-counter laxative and has been having small amount of bowel movement for the last few days.    Nursing Notes were all reviewed and agreed with or any disagreements were addressed in the HPI.    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

## 2025-05-03 NOTE — ED NOTES
[facility] arrived to transport pt to [place]. Pt talkative and appropriate. Laughing with staff. Pt understands plan to transfer. Manual bp 112/64

## 2025-05-03 NOTE — ED PROVIDER NOTES
Emergency Department Encounter  Location: [facility] EMERGENCY DEPARTMENT    Patient: Mitzi Higuera  MRN: [id number]  : 1965  Date of evaluation: 2025  ED Provider: Eric Serrano MD    19:00  Mitzi Higuera was checked out to me by dr Radha Davalos. Please see his/her initial documentation for details of the patient's initial ED presentation, physical exam and completed studies.    In brief, Mitzi Higuera is a 59 y.o. female that presented to the emergency department with left lower quadrant abdominal pain for 1 week, subjective fever, chills, nausea and vomiting, worse today.  Patient called her transplant team at [facility] today who advised her to go to the closest emergency room for further workup.  At time of me taking over the shift from dayshift physician there was a CT scan abdomen/pelvis with IV contrast that was pending reading.    I have reviewed and interpreted all of the currently available lab results and diagnostics from this visit:  Results for orders placed or performed during the hospital encounter of 25   COVID-19, Rapid    Specimen: Nasopharyngeal Swab   Result Value Ref Range    SARS-CoV-2, NAAT Not Detected Not Detected   CBC with Auto Differential   Result Value Ref Range    WBC 3.1 (L) 4.0 - 11.0 K/uL    RBC 2.94 (L) 3.80 - 5.80 M/uL    Hemoglobin 8.9 (L) 11.5 - 16.5 g/dL    Hematocrit 29.7 (L) 37.0 - 47.0 %    .0 (H) 80.0 - 100.0 fL    MCH 30.3 27.0 - 32.0 pg    MCHC 30.0 (L) 31.0 - 35.0 g/dL    RDW 16.0 11.0 - 16.0 %    Platelets 191 150 - 400 K/uL    MPV 10.7 (H) 6.0 - 10.0 fL    Neutrophils % 81.5 %    Immature Granulocytes % 2.6 0.0 - 5.0 %    Lymphocytes % 7.8 %    Monocytes % 7.5 %    Eosinophils % 0.3 %    Basophils % 0.3 %    Neutrophils Absolute 2.5 2.0 - 7.5 K/uL    Immature Granulocytes # 0.1 K/uL    Lymphocytes Absolute 0.2 (L) 1.5 - 4.0 K/uL    Monocytes Absolute 0.2 0.2 - 0.8 K/uL    Eosinophils Absolute 0.0 0.0 - 0.4  for at        least two readings in the first        hour after fluid bolus        administration      [] Vasopressors initiated (if hypotension persists after fluid resuscitation)        [] No criteria met for Septic Shock.   Patient Vitals for the past 6 hrs:   BP Pulse Resp SpO2   05/02/25 1715 93/71 (!) 113 23 91 %   05/02/25 1730 97/67 (!) 112 16 93 %   05/02/25 1745 -- (!) 132 13 96 %   05/02/25 1750 (!) 99/56 (!) 108 26 97 %   05/02/25 1800 (!) 76/62 (!) 110 27 93 %   05/02/25 1815 91/70 (!) 118 29 93 %   05/02/25 2146 -- (!) 115 18 92 %   05/02/25 2156 105/81 (!) 132 24 95 %   05/02/25 2206 -- (!) 127 30 93 %   05/02/25 2216 107/73 (!) 131 26 93 %   05/02/25 2226 98/69 (!) 132 28 90 %      Recent Labs     05/02/25  1655   WBC 3.1*   CREATININE 1.5*   BILITOT 0.4   INR 1.22*            Time  sepsis  Identified: 16:55    Fluid Resuscitation Rational: at least 30mL/kg based on entered actual weight at time of triage.  Patient only received NS at 150ml/hr (ordered by dr May on day shift).  Patient had myocardial infarction on 3/331 when she was transferred to [facility] which she required deployment of 2 cardiac stents.  At the time she was also diagnosed with CHF.      Repeat lactate level: ordered and pending at this time    Reassessment Exam:   Not applicable. Patient does not have septic shock.      ED Medication Orders (From admission, onward)      Start Ordered     Status Ordering Provider    05/02/25 2000 05/02/25 2000  ondansetron (ZOFRAN) injection 4 mg  EVERY 30 MIN PRN         Last MAR action: Given - by CRIS LIMA on 05/02/25 at 2036 SILVIA MAY    05/02/25 2000 05/02/25 2000  morphine sulfate (PF) injection 4 mg  EVERY 3 HOURS PRN         Last MAR action: Given - by CRIS LIMA on 05/02/25 at 2036 SILVIA MAY    05/02/25 1930 05/02/25 1928  piperacillin-tazobactam (ZOSYN) 3,375 mg in sodium chloride 0.9 % 50 mL IVPB (addEASE)  EVERY 6 HOURS        Question

## 2025-05-04 LAB
BACTERIA BLD CULT ORG #2: NORMAL
BACTERIA BLD CULT: NORMAL
BACTERIA UR CULT: ABNORMAL
ORGANISM: ABNORMAL

## 2025-05-05 LAB
BACTERIA UR CULT: ABNORMAL
ORGANISM: ABNORMAL

## 2025-05-07 LAB
BACTERIA BLD CULT ORG #2: NORMAL
BACTERIA BLD CULT: NORMAL

## 2025-05-21 ENCOUNTER — HOSPITAL ENCOUNTER (OUTPATIENT)
Facility: HOSPITAL | Age: 60
Discharge: HOME | End: 2025-05-21
Payer: MEDICARE

## 2025-05-21 LAB
ALBUMIN SERPL-MCNC: 4.1 G/DL (ref 3.4–4.8)
ALBUMIN/GLOB SERPL: 1.7 {RATIO} (ref 0.8–2)
ALP SERPL-CCNC: 221 U/L (ref 25–100)
ALT SERPL-CCNC: 11 U/L (ref 4–36)
ANION GAP SERPL CALCULATED.3IONS-SCNC: 12 MMOL/L (ref 3–16)
AST SERPL-CCNC: 19 U/L (ref 8–33)
BASOPHILS # BLD: 0 K/UL (ref 0–0.1)
BASOPHILS NFR BLD: 1 %
BILIRUB SERPL-MCNC: 0.3 MG/DL (ref 0.3–1.2)
BUN SERPL-MCNC: 18 MG/DL (ref 6–20)
CALCIUM SERPL-MCNC: 9.5 MG/DL (ref 8.3–10.6)
CHLORIDE SERPL-SCNC: 101 MMOL/L (ref 98–107)
CHOLEST SERPL-MCNC: 112 MG/DL (ref 0–200)
CK SERPL-CCNC: 25 U/L (ref 26–192)
CO2 SERPL-SCNC: 28 MMOL/L (ref 20–30)
CREAT SERPL-MCNC: 1.2 MG/DL (ref 0.6–1.2)
EOSINOPHIL # BLD: 0 K/UL (ref 0–0.4)
EOSINOPHIL NFR BLD: 0 %
ERYTHROCYTE [DISTWIDTH] IN BLOOD BY AUTOMATED COUNT: 14 % (ref 11–16)
GFR SERPLBLD CREATININE-BSD FMLA CKD-EPI: 52 ML/MIN/{1.73_M2}
GLOBULIN SER CALC-MCNC: 2.4 G/DL
GLUCOSE SERPL-MCNC: 106 MG/DL (ref 74–106)
HBA1C MFR BLD: 4.7 %
HCT VFR BLD AUTO: 32.2 % (ref 37–47)
HDLC SERPL-MCNC: 57 MG/DL (ref 40–60)
HGB BLD-MCNC: 9.6 G/DL (ref 11.5–16.5)
IMM GRANULOCYTES # BLD: 0.2 K/UL
IMM GRANULOCYTES NFR BLD: 8.3 % (ref 0–5)
LDLC SERPL CALC-MCNC: 44 MG/DL
LYMPHOCYTES # BLD: 0.9 K/UL (ref 1.5–4)
LYMPHOCYTES NFR BLD: 44.4 %
MAGNESIUM SERPL-MCNC: 2.2 MG/DL (ref 1.7–2.4)
MCH RBC QN AUTO: 29.3 PG (ref 27–32)
MCHC RBC AUTO-ENTMCNC: 29.8 G/DL (ref 31–35)
MCV RBC AUTO: 98.2 FL (ref 80–100)
MONOCYTES # BLD: 0.2 K/UL (ref 0.2–0.8)
MONOCYTES NFR BLD: 9.8 %
NEUTROPHILS # BLD: 0.8 K/UL (ref 2–7.5)
NEUTS SEG NFR BLD: 36.5 %
PHOSPHATE SERPL-MCNC: 3.8 MG/DL (ref 2.5–4.5)
PLATELET # BLD AUTO: 245 K/UL (ref 150–400)
PMV BLD AUTO: 10.4 FL (ref 6–10)
POTASSIUM SERPL-SCNC: 4 MMOL/L (ref 3.4–5.1)
PROT SERPL-MCNC: 6.5 G/DL (ref 6.4–8.3)
RBC # BLD AUTO: 3.28 M/UL (ref 3.8–5.8)
SODIUM SERPL-SCNC: 141 MMOL/L (ref 136–145)
TRIGL SERPL-MCNC: 57 MG/DL (ref 0–249)
TSH SERPL DL<=0.005 MIU/L-ACNC: 0.66 UIU/ML (ref 0.27–4.2)
URATE SERPL-MCNC: 7.6 MG/DL (ref 2.5–7.1)
VLDLC SERPL CALC-MCNC: 11 MG/DL
WBC # BLD AUTO: 2.1 K/UL (ref 4–11)

## 2025-05-21 PROCEDURE — 84550 ASSAY OF BLOOD/URIC ACID: CPT

## 2025-05-21 PROCEDURE — 83036 HEMOGLOBIN GLYCOSYLATED A1C: CPT

## 2025-05-21 PROCEDURE — 84100 ASSAY OF PHOSPHORUS: CPT

## 2025-05-21 PROCEDURE — 80061 LIPID PANEL: CPT

## 2025-05-21 PROCEDURE — 83735 ASSAY OF MAGNESIUM: CPT

## 2025-05-21 PROCEDURE — 80053 COMPREHEN METABOLIC PANEL: CPT

## 2025-05-21 PROCEDURE — 36415 COLL VENOUS BLD VENIPUNCTURE: CPT

## 2025-05-21 PROCEDURE — 84443 ASSAY THYROID STIM HORMONE: CPT

## 2025-05-21 PROCEDURE — 80197 ASSAY OF TACROLIMUS: CPT

## 2025-05-21 PROCEDURE — 82550 ASSAY OF CK (CPK): CPT

## 2025-05-21 PROCEDURE — 85025 COMPLETE CBC W/AUTO DIFF WBC: CPT

## 2025-05-22 LAB — TACROLIMUS BLOOD: 8 NG/ML (ref 5–20)

## 2025-06-20 ENCOUNTER — HOSPITAL ENCOUNTER (EMERGENCY)
Facility: HOSPITAL | Age: 60
Discharge: HOME | End: 2025-06-20
Attending: FAMILY MEDICINE
Payer: MEDICARE

## 2025-06-20 ENCOUNTER — APPOINTMENT (OUTPATIENT)
Dept: CT IMAGING | Facility: HOSPITAL | Age: 60
End: 2025-06-20
Payer: MEDICARE

## 2025-06-20 VITALS
HEIGHT: 64 IN | TEMPERATURE: 97.9 F | WEIGHT: 190 LBS | RESPIRATION RATE: 22 BRPM | DIASTOLIC BLOOD PRESSURE: 76 MMHG | HEART RATE: 68 BPM | BODY MASS INDEX: 32.44 KG/M2 | OXYGEN SATURATION: 98 % | SYSTOLIC BLOOD PRESSURE: 128 MMHG

## 2025-06-20 DIAGNOSIS — K57.32 DIVERTICULITIS OF COLON: Primary | ICD-10-CM

## 2025-06-20 LAB
ALBUMIN SERPL-MCNC: 4.2 G/DL (ref 3.4–4.8)
ALBUMIN/GLOB SERPL: 1.8 {RATIO} (ref 0.8–2)
ALP SERPL-CCNC: 212 U/L (ref 25–100)
ALT SERPL-CCNC: 10 U/L (ref 4–36)
ANION GAP SERPL CALCULATED.3IONS-SCNC: 12 MMOL/L (ref 3–16)
AST SERPL-CCNC: 21 U/L (ref 8–33)
BACTERIA URNS QL MICRO: ABNORMAL /HPF
BASOPHILS # BLD: 0 K/UL (ref 0–0.1)
BASOPHILS NFR BLD: 0.7 %
BILIRUB SERPL-MCNC: 0.5 MG/DL (ref 0.3–1.2)
BILIRUB UR QL STRIP.AUTO: NEGATIVE
BUN SERPL-MCNC: 13 MG/DL (ref 6–20)
CALCIUM SERPL-MCNC: 9.3 MG/DL (ref 8.3–10.6)
CHLORIDE SERPL-SCNC: 100 MMOL/L (ref 98–107)
CLARITY UR: CLEAR
CO2 SERPL-SCNC: 24 MMOL/L (ref 20–30)
COLOR UR: YELLOW
CREAT SERPL-MCNC: 1.3 MG/DL (ref 0.6–1.2)
EOSINOPHIL # BLD: 0 K/UL (ref 0–0.4)
EOSINOPHIL NFR BLD: 0 %
EPI CELLS #/AREA URNS HPF: ABNORMAL /HPF (ref 0–5)
ERYTHROCYTE [DISTWIDTH] IN BLOOD BY AUTOMATED COUNT: 13.6 % (ref 11–16)
GFR SERPLBLD CREATININE-BSD FMLA CKD-EPI: 47 ML/MIN/{1.73_M2}
GLOBULIN SER CALC-MCNC: 2.4 G/DL
GLUCOSE SERPL-MCNC: 145 MG/DL (ref 74–106)
GLUCOSE UR STRIP.AUTO-MCNC: NEGATIVE MG/DL
HCT VFR BLD AUTO: 29.3 % (ref 37–47)
HGB BLD-MCNC: 9 G/DL (ref 11.5–16.5)
HGB UR QL STRIP.AUTO: NEGATIVE
IMM GRANULOCYTES # BLD: 0.4 K/UL
IMM GRANULOCYTES NFR BLD: 12.4 % (ref 0–5)
KETONES UR STRIP.AUTO-MCNC: NEGATIVE MG/DL
LEUKOCYTE ESTERASE UR QL STRIP.AUTO: ABNORMAL
LIPASE SERPL-CCNC: 29 U/L (ref 5.6–51.3)
LYMPHOCYTES # BLD: 0.4 K/UL (ref 1.5–4)
LYMPHOCYTES NFR BLD: 13.1 %
MCH RBC QN AUTO: 29.1 PG (ref 27–32)
MCHC RBC AUTO-ENTMCNC: 30.7 G/DL (ref 31–35)
MCV RBC AUTO: 94.8 FL (ref 80–100)
MONOCYTES # BLD: 0.3 K/UL (ref 0.2–0.8)
MONOCYTES NFR BLD: 11 %
NEUTROPHILS # BLD: 1.8 K/UL (ref 2–7.5)
NEUTS SEG NFR BLD: 62.8 %
NITRITE UR QL STRIP.AUTO: NEGATIVE
PH UR STRIP.AUTO: 5.5 [PH] (ref 5–8)
PLATELET # BLD AUTO: 201 K/UL (ref 150–400)
PMV BLD AUTO: 11.7 FL (ref 6–10)
POTASSIUM SERPL-SCNC: 3.9 MMOL/L (ref 3.4–5.1)
PROT SERPL-MCNC: 6.6 G/DL (ref 6.4–8.3)
PROT UR STRIP.AUTO-MCNC: NEGATIVE MG/DL
RBC # BLD AUTO: 3.09 M/UL (ref 3.8–5.8)
RBC #/AREA URNS HPF: ABNORMAL /HPF (ref 0–4)
RENAL EPI CELLS #/AREA URNS HPF: ABNORMAL /HPF (ref 0–1)
SODIUM SERPL-SCNC: 136 MMOL/L (ref 136–145)
SP GR UR STRIP.AUTO: 1.01 (ref 1–1.03)
UA COMPLETE W REFLEX CULTURE PNL UR: ABNORMAL
UA DIPSTICK W REFLEX MICRO PNL UR: YES
URN SPEC COLLECT METH UR: ABNORMAL
UROBILINOGEN UR STRIP-ACNC: 0.2 E.U./DL
WBC # BLD AUTO: 2.9 K/UL (ref 4–11)
WBC #/AREA URNS HPF: ABNORMAL /HPF (ref 0–5)

## 2025-06-20 PROCEDURE — 85025 COMPLETE CBC W/AUTO DIFF WBC: CPT

## 2025-06-20 PROCEDURE — 80053 COMPREHEN METABOLIC PANEL: CPT

## 2025-06-20 PROCEDURE — 2580000003 HC RX 258: Performed by: FAMILY MEDICINE

## 2025-06-20 PROCEDURE — 99284 EMERGENCY DEPT VISIT MOD MDM: CPT

## 2025-06-20 PROCEDURE — 83690 ASSAY OF LIPASE: CPT

## 2025-06-20 PROCEDURE — 74176 CT ABD & PELVIS W/O CONTRAST: CPT

## 2025-06-20 PROCEDURE — 36415 COLL VENOUS BLD VENIPUNCTURE: CPT

## 2025-06-20 PROCEDURE — 81001 URINALYSIS AUTO W/SCOPE: CPT

## 2025-06-20 RX ORDER — 0.9 % SODIUM CHLORIDE 0.9 %
1000 INTRAVENOUS SOLUTION INTRAVENOUS ONCE
Status: COMPLETED | OUTPATIENT
Start: 2025-06-20 | End: 2025-06-20

## 2025-06-20 RX ORDER — METRONIDAZOLE 500 MG/1
500 TABLET ORAL 2 TIMES DAILY
Qty: 14 TABLET | Refills: 0 | Status: SHIPPED | OUTPATIENT
Start: 2025-06-20 | End: 2025-06-27

## 2025-06-20 RX ADMIN — SODIUM CHLORIDE 1000 ML: 0.9 INJECTION, SOLUTION INTRAVENOUS at 16:27

## 2025-06-20 ASSESSMENT — PAIN SCALES - GENERAL: PAINLEVEL_OUTOF10: 5

## 2025-06-20 ASSESSMENT — PAIN DESCRIPTION - ORIENTATION: ORIENTATION: LOWER

## 2025-06-20 ASSESSMENT — PAIN - FUNCTIONAL ASSESSMENT: PAIN_FUNCTIONAL_ASSESSMENT: 0-10

## 2025-06-20 ASSESSMENT — PAIN DESCRIPTION - LOCATION: LOCATION: ABDOMEN

## 2025-06-20 NOTE — ED PROVIDER NOTES
[facility]  EMERGENCY DEPARTMENT ENCOUNTER        Pt Name: Mitzi Higuera  MRN: [id number]  Birthdate 1965  Date of evaluation: 2025  Provider: Emanuel Reynoso MD  PCP: Janis Daily APRN - CNP  Note Started: 4:03 PM EDT 25    CHIEF COMPLAINT       Chief Complaint   Patient presents with    Abdominal Pain       HISTORY OF PRESENT ILLNESS: 1 or more Elements     History from : Patient    Limitations to history : None    Mitzi Higuera is a 60 y.o. female who presents hypertension, hypothyroidism, hyperlipidemia, pulmonary hypertension, CHF lung transplant because of lung fibrosis presented here today because of abdominal pain for the past few days are progressing getting worse.  Patient said he had a diarrhea since yesterday.  Denies any nausea or vomiting.  Rated the pain to be 6/10 in severity.  Denies any dysuria.    Nursing Notes were all reviewed and agreed with or any disagreements were addressed in the HPI.    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

## 2025-06-20 NOTE — ED TRIAGE NOTES
Pt has had ABD pain for the last 2 days and states that the pain has kept her up for the last two nights. PT states that she has a hx of UTI's. Pt also states that she has in itching sensation when voids

## 2025-07-03 ENCOUNTER — HOSPITAL ENCOUNTER (OUTPATIENT)
Facility: HOSPITAL | Age: 60
Discharge: HOME | End: 2025-07-03
Payer: MEDICARE

## 2025-07-03 LAB
ALBUMIN SERPL-MCNC: 4.1 G/DL (ref 3.4–4.8)
ALBUMIN/GLOB SERPL: 2.1 {RATIO} (ref 0.8–2)
ALP SERPL-CCNC: 167 U/L (ref 25–100)
ALT SERPL-CCNC: 10 U/L (ref 4–36)
ANION GAP SERPL CALCULATED.3IONS-SCNC: 13 MMOL/L (ref 3–16)
AST SERPL-CCNC: 23 U/L (ref 8–33)
BASOPHILS # BLD: 0 K/UL (ref 0–0.1)
BASOPHILS NFR BLD: 1.1 %
BILIRUB SERPL-MCNC: <0.2 MG/DL (ref 0.3–1.2)
BUN SERPL-MCNC: 18 MG/DL (ref 6–20)
CALCIUM SERPL-MCNC: 8.7 MG/DL (ref 8.3–10.6)
CHLORIDE SERPL-SCNC: 104 MMOL/L (ref 98–107)
CHOLEST SERPL-MCNC: 96 MG/DL (ref 0–200)
CO2 SERPL-SCNC: 23 MMOL/L (ref 20–30)
CREAT SERPL-MCNC: 1.5 MG/DL (ref 0.6–1.2)
EOSINOPHIL # BLD: 0 K/UL (ref 0–0.4)
EOSINOPHIL NFR BLD: 0 %
ERYTHROCYTE [DISTWIDTH] IN BLOOD BY AUTOMATED COUNT: 13.9 % (ref 11–16)
GFR SERPLBLD CREATININE-BSD FMLA CKD-EPI: 39 ML/MIN/{1.73_M2}
GLOBULIN SER CALC-MCNC: 2 G/DL
GLUCOSE SERPL-MCNC: 90 MG/DL (ref 74–106)
HBA1C MFR BLD: 5.7 %
HCT VFR BLD AUTO: 26.3 % (ref 37–47)
HDLC SERPL-MCNC: 46 MG/DL (ref 40–60)
HGB BLD-MCNC: 7.9 G/DL (ref 11.5–16.5)
IMM GRANULOCYTES # BLD: 0.3 K/UL
IMM GRANULOCYTES NFR BLD: 17.1 % (ref 0–5)
LDLC SERPL CALC-MCNC: 29 MG/DL
LYMPHOCYTES # BLD: 0.3 K/UL (ref 1.5–4)
LYMPHOCYTES NFR BLD: 19.4 %
MAGNESIUM SERPL-MCNC: 2.1 MG/DL (ref 1.7–2.4)
MCH RBC QN AUTO: 28.6 PG (ref 27–32)
MCHC RBC AUTO-ENTMCNC: 30 G/DL (ref 31–35)
MCV RBC AUTO: 95.3 FL (ref 80–100)
MONOCYTES # BLD: 0.2 K/UL (ref 0.2–0.8)
MONOCYTES NFR BLD: 12 %
NEUTROPHILS # BLD: 0.9 K/UL (ref 2–7.5)
NEUTS SEG NFR BLD: 50.4 %
PHOSPHATE SERPL-MCNC: 3.8 MG/DL (ref 2.5–4.5)
PLATELET # BLD AUTO: 211 K/UL (ref 150–400)
PMV BLD AUTO: 11 FL (ref 6–10)
POTASSIUM SERPL-SCNC: 4.4 MMOL/L (ref 3.4–5.1)
PROT SERPL-MCNC: 6.1 G/DL (ref 6.4–8.3)
RBC # BLD AUTO: 2.76 M/UL (ref 3.8–5.8)
SODIUM SERPL-SCNC: 140 MMOL/L (ref 136–145)
TRIGL SERPL-MCNC: 107 MG/DL (ref 0–249)
TSH SERPL DL<=0.005 MIU/L-ACNC: 1.17 UIU/ML (ref 0.27–4.2)
VLDLC SERPL CALC-MCNC: 21 MG/DL
WBC # BLD AUTO: 1.8 K/UL (ref 4–11)

## 2025-07-03 PROCEDURE — 83735 ASSAY OF MAGNESIUM: CPT

## 2025-07-03 PROCEDURE — 36415 COLL VENOUS BLD VENIPUNCTURE: CPT

## 2025-07-03 PROCEDURE — 85025 COMPLETE CBC W/AUTO DIFF WBC: CPT

## 2025-07-03 PROCEDURE — 80197 ASSAY OF TACROLIMUS: CPT

## 2025-07-03 PROCEDURE — 80061 LIPID PANEL: CPT

## 2025-07-03 PROCEDURE — 84100 ASSAY OF PHOSPHORUS: CPT

## 2025-07-03 PROCEDURE — 80053 COMPREHEN METABOLIC PANEL: CPT

## 2025-07-03 PROCEDURE — 83036 HEMOGLOBIN GLYCOSYLATED A1C: CPT

## 2025-07-03 PROCEDURE — 84443 ASSAY THYROID STIM HORMONE: CPT

## 2025-07-05 LAB — TACROLIMUS BLOOD: 14.1 NG/ML (ref 5–20)

## 2025-07-14 ENCOUNTER — HOSPITAL ENCOUNTER (OUTPATIENT)
Facility: HOSPITAL | Age: 60
Discharge: HOME | End: 2025-07-14
Payer: MEDICARE

## 2025-07-14 LAB
REASON FOR REJECTION: NORMAL
REJECTED TEST: NORMAL

## 2025-07-14 PROCEDURE — 82653 EL-1 FECAL QUANTITATIVE: CPT

## 2025-07-14 PROCEDURE — 82705 FATS/LIPIDS FECES QUAL: CPT

## 2025-07-14 PROCEDURE — 87506 IADNA-DNA/RNA PROBE TQ 6-11: CPT

## 2025-07-15 LAB — GI PATHOGENS PNL STL NAA+PROBE: NORMAL

## 2025-07-18 LAB
FAT STL QL: NORMAL
NEUTRAL FAT STL QL: NORMAL

## 2025-07-21 LAB — ELASTASE PANC STL-MCNT: >800 UG/G

## 2025-08-15 ENCOUNTER — HOSPITAL ENCOUNTER (OUTPATIENT)
Dept: LAB | Facility: HOSPITAL | Age: 60
Discharge: HOME | End: 2025-08-15
Payer: MEDICARE

## 2025-08-15 ENCOUNTER — HOSPITAL ENCOUNTER (EMERGENCY)
Facility: HOSPITAL | Age: 60
Discharge: HOME | End: 2025-08-15
Attending: EMERGENCY MEDICINE
Payer: MEDICARE

## 2025-08-15 ENCOUNTER — APPOINTMENT (OUTPATIENT)
Dept: GENERAL RADIOLOGY | Facility: HOSPITAL | Age: 60
End: 2025-08-15
Payer: MEDICARE

## 2025-08-15 VITALS
OXYGEN SATURATION: 96 % | RESPIRATION RATE: 19 BRPM | SYSTOLIC BLOOD PRESSURE: 107 MMHG | WEIGHT: 169 LBS | TEMPERATURE: 98.5 F | DIASTOLIC BLOOD PRESSURE: 75 MMHG | HEIGHT: 64 IN | HEART RATE: 72 BPM | BODY MASS INDEX: 28.85 KG/M2

## 2025-08-15 DIAGNOSIS — J18.9 PNEUMONIA OF BOTH LOWER LOBES DUE TO INFECTIOUS ORGANISM: Primary | ICD-10-CM

## 2025-08-15 LAB
ALBUMIN SERPL-MCNC: 4 G/DL (ref 3.4–4.8)
ALBUMIN SERPL-MCNC: 4 G/DL (ref 3.4–4.8)
ALBUMIN/GLOB SERPL: 1.7 {RATIO} (ref 0.8–2)
ALBUMIN/GLOB SERPL: 1.7 {RATIO} (ref 0.8–2)
ALP SERPL-CCNC: 198 U/L (ref 25–100)
ALP SERPL-CCNC: 201 U/L (ref 25–100)
ALT SERPL-CCNC: 9 U/L (ref 4–36)
ALT SERPL-CCNC: 9 U/L (ref 4–36)
ANION GAP SERPL CALCULATED.3IONS-SCNC: 15 MMOL/L (ref 3–16)
ANION GAP SERPL CALCULATED.3IONS-SCNC: 17 MMOL/L (ref 3–16)
AST SERPL-CCNC: 17 U/L (ref 8–33)
AST SERPL-CCNC: 18 U/L (ref 8–33)
BASOPHILS # BLD: 0 K/UL (ref 0–0.1)
BASOPHILS # BLD: 0 K/UL (ref 0–0.1)
BASOPHILS NFR BLD: 0.8 %
BASOPHILS NFR BLD: 1 %
BILIRUB SERPL-MCNC: 0.3 MG/DL (ref 0.3–1.2)
BILIRUB SERPL-MCNC: 0.4 MG/DL (ref 0.3–1.2)
BUN SERPL-MCNC: 16 MG/DL (ref 6–20)
BUN SERPL-MCNC: 16 MG/DL (ref 6–20)
CALCIUM SERPL-MCNC: 9 MG/DL (ref 8.3–10.6)
CALCIUM SERPL-MCNC: 9 MG/DL (ref 8.3–10.6)
CHLORIDE SERPL-SCNC: 102 MMOL/L (ref 98–107)
CHLORIDE SERPL-SCNC: 102 MMOL/L (ref 98–107)
CHOLEST SERPL-MCNC: 145 MG/DL (ref 0–200)
CK SERPL-CCNC: 26 U/L (ref 26–192)
CO2 SERPL-SCNC: 21 MMOL/L (ref 20–30)
CO2 SERPL-SCNC: 23 MMOL/L (ref 20–30)
CREAT SERPL-MCNC: 1.4 MG/DL (ref 0.6–1.2)
CREAT SERPL-MCNC: 1.4 MG/DL (ref 0.6–1.2)
EOSINOPHIL # BLD: 0 K/UL (ref 0–0.4)
EOSINOPHIL # BLD: 0 K/UL (ref 0–0.4)
EOSINOPHIL NFR BLD: 0 %
EOSINOPHIL NFR BLD: 0 %
ERYTHROCYTE [DISTWIDTH] IN BLOOD BY AUTOMATED COUNT: 14.4 % (ref 11–16)
ERYTHROCYTE [DISTWIDTH] IN BLOOD BY AUTOMATED COUNT: 14.4 % (ref 11–16)
GFR SERPLBLD CREATININE-BSD FMLA CKD-EPI: 43 ML/MIN/{1.73_M2}
GFR SERPLBLD CREATININE-BSD FMLA CKD-EPI: 43 ML/MIN/{1.73_M2}
GLOBULIN SER CALC-MCNC: 2.3 G/DL
GLOBULIN SER CALC-MCNC: 2.3 G/DL
GLUCOSE SERPL-MCNC: 152 MG/DL (ref 74–106)
GLUCOSE SERPL-MCNC: 185 MG/DL (ref 74–106)
HCT VFR BLD AUTO: 28.6 % (ref 37–47)
HCT VFR BLD AUTO: 29.2 % (ref 37–47)
HDLC SERPL-MCNC: 48 MG/DL (ref 40–60)
HGB BLD-MCNC: 8.4 G/DL (ref 11.5–16.5)
HGB BLD-MCNC: 8.6 G/DL (ref 11.5–16.5)
IMM GRANULOCYTES # BLD: 0.2 K/UL
IMM GRANULOCYTES # BLD: 0.2 K/UL
IMM GRANULOCYTES NFR BLD: 10.3 % (ref 0–5)
IMM GRANULOCYTES NFR BLD: 9.4 % (ref 0–5)
LDLC SERPL CALC-MCNC: 76 MG/DL
LIPASE SERPL-CCNC: 44 U/L (ref 5.6–51.3)
LYMPHOCYTES # BLD: 0.3 K/UL (ref 1.5–4)
LYMPHOCYTES # BLD: 0.4 K/UL (ref 1.5–4)
LYMPHOCYTES NFR BLD: 15.3 %
LYMPHOCYTES NFR BLD: 16.2 %
MAGNESIUM SERPL-MCNC: 2 MG/DL (ref 1.7–2.4)
MAGNESIUM SERPL-MCNC: 2 MG/DL (ref 1.7–2.4)
MCH RBC QN AUTO: 26.6 PG (ref 27–32)
MCH RBC QN AUTO: 26.8 PG (ref 27–32)
MCHC RBC AUTO-ENTMCNC: 29.4 G/DL (ref 31–35)
MCHC RBC AUTO-ENTMCNC: 29.5 G/DL (ref 31–35)
MCV RBC AUTO: 90.5 FL (ref 80–100)
MCV RBC AUTO: 91 FL (ref 80–100)
MONOCYTES # BLD: 0.2 K/UL (ref 0.2–0.8)
MONOCYTES # BLD: 0.4 K/UL (ref 0.2–0.8)
MONOCYTES NFR BLD: 11.3 %
MONOCYTES NFR BLD: 14.1 %
NEUTROPHILS # BLD: 1.3 K/UL (ref 2–7.5)
NEUTROPHILS # BLD: 1.5 K/UL (ref 2–7.5)
NEUTS SEG NFR BLD: 60.4 %
NEUTS SEG NFR BLD: 61.2 %
PHOSPHATE SERPL-MCNC: 3.5 MG/DL (ref 2.5–4.5)
PLATELET # BLD AUTO: 189 K/UL (ref 150–400)
PLATELET # BLD AUTO: 195 K/UL (ref 150–400)
PMV BLD AUTO: 11.6 FL (ref 6–10)
PMV BLD AUTO: 12.5 FL (ref 6–10)
POTASSIUM SERPL-SCNC: 4.2 MMOL/L (ref 3.4–5.1)
POTASSIUM SERPL-SCNC: 4.3 MMOL/L (ref 3.4–5.1)
PROT SERPL-MCNC: 6.3 G/DL (ref 6.4–8.3)
PROT SERPL-MCNC: 6.3 G/DL (ref 6.4–8.3)
RBC # BLD AUTO: 3.16 M/UL (ref 3.8–5.8)
RBC # BLD AUTO: 3.21 M/UL (ref 3.8–5.8)
SODIUM SERPL-SCNC: 140 MMOL/L (ref 136–145)
SODIUM SERPL-SCNC: 140 MMOL/L (ref 136–145)
TRIGL SERPL-MCNC: 105 MG/DL (ref 0–249)
TROPONIN, HIGH SENSITIVITY: 12 NG/L (ref 0–14)
TROPONIN, HIGH SENSITIVITY: 13 NG/L (ref 0–14)
TSH SERPL DL<=0.005 MIU/L-ACNC: 0.02 UIU/ML (ref 0.27–4.2)
URATE SERPL-MCNC: 7.9 MG/DL (ref 2.5–7.1)
VLDLC SERPL CALC-MCNC: 21 MG/DL
WBC # BLD AUTO: 2 K/UL (ref 4–11)
WBC # BLD AUTO: 2.6 K/UL (ref 4–11)

## 2025-08-15 PROCEDURE — 36415 COLL VENOUS BLD VENIPUNCTURE: CPT

## 2025-08-15 PROCEDURE — 83690 ASSAY OF LIPASE: CPT

## 2025-08-15 PROCEDURE — 2580000003 HC RX 258: Performed by: EMERGENCY MEDICINE

## 2025-08-15 PROCEDURE — 82550 ASSAY OF CK (CPK): CPT

## 2025-08-15 PROCEDURE — 80197 ASSAY OF TACROLIMUS: CPT

## 2025-08-15 PROCEDURE — 84550 ASSAY OF BLOOD/URIC ACID: CPT

## 2025-08-15 PROCEDURE — 6360000002 HC RX W HCPCS: Performed by: EMERGENCY MEDICINE

## 2025-08-15 PROCEDURE — 85025 COMPLETE CBC W/AUTO DIFF WBC: CPT

## 2025-08-15 PROCEDURE — 71045 X-RAY EXAM CHEST 1 VIEW: CPT

## 2025-08-15 PROCEDURE — 6370000000 HC RX 637 (ALT 250 FOR IP): Performed by: EMERGENCY MEDICINE

## 2025-08-15 PROCEDURE — 96365 THER/PROPH/DIAG IV INF INIT: CPT

## 2025-08-15 PROCEDURE — 80061 LIPID PANEL: CPT

## 2025-08-15 PROCEDURE — 99285 EMERGENCY DEPT VISIT HI MDM: CPT

## 2025-08-15 PROCEDURE — 84100 ASSAY OF PHOSPHORUS: CPT

## 2025-08-15 PROCEDURE — 84484 ASSAY OF TROPONIN QUANT: CPT

## 2025-08-15 PROCEDURE — 80053 COMPREHEN METABOLIC PANEL: CPT

## 2025-08-15 PROCEDURE — 84443 ASSAY THYROID STIM HORMONE: CPT

## 2025-08-15 PROCEDURE — 83735 ASSAY OF MAGNESIUM: CPT

## 2025-08-15 RX ORDER — ASPIRIN 81 MG/1
324 TABLET, CHEWABLE ORAL ONCE
Status: COMPLETED | OUTPATIENT
Start: 2025-08-15 | End: 2025-08-15

## 2025-08-15 RX ORDER — AZITHROMYCIN 500 MG/1
500 TABLET, FILM COATED ORAL DAILY
Qty: 7 TABLET | Refills: 0 | Status: SHIPPED | OUTPATIENT
Start: 2025-08-15 | End: 2025-08-22

## 2025-08-15 RX ORDER — KETOROLAC TROMETHAMINE 15 MG/ML
15 INJECTION, SOLUTION INTRAMUSCULAR; INTRAVENOUS ONCE
Status: DISCONTINUED | OUTPATIENT
Start: 2025-08-15 | End: 2025-08-15 | Stop reason: HOSPADM

## 2025-08-15 RX ADMIN — ASPIRIN 81 MG 243 MG: 81 TABLET ORAL at 17:02

## 2025-08-15 RX ADMIN — CEFTRIAXONE 1000 MG: 1 INJECTION, POWDER, FOR SOLUTION INTRAMUSCULAR; INTRAVENOUS at 18:23

## 2025-08-15 ASSESSMENT — PAIN DESCRIPTION - LOCATION: LOCATION: CHEST

## 2025-08-15 ASSESSMENT — PAIN SCALES - GENERAL
PAINLEVEL_OUTOF10: 0
PAINLEVEL_OUTOF10: 4

## 2025-08-17 LAB — TACROLIMUS BLOOD: 3.6 NG/ML (ref 5–20)

## 2025-08-18 LAB
CMV DNA SERPL NAA+PROBE-ACNC: NOT DETECTED IU/ML
CMV DNA SERPL NAA+PROBE-LOG IU: NOT DETECTED LOG IU/ML
CMV DNA SERPL QL NAA+PROBE: NOT DETECTED

## (undated) DEVICE — Device

## (undated) DEVICE — 15 DEG. MICROKNIFE - 3MM: Brand: SHARPOINT

## (undated) DEVICE — HP CONCL INTREPID COAX I/A CRV .3MM

## (undated) DEVICE — GLV SURG SENSICARE W/ALOE PF LF SZ6 STRL

## (undated) DEVICE — MEDI-VAC NON-CONDUCTIVE SUCTION TUBING: Brand: CARDINAL HEALTH

## (undated) DEVICE — GLV SURG SENSICARE W/ALOE PF LF 8 STRL

## (undated) DEVICE — SOL IRRIG H2O 1000ML STRL

## (undated) DEVICE — LUBE JELLY PK/2.75GM STRL BX/144

## (undated) DEVICE — CANN IRR/INJ AIR ANT CHAMBER 6MM BEND 27G

## (undated) DEVICE — CLEARCUT® HP2 SLIT KNIFE INTREPID MICRO-COAXIAL SYSTEM 2.4 DB: Brand: CLEARCUT®; INTREPID

## (undated) DEVICE — SYR LUERLOK 5CC

## (undated) DEVICE — POST OP EYE CARE KIT: Brand: MEDLINE

## (undated) DEVICE — VLV SXN AIR/H2O ORCAPOD3 1P/U STRL